# Patient Record
Sex: FEMALE | Race: WHITE | Employment: PART TIME | URBAN - METROPOLITAN AREA
[De-identification: names, ages, dates, MRNs, and addresses within clinical notes are randomized per-mention and may not be internally consistent; named-entity substitution may affect disease eponyms.]

---

## 2017-01-24 RX ORDER — LEVOTHYROXINE SODIUM 0.03 MG/1
TABLET ORAL
Qty: 30 TABLET | Refills: 5 | Status: SHIPPED | OUTPATIENT
Start: 2017-01-24 | End: 2017-04-20 | Stop reason: DRUGHIGH

## 2017-02-06 RX ORDER — HYDROXYZINE PAMOATE 25 MG/1
CAPSULE ORAL
Qty: 90 CAPSULE | Refills: 1 | Status: SHIPPED | OUTPATIENT
Start: 2017-02-06 | End: 2019-05-16

## 2017-04-18 ENCOUNTER — OFFICE VISIT (OUTPATIENT)
Dept: FAMILY MEDICINE CLINIC | Age: 37
End: 2017-04-18

## 2017-04-18 VITALS
TEMPERATURE: 99.3 F | BODY MASS INDEX: 48.54 KG/M2 | DIASTOLIC BLOOD PRESSURE: 84 MMHG | OXYGEN SATURATION: 98 % | RESPIRATION RATE: 20 BRPM | HEART RATE: 78 BPM | SYSTOLIC BLOOD PRESSURE: 122 MMHG | WEIGHT: 274 LBS

## 2017-04-18 DIAGNOSIS — N63.10 BREAST MASS, RIGHT: ICD-10-CM

## 2017-04-18 DIAGNOSIS — E03.9 ACQUIRED HYPOTHYROIDISM: ICD-10-CM

## 2017-04-18 DIAGNOSIS — R10.84 GENERALIZED ABDOMINAL PAIN: Primary | ICD-10-CM

## 2017-04-18 LAB
BILIRUBIN, POC: NORMAL
BLOOD URINE, POC: NORMAL
CLARITY, POC: NORMAL
COLOR, POC: NORMAL
GLUCOSE URINE, POC: NORMAL
KETONES, POC: NORMAL
LEUKOCYTE EST, POC: NORMAL
NITRITE, POC: NORMAL
PH, POC: 5.5
PROTEIN, POC: NORMAL
SPECIFIC GRAVITY, POC: 1.03
T4 FREE: 1.11 NG/DL (ref 0.93–1.7)
TSH REFLEX: 6.78 UIU/ML (ref 0.27–4.2)
UROBILINOGEN, POC: 3.5

## 2017-04-18 PROCEDURE — 99214 OFFICE O/P EST MOD 30 MIN: CPT | Performed by: NURSE PRACTITIONER

## 2017-04-18 PROCEDURE — 81003 URINALYSIS AUTO W/O SCOPE: CPT | Performed by: NURSE PRACTITIONER

## 2017-04-18 RX ORDER — DICYCLOMINE HYDROCHLORIDE 10 MG/1
10 CAPSULE ORAL
Qty: 120 CAPSULE | Refills: 0 | Status: SHIPPED | OUTPATIENT
Start: 2017-04-18 | End: 2017-08-16 | Stop reason: SDUPTHER

## 2017-04-18 ASSESSMENT — ENCOUNTER SYMPTOMS
HEMATOCHEZIA: 0
BELCHING: 0
DIARRHEA: 0
ABDOMINAL PAIN: 1
FLATUS: 0
CONSTIPATION: 0
NAUSEA: 0
VOMITING: 0

## 2017-04-18 ASSESSMENT — CROHNS DISEASE ACTIVITY INDEX (CDAI): CDAI SCORE: 0

## 2017-04-19 ENCOUNTER — TELEPHONE (OUTPATIENT)
Dept: FAMILY MEDICINE CLINIC | Age: 37
End: 2017-04-19

## 2017-04-19 DIAGNOSIS — N63.10 BREAST MASS, RIGHT: Primary | ICD-10-CM

## 2017-04-20 DIAGNOSIS — E03.9 PRIMARY HYPOTHYROIDISM: Primary | ICD-10-CM

## 2017-04-20 RX ORDER — LEVOTHYROXINE SODIUM 0.2 MG/1
200 TABLET ORAL DAILY
Qty: 30 TABLET | Refills: 3 | Status: SHIPPED | OUTPATIENT
Start: 2017-04-20 | End: 2017-09-03 | Stop reason: SDUPTHER

## 2017-05-01 ENCOUNTER — HOSPITAL ENCOUNTER (OUTPATIENT)
Dept: ULTRASOUND IMAGING | Age: 37
Discharge: HOME | End: 2017-05-01
Payer: COMMERCIAL

## 2017-05-01 ENCOUNTER — HOSPITAL ENCOUNTER (OUTPATIENT)
Dept: WOMENS IMAGING | Age: 37
Discharge: HOME | End: 2017-05-01
Payer: COMMERCIAL

## 2017-05-01 DIAGNOSIS — N63.10 BREAST MASS, RIGHT: ICD-10-CM

## 2017-05-01 DIAGNOSIS — N63.10 LUMP OF RIGHT BREAST: Primary | ICD-10-CM

## 2017-05-01 DIAGNOSIS — N63.10 LUMP OF RIGHT BREAST: ICD-10-CM

## 2017-05-01 PROCEDURE — 76642 ULTRASOUND BREAST LIMITED: CPT

## 2017-05-01 PROCEDURE — G0204 DX MAMMO INCL CAD BI: HCPCS

## 2017-05-03 ENCOUNTER — HOSPITAL ENCOUNTER (EMERGENCY)
Age: 37
Discharge: HOME | End: 2017-05-03
Payer: COMMERCIAL

## 2017-05-03 VITALS
TEMPERATURE: 98 F | DIASTOLIC BLOOD PRESSURE: 79 MMHG | HEART RATE: 89 BPM | RESPIRATION RATE: 16 BRPM | HEIGHT: 64 IN | SYSTOLIC BLOOD PRESSURE: 128 MMHG | OXYGEN SATURATION: 99 % | BODY MASS INDEX: 46.44 KG/M2 | WEIGHT: 272 LBS

## 2017-05-03 DIAGNOSIS — N61.0 CELLULITIS OF BREAST: Primary | ICD-10-CM

## 2017-05-03 PROCEDURE — 99282 EMERGENCY DEPT VISIT SF MDM: CPT

## 2017-05-03 RX ORDER — DOXYCYCLINE HYCLATE 100 MG
100 TABLET ORAL 2 TIMES DAILY
Qty: 20 TABLET | Refills: 0 | Status: SHIPPED | OUTPATIENT
Start: 2017-05-03 | End: 2017-05-13

## 2017-05-03 RX ORDER — HYDROCODONE BITARTRATE AND ACETAMINOPHEN 5; 325 MG/1; MG/1
1-2 TABLET ORAL EVERY 6 HOURS PRN
Qty: 14 TABLET | Refills: 0 | Status: SHIPPED | OUTPATIENT
Start: 2017-05-03 | End: 2017-05-10

## 2017-05-03 ASSESSMENT — PAIN SCALES - GENERAL: PAINLEVEL_OUTOF10: 9

## 2017-05-03 ASSESSMENT — PAIN DESCRIPTION - LOCATION: LOCATION: BREAST

## 2017-05-03 ASSESSMENT — PAIN DESCRIPTION - ORIENTATION: ORIENTATION: RIGHT

## 2017-05-05 ENCOUNTER — OFFICE VISIT (OUTPATIENT)
Dept: FAMILY MEDICINE CLINIC | Age: 37
End: 2017-05-05

## 2017-05-05 VITALS
BODY MASS INDEX: 47.03 KG/M2 | SYSTOLIC BLOOD PRESSURE: 112 MMHG | DIASTOLIC BLOOD PRESSURE: 78 MMHG | HEART RATE: 89 BPM | RESPIRATION RATE: 20 BRPM | WEIGHT: 274 LBS | TEMPERATURE: 97.8 F

## 2017-05-05 DIAGNOSIS — N63.10 BREAST MASS, RIGHT: ICD-10-CM

## 2017-05-05 DIAGNOSIS — N63.10 LUMP OF RIGHT BREAST: Primary | ICD-10-CM

## 2017-05-05 PROCEDURE — 99213 OFFICE O/P EST LOW 20 MIN: CPT | Performed by: NURSE PRACTITIONER

## 2017-05-05 RX ORDER — DOXYCYCLINE 100 MG/1
CAPSULE ORAL
COMMUNITY
Start: 2017-05-03 | End: 2019-05-16 | Stop reason: ALTCHOICE

## 2017-05-08 ENCOUNTER — OFFICE VISIT (OUTPATIENT)
Dept: SURGERY | Age: 37
End: 2017-05-08

## 2017-05-08 VITALS — TEMPERATURE: 98.3 F | WEIGHT: 273 LBS | BODY MASS INDEX: 46.61 KG/M2 | HEIGHT: 64 IN

## 2017-05-08 DIAGNOSIS — N60.81 SEBACEOUS CYST OF BREAST, RIGHT: ICD-10-CM

## 2017-05-08 DIAGNOSIS — L02.213 ABSCESS OF CHEST WALL: Primary | ICD-10-CM

## 2017-05-08 PROCEDURE — 11401 EXC TR-EXT B9+MARG 0.6-1 CM: CPT | Performed by: SURGERY

## 2017-05-08 PROCEDURE — 99201 PR OFFICE OUTPATIENT NEW 10 MINUTES: CPT | Performed by: SURGERY

## 2017-05-09 RX ORDER — LEVOTHYROXINE SODIUM 175 UG/1
TABLET ORAL
Qty: 30 TABLET | Refills: 3 | Status: SHIPPED | OUTPATIENT
Start: 2017-05-09 | End: 2019-03-15 | Stop reason: DRUGHIGH

## 2017-05-15 ENCOUNTER — OFFICE VISIT (OUTPATIENT)
Dept: SURGERY | Age: 37
End: 2017-05-15

## 2017-05-15 VITALS
HEART RATE: 84 BPM | BODY MASS INDEX: 46.78 KG/M2 | HEIGHT: 64 IN | SYSTOLIC BLOOD PRESSURE: 118 MMHG | RESPIRATION RATE: 12 BRPM | WEIGHT: 274 LBS | TEMPERATURE: 98 F | DIASTOLIC BLOOD PRESSURE: 80 MMHG

## 2017-05-15 DIAGNOSIS — Z09 SURGERY FOLLOW-UP: Primary | ICD-10-CM

## 2017-05-15 PROCEDURE — 99024 POSTOP FOLLOW-UP VISIT: CPT | Performed by: SURGERY

## 2017-08-02 DIAGNOSIS — E03.9 PRIMARY HYPOTHYROIDISM: ICD-10-CM

## 2017-08-02 LAB
T4 FREE: 1.21 NG/DL (ref 0.93–1.7)
TSH SERPL DL<=0.05 MIU/L-ACNC: 2.84 UIU/ML (ref 0.27–4.2)

## 2017-08-16 DIAGNOSIS — R10.84 GENERALIZED ABDOMINAL PAIN: ICD-10-CM

## 2017-08-16 RX ORDER — DICYCLOMINE HYDROCHLORIDE 10 MG/1
10 CAPSULE ORAL
Qty: 120 CAPSULE | Refills: 0 | Status: SHIPPED | OUTPATIENT
Start: 2017-08-16 | End: 2017-09-13 | Stop reason: SDUPTHER

## 2017-09-05 RX ORDER — LEVOTHYROXINE SODIUM 0.2 MG/1
TABLET ORAL
Qty: 30 TABLET | Refills: 5 | Status: SHIPPED | OUTPATIENT
Start: 2017-09-05 | End: 2018-03-12 | Stop reason: SDUPTHER

## 2017-09-13 DIAGNOSIS — R10.84 GENERALIZED ABDOMINAL PAIN: ICD-10-CM

## 2017-09-13 RX ORDER — DICYCLOMINE HYDROCHLORIDE 10 MG/1
CAPSULE ORAL
Qty: 120 CAPSULE | Refills: 0 | Status: SHIPPED | OUTPATIENT
Start: 2017-09-13 | End: 2019-05-16 | Stop reason: SDUPTHER

## 2018-01-04 ENCOUNTER — OFFICE VISIT (OUTPATIENT)
Dept: FAMILY MEDICINE CLINIC | Age: 38
End: 2018-01-04

## 2018-01-04 VITALS
DIASTOLIC BLOOD PRESSURE: 80 MMHG | BODY MASS INDEX: 46.78 KG/M2 | HEART RATE: 85 BPM | SYSTOLIC BLOOD PRESSURE: 112 MMHG | OXYGEN SATURATION: 98 % | RESPIRATION RATE: 16 BRPM | WEIGHT: 274 LBS | HEIGHT: 64 IN | TEMPERATURE: 98.6 F

## 2018-01-04 DIAGNOSIS — M25.551 RIGHT HIP PAIN: Primary | ICD-10-CM

## 2018-01-04 DIAGNOSIS — M70.61 GREATER TROCHANTERIC BURSITIS OF RIGHT HIP: ICD-10-CM

## 2018-01-04 PROCEDURE — 1036F TOBACCO NON-USER: CPT | Performed by: NURSE PRACTITIONER

## 2018-01-04 PROCEDURE — G8417 CALC BMI ABV UP PARAM F/U: HCPCS | Performed by: NURSE PRACTITIONER

## 2018-01-04 PROCEDURE — G8484 FLU IMMUNIZE NO ADMIN: HCPCS | Performed by: NURSE PRACTITIONER

## 2018-01-04 PROCEDURE — 99213 OFFICE O/P EST LOW 20 MIN: CPT | Performed by: NURSE PRACTITIONER

## 2018-01-04 PROCEDURE — G8427 DOCREV CUR MEDS BY ELIG CLIN: HCPCS | Performed by: NURSE PRACTITIONER

## 2018-01-04 RX ORDER — PREDNISONE 10 MG/1
TABLET ORAL
Qty: 30 TABLET | Refills: 0 | Status: SHIPPED | OUTPATIENT
Start: 2018-01-04 | End: 2018-02-14

## 2018-01-04 ASSESSMENT — PATIENT HEALTH QUESTIONNAIRE - PHQ9
SUM OF ALL RESPONSES TO PHQ QUESTIONS 1-9: 0
2. FEELING DOWN, DEPRESSED OR HOPELESS: 0
1. LITTLE INTEREST OR PLEASURE IN DOING THINGS: 0
SUM OF ALL RESPONSES TO PHQ9 QUESTIONS 1 & 2: 0

## 2018-01-04 ASSESSMENT — ENCOUNTER SYMPTOMS: BACK PAIN: 0

## 2018-01-04 NOTE — PROGRESS NOTES
discontinued medications. Return in about 2 months (around 3/4/2018) for Irena Tucker. Reviewed with the patient: current clinical status, medications, activities and diet. Side effects, adverse effects of the medication prescribed today, as well as treatment plan/ rationale and result expectations have been discussed with the patient who expresses understanding and desires to proceed. Close follow up to evaluate treatment results and for coordination of care. I have reviewed the patient's medical history in detail and updated the computerized patient record.     Nj Alcantara NP

## 2018-01-04 NOTE — PATIENT INSTRUCTIONS
knee. With your opposite hand, reach across your body, and then gently pull your knee toward your opposite shoulder. 3. Hold the stretch for 15 to 30 seconds. 4. Repeat 2 to 4 times. Double knee-to-chest    1. Lie on your back with your knees bent and your feet flat on the floor. You can put a small pillow under your head and neck if it is more comfortable. 2. Bring both knees to your chest.  3. Keep your lower back pressed to the floor. Hold for 15 to 30 seconds. 4. Relax, and lower your knees to the starting position. 5. Repeat 2 to 4 times. Follow-up care is a key part of your treatment and safety. Be sure to make and go to all appointments, and call your doctor if you are having problems. It's also a good idea to know your test results and keep a list of the medicines you take. Where can you learn more? Go to https://Pear (formerly [employer]SoundCure. org and sign in to your [employer] account. Enter X636 in the [employer] box to learn more about \"Trochanteric Bursitis: Exercises. \"     If you do not have an account, please click on the \"Sign Up Now\" link. Current as of: March 21, 2017  Content Version: 11.5  © 9161-8938 Healthwise, [employer]. Care instructions adapted under license by [facility] ([facility]). If you have questions about a medical condition or this instruction, always ask your healthcare professional. [place] [address] any warranty or liability for your use of this information. Patient Education        Learning About a Hip Bursa Injection  What is a hip bursa injection? A bursa is a small sac of fluid that cushions an area between tendons and bones. The bursa on the outer side of the hip bone is called the trochanteric (say \"RGBN-xrr-CCEC-ik\") bursa. Sometimes it can become swollen and painful. This condition is called bursitis. An injection into the bursa is a shot of medicine to reduce pain and swelling.  The medicines may include pain relievers and steroid medicines. A steroid shot can sometimes help with short-term pain relief when other treatments haven't worked. How is a hip bursa injection done? First the area over the bursa will be cleaned. Your doctor may use a tiny needle to numb the skin over the area where you will get the injection. If a tiny needle is used to numb the area, your doctor will use another needle to inject the medicine. Your doctor may use a pain reliever, a steroid, or both. You may feel some pressure or discomfort. The procedure takes 10 to 30 minutes. But the shot itself usually takes only a few minutes. Your doctor may put ice on the area before you go home. You will probably go home shortly after your shot. What can you expect after the injection? You may have numbness over your hip for a few hours. If your shot included both a pain reliever and a steroid, the pain will probably go away right away. But it might come back after a few hours. This might happen if the pain reliever wears off and the steroid has not started to work yet. The steroid medicine generally takes a few days to work. If the pain comes back, you can put ice or a cold pack on your hip for 10 to 20 minutes at a time. Put a thin cloth between the ice and your skin. Follow your doctor's instructions carefully. Avoid strenuous activities on the day you get the shot, especially those that put stress on your hip. Steroids don't always work. But when they do, the pain relief can last for several days to a few months or longer. Follow-up care is a key part of your treatment and safety. Be sure to make and go to all appointments, and call your doctor if you are having problems. It's also a good idea to know your test results and keep a list of the medicines you take. Where can you learn more? Go to https://jazmyn.CommunityForce. org and sign in to your [email or web address] account.  Enter B755 in the [employer]Money box to learn more about \"Learning About a Hip

## 2018-02-09 ENCOUNTER — TELEPHONE (OUTPATIENT)
Dept: FAMILY MEDICINE CLINIC | Age: 38
End: 2018-02-09

## 2018-02-09 NOTE — TELEPHONE ENCOUNTER
Patient was seen at [facility] 2/6/18 for back lower lumbar strain. X-ray showed degenerative disease in spine. Patient has appt next week but would like some medication for pain to get her through until then. Ibuprofen and Tylenol is not working. Pharmacy is [facility].

## 2018-02-14 ENCOUNTER — OFFICE VISIT (OUTPATIENT)
Dept: FAMILY MEDICINE CLINIC | Age: 38
End: 2018-02-14
Payer: COMMERCIAL

## 2018-02-14 VITALS
SYSTOLIC BLOOD PRESSURE: 120 MMHG | WEIGHT: 275 LBS | BODY MASS INDEX: 46.95 KG/M2 | TEMPERATURE: 98.1 F | HEIGHT: 64 IN | HEART RATE: 76 BPM | RESPIRATION RATE: 16 BRPM | DIASTOLIC BLOOD PRESSURE: 70 MMHG

## 2018-02-14 DIAGNOSIS — G89.29 CHRONIC MIDLINE LOW BACK PAIN WITHOUT SCIATICA: Primary | ICD-10-CM

## 2018-02-14 DIAGNOSIS — M54.50 CHRONIC MIDLINE LOW BACK PAIN WITHOUT SCIATICA: Primary | ICD-10-CM

## 2018-02-14 DIAGNOSIS — M47.816 SPONDYLOSIS OF LUMBAR REGION WITHOUT MYELOPATHY OR RADICULOPATHY: ICD-10-CM

## 2018-02-14 DIAGNOSIS — S39.012A LUMBOSACRAL STRAIN, INITIAL ENCOUNTER: ICD-10-CM

## 2018-02-14 PROCEDURE — G8484 FLU IMMUNIZE NO ADMIN: HCPCS | Performed by: FAMILY MEDICINE

## 2018-02-14 PROCEDURE — 99213 OFFICE O/P EST LOW 20 MIN: CPT | Performed by: FAMILY MEDICINE

## 2018-02-14 PROCEDURE — 1036F TOBACCO NON-USER: CPT | Performed by: FAMILY MEDICINE

## 2018-02-14 PROCEDURE — G8427 DOCREV CUR MEDS BY ELIG CLIN: HCPCS | Performed by: FAMILY MEDICINE

## 2018-02-14 PROCEDURE — G8417 CALC BMI ABV UP PARAM F/U: HCPCS | Performed by: FAMILY MEDICINE

## 2018-02-14 RX ORDER — CYCLOBENZAPRINE HCL 10 MG
10 TABLET ORAL NIGHTLY PRN
Qty: 30 TABLET | Refills: 1 | Status: SHIPPED | OUTPATIENT
Start: 2018-02-14 | End: 2018-02-24

## 2018-02-14 NOTE — PROGRESS NOTES
 hydrOXYzine (VISTARIL) 25 MG capsule TAKE 1 CAPSULE BY MOUTH 4 TIMES DAILY AS NEEDED FOR ANXIETY 90 capsule 1    ibuprofen (ADVIL;MOTRIN) 600 MG tablet   2    omeprazole (PRILOSEC) 20 MG capsule Take 1 capsule by mouth Daily. for stomach 60 capsule 2     No current facility-administered medications for this visit. The patient denies any history of      seizures,             heart attack or KNOWN CAD        or stroke. No chest pain, shortness of breath, paroxysmal nocturnal dyspnea. No nausea, vomiting, diarrhea, hematochezia or melena. No paresthesias or headaches. No dysuria, frequency or hematuria. Last labs  No visits with results within 3 Month(s) from this visit. Latest known visit with results is:   Orders Only on 08/02/2017   Component Date Value Ref Range Status    T4 Free 08/02/2017 1.21  0.93 - 1.70 ng/dL Final    TSH 08/02/2017 2.840  0.270 - 4.200 uIU/mL Final     Health Maintenance   Topic Date Due    HIV screen  08/06/1995    DTaP/Tdap/Td vaccine (1 - Tdap) 01/04/2019 (Originally 8/6/1999)    Flu vaccine (1) 01/04/2019 (Originally 9/1/2017)    Cervical cancer screen  04/27/2018    TSH testing  08/02/2018       No results found for this visit on 02/14/18. Objective    Vitals:    02/14/18 0902   BP: 120/70   Pulse: 76   Resp: 16   Temp: 98.1 °F (36.7 °C)   TempSrc: Oral   Weight: 275 lb (124.7 kg)   Height: 5' 4\" (1.626 m)       PHYSICAL EXAMINATION:        GENERAL:    The patient appears well nourished and well-developed,     Normal affect. Not appearing significantly anxious or depressed. No acute respiratory distress. Alert and oriented times 3. Skin:     No skin rashes. No concerning moles observed. Gait:    Normal gait. No ataxia. HEENT:  Normocephalic, atraumatic. Throat:  Pharynx is clear, no erythema/ edema or exudates   Ears:    TMs normal bilaterally.   Canals and ears normal   Eyes:  Extraocular eye motions intact and pain

## 2018-02-14 NOTE — PATIENT INSTRUCTIONS
Patient Education        Back Pain: Care Instructions  Your Care Instructions    Back pain has many possible causes. It is often related to problems with muscles and ligaments of the back. It may also be related to problems with the nerves, discs, or bones of the back. Moving, lifting, standing, sitting, or sleeping in an awkward way can strain the back. Sometimes you don't notice the injury until later. Arthritis is another common cause of back pain. Although it may hurt a lot, back pain usually improves on its own within several weeks. Most people recover in 12 weeks or less. Using good home treatment and being careful not to stress your back can help you feel better sooner. Follow-up care is a key part of your treatment and safety. Be sure to make and go to all appointments, and call your doctor if you are having problems. It's also a good idea to know your test results and keep a list of the medicines you take. How can you care for yourself at home? · Sit or lie in positions that are most comfortable and reduce your pain. Try one of these positions when you lie down:  ¨ Lie on your back with your knees bent and supported by large pillows. ¨ Lie on the floor with your legs on the seat of a sofa or chair. Yoana Sas on your side with your knees and hips bent and a pillow between your legs. ¨ Lie on your stomach if it does not make pain worse. · Do not sit up in bed, and avoid soft couches and twisted positions. Bed rest can help relieve pain at first, but it delays healing. Avoid bed rest after the first day of back pain. · Change positions every 30 minutes. If you must sit for long periods of time, take breaks from sitting. Get up and walk around, or lie in a comfortable position. · Try using a heating pad on a low or medium setting for 15 to 20 minutes every 2 or 3 hours. Try a warm shower in place of one session with the heating pad.   · You can also try an ice pack for 10 to 15 minutes every 2 to 3 school or to energize yourself in the morning. You can easily massage your feet, hands, or neck. Self-massage works best if you are in comfortable clothes and are sitting or lying in a comfortable position. Use oil or lotion to massage bare skin. · Reduce stress. Back pain can lead to a vicious [place]: Distress about the pain tenses the muscles in your back, which in turn causes more pain. Learn how to relax your mind and your muscles to lower your stress. Where can you learn more? Go to https://Fitonic AGpeciprianoeb.Bazaarvoice. org and sign in to your [employer] account. Enter N463 in the MPGomatic.com box to learn more about \"Learning About Relief for Back Pain. \"     If you do not have an account, please click on the \"Sign Up Now\" link. Current as of: March 21, 2017  Content Version: 11.5  © 8399-5646 Blink Messenger. Care instructions adapted under license by [facility] ([facility]). If you have questions about a medical condition or this instruction, always ask your healthcare professional. [place] [address] any warranty or liability for your use of this information. Patient Education        Back Pain, Emergency or Urgent Symptoms: Care Instructions  Your Care Instructions    Many people have back pain at one time or another. In most cases, pain gets better with self-care that includes over-the-counter pain medicine, ice, heat, and exercises. Unless you have symptoms of a severe injury or heart attack, you may be able to give yourself a few days before you call a doctor. But some back problems are very serious. Do not ignore symptoms that need to be checked right away. Follow-up care is a key part of your treatment and safety. Be sure to make and go to all appointments, and call your doctor if you are having problems. It's also a good idea to know your test results and keep a list of the medicines you take. How can you care for yourself at home?   · Sit or lie in positions that are most comfortable and that reduce your pain. Try one of these positions when you lie down:  ¨ Lie on your back with your knees bent and supported by large pillows. ¨ Lie on the floor with your legs on the seat of a sofa or chair. Alvarez Doles on your side with your knees and hips bent and a pillow between your legs. ¨ Lie on your stomach if it does not make pain worse. · Do not sit up in bed, and avoid soft couches and twisted positions. Bed rest can help relieve pain at first, but it delays healing. Avoid bed rest after the first day. · Change positions every 30 minutes. If you must sit for long periods of time, take breaks from sitting. Get up and walk around, or lie flat. · Try using a heating pad on a low or medium setting, for 15 to 20 minutes every 2 or 3 hours. Try a warm shower in place of one session with the heating pad. You can also buy single-use heat wraps that last up to 8 hours. You can also try ice or cold packs on your back for 10 to 20 minutes at a time, several times a day. (Put a thin cloth between the ice pack and your skin.) This reduces pain and makes it easier to be active and exercise. · Take pain medicines exactly as directed. ¨ If the doctor gave you a prescription medicine for pain, take it as prescribed. ¨ If you are not taking a prescription pain medicine, ask your doctor if you can take an over-the-counter medicine. When should you call for help? Call 911 anytime you think you may need emergency care. For example, call if:  ? · You are unable to move a leg at all. ? · You have back pain with severe belly pain. ? · You have symptoms of a heart attack. These may include:  ¨ Chest pain or pressure, or a strange feeling in the chest.  ¨ Sweating. ¨ Shortness of breath. ¨ Nausea or vomiting. ¨ Pain, pressure, or a strange feeling in the back, neck, jaw, or upper belly or in one or both shoulders or arms. ¨ Lightheadedness or sudden weakness.   ¨ A fast or irregular the ball forward. You will bear most of your weight on your arms. 5. If your back starts to ache, you've gone too far. Pull back a bit. 6. Roll back to the start position. 7. Repeat 8 to 12 times. Walk-out plank on ball    1. Kneel over the ball. Place your hands on the floor in front of you. 2. Walk your hands forward until your legs are straight on the ball. This is the plank position. 3. When in plank position, hold your body straight and tighten your belly and buttocks muscles. Keep your chin slightly tucked. 4. Roll as far forward as you can without losing your balance or letting your hips drop. You may stop with the ball under your thighs, or even under your knees or shins. 5. Hold a few seconds, then walk your hands back and return to the start position. 6. Repeat 8 to 12 times. Push-up with thighs on ball    1. Kneel over the ball. Place your hands on the floor in front of you. 2. Walk your hands forward until your legs are straight on the ball. This is the plank position. 3. When in plank position, hold your body straight and tighten your belly and buttocks muscles. Keep your chin slightly tucked. 4. Roll as far forward as you can without losing your balance or letting your hips drop. You may stop with the ball under your thighs, or even under your knees or shins. 5. Bend your elbows. Slowly lower your body toward the ground as far as you can without losing your balance. 6. If your wrists hurt, try moving your hands a little farther apart so they're not right under your shoulders. 7. Slowly straighten your arms. 8. Do 8 to 12 of these push-ups. Wall squat with ball    1. Stand facing away from a wall. Place your feet about shoulder-width apart. 2. Place the ball between your middle back and the wall. Move your feet out in front of you so they are about a foot in front of your hips. 3. Keep your arms at your sides, or put your hands on your hips.   4. Slowly squat down as if you are going

## 2018-02-28 ENCOUNTER — HOSPITAL ENCOUNTER (OUTPATIENT)
Dept: PHYSICAL THERAPY | Age: 38
Setting detail: THERAPIES SERIES
Discharge: HOME | End: 2018-02-28
Payer: COMMERCIAL

## 2018-02-28 PROCEDURE — 97110 THERAPEUTIC EXERCISES: CPT

## 2018-02-28 PROCEDURE — G0283 ELEC STIM OTHER THAN WOUND: HCPCS

## 2018-02-28 PROCEDURE — 97162 PT EVAL MOD COMPLEX 30 MIN: CPT

## 2018-02-28 ASSESSMENT — PAIN DESCRIPTION - ORIENTATION: ORIENTATION: RIGHT;LEFT;LOWER

## 2018-02-28 ASSESSMENT — PAIN DESCRIPTION - LOCATION: LOCATION: BACK

## 2018-02-28 ASSESSMENT — PAIN DESCRIPTION - DESCRIPTORS: DESCRIPTORS: BURNING

## 2018-02-28 ASSESSMENT — PAIN SCALES - GENERAL: PAINLEVEL_OUTOF10: 6

## 2018-02-28 ASSESSMENT — PAIN DESCRIPTION - PAIN TYPE: TYPE: CHRONIC PAIN

## 2018-02-28 NOTE — PROGRESS NOTES
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     Date: 2018  Treatment Diagnosis: Impaired Strength; Impaired ROM;  LBP; Joint hypomobility       Progress Report Period from:  2018  to 2018    Total # of Visits to Date: 1              OBJECTIVE:   Short Term Goals -      Goals Current/Discharge status  Met   Short term goal 1: I w/ HEP  Initiated HEP this date [] yes  [] no     Long Term Goals - Time Frame for Long term goals : 10 visits  Goals Current/ Discharge status Met   Long term goal 1: Dec Pain to </= 4/10 at worst   Pain Location: Back    Pain Level: 6 (weekly range: 4/10 to 8/10)    Pain Descriptors: Burning       [] yes  [] no   Long term goal 2: Inc strength B HS and hip SLR, Abd, Ext, IR, ER >/= 4+/5 to dec tune w/ transfers Strength RLE  Comment: HIP: IR 4/5;  ER 4/5;  SLR 4/5;  Abd 4+/5; Ext 4/5;  HS 4-/5; Quad and DF5/5  Strength LLE  Comment: HIP:  IR 4-/5;  ER 4/5;  SLR 4+/5;   Abd 4/5;  Ext 4/5;  HS 4-/5;  Quad and DF 5/5           [] yes  [] no   Long term goal 3: Pt to demo Lx AROM w/ </= 3/10 pain  to improve alan of work duties PROM RLE (degrees)  RLE General PROM: hip IR and ER WFL     PROM LLE (degrees)  LLE General PROM: hip IR and ER [facility]              Spine  Lumbar: flex WFL-WNL ERP; Ext > 50% limited , PDM, inc at end range;  B SB WNL, PDM        [] yes  [] no   Long term goal 4: Pt to demo lev 2 TA march w/ neutral LB to improve bed mobility Strength Other  Other: unable to maintain nuetral LB w/ Leve 1 TA march [] yes  [] no   Long term goal 5: Dec TONI to
d/t bursitis; Pt demos painful Lx AROM w/ limited Ext, dec strength B LEs and core effecting pt's function w/ transfers, and with  bending, lifting, carrying and turning needed for job duties. Prognosis: Good           History: high - chronicity, BMI, depression  Exam: high- ROM, strength LE, strength core, TONI 17/50   Clinical Presentation: mod- evolving        Plan  Frequency/Duration:  Plan  Times per week: 2  Plan weeks: 5  Current Treatment Recommendations: Strengthening, ROM, Neuromuscular Re-education, Manual Therapy - Soft Tissue Mobilization, Manual Therapy - Joint Manipulation, Pain Management, Modalities, Home Exercise Program  Plan Comment: skilled PT         Patient Education  New Education Provided: HEP    POST-PAIN     Pain Rating (0-10 pain scale):   5/10 ( 7/10 pre IFC and HP)  Location and pain description same as pre-treatment unless indicated. Action: [] NA  [] Call Physician  [x] Perform HEP  [] Meds as prescribed    Evaluation and patient rights have been reviewed and patient agrees with plan of care. Yes  [x]  No  []   Explain:       Reina Fall Risk Assessment  Risk Factor Scale  Score   History of Falls [] Yes  [] No 25  0 0   Secondary Diagnosis [] Yes  [] No 15  0 0   Ambulatory Aid [] Furniture  [] Crutches/cane/walker  [] None/bedrest/wheelchair/nurse 30  15  0 0   IV/Heparin Lock [] Yes  [] No 20  0 0   Gait/Transferring [] Impaired  [] Weak  [] Normal/bedrest/immobile 20  10  0 0   Mental Status [] Forgets limitations  [] Oriented to own ability 15  0 0      Total:0     Based on the Assessment score: check the appropriate box.   [x]  No intervention needed   Low =   Score of 0-24  []  Use standard prevention interventions Moderate =  Score of 24-44   [] Discuss fall prevention strategies   [] Indicate moderate falls risk on eval  []  Use high risk prevention interventions High = Score of 45 and higher   [] Discuss fall prevention strategies   [] Provide supervision during

## 2018-03-02 ENCOUNTER — HOSPITAL ENCOUNTER (OUTPATIENT)
Dept: PHYSICAL THERAPY | Age: 38
Setting detail: THERAPIES SERIES
Discharge: HOME | End: 2018-03-02
Payer: COMMERCIAL

## 2018-03-02 NOTE — PROGRESS NOTES
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    Visit Information:  PT Visit Information  Onset Date: 18  [facility]  Total # of Visits Approved: 30  Total # of Visits to Date: 1  No Show: 0  Canceled Appointment: 1  Progress Note Counter: 1/10 (CX 3/2/18)     For today's appointment patient:  [x]  Cancelled  []  Rescheduled appointment  []  No-show   []  Called pt to remind of next appointment     Reason given by patient:  []  Patient ill  []  Conflicting appointment  []  No transportation    [x]  Conflict with work  []  No reason given  []  Other:        Signature: Electronically signed by Ana Paula Moore PTA on 3/2/18 at 7:23 AM

## 2018-03-07 ENCOUNTER — HOSPITAL ENCOUNTER (OUTPATIENT)
Dept: PHYSICAL THERAPY | Age: 38
Setting detail: THERAPIES SERIES
Discharge: HOME | End: 2018-03-07
Payer: COMMERCIAL

## 2018-03-07 NOTE — PROGRESS NOTES
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    Visit Information:  PT Visit Information  Onset Date: 18  [facility]  Total # of Visits Approved: 30  Total # of Visits to Date: 1  No Show: 0  Canceled Appointment: 2  Progress Note Counter: 1/10 (CX 3/7/18)     For today's appointment patient:  [x]  Cancelled  []  Rescheduled appointment  []  No-show   []  Called pt to remind of next appointment     Reason given by patient:  []  Patient ill  []  Conflicting appointment  []  No transportation    []  Conflict with work  []  No reason given  [x]  Other:  Pt having  issues. Cancelling all scheduled appts at this time. States will call back to reschedule next week.      Comments:       Signature: Electronically signed by Lucien Hines PTA on 3/7/18 at 10:35 AM

## 2018-03-12 RX ORDER — LEVOTHYROXINE SODIUM 0.2 MG/1
TABLET ORAL
Qty: 30 TABLET | Refills: 5 | Status: SHIPPED | OUTPATIENT
Start: 2018-03-12 | End: 2018-09-17 | Stop reason: SDUPTHER

## 2018-05-01 ENCOUNTER — CLINICAL DOCUMENTATION (OUTPATIENT)
Dept: PHYSICAL THERAPY | Age: 38
End: 2018-05-01

## 2018-06-22 ENCOUNTER — TELEPHONE (OUTPATIENT)
Dept: FAMILY MEDICINE CLINIC | Age: 38
End: 2018-06-22

## 2018-08-27 ENCOUNTER — OFFICE VISIT (OUTPATIENT)
Dept: FAMILY MEDICINE CLINIC | Age: 38
End: 2018-08-27
Payer: COMMERCIAL

## 2018-08-27 VITALS
WEIGHT: 268 LBS | SYSTOLIC BLOOD PRESSURE: 130 MMHG | HEIGHT: 63 IN | DIASTOLIC BLOOD PRESSURE: 74 MMHG | RESPIRATION RATE: 17 BRPM | BODY MASS INDEX: 47.48 KG/M2 | TEMPERATURE: 98 F | HEART RATE: 77 BPM

## 2018-08-27 DIAGNOSIS — Z01.419 PAP TEST, AS PART OF ROUTINE GYNECOLOGICAL EXAMINATION: ICD-10-CM

## 2018-08-27 DIAGNOSIS — E03.9 ACQUIRED HYPOTHYROIDISM: ICD-10-CM

## 2018-08-27 DIAGNOSIS — Z20.2 EXPOSURE TO GENITAL HERPES: ICD-10-CM

## 2018-08-27 DIAGNOSIS — Z20.2 STD EXPOSURE: ICD-10-CM

## 2018-08-27 DIAGNOSIS — Z01.419 PAP TEST, AS PART OF ROUTINE GYNECOLOGICAL EXAMINATION: Primary | ICD-10-CM

## 2018-08-27 LAB
T4 FREE: 0.86 NG/DL (ref 0.93–1.7)
TSH SERPL DL<=0.05 MIU/L-ACNC: 38.54 UIU/ML (ref 0.27–4.2)

## 2018-08-27 PROCEDURE — 99395 PREV VISIT EST AGE 18-39: CPT | Performed by: NURSE PRACTITIONER

## 2018-08-30 LAB
GENITAL CULTURE, ROUTINE: NORMAL
HERPES TYPE 1/2 IGM COMBINED: 1 IV
HERPES TYPE I/II IGG COMBINED: >22.4 IV
HSV 1 GLYCOPROTEIN G AB IGG: 41.6 IV
HSV 2 GLYCOPROTEIN G AB IGG: 3.11 IV

## 2018-09-05 LAB
C TRACH DNA GENITAL QL NAA+PROBE: NEGATIVE
N. GONORRHOEAE DNA: NEGATIVE

## 2018-09-06 ENCOUNTER — TELEPHONE (OUTPATIENT)
Dept: FAMILY MEDICINE CLINIC | Age: 38
End: 2018-09-06

## 2018-09-06 NOTE — TELEPHONE ENCOUNTER
Patient is still waiting to hear from you as to what she need to do about her thyroid and HSV results. Please review message sent back to you.

## 2018-09-12 DIAGNOSIS — E03.9 PRIMARY HYPOTHYROIDISM: Primary | ICD-10-CM

## 2018-09-12 RX ORDER — LIOTHYRONINE SODIUM 5 UG/1
5 TABLET ORAL DAILY
Qty: 30 TABLET | Refills: 3 | Status: SHIPPED | OUTPATIENT
Start: 2018-09-12 | End: 2019-01-30 | Stop reason: SDUPTHER

## 2018-09-12 RX ORDER — LEVOTHYROXINE SODIUM 0.03 MG/1
25 TABLET ORAL DAILY
Qty: 30 TABLET | Refills: 3 | Status: SHIPPED | OUTPATIENT
Start: 2018-09-12 | End: 2019-01-12 | Stop reason: SDUPTHER

## 2018-09-17 RX ORDER — LEVOTHYROXINE SODIUM 0.2 MG/1
TABLET ORAL
Qty: 30 TABLET | Refills: 5 | Status: SHIPPED | OUTPATIENT
Start: 2018-09-17 | End: 2019-04-02 | Stop reason: SDUPTHER

## 2018-12-21 ENCOUNTER — TELEPHONE (OUTPATIENT)
Dept: FAMILY MEDICINE CLINIC | Age: 38
End: 2018-12-21

## 2018-12-21 DIAGNOSIS — E03.9 PRIMARY HYPOTHYROIDISM: Primary | ICD-10-CM

## 2018-12-28 DIAGNOSIS — E03.9 PRIMARY HYPOTHYROIDISM: ICD-10-CM

## 2018-12-28 LAB
T4 FREE: 1.08 NG/DL (ref 0.93–1.7)
TSH SERPL DL<=0.05 MIU/L-ACNC: 1.28 UIU/ML (ref 0.27–4.2)

## 2019-01-12 DIAGNOSIS — E03.9 PRIMARY HYPOTHYROIDISM: ICD-10-CM

## 2019-01-14 RX ORDER — LEVOTHYROXINE SODIUM 0.03 MG/1
TABLET ORAL
Qty: 30 TABLET | Refills: 5 | Status: SHIPPED | OUTPATIENT
Start: 2019-01-14 | End: 2019-05-09 | Stop reason: ALTCHOICE

## 2019-01-30 DIAGNOSIS — E03.9 PRIMARY HYPOTHYROIDISM: ICD-10-CM

## 2019-01-30 RX ORDER — LIOTHYRONINE SODIUM 5 UG/1
TABLET ORAL
Qty: 30 TABLET | Refills: 3 | Status: SHIPPED | OUTPATIENT
Start: 2019-01-30 | End: 2019-05-09 | Stop reason: ALTCHOICE

## 2019-03-13 ENCOUNTER — HOSPITAL ENCOUNTER (EMERGENCY)
Age: 39
Discharge: HOME | End: 2019-03-14
Payer: COMMERCIAL

## 2019-03-13 DIAGNOSIS — R79.89 ELEVATED TSH: ICD-10-CM

## 2019-03-13 DIAGNOSIS — R00.2 PALPITATIONS: Primary | ICD-10-CM

## 2019-03-13 LAB
ALBUMIN SERPL-MCNC: 4.5 G/DL (ref 3.5–4.6)
ALP BLD-CCNC: 62 U/L (ref 40–130)
ALT SERPL-CCNC: 14 U/L (ref 0–33)
ANION GAP SERPL CALCULATED.3IONS-SCNC: 10 MEQ/L (ref 9–15)
AST SERPL-CCNC: 13 U/L (ref 0–35)
BASOPHILS ABSOLUTE: 0 K/UL (ref 0–0.2)
BASOPHILS RELATIVE PERCENT: 0.3 %
BILIRUB SERPL-MCNC: 0.6 MG/DL (ref 0.2–0.7)
BUN BLDV-MCNC: 13 MG/DL (ref 6–20)
CALCIUM SERPL-MCNC: 9.1 MG/DL (ref 8.5–9.9)
CHLORIDE BLD-SCNC: 106 MEQ/L (ref 95–107)
CO2: 25 MEQ/L (ref 20–31)
CREAT SERPL-MCNC: 0.6 MG/DL (ref 0.5–0.9)
EKG ATRIAL RATE: 68 BPM
EKG P AXIS: 36 DEGREES
EKG P-R INTERVAL: 154 MS
EKG Q-T INTERVAL: 418 MS
EKG QRS DURATION: 92 MS
EKG QTC CALCULATION (BAZETT): 444 MS
EKG R AXIS: 22 DEGREES
EKG T AXIS: 22 DEGREES
EKG VENTRICULAR RATE: 68 BPM
EOSINOPHILS ABSOLUTE: 0 K/UL (ref 0–0.7)
EOSINOPHILS RELATIVE PERCENT: 0.4 %
GFR AFRICAN AMERICAN: >60
GFR NON-AFRICAN AMERICAN: >60
GLOBULIN: 2.6 G/DL (ref 2.3–3.5)
GLUCOSE BLD-MCNC: 87 MG/DL (ref 70–99)
HCG QUALITATIVE: NEGATIVE
HCT VFR BLD CALC: 36.5 % (ref 37–47)
HEMOGLOBIN: 12.4 G/DL (ref 12–16)
LYMPHOCYTES ABSOLUTE: 2.3 K/UL (ref 1–4.8)
LYMPHOCYTES RELATIVE PERCENT: 34.3 %
MAGNESIUM: 2.1 MG/DL (ref 1.7–2.4)
MCH RBC QN AUTO: 33.1 PG (ref 27–31.3)
MCHC RBC AUTO-ENTMCNC: 34.1 % (ref 33–37)
MCV RBC AUTO: 97.2 FL (ref 82–100)
MONOCYTES ABSOLUTE: 0.6 K/UL (ref 0.2–0.8)
MONOCYTES RELATIVE PERCENT: 9 %
NEUTROPHILS ABSOLUTE: 3.7 K/UL (ref 1.4–6.5)
NEUTROPHILS RELATIVE PERCENT: 56 %
PDW BLD-RTO: 13.2 % (ref 11.5–14.5)
PLATELET # BLD: 147 K/UL (ref 130–400)
POTASSIUM SERPL-SCNC: 3.6 MEQ/L (ref 3.4–4.9)
RBC # BLD: 3.76 M/UL (ref 4.2–5.4)
SODIUM BLD-SCNC: 141 MEQ/L (ref 135–144)
TOTAL PROTEIN: 7.1 G/DL (ref 6.3–8)
TROPONIN: <0.01 NG/ML (ref 0–0.01)
TSH SERPL DL<=0.05 MIU/L-ACNC: 5.45 UIU/ML (ref 0.44–3.86)
WBC # BLD: 6.6 K/UL (ref 4.8–10.8)

## 2019-03-13 PROCEDURE — 36415 COLL VENOUS BLD VENIPUNCTURE: CPT

## 2019-03-13 PROCEDURE — 2580000003 HC RX 258: Performed by: PERSONAL EMERGENCY RESPONSE ATTENDANT

## 2019-03-13 PROCEDURE — 83735 ASSAY OF MAGNESIUM: CPT

## 2019-03-13 PROCEDURE — 80053 COMPREHEN METABOLIC PANEL: CPT

## 2019-03-13 PROCEDURE — 84484 ASSAY OF TROPONIN QUANT: CPT

## 2019-03-13 PROCEDURE — 84703 CHORIONIC GONADOTROPIN ASSAY: CPT

## 2019-03-13 PROCEDURE — 93005 ELECTROCARDIOGRAM TRACING: CPT

## 2019-03-13 PROCEDURE — 84443 ASSAY THYROID STIM HORMONE: CPT

## 2019-03-13 PROCEDURE — 85025 COMPLETE CBC W/AUTO DIFF WBC: CPT

## 2019-03-13 PROCEDURE — 99285 EMERGENCY DEPT VISIT HI MDM: CPT

## 2019-03-13 RX ORDER — 0.9 % SODIUM CHLORIDE 0.9 %
1000 INTRAVENOUS SOLUTION INTRAVENOUS ONCE
Status: COMPLETED | OUTPATIENT
Start: 2019-03-13 | End: 2019-03-14

## 2019-03-13 RX ADMIN — SODIUM CHLORIDE 1000 ML: 9 INJECTION, SOLUTION INTRAVENOUS at 22:36

## 2019-03-13 ASSESSMENT — ENCOUNTER SYMPTOMS
ABDOMINAL PAIN: 0
SHORTNESS OF BREATH: 0
SORE THROAT: 0
NAUSEA: 0
COLOR CHANGE: 0
COUGH: 0
BLOOD IN STOOL: 0
VOMITING: 0
RHINORRHEA: 0
DIARRHEA: 0

## 2019-03-14 ENCOUNTER — APPOINTMENT (OUTPATIENT)
Dept: GENERAL RADIOLOGY | Age: 39
End: 2019-03-14
Payer: COMMERCIAL

## 2019-03-14 VITALS
DIASTOLIC BLOOD PRESSURE: 71 MMHG | RESPIRATION RATE: 18 BRPM | HEART RATE: 71 BPM | SYSTOLIC BLOOD PRESSURE: 103 MMHG | BODY MASS INDEX: 38.09 KG/M2 | TEMPERATURE: 98.1 F | HEIGHT: 63 IN | WEIGHT: 215 LBS | OXYGEN SATURATION: 98 %

## 2019-03-14 LAB
T3 FREE: 2.3 PG/ML (ref 2–4.4)
T4 FREE: 1.23 NG/DL (ref 0.84–1.68)

## 2019-03-14 PROCEDURE — 93010 ELECTROCARDIOGRAM REPORT: CPT | Performed by: INTERNAL MEDICINE

## 2019-03-14 PROCEDURE — 84439 ASSAY OF FREE THYROXINE: CPT

## 2019-03-14 PROCEDURE — 36415 COLL VENOUS BLD VENIPUNCTURE: CPT

## 2019-03-14 PROCEDURE — 84481 FREE ASSAY (FT-3): CPT

## 2019-03-14 PROCEDURE — 71045 X-RAY EXAM CHEST 1 VIEW: CPT

## 2019-03-15 ENCOUNTER — OFFICE VISIT (OUTPATIENT)
Dept: FAMILY MEDICINE CLINIC | Age: 39
End: 2019-03-15
Payer: COMMERCIAL

## 2019-03-15 VITALS
OXYGEN SATURATION: 99 % | HEIGHT: 63 IN | WEIGHT: 215 LBS | TEMPERATURE: 97.2 F | DIASTOLIC BLOOD PRESSURE: 58 MMHG | RESPIRATION RATE: 12 BRPM | SYSTOLIC BLOOD PRESSURE: 96 MMHG | HEART RATE: 64 BPM | BODY MASS INDEX: 38.09 KG/M2

## 2019-03-15 DIAGNOSIS — E66.09 CLASS 2 OBESITY DUE TO EXCESS CALORIES WITHOUT SERIOUS COMORBIDITY WITH BODY MASS INDEX (BMI) OF 38.0 TO 38.9 IN ADULT: ICD-10-CM

## 2019-03-15 DIAGNOSIS — R00.2 PALPITATIONS: Primary | ICD-10-CM

## 2019-03-15 DIAGNOSIS — E03.8 SUBCLINICAL HYPOTHYROIDISM: ICD-10-CM

## 2019-03-15 PROCEDURE — 99214 OFFICE O/P EST MOD 30 MIN: CPT | Performed by: INTERNAL MEDICINE

## 2019-03-15 PROCEDURE — 93000 ELECTROCARDIOGRAM COMPLETE: CPT | Performed by: INTERNAL MEDICINE

## 2019-03-15 PROCEDURE — G8417 CALC BMI ABV UP PARAM F/U: HCPCS | Performed by: INTERNAL MEDICINE

## 2019-03-15 PROCEDURE — G8427 DOCREV CUR MEDS BY ELIG CLIN: HCPCS | Performed by: INTERNAL MEDICINE

## 2019-03-15 PROCEDURE — 1036F TOBACCO NON-USER: CPT | Performed by: INTERNAL MEDICINE

## 2019-03-15 PROCEDURE — G8484 FLU IMMUNIZE NO ADMIN: HCPCS | Performed by: INTERNAL MEDICINE

## 2019-03-15 ASSESSMENT — PATIENT HEALTH QUESTIONNAIRE - PHQ9
2. FEELING DOWN, DEPRESSED OR HOPELESS: 1
1. LITTLE INTEREST OR PLEASURE IN DOING THINGS: 0
SUM OF ALL RESPONSES TO PHQ9 QUESTIONS 1 & 2: 1
SUM OF ALL RESPONSES TO PHQ QUESTIONS 1-9: 1
SUM OF ALL RESPONSES TO PHQ QUESTIONS 1-9: 1

## 2019-03-15 ASSESSMENT — ENCOUNTER SYMPTOMS
BACK PAIN: 0
ABDOMINAL PAIN: 0
SHORTNESS OF BREATH: 0
EYE PAIN: 0

## 2019-04-02 RX ORDER — LEVOTHYROXINE SODIUM 0.2 MG/1
TABLET ORAL
Qty: 30 TABLET | Refills: 0 | Status: SHIPPED | OUTPATIENT
Start: 2019-04-02 | End: 2019-05-09 | Stop reason: ALTCHOICE

## 2019-05-07 ENCOUNTER — TELEPHONE (OUTPATIENT)
Dept: FAMILY MEDICINE CLINIC | Age: 39
End: 2019-05-07

## 2019-05-08 DIAGNOSIS — E03.8 SUBCLINICAL HYPOTHYROIDISM: ICD-10-CM

## 2019-05-08 LAB
T4 FREE: 2.09 NG/DL (ref 0.84–1.68)
TSH REFLEX: 0.04 UIU/ML (ref 0.44–3.86)

## 2019-05-09 RX ORDER — LEVOTHYROXINE SODIUM 0.15 MG/1
150 TABLET ORAL DAILY
Qty: 30 TABLET | Refills: 1 | Status: SHIPPED | OUTPATIENT
Start: 2019-05-09 | End: 2019-07-09 | Stop reason: SDUPTHER

## 2019-05-16 ENCOUNTER — OFFICE VISIT (OUTPATIENT)
Dept: FAMILY MEDICINE CLINIC | Age: 39
End: 2019-05-16
Payer: COMMERCIAL

## 2019-05-16 VITALS
TEMPERATURE: 98.1 F | DIASTOLIC BLOOD PRESSURE: 60 MMHG | WEIGHT: 205 LBS | BODY MASS INDEX: 36.31 KG/M2 | OXYGEN SATURATION: 99 % | HEART RATE: 74 BPM | SYSTOLIC BLOOD PRESSURE: 104 MMHG

## 2019-05-16 DIAGNOSIS — E03.9 HYPOTHYROIDISM, UNSPECIFIED TYPE: ICD-10-CM

## 2019-05-16 DIAGNOSIS — F41.9 ANXIETY: ICD-10-CM

## 2019-05-16 DIAGNOSIS — R00.2 PALPITATIONS: ICD-10-CM

## 2019-05-16 DIAGNOSIS — R10.9 ABDOMINAL CRAMPS: Primary | ICD-10-CM

## 2019-05-16 DIAGNOSIS — K58.9 IRRITABLE BOWEL SYNDROME WITHOUT DIARRHEA: ICD-10-CM

## 2019-05-16 PROCEDURE — G8427 DOCREV CUR MEDS BY ELIG CLIN: HCPCS | Performed by: INTERNAL MEDICINE

## 2019-05-16 PROCEDURE — 99214 OFFICE O/P EST MOD 30 MIN: CPT | Performed by: INTERNAL MEDICINE

## 2019-05-16 PROCEDURE — 1036F TOBACCO NON-USER: CPT | Performed by: INTERNAL MEDICINE

## 2019-05-16 PROCEDURE — G8417 CALC BMI ABV UP PARAM F/U: HCPCS | Performed by: INTERNAL MEDICINE

## 2019-05-16 RX ORDER — PANTOPRAZOLE SODIUM 40 MG/1
TABLET, DELAYED RELEASE ORAL
Refills: 2 | COMMUNITY
Start: 2019-04-22 | End: 2020-02-17

## 2019-05-16 RX ORDER — BUSPIRONE HYDROCHLORIDE 7.5 MG/1
7.5 TABLET ORAL 3 TIMES DAILY
Qty: 90 TABLET | Refills: 0 | Status: SHIPPED | OUTPATIENT
Start: 2019-05-16 | End: 2019-06-17 | Stop reason: SDUPTHER

## 2019-05-16 RX ORDER — DICYCLOMINE HYDROCHLORIDE 10 MG/1
CAPSULE ORAL
Qty: 120 CAPSULE | Refills: 0 | Status: SHIPPED | OUTPATIENT
Start: 2019-05-16 | End: 2019-11-07

## 2019-05-16 ASSESSMENT — ENCOUNTER SYMPTOMS
SHORTNESS OF BREATH: 0
BACK PAIN: 0
ABDOMINAL PAIN: 0
EYE PAIN: 0

## 2019-05-16 NOTE — PROGRESS NOTES
Subjective:      Patient ID: Nancie Mancuso is a 45 y.o. female who presents today with:  Chief Complaint   Patient presents with    Palpitations    Anxiety    Abdominal Cramping       HPI   Palpitations-Chronic, started back in 2019, in 2019 saw me. I directed her to her cardiologist whom ordered a 30 day monitor and she mentions she completed a 19 day course. Dr. Antonia Gunn stopped it at that point. She hasn't followed up with him since then. She is no longer using monitor. No passing out. Mentions she still has anxiety. Anxiety is intermittent. Sometimes has palpitations without anxiety. Generalized abdominal cramps Marilin Sinning gets them everyone once a in a while\" and bentyl helps it. Never had dark or bright stools. No focal pain in one area. In past she was diagnosed with IBS. Bentyl and completely relieves her symptoms. Past Medical History:   Diagnosis Date    Depression     Generalized anxiety disorder     Hiatal hernia 13    Hypothyroidism     Obesity     Tx'd with Adipex     Past Surgical History:   Procedure Laterality Date    BREAST SURGERY      Reduction     SECTION      CHOLECYSTECTOMY      ENDOMETRIAL ABLATION      TUBAL LIGATION      UPPER GASTROINTESTINAL ENDOSCOPY  2013    Dr Valencia Saeed     Social History     Socioeconomic History    Marital status: Single     Spouse name: Not on file    Number of children: Not on file    Years of education: Not on file    Highest education level: Not on file   Occupational History    Not on file   Social Needs    Financial resource strain: Not on file    Food insecurity:     Worry: Not on file     Inability: Not on file    Transportation needs:     Medical: Not on file     Non-medical: Not on file   Tobacco Use    Smoking status: Former Smoker     Types: Cigarettes    Smokeless tobacco: Never Used   Substance and Sexual Activity    Alcohol use:  Yes     Alcohol/week: 0.0 oz     Comment: Rare use    Drug use: No    Sexual activity: Yes     Partners: Male   Lifestyle    Physical activity:     Days per week: Not on file     Minutes per session: Not on file    Stress: Not on file   Relationships    Social connections:     Talks on phone: Not on file     Gets together: Not on file     Attends Yarsani service: Not on file     Active member of club or organization: Not on file     Attends meetings of clubs or organizations: Not on file     Relationship status: Not on file    Intimate partner violence:     Fear of current or ex partner: Not on file     Emotionally abused: Not on file     Physically abused: Not on file     Forced sexual activity: Not on file   Other Topics Concern    Not on file   Social History Narrative    Not on file     No Known Allergies  Current Outpatient Medications on File Prior to Visit   Medication Sig Dispense Refill    pantoprazole (PROTONIX) 40 MG tablet TAKE 1 TABLET BY MOUTH EVERY DAY  2    levothyroxine (SYNTHROID) 150 MCG tablet Take 1 tablet by mouth daily 30 tablet 1     No current facility-administered medications on file prior to visit. I have personally reviewed the ROS, PMH, PFH, and social history     Review of Systems   Constitutional: Negative for chills and fever. HENT: Negative for congestion. Eyes: Negative for pain. Respiratory: Negative for shortness of breath. Cardiovascular: Negative for chest pain. Gastrointestinal: Negative for abdominal pain. +abdominal cramps    Genitourinary: Negative for hematuria. Musculoskeletal: Negative for back pain. Allergic/Immunologic: Negative for immunocompromised state. Neurological: Negative for headaches. Psychiatric/Behavioral: Negative for hallucinations.        Objective:   /60 (Site: Left Upper Arm, Position: Sitting, Cuff Size: Large Adult)   Pulse 74   Temp 98.1 °F (36.7 °C) (Oral)   Wt 205 lb (93 kg)   SpO2 99%   BMI 36.31 kg/m²     Physical Exam   Constitutional: She is oriented to person, place, and time. She appears well-developed and well-nourished. HENT:   Head: Normocephalic. Eyes: Pupils are equal, round, and reactive to light. Neck: No tracheal deviation present. Cardiovascular: Normal rate, regular rhythm and normal heart sounds. Exam reveals no gallop and no friction rub. No murmur heard. Pulmonary/Chest: No respiratory distress. Abdominal: Soft. Bowel sounds are normal. She exhibits no distension. There is no tenderness. There is no rebound and no guarding. No pain over mcburney's point   Negative smalls's sign    Musculoskeletal: She exhibits no edema. Neurological: She is oriented to person, place, and time. Skin: Skin is warm and dry. Assessment:       Diagnosis Orders   1. Abdominal cramps     2. Irritable bowel syndrome without diarrhea     3. Palpitations  TSH with Reflex   4. Anxiety  busPIRone (BUSPAR) 7.5 MG tablet    Albaro Mendosa, PhD, Diabetes Counseling, [place]   5. Hypothyroidism, unspecified type  TSH with Reflex         Plan:   Due for repeat TSH in 6 weeks. Her cramps are predictably improved with bentyl  Chronic problem for 2 years  Never gotten worse  No FH of colon cancer  Follow up with Dr. Asia Galo for 19 day/30 monitor, if new or worsening symptoms go to ER. Doesn't need referral.   Continue synthroid at 150 mcg, repeat in Early Juy   Continue bentyl and to call asap if any new or worsening symptoms  Understands that's it's likely IBS, but would need GI if new or worsening symptoms  Patient was offered pregnancy test, she declined, she understands risks of birth defects. BUSPIRONE AND referall to psych  No si/hi   If you get abdominal pain, new or worsening symptoms go to ER. Follow up in 6 weeks. Feels fine in office, drink fluids, if get symptoms follow up or ER need work up.    Orders Placed This Encounter   Procedures    TSH with Reflex     Standing Status:   Future     Standing Expiration Date:   5/16/2020   Vijaya Bernstein, PhD, [facility] Counsel[facility], [place]     Referral Priority:   Routine     Referral Type:   Eval and Treat     Referral Reason:   Specialty Services Required     Referred to Provider:   Srikanth Hurst PSYD     Requested Specialty:   Psychology     Number of Visits Requested:   1     Orders Placed This Encounter   Medications    dicyclomine (BENTYL) 10 MG capsule     Sig: TAKE 1 CAPSULE BY MOUTH 4 TIMES DAILY BEFORE MEALS AND NIGHTLY     Dispense:  120 capsule     Refill:  0    busPIRone (BUSPAR) 7.5 MG tablet     Sig: Take 1 tablet by mouth 3 times daily     Dispense:  90 tablet     Refill:  0       Return in about 6 weeks (around 6/27/2019) for worsening symptoms, call ASAP for appointment, regularly scheduled appointment with PCP. Rohan Ann MD    If anything should change or worsen call ASAP, don't wait for next scheduled appointment.

## 2019-05-21 ENCOUNTER — INITIAL CONSULT (OUTPATIENT)
Dept: BEHAVIORAL/MENTAL HEALTH CLINIC | Age: 39
End: 2019-05-21
Payer: COMMERCIAL

## 2019-05-21 DIAGNOSIS — F41.0 GENERALIZED ANXIETY DISORDER WITH PANIC ATTACKS: Primary | ICD-10-CM

## 2019-05-21 DIAGNOSIS — F41.1 GENERALIZED ANXIETY DISORDER WITH PANIC ATTACKS: Primary | ICD-10-CM

## 2019-05-21 PROCEDURE — 90791 PSYCH DIAGNOSTIC EVALUATION: CPT | Performed by: PSYCHOLOGIST

## 2019-05-21 ASSESSMENT — PATIENT HEALTH QUESTIONNAIRE - PHQ9
10. IF YOU CHECKED OFF ANY PROBLEMS, HOW DIFFICULT HAVE THESE PROBLEMS MADE IT FOR YOU TO DO YOUR WORK, TAKE CARE OF THINGS AT HOME, OR GET ALONG WITH OTHER PEOPLE: 1
4. FEELING TIRED OR HAVING LITTLE ENERGY: 3
6. FEELING BAD ABOUT YOURSELF - OR THAT YOU ARE A FAILURE OR HAVE LET YOURSELF OR YOUR FAMILY DOWN: 0
2. FEELING DOWN, DEPRESSED OR HOPELESS: 1
SUM OF ALL RESPONSES TO PHQ QUESTIONS 1-9: 6
9. THOUGHTS THAT YOU WOULD BE BETTER OFF DEAD, OR OF HURTING YOURSELF: 0
3. TROUBLE FALLING OR STAYING ASLEEP: 2
7. TROUBLE CONCENTRATING ON THINGS, SUCH AS READING THE NEWSPAPER OR WATCHING TELEVISION: 0
1. LITTLE INTEREST OR PLEASURE IN DOING THINGS: 0
8. MOVING OR SPEAKING SO SLOWLY THAT OTHER PEOPLE COULD HAVE NOTICED. OR THE OPPOSITE, BEING SO FIGETY OR RESTLESS THAT YOU HAVE BEEN MOVING AROUND A LOT MORE THAN USUAL: 0
5. POOR APPETITE OR OVEREATING: 0
SUM OF ALL RESPONSES TO PHQ QUESTIONS 1-9: 6
SUM OF ALL RESPONSES TO PHQ9 QUESTIONS 1 & 2: 1

## 2019-05-21 NOTE — PROGRESS NOTES
reports that she loves her job. Pt reports that she was previously prescribed Vistaril, but it was not helpful. She is currently prescribed Buspar and has found this to be helpful in decreasing her palpitations. Pt reports that she had gastric sleeve surgery in November 2018. She states that she has been exercising about every other day. Pt has no prior history of counseling or psychiatric hospitalization. Pt denies SI and HI. History:          Diagnosis Date    Depression     Generalized anxiety disorder     Hiatal hernia 11/18/13    Hypothyroidism     Obesity     Tx'd with Adipex           Medications:   Current Outpatient Medications   Medication Sig Dispense Refill    pantoprazole (PROTONIX) 40 MG tablet TAKE 1 TABLET BY MOUTH EVERY DAY  2    dicyclomine (BENTYL) 10 MG capsule TAKE 1 CAPSULE BY MOUTH 4 TIMES DAILY BEFORE MEALS AND NIGHTLY 120 capsule 0    busPIRone (BUSPAR) 7.5 MG tablet Take 1 tablet by mouth 3 times daily 90 tablet 0    levothyroxine (SYNTHROID) 150 MCG tablet Take 1 tablet by mouth daily 30 tablet 1     No current facility-administered medications for this visit. Social History:   Social History     Socioeconomic History    Marital status: Single     Spouse name: Not on file    Number of children: Not on file    Years of education: Not on file    Highest education level: Not on file   Occupational History    Not on file   Social Needs    Financial resource strain: Not on file    Food insecurity:     Worry: Not on file     Inability: Not on file    Transportation needs:     Medical: Not on file     Non-medical: Not on file   Tobacco Use    Smoking status: Former Smoker     Types: Cigarettes    Smokeless tobacco: Never Used   Substance and Sexual Activity    Alcohol use:  Yes     Alcohol/week: 0.0 oz     Comment: Rare use    Drug use: No    Sexual activity: Yes     Partners: Male   Lifestyle    Physical activity:     Days per week: Not on file Minutes per session: Not on file    Stress: Not on file   Relationships    Social connections:     Talks on phone: Not on file     Gets together: Not on file     Attends Zoroastrianism service: Not on file     Active member of club or organization: Not on file     Attends meetings of clubs or organizations: Not on file     Relationship status: Not on file    Intimate partner violence:     Fear of current or ex partner: Not on file     Emotionally abused: Not on file     Physically abused: Not on file     Forced sexual activity: Not on file   Other Topics Concern    Not on file   Social History Narrative    Not on file         Family History:   Family History   Problem Relation Age of Onset    High Blood Pressure Mother     Cancer Father         Lung cancer    Mental Illness Brother     Heart Disease Brother     Asthma Brother        TOBACCO:   reports that she has quit smoking. Her smoking use included cigarettes. She has never used smokeless tobacco.  ETOH:   reports that she drinks alcohol.        O:  MSE:    Appearance    alert, cooperative   Personal Hygiene : appropriately dressed, appropriately groomed and healthy looking  Appetite normal  Sleep disturbance Yes, including: non-restful sleep  Fatigue Yes  Loss of pleasure No  Impulsive behavior Yes  Speech    spontaneous, normal rate and normal volume  Mood   anxious   Affect    anxiety  Thought Content    intact  Thought Process    linear, goal directed and coherent  Associations    logical connections  Insight    fair  Judgment    fair  Orientation    oriented to person, place, time, and general circumstances  Memory    recent and remote memory intact  Attention/Concentration    impaired  Morbid ideation No  Suicide Assessment    no suicidal ideation        A:  Symptoms of depression include: occasional depressed mood    Symptoms of brendon include: none    Symptoms of generalized anxiety include: excessive worry, difficulty controlling worry, restlessness, fatigue, difficulty concentrating, irritability and sleep disturbance    Symptoms of panic include: palpitations        PHQ Scores 5/21/2019 3/15/2019 1/4/2018   PHQ2 Score 1 1 0   PHQ9 Score 6 1 0     Interpretation of Total Score Depression Severity: 1-4 = Minimal depression, 5-9 = Mild depression, 10-14 = Moderate depression, 15-19 = Moderately severe depression, 20-27 = Severe depression    Administered the PHQ9 which indicates a self report of mild symptom distress. Is given a diagnosis of generalized anxiety disorder with panic attacks due to report of excessive worry that is difficult to manage along with fatigue, restlessness, irritability and panic attacks characterized by palpitations and sweating. Pt would benefit from [facility] services to increase coping skills to provide symptom management/control/relief. Diagnosis:    Generalized anxiety disorder        Plan:  Pt interventions:  Provided handout on  stress, Established rapport, Conducted functional assessment, Supportive techniques, Emphasized self-care as important for managing overall health, Explained relaxed breathing technique in detail and practiced this with pt in visit and Provided pt list of websites and several smartphone dominique resources for further practicing guided meditations and breathing exercises        Pt Behavioral Change Plan:  1. Dedicate 5 minutes 3x/day to practice the deep breathing    2. Review the list of apps and give some a try! ([id number], CBTi  and progressive muscle relaxation for sleep, etc.)    3. Read the below information about stress management    4. Return in about 2-3 weeks      Please note this report has been partially produced using speech recognition software and may cause contain errors related to that system including grammar, punctuation and spelling as well as words and phrases that may seem inappropriate.  If there are questions or concerns please feel free to contact me to

## 2019-05-21 NOTE — Clinical Note
Reports Buspar working well but does notice palpitations resume close to when her next dose is due.   Will see her again in 2 weeks

## 2019-05-21 NOTE — PATIENT INSTRUCTIONS
1. Dedicate 5 minutes 3x/day to practice the deep breathing    2. Review the list of apps and give some a try! ([id number], CBTi  and progressive muscle relaxation for sleep, etc.)    3. Read the below information about stress management    4. Return in about 2-3 weeks    \"The entire autonomic nervous system (and through it, our internal organs and glands) is largely driven by our breathing patterns. By changing our breathing we can influence millions of biochemical reactions in our body, producing more relaxing substances such as endorphins and fewer anxiety-producing ones like adrenaline and higher blood acidity. Mindfulness of the breath is so effective that it is common to all meditative and prayer traditions. \" Anxiety Fear & Breathing - Breathing. com    \"When overcoming high levels of anxiety, it is important to learn the techniques of correct breathing. Many people who live with high levels of anxiety are known to breathe through their chest. Shallow breathing through the chest means you are disrupting the balance of oxygen and carbon dioxide necessary to be in a relaxed state. This type of breathing will perpetuate the symptoms of anxiety. \" [employer]. com      Diaphragmatic Breathing             _____________________________________________________________________________  1. Sit in a comfortable position    2. Place one hand on your stomach and the other on your chest    3. Try to breathe so that only your stomach rises and falls    As you inhale, concentrate on your chest remaining relatively still while your stomach rises. It may be helpful for you to imagine that your pants are too big and you need to push your stomach out to hold them up. When exhaling, allow your stomach to fall in and the air to fully escape. Inhale slowly. You may choose to hold the air in for about a second. Exhale slowly.   Dont push the air out, but just let the natural pressure of your body slowly move it out.    It is normal for this healthy method of breathing to feel a little awkward at first.  With practice, it will feel more natural.    4. Get your mind on your side    One other important factor in getting relaxed is your mind. Your mind and body are connected. The mind influences the body and the body influences the mind. What you do with your mind when you are trying to relax is very important. The key is to avoid thinking about stressful things. You can think about      Neutral things (e.g., counting, saying a word like calm or relax)   Pleasant things (e.g., imagining a pleasant place)    5. It is recommended that you practice 2 times per day, 10 minutes each time. ----------------------------------------------------------------------------------------------------------------------  ----------------------------------------------------------------------------------------------------------------------  ----------------------------------------------------------------------------------------------------------------------  ----------------------------------------------------------------------------------------------------------------------      CONTROLLED or MEASURED BREATHING EXERCISE: (YOU MAY WANT TO DOWNLOAD THE FREE WILLARD \"VIRTUAL HOPE BOX\" TO PRACTICE THIS EXERCISE)    You can sit or stand, but be sure to soften up a little before you begin. Make sure your hands are relaxed, and your knees are soft. Drop your shoulders and let your jaw relax. Now breath in slowly through your nose and count to three, keep your shoulders down and allow your stomach to expand as you breathe in. Hold the breath for a moment. Now release your breath slowly and smoothly as you count to six. Repeat for a couple of minutes        It can be very helpful to use tools like relaxed breathing, muscle relaxation, and guided imagery/visualization to cope with stress, pain, anxiety and depression.  I recommend to all my patients that they try different techniques to find the ones that work best for them. Below are 2 websites that have several breathing, relaxation, and visualization exercises that you can listen to and download for free.    NetworkAffair.tn. html  · Deep Breathing & Guided Relaxation Exercises (3)  · Guided Imagery/Visualization Exercises (5)  · Mindfulness & Meditation Exercises (3)  · Progressive Muscle Relaxation   · Soothing Instrumental Music (11)    [email or web address]  · Diaphragmatic Breathing   · Deep Breathing I   · Deep Breathing II   · Progressive Muscle Relaxation   · Guided Imagery: The FluxDrive   · Guided Imagery: The [facility]   · Relaxing Phrases   · Just This Breath   · Increasing Awareness   · Sending Thoughts Away on Clouds  · Sending Thoughts Away on Leaves  · Sorting Into Boxes     -----------------------------------------------------  Below are several apps that you can download to your smartphone to help with relaxation and mood coping. Guhtood5Axuro  Platform: Baby World Language  Cost: Free  Your breathing has a profound effect on your body. Errol Lei know this fact to be true if youve ever taken deep breaths to calm yourself down when you were upset. That exercise can often make you feel more centered, and its proof that breathing is powerful. The Mkwylzk5Avaep dominique uses guided breathing exercises to help reduce symptoms of an anxiety attack. If an attack is coming or the symptoms are unbearable, slip away into a quiet room, open your dominique, and let the worry and stress slip away with each breath. Universal Breathing - Pranayama Free  Platform: Baby World Language  Cost: Free  Focused breathing exercises can help you regain composure during an anxiety attack. They can also help you prevent an anxiety attack before one starts. Pranayama breathing techniques are common in yoga and have powerful benefits.  If youre a beginner, you can benefit from the dominiques guided breathing instruction. Ofe Ramirez learn how to breathe deeply, hold, and then release with better control. If it works for you, you can purchase the full course which gives you access to the entire program.    Breathing Zone   Platform: The New Dailyhone & Android  Cost: $3.99   Breathing Zone uses a clinically proven therapeutic breathing exercise that decreases your heart rate, and with daily use can help manage high blood pressure.    ----------------------------------------------  Self-Help for Anxiety Management (PAO)  Platform: iPhone & Android  Cost: Free  The Self-Help for Anxiety Management (PAO) dominique from the Virtual [employer] Center can help you regain control of your anxiety and emotions. Tell the dominique how youre feeling, how anxious you are, or how worried you are. Then let the dominiques self-help features walk you through some calming or relaxation practices. If you want, you can connect with a social network of other [facility] users. Dont worry, the network isnt connected to larger networks like Twitter or [employer]. Stop Panic & Anxiety Help  Platform: Android  Cost: Free  If panic and anxiety attacks have a  on your life, this dominique might help you let them go. The Stop Panic & Anxiety Help Android dominique uses emotion and relaxation training audio tracks to help you fight your fears and find a state of calm. When youve overcome the attack, use the Why Not Give Back journal to record what caused the attack and how you were able to get through it. Then use this journal to learn from your experiences and prepare for the future. I Can Be Fearless by Human Progress  Platform: Firetide  Cost: Free  When you were younger, your parents might have told you that you could do anything you put your mind to. This dominique might not help you be an astronaut or a world famous actress, but it can help you break through your anxiety, fears, and worries to a place of calm and confidence.  Open your Apple device and select what you want to be right now -- calm, motivated, and confident are among the options -- then let the audio hypnosis guide you through a session. Relax Melodies  Platform: iPhone and Android  Cost: Free  Anxiety can disrupt healthy sleep patterns in more than one way. First, people who dont get enough sleep tend to feel more anxious. Then, people who are more anxious have a difficult time sleeping. Creating a calming environment may help you fall asleep and stay asleep. Relax to one of this dominiques 50 sounds. Need the music to stop once youre asleep? Set a timer, and it will stop playing. Set an alarm when you need to be awake. Then, enjoy the benefits of a good nights sleep, free from anxiety. Relaxing Sounds of Nature - Lite  Platform: iPhone  Cost: Free  You can find rest and relaxation without having to travel. The dominique comes with 35 nature tracks, which include soothing classics like crickets chirping, breaking waves, and a serene lake. You can download more free tracks to MyFab, and customize a favorite combination that helps you reduce your anxiety in a peaceful setting. Allow the sounds of nature to sweep you away from your worries in the comfort of your living room, office, or bedroom. Relax & Rest Guided Meditations  Platform: iPhone and Android  Cost: $0.99  While group meetings and discussions are always an option, some people find relaxation more easily on their own. This dominique lets you relax in the space of your own home or office with three guided meditations. Breath Awareness Guided Meditation (5 minutes), Deep Rested Guided Meditation (13 minutes), and Whole Body Guided Relaxation (24 minutes) are designed specifically to help you relax and sink into a peaceful meditation moment.     Virtual Hope Box  Platform: iPhone and Android  Cost: Free  The [facility] Box (VHB) is a smartphone application that contains simple tools to help with coping, relaxation, distraction, and positive thinking via personalized supportive audio, video, pictures, games, mindfulness exercises, positive messages and activity planning, inspirational quotes, coping statements, and other tools. PTSD : Self-Management of Posttraumatic Stress  Platform: iPhone and Android  Cost: Free  This dominique can help you learn about and manage symptoms that often occur after trauma. Provides information and coping skills for common kinds of posttraumatic stress symptoms and problems, including systematic relaxation and self-help techniques. CBT-i : Cognitive Behavioral Therapy for Insomnia  Platform: iPhone  Cost: Free  Identify sleep patterns with a sleep diary and assessment, tools to create new sleep habits, quiet your mind and prevent insomnia in the future. You can set reminders and learn about healthy sleep habits. Mindfulness   Platform: iPhone  Cost: Free  Mindfulness practice to decrease stress, manage emotional discomfort, depression, physical pain, and other problems. It offers exercises, information, and a tracking log to that you can optimize practice.

## 2019-06-17 DIAGNOSIS — F41.9 ANXIETY: ICD-10-CM

## 2019-06-17 RX ORDER — BUSPIRONE HYDROCHLORIDE 7.5 MG/1
7.5 TABLET ORAL 3 TIMES DAILY
Qty: 90 TABLET | Refills: 0 | Status: SHIPPED | OUTPATIENT
Start: 2019-06-17 | End: 2019-07-16 | Stop reason: SDUPTHER

## 2019-06-17 NOTE — TELEPHONE ENCOUNTER
requesting medication refill.      Rx requested:  Requested Prescriptions     Pending Prescriptions Disp Refills    busPIRone (BUSPAR) 7.5 MG tablet 90 tablet 0     Sig: Take 1 tablet by mouth 3 times daily       Last Office Visit:   5/16/2019        Next Visit Date:  Future Appointments   Date Time Provider Sangeeta Agarwal   6/25/2019  4:45 PM Alaina Lee  [place]

## 2019-09-20 RX ORDER — LEVOTHYROXINE SODIUM 0.15 MG/1
TABLET ORAL
Qty: 10 TABLET | Refills: 0 | Status: SHIPPED | OUTPATIENT
Start: 2019-09-20 | End: 2019-09-30 | Stop reason: DRUGHIGH

## 2019-09-23 DIAGNOSIS — E03.9 HYPOTHYROIDISM, UNSPECIFIED TYPE: ICD-10-CM

## 2019-09-23 DIAGNOSIS — R00.2 PALPITATIONS: ICD-10-CM

## 2019-09-23 LAB
T4 FREE: 0.8 NG/DL (ref 0.84–1.68)
TSH REFLEX: 48.89 UIU/ML (ref 0.44–3.86)

## 2019-09-30 ENCOUNTER — OFFICE VISIT (OUTPATIENT)
Dept: FAMILY MEDICINE CLINIC | Age: 39
End: 2019-09-30
Payer: COMMERCIAL

## 2019-09-30 VITALS
HEIGHT: 63 IN | SYSTOLIC BLOOD PRESSURE: 104 MMHG | DIASTOLIC BLOOD PRESSURE: 66 MMHG | HEART RATE: 70 BPM | WEIGHT: 197 LBS | OXYGEN SATURATION: 98 % | BODY MASS INDEX: 34.91 KG/M2 | TEMPERATURE: 98.1 F | RESPIRATION RATE: 15 BRPM

## 2019-09-30 DIAGNOSIS — R32 URINARY INCONTINENCE, UNSPECIFIED TYPE: ICD-10-CM

## 2019-09-30 DIAGNOSIS — E66.09 CLASS 1 OBESITY DUE TO EXCESS CALORIES WITHOUT SERIOUS COMORBIDITY WITH BODY MASS INDEX (BMI) OF 34.0 TO 34.9 IN ADULT: ICD-10-CM

## 2019-09-30 DIAGNOSIS — E03.9 HYPOTHYROIDISM, UNSPECIFIED TYPE: Primary | ICD-10-CM

## 2019-09-30 PROCEDURE — G8427 DOCREV CUR MEDS BY ELIG CLIN: HCPCS | Performed by: INTERNAL MEDICINE

## 2019-09-30 PROCEDURE — 1036F TOBACCO NON-USER: CPT | Performed by: INTERNAL MEDICINE

## 2019-09-30 PROCEDURE — G8417 CALC BMI ABV UP PARAM F/U: HCPCS | Performed by: INTERNAL MEDICINE

## 2019-09-30 PROCEDURE — 99214 OFFICE O/P EST MOD 30 MIN: CPT | Performed by: INTERNAL MEDICINE

## 2019-09-30 RX ORDER — OXYBUTYNIN CHLORIDE 10 MG/1
10 TABLET, EXTENDED RELEASE ORAL DAILY
COMMUNITY
End: 2020-09-17 | Stop reason: CLARIF

## 2019-09-30 RX ORDER — LEVOTHYROXINE SODIUM 175 UG/1
175 TABLET ORAL DAILY
Qty: 30 TABLET | Refills: 1 | Status: SHIPPED | OUTPATIENT
Start: 2019-09-30 | End: 2019-09-30 | Stop reason: DRUGHIGH

## 2019-09-30 RX ORDER — LEVOTHYROXINE SODIUM 0.2 MG/1
200 TABLET ORAL DAILY
Qty: 30 TABLET | Refills: 1 | Status: SHIPPED | OUTPATIENT
Start: 2019-09-30 | End: 2019-10-29 | Stop reason: SDUPTHER

## 2019-09-30 RX ORDER — MELATONIN 10 MG
CAPSULE ORAL
Qty: 30 CAPSULE | Refills: 2 | Status: SHIPPED | OUTPATIENT
Start: 2019-09-30 | End: 2019-12-18

## 2019-09-30 ASSESSMENT — ENCOUNTER SYMPTOMS
BACK PAIN: 0
SHORTNESS OF BREATH: 0
ABDOMINAL PAIN: 0
EYE PAIN: 0

## 2019-09-30 NOTE — PROGRESS NOTES
Subjective:      Patient ID: Ana Paula Alas is a 44 y.o. female who presents today with:  Chief Complaint   Patient presents with    Follow-up    Discuss Labs    Hypothyroidism    Obesity    Other     Urinary incontinence        HPI    Hypothyroidism-Chronic, improved with synthroid 150 micrograms once daily. Compliant. Denies being cold. Urinary incontinence-Chronic issue, on ditropan xl 10 mg once daily. It helps some. Obesity-Chronic, BMI of 35   Not exercising regularly. Past Medical History:   Diagnosis Date    Depression     Generalized anxiety disorder     Hiatal hernia 13    Hypothyroidism     Obesity     Tx'd with Adipex     Past Surgical History:   Procedure Laterality Date    BREAST SURGERY  2007    Reduction     SECTION      CHOLECYSTECTOMY  1996    ENDOMETRIAL ABLATION      TUBAL LIGATION      UPPER GASTROINTESTINAL ENDOSCOPY  2013    Dr Windy Landon     Social History     Socioeconomic History    Marital status: Single     Spouse name: Not on file    Number of children: Not on file    Years of education: Not on file    Highest education level: Not on file   Occupational History    Not on file   Social Needs    Financial resource strain: Not on file    Food insecurity:     Worry: Not on file     Inability: Not on file    Transportation needs:     Medical: Not on file     Non-medical: Not on file   Tobacco Use    Smoking status: Former Smoker     Types: Cigarettes    Smokeless tobacco: Never Used   Substance and Sexual Activity    Alcohol use:  Yes     Alcohol/week: 0.0 standard drinks     Comment: Rare use    Drug use: No    Sexual activity: Yes     Partners: Male   Lifestyle    Physical activity:     Days per week: Not on file     Minutes per session: Not on file    Stress: Not on file   Relationships    Social connections:     Talks on phone: Not on file     Gets together: Not on file     Attends [employer] orthodox service: Not on file Expiration Date:   9/29/2020    Hemoglobin A1C     Standing Status:   Future     Standing Expiration Date:   9/29/2020    Lipid Panel     Standing Status:   Future     Standing Expiration Date:   9/29/2020     Order Specific Question:   Is Patient Fasting?/# of Hours     Answer:   10    Vitamin D 25 Hydroxy     Standing Status:   Future     Standing Expiration Date:   9/29/2020     Orders Placed This Encounter   Medications    DISCONTD: levothyroxine (SYNTHROID) 175 MCG tablet     Sig: Take 1 tablet by mouth daily     Dispense:  30 tablet     Refill:  1    levothyroxine (SYNTHROID) 200 MCG tablet     Sig: Take 1 tablet by mouth daily     Dispense:  30 tablet     Refill:  1       Return for regularly scheduled appointment with PCP, worsening symptoms, call ASAP for appointment. Hernan Roberts MD    If anything should change or worsen call ASAP, don't wait for next scheduled appointment.

## 2019-10-18 RX ORDER — LEVOTHYROXINE SODIUM 0.15 MG/1
TABLET ORAL
Qty: 30 TABLET | Refills: 0 | OUTPATIENT
Start: 2019-10-18

## 2019-10-24 ENCOUNTER — OFFICE VISIT (OUTPATIENT)
Dept: FAMILY MEDICINE CLINIC | Age: 39
End: 2019-10-24
Payer: COMMERCIAL

## 2019-10-24 VITALS
SYSTOLIC BLOOD PRESSURE: 120 MMHG | HEART RATE: 71 BPM | OXYGEN SATURATION: 99 % | HEIGHT: 63 IN | TEMPERATURE: 98 F | RESPIRATION RATE: 16 BRPM | WEIGHT: 193 LBS | BODY MASS INDEX: 34.2 KG/M2 | DIASTOLIC BLOOD PRESSURE: 80 MMHG

## 2019-10-24 DIAGNOSIS — R10.9 ABDOMINAL CRAMPING: ICD-10-CM

## 2019-10-24 DIAGNOSIS — M25.512 LEFT SUBSCAPULAR PAIN: ICD-10-CM

## 2019-10-24 DIAGNOSIS — F41.9 ANXIETY: Primary | ICD-10-CM

## 2019-10-24 PROCEDURE — 99213 OFFICE O/P EST LOW 20 MIN: CPT | Performed by: INTERNAL MEDICINE

## 2019-10-24 PROCEDURE — G8417 CALC BMI ABV UP PARAM F/U: HCPCS | Performed by: INTERNAL MEDICINE

## 2019-10-24 PROCEDURE — G8484 FLU IMMUNIZE NO ADMIN: HCPCS | Performed by: INTERNAL MEDICINE

## 2019-10-24 PROCEDURE — G8427 DOCREV CUR MEDS BY ELIG CLIN: HCPCS | Performed by: INTERNAL MEDICINE

## 2019-10-24 PROCEDURE — 1036F TOBACCO NON-USER: CPT | Performed by: INTERNAL MEDICINE

## 2019-10-24 RX ORDER — BUSPIRONE HYDROCHLORIDE 10 MG/1
10 TABLET ORAL 3 TIMES DAILY
Qty: 42 TABLET | Refills: 0 | Status: SHIPPED | OUTPATIENT
Start: 2019-10-24 | End: 2019-11-07 | Stop reason: SDUPTHER

## 2019-10-24 RX ORDER — NITROFURANTOIN MACROCRYSTALS 50 MG/1
CAPSULE ORAL
Status: ON HOLD | COMMUNITY
Start: 2019-10-22 | End: 2019-12-03

## 2019-10-24 RX ORDER — DICYCLOMINE HCL 20 MG
20 TABLET ORAL 3 TIMES DAILY PRN
Qty: 120 TABLET | Refills: 3 | Status: SHIPPED | OUTPATIENT
Start: 2019-10-24 | End: 2020-01-06 | Stop reason: ALTCHOICE

## 2019-10-24 ASSESSMENT — ENCOUNTER SYMPTOMS
NAUSEA: 0
SHORTNESS OF BREATH: 0
RHINORRHEA: 0
COLOR CHANGE: 0
WHEEZING: 0
VOMITING: 0
PHOTOPHOBIA: 0
SORE THROAT: 0
CONSTIPATION: 0
VOICE CHANGE: 0
DIARRHEA: 0
ABDOMINAL DISTENTION: 0
COUGH: 0
BLOOD IN STOOL: 0
BACK PAIN: 0
TROUBLE SWALLOWING: 0
EYE DISCHARGE: 0
EYE PAIN: 0
EYE REDNESS: 0
CHEST TIGHTNESS: 0
FACIAL SWELLING: 0
ABDOMINAL PAIN: 0
APNEA: 0
EYE ITCHING: 0
SINUS PRESSURE: 0
RECTAL PAIN: 0
SINUS PAIN: 0

## 2019-10-29 RX ORDER — LEVOTHYROXINE SODIUM 0.2 MG/1
200 TABLET ORAL DAILY
Qty: 30 TABLET | Refills: 1 | Status: SHIPPED | OUTPATIENT
Start: 2019-10-29 | End: 2020-01-03 | Stop reason: DRUGHIGH

## 2019-11-07 ENCOUNTER — OFFICE VISIT (OUTPATIENT)
Dept: FAMILY MEDICINE CLINIC | Age: 39
End: 2019-11-07
Payer: COMMERCIAL

## 2019-11-07 VITALS
SYSTOLIC BLOOD PRESSURE: 108 MMHG | TEMPERATURE: 96.7 F | BODY MASS INDEX: 34.47 KG/M2 | DIASTOLIC BLOOD PRESSURE: 60 MMHG | WEIGHT: 194.6 LBS | OXYGEN SATURATION: 99 % | HEART RATE: 64 BPM

## 2019-11-07 DIAGNOSIS — Z23 NEED FOR INFLUENZA VACCINATION: ICD-10-CM

## 2019-11-07 DIAGNOSIS — F41.9 ANXIETY: Primary | ICD-10-CM

## 2019-11-07 DIAGNOSIS — R14.0 ABDOMINAL BLOATING: ICD-10-CM

## 2019-11-07 PROCEDURE — 1036F TOBACCO NON-USER: CPT | Performed by: INTERNAL MEDICINE

## 2019-11-07 PROCEDURE — G8427 DOCREV CUR MEDS BY ELIG CLIN: HCPCS | Performed by: INTERNAL MEDICINE

## 2019-11-07 PROCEDURE — G8482 FLU IMMUNIZE ORDER/ADMIN: HCPCS | Performed by: INTERNAL MEDICINE

## 2019-11-07 PROCEDURE — 90688 IIV4 VACCINE SPLT 0.5 ML IM: CPT | Performed by: INTERNAL MEDICINE

## 2019-11-07 PROCEDURE — G8417 CALC BMI ABV UP PARAM F/U: HCPCS | Performed by: INTERNAL MEDICINE

## 2019-11-07 PROCEDURE — 90471 IMMUNIZATION ADMIN: CPT | Performed by: INTERNAL MEDICINE

## 2019-11-07 PROCEDURE — 99213 OFFICE O/P EST LOW 20 MIN: CPT | Performed by: INTERNAL MEDICINE

## 2019-11-07 RX ORDER — AMOXICILLIN AND CLAVULANATE POTASSIUM 875; 125 MG/1; MG/1
1 TABLET, FILM COATED ORAL 2 TIMES DAILY
Qty: 14 TABLET | Refills: 0 | Status: SHIPPED | OUTPATIENT
Start: 2019-11-07 | End: 2019-11-14

## 2019-11-07 RX ORDER — BUSPIRONE HYDROCHLORIDE 10 MG/1
10 TABLET ORAL 3 TIMES DAILY
Qty: 42 TABLET | Refills: 0 | Status: SHIPPED | OUTPATIENT
Start: 2019-11-07 | End: 2019-11-21 | Stop reason: SDUPTHER

## 2019-11-07 ASSESSMENT — ENCOUNTER SYMPTOMS
PHOTOPHOBIA: 0
EYE PAIN: 0
COUGH: 0
ABDOMINAL DISTENTION: 0
BACK PAIN: 0
SORE THROAT: 0
EYE REDNESS: 0
CHEST TIGHTNESS: 0
BLOOD IN STOOL: 0
NAUSEA: 0
SHORTNESS OF BREATH: 0
FACIAL SWELLING: 0
RHINORRHEA: 0
CONSTIPATION: 0
EYE ITCHING: 0
APNEA: 0
RECTAL PAIN: 0
TROUBLE SWALLOWING: 0
VOICE CHANGE: 0
COLOR CHANGE: 0
WHEEZING: 0
ABDOMINAL PAIN: 0
EYE DISCHARGE: 0
SINUS PAIN: 0
SINUS PRESSURE: 0
DIARRHEA: 0
VOMITING: 0

## 2019-11-21 ENCOUNTER — OFFICE VISIT (OUTPATIENT)
Dept: FAMILY MEDICINE CLINIC | Age: 39
End: 2019-11-21
Payer: COMMERCIAL

## 2019-11-21 VITALS
DIASTOLIC BLOOD PRESSURE: 60 MMHG | WEIGHT: 194 LBS | BODY MASS INDEX: 34.37 KG/M2 | HEART RATE: 72 BPM | SYSTOLIC BLOOD PRESSURE: 100 MMHG | OXYGEN SATURATION: 99 % | TEMPERATURE: 97.3 F

## 2019-11-21 DIAGNOSIS — B37.31 YEAST INFECTION OF THE VAGINA: ICD-10-CM

## 2019-11-21 DIAGNOSIS — F41.9 ANXIETY: ICD-10-CM

## 2019-11-21 DIAGNOSIS — N63.0 BREAST LUMP: Primary | ICD-10-CM

## 2019-11-21 DIAGNOSIS — Z23 NEED FOR TETANUS BOOSTER: ICD-10-CM

## 2019-11-21 PROCEDURE — G8427 DOCREV CUR MEDS BY ELIG CLIN: HCPCS | Performed by: INTERNAL MEDICINE

## 2019-11-21 PROCEDURE — 99214 OFFICE O/P EST MOD 30 MIN: CPT | Performed by: INTERNAL MEDICINE

## 2019-11-21 PROCEDURE — G8417 CALC BMI ABV UP PARAM F/U: HCPCS | Performed by: INTERNAL MEDICINE

## 2019-11-21 PROCEDURE — G8482 FLU IMMUNIZE ORDER/ADMIN: HCPCS | Performed by: INTERNAL MEDICINE

## 2019-11-21 PROCEDURE — 90715 TDAP VACCINE 7 YRS/> IM: CPT | Performed by: INTERNAL MEDICINE

## 2019-11-21 PROCEDURE — 90471 IMMUNIZATION ADMIN: CPT | Performed by: INTERNAL MEDICINE

## 2019-11-21 PROCEDURE — 1036F TOBACCO NON-USER: CPT | Performed by: INTERNAL MEDICINE

## 2019-11-21 RX ORDER — FLUCONAZOLE 150 MG/1
TABLET ORAL
Qty: 2 TABLET | Refills: 0 | Status: SHIPPED | OUTPATIENT
Start: 2019-11-21 | End: 2020-04-08 | Stop reason: CLARIF

## 2019-11-21 RX ORDER — BUSPIRONE HYDROCHLORIDE 10 MG/1
10 TABLET ORAL 3 TIMES DAILY
Qty: 90 TABLET | Refills: 0 | Status: SHIPPED | OUTPATIENT
Start: 2019-11-21 | End: 2020-01-07

## 2019-11-21 ASSESSMENT — ENCOUNTER SYMPTOMS
EYE REDNESS: 0
BACK PAIN: 0
DIARRHEA: 0
CONSTIPATION: 0
CHEST TIGHTNESS: 0
PHOTOPHOBIA: 0
TROUBLE SWALLOWING: 0
COLOR CHANGE: 0
NAUSEA: 0
EYE DISCHARGE: 0
SINUS PRESSURE: 0
VOICE CHANGE: 0
RECTAL PAIN: 0
APNEA: 0
EYE ITCHING: 0
COUGH: 0
WHEEZING: 0
VOMITING: 0
RHINORRHEA: 0
SORE THROAT: 0
SINUS PAIN: 0
ABDOMINAL PAIN: 0
BLOOD IN STOOL: 0
FACIAL SWELLING: 0
SHORTNESS OF BREATH: 0
EYE PAIN: 0
ABDOMINAL DISTENTION: 0

## 2019-12-03 ENCOUNTER — HOSPITAL ENCOUNTER (INPATIENT)
Age: 39
LOS: 1 days | Discharge: HOME | DRG: 661 | End: 2019-12-04
Attending: INTERNAL MEDICINE | Admitting: INTERNAL MEDICINE
Payer: COMMERCIAL

## 2019-12-03 DIAGNOSIS — D69.6 SEVERE THROMBOCYTOPENIA (HCC): Primary | ICD-10-CM

## 2019-12-03 LAB
ABO/RH: NORMAL
ALBUMIN SERPL-MCNC: 4.3 G/DL (ref 3.5–4.6)
ALP BLD-CCNC: 61 U/L (ref 40–130)
ALT SERPL-CCNC: 9 U/L (ref 0–33)
ANION GAP SERPL CALCULATED.3IONS-SCNC: 11 MEQ/L (ref 9–15)
ANION GAP SERPL CALCULATED.3IONS-SCNC: 9 MEQ/L (ref 9–15)
ANISOCYTOSIS: 0
ANTIBODY SCREEN: NORMAL
APTT: 27.3 SEC (ref 24.4–36.8)
AST SERPL-CCNC: 10 U/L (ref 0–35)
ATYPICAL LYMPHOCYTE RELATIVE PERCENT: 3 %
ATYPICAL LYMPHOCYTE RELATIVE PERCENT: 5 %
BASOPHILS ABSOLUTE: 0 K/UL (ref 0–0.2)
BASOPHILS ABSOLUTE: 0.1 K/UL (ref 0–0.2)
BASOPHILS RELATIVE PERCENT: 0.4 %
BASOPHILS RELATIVE PERCENT: 2 %
BILIRUB SERPL-MCNC: 0.6 MG/DL (ref 0.2–0.7)
BLOOD BANK DISPENSE STATUS: NORMAL
BLOOD BANK DISPENSE STATUS: NORMAL
BLOOD BANK PRODUCT CODE: NORMAL
BLOOD BANK PRODUCT CODE: NORMAL
BPU ID: NORMAL
BPU ID: NORMAL
BUN BLDV-MCNC: 13 MG/DL (ref 6–20)
BUN BLDV-MCNC: 13 MG/DL (ref 6–20)
CALCIUM SERPL-MCNC: 9.5 MG/DL (ref 8.5–9.9)
CALCIUM SERPL-MCNC: 9.5 MG/DL (ref 8.5–9.9)
CHLORIDE BLD-SCNC: 104 MEQ/L (ref 95–107)
CHLORIDE BLD-SCNC: 105 MEQ/L (ref 95–107)
CO2: 26 MEQ/L (ref 20–31)
CO2: 26 MEQ/L (ref 20–31)
CREAT SERPL-MCNC: 0.55 MG/DL (ref 0.5–0.9)
CREAT SERPL-MCNC: 0.66 MG/DL (ref 0.5–0.9)
DESCRIPTION BLOOD BANK: NORMAL
DESCRIPTION BLOOD BANK: NORMAL
EOSINOPHILS ABSOLUTE: 0 K/UL (ref 0–0.7)
EOSINOPHILS ABSOLUTE: 0 K/UL (ref 0–0.7)
EOSINOPHILS RELATIVE PERCENT: 0.1 %
EOSINOPHILS RELATIVE PERCENT: 0.9 %
FOLATE: 11.1 NG/ML (ref 7.3–26.1)
GFR AFRICAN AMERICAN: >60
GFR AFRICAN AMERICAN: >60
GFR NON-AFRICAN AMERICAN: >60
GFR NON-AFRICAN AMERICAN: >60
GLOBULIN: 3 G/DL (ref 2.3–3.5)
GLUCOSE BLD-MCNC: 82 MG/DL (ref 70–99)
GLUCOSE BLD-MCNC: 88 MG/DL (ref 70–99)
HAV IGM SER IA-ACNC: NORMAL
HCT VFR BLD CALC: 34.4 % (ref 37–47)
HCT VFR BLD CALC: 37.8 % (ref 37–47)
HCT VFR BLD CALC: 37.9 % (ref 37–47)
HEMOGLOBIN: 11.7 G/DL (ref 12–16)
HEMOGLOBIN: 12.7 G/DL (ref 12–16)
HEMOGLOBIN: 13 G/DL (ref 12–16)
HEPATITIS B CORE IGM ANTIBODY: NORMAL
HEPATITIS B SURFACE ANTIGEN INTERPRETATION: NORMAL
HEPATITIS C ANTIBODY INTERPRETATION: NORMAL
HEPATITIS INTERPRETATION:: NORMAL
HYPOCHROMIA: 0
INR BLD: 1
LYMPHOCYTES ABSOLUTE: 0.5 K/UL (ref 1–4.8)
LYMPHOCYTES ABSOLUTE: 3.9 K/UL (ref 1–4.8)
LYMPHOCYTES RELATIVE PERCENT: 11 %
LYMPHOCYTES RELATIVE PERCENT: 52 %
MACROCYTES: 0
MAGNESIUM: 2 MG/DL (ref 1.7–2.4)
MCH RBC QN AUTO: 33.3 PG (ref 27–31.3)
MCH RBC QN AUTO: 33.3 PG (ref 27–31.3)
MCH RBC QN AUTO: 33.6 PG (ref 27–31.3)
MCHC RBC AUTO-ENTMCNC: 33.6 % (ref 33–37)
MCHC RBC AUTO-ENTMCNC: 34 % (ref 33–37)
MCHC RBC AUTO-ENTMCNC: 34.2 % (ref 33–37)
MCV RBC AUTO: 97.9 FL (ref 82–100)
MCV RBC AUTO: 98.4 FL (ref 82–100)
MCV RBC AUTO: 99.2 FL (ref 82–100)
MICROCYTES: 0
MONOCYTES ABSOLUTE: 0 K/UL (ref 0.2–0.8)
MONOCYTES ABSOLUTE: 0.4 K/UL (ref 0.2–0.8)
MONOCYTES RELATIVE PERCENT: 0.9 %
MONOCYTES RELATIVE PERCENT: 5.7 %
NEUTROPHILS ABSOLUTE: 2.4 K/UL (ref 1.4–6.5)
NEUTROPHILS ABSOLUTE: 3.2 K/UL (ref 1.4–6.5)
NEUTROPHILS RELATIVE PERCENT: 36 %
NEUTROPHILS RELATIVE PERCENT: 85 %
PDW BLD-RTO: 11.5 % (ref 11.5–14.5)
PDW BLD-RTO: 11.6 % (ref 11.5–14.5)
PDW BLD-RTO: 11.8 % (ref 11.5–14.5)
PLATELET # BLD: 14 K/UL (ref 130–400)
PLATELET # BLD: 27 K/UL (ref 130–400)
PLATELET # BLD: 5 K/UL (ref 130–400)
PLATELET SLIDE REVIEW: ABNORMAL
POIKILOCYTES: 0
POLYCHROMASIA: 0
POTASSIUM REFLEX MAGNESIUM: 3.5 MEQ/L (ref 3.4–4.9)
POTASSIUM SERPL-SCNC: 3.8 MEQ/L (ref 3.4–4.9)
PROTHROMBIN TIME: 14.1 SEC (ref 12.3–14.9)
RBC # BLD: 3.51 M/UL (ref 4.2–5.4)
RBC # BLD: 3.81 M/UL (ref 4.2–5.4)
RBC # BLD: 3.85 M/UL (ref 4.2–5.4)
RBC # BLD: NORMAL 10*6/UL
SMUDGE CELLS: 1.9
SODIUM BLD-SCNC: 140 MEQ/L (ref 135–144)
SODIUM BLD-SCNC: 141 MEQ/L (ref 135–144)
T4 FREE: 1.77 NG/DL (ref 0.84–1.68)
T4 FREE: 1.8 NG/DL (ref 0.84–1.68)
TOTAL PROTEIN: 7.3 G/DL (ref 6.3–8)
TOXIC GRANULATION: ABNORMAL
TSH SERPL DL<=0.05 MIU/L-ACNC: 0.19 UIU/ML (ref 0.44–3.86)
VACUOLATED NEUTROPHILS: PRESENT
VACUOLATED NEUTROPHILS: PRESENT
VITAMIN B-12: 386 PG/ML (ref 232–1245)
WBC # BLD: 3.8 K/UL (ref 4.8–10.8)
WBC # BLD: 6.8 K/UL (ref 4.8–10.8)
WBC # BLD: 8.4 K/UL (ref 4.8–10.8)

## 2019-12-03 PROCEDURE — 82607 VITAMIN B-12: CPT

## 2019-12-03 PROCEDURE — 85610 PROTHROMBIN TIME: CPT

## 2019-12-03 PROCEDURE — 86361 T CELL ABSOLUTE COUNT: CPT

## 2019-12-03 PROCEDURE — 87389 HIV-1 AG W/HIV-1&-2 AB AG IA: CPT

## 2019-12-03 PROCEDURE — 84443 ASSAY THYROID STIM HORMONE: CPT

## 2019-12-03 PROCEDURE — 86901 BLOOD TYPING SEROLOGIC RH(D): CPT

## 2019-12-03 PROCEDURE — 80074 ACUTE HEPATITIS PANEL: CPT

## 2019-12-03 PROCEDURE — 83735 ASSAY OF MAGNESIUM: CPT

## 2019-12-03 PROCEDURE — 6360000002 HC RX W HCPCS: Performed by: INTERNAL MEDICINE

## 2019-12-03 PROCEDURE — 36415 COLL VENOUS BLD VENIPUNCTURE: CPT

## 2019-12-03 PROCEDURE — 84439 ASSAY OF FREE THYROXINE: CPT

## 2019-12-03 PROCEDURE — 85027 COMPLETE CBC AUTOMATED: CPT

## 2019-12-03 PROCEDURE — 86850 RBC ANTIBODY SCREEN: CPT

## 2019-12-03 PROCEDURE — 85730 THROMBOPLASTIN TIME PARTIAL: CPT

## 2019-12-03 PROCEDURE — 82746 ASSAY OF FOLIC ACID SERUM: CPT

## 2019-12-03 PROCEDURE — P9035 PLATELET PHERES LEUKOREDUCED: HCPCS

## 2019-12-03 PROCEDURE — 80053 COMPREHEN METABOLIC PANEL: CPT

## 2019-12-03 PROCEDURE — 36430 TRANSFUSION BLD/BLD COMPNT: CPT

## 2019-12-03 PROCEDURE — 2580000003 HC RX 258: Performed by: HOSPITALIST

## 2019-12-03 PROCEDURE — 86900 BLOOD TYPING SEROLOGIC ABO: CPT

## 2019-12-03 PROCEDURE — 85025 COMPLETE CBC W/AUTO DIFF WBC: CPT

## 2019-12-03 PROCEDURE — 6370000000 HC RX 637 (ALT 250 FOR IP): Performed by: HOSPITALIST

## 2019-12-03 PROCEDURE — 1210000000 HC MED SURG R&B

## 2019-12-03 RX ORDER — 0.9 % SODIUM CHLORIDE 0.9 %
500 INTRAVENOUS SOLUTION INTRAVENOUS ONCE
Status: COMPLETED | OUTPATIENT
Start: 2019-12-03 | End: 2019-12-04

## 2019-12-03 RX ORDER — SODIUM CHLORIDE 0.9 % (FLUSH) 0.9 %
10 SYRINGE (ML) INJECTION EVERY 12 HOURS SCHEDULED
Status: DISCONTINUED | OUTPATIENT
Start: 2019-12-03 | End: 2019-12-04 | Stop reason: HOSPADM

## 2019-12-03 RX ORDER — UREA 10 %
10 LOTION (ML) TOPICAL NIGHTLY
Status: DISCONTINUED | OUTPATIENT
Start: 2019-12-03 | End: 2019-12-04 | Stop reason: HOSPADM

## 2019-12-03 RX ORDER — FAMOTIDINE 20 MG/1
20 TABLET, FILM COATED ORAL 2 TIMES DAILY
Status: DISCONTINUED | OUTPATIENT
Start: 2019-12-03 | End: 2019-12-04 | Stop reason: HOSPADM

## 2019-12-03 RX ORDER — BUSPIRONE HYDROCHLORIDE 10 MG/1
10 TABLET ORAL 3 TIMES DAILY
Status: DISCONTINUED | OUTPATIENT
Start: 2019-12-03 | End: 2019-12-04 | Stop reason: HOSPADM

## 2019-12-03 RX ORDER — OMEPRAZOLE 10 MG/1
10 CAPSULE, DELAYED RELEASE ORAL DAILY
Status: ON HOLD | COMMUNITY
End: 2019-12-03

## 2019-12-03 RX ORDER — DICYCLOMINE HYDROCHLORIDE 10 MG/1
20 CAPSULE ORAL 3 TIMES DAILY PRN
Status: DISCONTINUED | OUTPATIENT
Start: 2019-12-03 | End: 2019-12-04 | Stop reason: HOSPADM

## 2019-12-03 RX ORDER — OXYBUTYNIN CHLORIDE 5 MG/1
10 TABLET, EXTENDED RELEASE ORAL DAILY
Status: DISCONTINUED | OUTPATIENT
Start: 2019-12-03 | End: 2019-12-04 | Stop reason: HOSPADM

## 2019-12-03 RX ORDER — 0.9 % SODIUM CHLORIDE 0.9 %
250 INTRAVENOUS SOLUTION INTRAVENOUS ONCE
Status: COMPLETED | OUTPATIENT
Start: 2019-12-03 | End: 2019-12-03

## 2019-12-03 RX ORDER — SODIUM CHLORIDE 0.9 % (FLUSH) 0.9 %
10 SYRINGE (ML) INJECTION PRN
Status: DISCONTINUED | OUTPATIENT
Start: 2019-12-03 | End: 2019-12-04 | Stop reason: HOSPADM

## 2019-12-03 RX ORDER — ACETAMINOPHEN 325 MG/1
650 TABLET ORAL EVERY 4 HOURS PRN
Status: DISCONTINUED | OUTPATIENT
Start: 2019-12-03 | End: 2019-12-04 | Stop reason: HOSPADM

## 2019-12-03 RX ORDER — ONDANSETRON 2 MG/ML
4 INJECTION INTRAMUSCULAR; INTRAVENOUS EVERY 6 HOURS PRN
Status: DISCONTINUED | OUTPATIENT
Start: 2019-12-03 | End: 2019-12-04 | Stop reason: HOSPADM

## 2019-12-03 RX ORDER — DEXAMETHASONE 4 MG/1
40 TABLET ORAL DAILY
Status: DISCONTINUED | OUTPATIENT
Start: 2019-12-03 | End: 2019-12-04 | Stop reason: HOSPADM

## 2019-12-03 RX ORDER — LEVOTHYROXINE SODIUM 175 UG/1
175 TABLET ORAL DAILY
Status: DISCONTINUED | OUTPATIENT
Start: 2019-12-03 | End: 2019-12-04 | Stop reason: HOSPADM

## 2019-12-03 RX ADMIN — SODIUM CHLORIDE 500 ML: 9 INJECTION, SOLUTION INTRAVENOUS at 23:57

## 2019-12-03 RX ADMIN — DEXAMETHASONE 40 MG: 4 TABLET ORAL at 12:19

## 2019-12-03 RX ADMIN — BUSPIRONE HYDROCHLORIDE 10 MG: 10 TABLET ORAL at 08:56

## 2019-12-03 RX ADMIN — BUSPIRONE HYDROCHLORIDE 10 MG: 10 TABLET ORAL at 14:05

## 2019-12-03 RX ADMIN — FAMOTIDINE 20 MG: 20 TABLET, FILM COATED ORAL at 20:50

## 2019-12-03 RX ADMIN — IMMUNE GLOBULIN INTRAVENOUS (HUMAN) 30 G: 5 INJECTION, SOLUTION INTRAVENOUS at 12:16

## 2019-12-03 RX ADMIN — Medication 10 ML: at 20:51

## 2019-12-03 RX ADMIN — OXYBUTYNIN CHLORIDE 10 MG: 5 TABLET, EXTENDED RELEASE ORAL at 08:56

## 2019-12-03 RX ADMIN — LEVOTHYROXINE SODIUM 175 MCG: 175 TABLET ORAL at 06:21

## 2019-12-03 RX ADMIN — Medication 10 MG: at 21:28

## 2019-12-03 RX ADMIN — SODIUM CHLORIDE 250 ML: 9 INJECTION, SOLUTION INTRAVENOUS at 03:38

## 2019-12-03 RX ADMIN — FAMOTIDINE 20 MG: 20 TABLET, FILM COATED ORAL at 08:56

## 2019-12-03 RX ADMIN — Medication 10 ML: at 08:57

## 2019-12-03 RX ADMIN — BUSPIRONE HYDROCHLORIDE 10 MG: 10 TABLET ORAL at 20:50

## 2019-12-03 RX ADMIN — FAMOTIDINE 20 MG: 20 TABLET, FILM COATED ORAL at 03:38

## 2019-12-03 RX ADMIN — ACETAMINOPHEN 650 MG: 325 TABLET ORAL at 17:36

## 2019-12-03 ASSESSMENT — PAIN SCALES - GENERAL
PAINLEVEL_OUTOF10: 0
PAINLEVEL_OUTOF10: 4

## 2019-12-04 VITALS
SYSTOLIC BLOOD PRESSURE: 109 MMHG | OXYGEN SATURATION: 98 % | TEMPERATURE: 97.9 F | BODY MASS INDEX: 34.2 KG/M2 | RESPIRATION RATE: 18 BRPM | DIASTOLIC BLOOD PRESSURE: 51 MMHG | HEIGHT: 63 IN | HEART RATE: 59 BPM | WEIGHT: 193 LBS

## 2019-12-04 LAB
ABSOLUTE CD 4 HELPER: 2189 CELLS/UL (ref 430–1800)
ANION GAP SERPL CALCULATED.3IONS-SCNC: 12 MEQ/L (ref 9–15)
BUN BLDV-MCNC: 14 MG/DL (ref 6–20)
CALCIUM SERPL-MCNC: 9.8 MG/DL (ref 8.5–9.9)
CD4 % HELPER T CELL: 61 % (ref 32–64)
CHLORIDE BLD-SCNC: 106 MEQ/L (ref 95–107)
CO2: 23 MEQ/L (ref 20–31)
CREAT SERPL-MCNC: 0.53 MG/DL (ref 0.5–0.9)
GFR AFRICAN AMERICAN: >60
GFR NON-AFRICAN AMERICAN: >60
GLUCOSE BLD-MCNC: 130 MG/DL (ref 70–99)
HCT VFR BLD CALC: 39.2 % (ref 37–47)
HEMOGLOBIN: 13.4 G/DL (ref 12–16)
HIV 1,2 COMBO ANTIGEN/ANTIBODY: NEGATIVE
LYMPHOCYTE SUBSET PANEL 2 INFO: ABNORMAL
MCH RBC QN AUTO: 33.7 PG (ref 27–31.3)
MCHC RBC AUTO-ENTMCNC: 34.3 % (ref 33–37)
MCV RBC AUTO: 98.4 FL (ref 82–100)
PDW BLD-RTO: 11.5 % (ref 11.5–14.5)
PLATELET # BLD: 16 K/UL (ref 130–400)
POTASSIUM REFLEX MAGNESIUM: 4.5 MEQ/L (ref 3.4–4.9)
RBC # BLD: 3.98 M/UL (ref 4.2–5.4)
SODIUM BLD-SCNC: 141 MEQ/L (ref 135–144)
WBC # BLD: 6.1 K/UL (ref 4.8–10.8)

## 2019-12-04 PROCEDURE — 85027 COMPLETE CBC AUTOMATED: CPT

## 2019-12-04 PROCEDURE — 6370000000 HC RX 637 (ALT 250 FOR IP): Performed by: HOSPITALIST

## 2019-12-04 PROCEDURE — 36415 COLL VENOUS BLD VENIPUNCTURE: CPT

## 2019-12-04 PROCEDURE — 2580000003 HC RX 258: Performed by: HOSPITALIST

## 2019-12-04 PROCEDURE — 6360000002 HC RX W HCPCS: Performed by: INTERNAL MEDICINE

## 2019-12-04 PROCEDURE — 80048 BASIC METABOLIC PNL TOTAL CA: CPT

## 2019-12-04 RX ORDER — DEXAMETHASONE 4 MG/1
40 TABLET ORAL DAILY
Qty: 20 TABLET | Refills: 0 | Status: SHIPPED | OUTPATIENT
Start: 2019-12-05 | End: 2019-12-07

## 2019-12-04 RX ADMIN — FAMOTIDINE 20 MG: 20 TABLET, FILM COATED ORAL at 09:09

## 2019-12-04 RX ADMIN — ACETAMINOPHEN 650 MG: 325 TABLET ORAL at 09:14

## 2019-12-04 RX ADMIN — Medication 10 ML: at 09:10

## 2019-12-04 RX ADMIN — OXYBUTYNIN CHLORIDE 10 MG: 5 TABLET, EXTENDED RELEASE ORAL at 09:09

## 2019-12-04 RX ADMIN — LEVOTHYROXINE SODIUM 175 MCG: 175 TABLET ORAL at 05:56

## 2019-12-04 RX ADMIN — DEXAMETHASONE 40 MG: 4 TABLET ORAL at 10:02

## 2019-12-04 RX ADMIN — BUSPIRONE HYDROCHLORIDE 10 MG: 10 TABLET ORAL at 09:09

## 2019-12-04 ASSESSMENT — PAIN SCALES - GENERAL
PAINLEVEL_OUTOF10: 0
PAINLEVEL_OUTOF10: 3

## 2019-12-12 ENCOUNTER — OFFICE VISIT (OUTPATIENT)
Dept: GASTROENTEROLOGY | Age: 39
End: 2019-12-12
Payer: COMMERCIAL

## 2019-12-12 VITALS
OXYGEN SATURATION: 99 % | HEART RATE: 71 BPM | WEIGHT: 195 LBS | TEMPERATURE: 98.1 F | BODY MASS INDEX: 34.55 KG/M2 | HEIGHT: 63 IN

## 2019-12-12 DIAGNOSIS — R10.9 ABDOMINAL CRAMPS: ICD-10-CM

## 2019-12-12 DIAGNOSIS — K21.9 GASTROESOPHAGEAL REFLUX DISEASE WITHOUT ESOPHAGITIS: ICD-10-CM

## 2019-12-12 DIAGNOSIS — K58.2 IRRITABLE BOWEL SYNDROME WITH BOTH CONSTIPATION AND DIARRHEA: ICD-10-CM

## 2019-12-12 DIAGNOSIS — R14.0 ABDOMINAL BLOATING: Primary | ICD-10-CM

## 2019-12-12 DIAGNOSIS — Z90.3 HISTORY OF SLEEVE GASTRECTOMY: ICD-10-CM

## 2019-12-12 PROCEDURE — 1111F DSCHRG MED/CURRENT MED MERGE: CPT | Performed by: INTERNAL MEDICINE

## 2019-12-12 PROCEDURE — 1036F TOBACCO NON-USER: CPT | Performed by: INTERNAL MEDICINE

## 2019-12-12 PROCEDURE — G8427 DOCREV CUR MEDS BY ELIG CLIN: HCPCS | Performed by: INTERNAL MEDICINE

## 2019-12-12 PROCEDURE — 99203 OFFICE O/P NEW LOW 30 MIN: CPT | Performed by: INTERNAL MEDICINE

## 2019-12-12 PROCEDURE — G8417 CALC BMI ABV UP PARAM F/U: HCPCS | Performed by: INTERNAL MEDICINE

## 2019-12-12 PROCEDURE — G8482 FLU IMMUNIZE ORDER/ADMIN: HCPCS | Performed by: INTERNAL MEDICINE

## 2019-12-12 RX ORDER — DICYCLOMINE HYDROCHLORIDE 10 MG/1
10 CAPSULE ORAL
Qty: 120 CAPSULE | Refills: 3 | Status: SHIPPED | OUTPATIENT
Start: 2019-12-12 | End: 2020-02-17

## 2019-12-18 RX ORDER — BIOTIN 10000 MCG
TABLET,CHEWABLE ORAL
Qty: 30 CAPSULE | Refills: 2 | Status: SHIPPED | OUTPATIENT
Start: 2019-12-18 | End: 2020-03-18 | Stop reason: CLARIF

## 2019-12-19 RX ORDER — TRAZODONE HYDROCHLORIDE 50 MG/1
50 TABLET ORAL NIGHTLY
Qty: 30 TABLET | Refills: 1 | Status: SHIPPED | OUTPATIENT
Start: 2019-12-19 | End: 2020-02-19 | Stop reason: SDUPTHER

## 2019-12-23 ENCOUNTER — OFFICE VISIT (OUTPATIENT)
Dept: FAMILY MEDICINE CLINIC | Age: 39
End: 2019-12-23
Payer: COMMERCIAL

## 2019-12-23 VITALS
DIASTOLIC BLOOD PRESSURE: 88 MMHG | HEIGHT: 63 IN | RESPIRATION RATE: 15 BRPM | TEMPERATURE: 97.7 F | WEIGHT: 199.2 LBS | HEART RATE: 85 BPM | SYSTOLIC BLOOD PRESSURE: 130 MMHG | BODY MASS INDEX: 35.3 KG/M2 | OXYGEN SATURATION: 99 %

## 2019-12-23 DIAGNOSIS — J01.90 ACUTE BACTERIAL SINUSITIS: Primary | ICD-10-CM

## 2019-12-23 DIAGNOSIS — R05.9 COUGH: ICD-10-CM

## 2019-12-23 DIAGNOSIS — B96.89 ACUTE BACTERIAL SINUSITIS: Primary | ICD-10-CM

## 2019-12-23 PROCEDURE — 99213 OFFICE O/P EST LOW 20 MIN: CPT | Performed by: NURSE PRACTITIONER

## 2019-12-23 RX ORDER — METHYLPREDNISOLONE 4 MG/1
TABLET ORAL
Qty: 1 KIT | Refills: 0 | Status: SHIPPED | OUTPATIENT
Start: 2019-12-23 | End: 2019-12-29

## 2019-12-23 RX ORDER — AMOXICILLIN AND CLAVULANATE POTASSIUM 875; 125 MG/1; MG/1
1 TABLET, FILM COATED ORAL 2 TIMES DAILY
Qty: 20 TABLET | Refills: 0 | Status: SHIPPED | OUTPATIENT
Start: 2019-12-23 | End: 2020-01-02

## 2019-12-23 RX ORDER — BENZONATATE 100 MG/1
200 CAPSULE ORAL 3 TIMES DAILY PRN
Qty: 20 CAPSULE | Refills: 0 | Status: SHIPPED | OUTPATIENT
Start: 2019-12-23 | End: 2019-12-28

## 2019-12-30 ASSESSMENT — ENCOUNTER SYMPTOMS
COLOR CHANGE: 0
COUGH: 1
WHEEZING: 0
EYE DISCHARGE: 0
SORE THROAT: 1
TROUBLE SWALLOWING: 0
SINUS PAIN: 1
RHINORRHEA: 1
SINUS PRESSURE: 1
SHORTNESS OF BREATH: 0
EYE PAIN: 0

## 2020-01-03 DIAGNOSIS — R32 URINARY INCONTINENCE, UNSPECIFIED TYPE: ICD-10-CM

## 2020-01-03 DIAGNOSIS — E03.9 HYPOTHYROIDISM, UNSPECIFIED TYPE: ICD-10-CM

## 2020-01-03 DIAGNOSIS — E66.09 CLASS 1 OBESITY DUE TO EXCESS CALORIES WITHOUT SERIOUS COMORBIDITY WITH BODY MASS INDEX (BMI) OF 34.0 TO 34.9 IN ADULT: ICD-10-CM

## 2020-01-03 LAB
ALBUMIN SERPL-MCNC: 4.2 G/DL (ref 3.5–4.6)
ALP BLD-CCNC: 61 U/L (ref 40–130)
ALT SERPL-CCNC: 13 U/L (ref 0–33)
ANION GAP SERPL CALCULATED.3IONS-SCNC: 17 MEQ/L (ref 9–15)
AST SERPL-CCNC: 15 U/L (ref 0–35)
BASOPHILS ABSOLUTE: 0.1 K/UL (ref 0–0.2)
BASOPHILS RELATIVE PERCENT: 1 %
BILIRUB SERPL-MCNC: 0.6 MG/DL (ref 0.2–0.7)
BUN BLDV-MCNC: 14 MG/DL (ref 6–20)
CALCIUM SERPL-MCNC: 9.6 MG/DL (ref 8.5–9.9)
CHLORIDE BLD-SCNC: 99 MEQ/L (ref 95–107)
CHOLESTEROL, TOTAL: 176 MG/DL (ref 0–199)
CO2: 25 MEQ/L (ref 20–31)
CREAT SERPL-MCNC: 0.65 MG/DL (ref 0.5–0.9)
EOSINOPHILS ABSOLUTE: 0 K/UL (ref 0–0.7)
EOSINOPHILS RELATIVE PERCENT: 0.7 %
GFR AFRICAN AMERICAN: >60
GFR NON-AFRICAN AMERICAN: >60
GLOBULIN: 3.2 G/DL (ref 2.3–3.5)
GLUCOSE BLD-MCNC: 73 MG/DL (ref 70–99)
HBA1C MFR BLD: 4.3 % (ref 4.8–5.9)
HCT VFR BLD CALC: 41.5 % (ref 37–47)
HDLC SERPL-MCNC: 50 MG/DL (ref 40–59)
HEMOGLOBIN: 14.2 G/DL (ref 12–16)
LDL CHOLESTEROL CALCULATED: 95 MG/DL (ref 0–129)
LYMPHOCYTES ABSOLUTE: 2.5 K/UL (ref 1–4.8)
LYMPHOCYTES RELATIVE PERCENT: 46.8 %
MCH RBC QN AUTO: 33.4 PG (ref 27–31.3)
MCHC RBC AUTO-ENTMCNC: 34.2 % (ref 33–37)
MCV RBC AUTO: 97.5 FL (ref 82–100)
MONOCYTES ABSOLUTE: 0.5 K/UL (ref 0.2–0.8)
MONOCYTES RELATIVE PERCENT: 8.7 %
NEUTROPHILS ABSOLUTE: 2.3 K/UL (ref 1.4–6.5)
NEUTROPHILS RELATIVE PERCENT: 42.8 %
PDW BLD-RTO: 12 % (ref 11.5–14.5)
PLATELET # BLD: 116 K/UL (ref 130–400)
POTASSIUM SERPL-SCNC: 3.9 MEQ/L (ref 3.4–4.9)
RBC # BLD: 4.25 M/UL (ref 4.2–5.4)
SODIUM BLD-SCNC: 141 MEQ/L (ref 135–144)
T4 FREE: 1.65 NG/DL (ref 0.84–1.68)
TOTAL PROTEIN: 7.4 G/DL (ref 6.3–8)
TRIGL SERPL-MCNC: 153 MG/DL (ref 0–150)
TSH REFLEX: 0.09 UIU/ML (ref 0.44–3.86)
VITAMIN D 25-HYDROXY: 26 NG/ML (ref 30–100)
WBC # BLD: 5.4 K/UL (ref 4.8–10.8)

## 2020-01-03 RX ORDER — LEVOTHYROXINE SODIUM 0.15 MG/1
150 TABLET ORAL DAILY
Qty: 30 TABLET | Refills: 1 | Status: SHIPPED | OUTPATIENT
Start: 2020-01-03 | End: 2020-03-18 | Stop reason: CLARIF

## 2020-01-06 ENCOUNTER — OFFICE VISIT (OUTPATIENT)
Dept: FAMILY MEDICINE CLINIC | Age: 40
End: 2020-01-06
Payer: COMMERCIAL

## 2020-01-06 VITALS
TEMPERATURE: 98.2 F | WEIGHT: 202 LBS | DIASTOLIC BLOOD PRESSURE: 66 MMHG | HEIGHT: 63 IN | SYSTOLIC BLOOD PRESSURE: 108 MMHG | BODY MASS INDEX: 35.79 KG/M2 | RESPIRATION RATE: 15 BRPM | HEART RATE: 78 BPM | OXYGEN SATURATION: 98 %

## 2020-01-06 PROCEDURE — G8417 CALC BMI ABV UP PARAM F/U: HCPCS | Performed by: INTERNAL MEDICINE

## 2020-01-06 PROCEDURE — 1036F TOBACCO NON-USER: CPT | Performed by: INTERNAL MEDICINE

## 2020-01-06 PROCEDURE — 99214 OFFICE O/P EST MOD 30 MIN: CPT | Performed by: INTERNAL MEDICINE

## 2020-01-06 PROCEDURE — G8482 FLU IMMUNIZE ORDER/ADMIN: HCPCS | Performed by: INTERNAL MEDICINE

## 2020-01-06 PROCEDURE — G8427 DOCREV CUR MEDS BY ELIG CLIN: HCPCS | Performed by: INTERNAL MEDICINE

## 2020-01-06 RX ORDER — MULTIVIT WITH MINERALS/LUTEIN
500 TABLET ORAL DAILY
COMMUNITY

## 2020-01-06 RX ORDER — ERGOCALCIFEROL 1.25 MG/1
50000 CAPSULE ORAL WEEKLY
Qty: 8 CAPSULE | Refills: 0 | Status: SHIPPED | OUTPATIENT
Start: 2020-01-06 | End: 2020-06-30 | Stop reason: CLARIF

## 2020-01-06 ASSESSMENT — PATIENT HEALTH QUESTIONNAIRE - PHQ9
SUM OF ALL RESPONSES TO PHQ QUESTIONS 1-9: 0
2. FEELING DOWN, DEPRESSED OR HOPELESS: 0
SUM OF ALL RESPONSES TO PHQ9 QUESTIONS 1 & 2: 0
1. LITTLE INTEREST OR PLEASURE IN DOING THINGS: 0
SUM OF ALL RESPONSES TO PHQ QUESTIONS 1-9: 0

## 2020-01-06 ASSESSMENT — ENCOUNTER SYMPTOMS
SHORTNESS OF BREATH: 0
BACK PAIN: 0
ABDOMINAL PAIN: 0
EYE PAIN: 0

## 2020-01-06 NOTE — PROGRESS NOTES
Subjective:      Patient ID: Autumn Quezada is a 44 y.o. female who presents today with:  Chief Complaint   Patient presents with    3 Month Follow-Up    Discuss Labs       HPI     Thrombocytopenia-Acute, ITP, Status post IVIG and decadron. Most recent platelets 469G. No longer with petechia. Hypothyroidism-Chronic, improved with synthroid 150 micrograms once daily. Compliant. Denies being cold. Insomnia-Chronic, improved with trazodone, better than it was. She is able to fall alseep easier. No side effects. Past Medical History:   Diagnosis Date    Acid reflux     Anxiety     Breast cyst     Depression     Generalized anxiety disorder     H/O tubal ligation     Hiatal hernia 13    Hypothyroidism     Hypothyroidism     IBS (irritable bowel syndrome)     Obesity     Tx'd with Adipex     Past Surgical History:   Procedure Laterality Date    BREAST SURGERY      Reduction     SECTION      CHOLECYSTECTOMY  1996    ENDOMETRIAL ABLATION      TUBAL LIGATION      UPPER GASTROINTESTINAL ENDOSCOPY  2013    Dr Stefany Watson     Social History     Socioeconomic History    Marital status: Single     Spouse name: Not on file    Number of children: Not on file    Years of education: Not on file    Highest education level: Not on file   Occupational History    Not on file   Social Needs    Financial resource strain: Not on file    Food insecurity:     Worry: Not on file     Inability: Not on file    Transportation needs:     Medical: Not on file     Non-medical: Not on file   Tobacco Use    Smoking status: Former Smoker     Types: Cigarettes    Smokeless tobacco: Never Used   Substance and Sexual Activity    Alcohol use:  Yes     Alcohol/week: 0.0 standard drinks     Comment: Rare use    Drug use: No    Sexual activity: Yes     Partners: Male   Lifestyle    Physical activity:     Days per week: Not on file     Minutes per session: Not on file    Stress: Not

## 2020-01-07 ENCOUNTER — TELEPHONE (OUTPATIENT)
Dept: FAMILY MEDICINE CLINIC | Age: 40
End: 2020-01-07

## 2020-01-07 RX ORDER — BUSPIRONE HYDROCHLORIDE 10 MG/1
TABLET ORAL
Qty: 90 TABLET | Refills: 0 | Status: SHIPPED | OUTPATIENT
Start: 2020-01-07 | End: 2020-02-03

## 2020-01-07 NOTE — PATIENT INSTRUCTIONS
dose, do NOT take a double dose of medicine. Take your usual dose the next day. · Tell your doctor about all prescription, herbal, or over-the-counter products you take. · Take care of yourself. Eat a healthy diet, get enough sleep, and get regular exercise. When should you call for help? Call 911 anytime you think you may need emergency care. For example, call if:    · You passed out (lost consciousness).     · You have severe trouble breathing.     · You have a very slow heartbeat (less than 60 beats a minute).     · You have a low body temperature (95°F or below).    Call your doctor now or seek immediate medical care if:    · You feel tired, sluggish, or weak.     · You have trouble remembering things or concentrating.     · You do not begin to feel better 2 weeks after starting your medicine.    Watch closely for changes in your health, and be sure to contact your doctor if you have any problems. Where can you learn more? Go to https://hovelstay.[employer]. org and sign in to your [employer] account. Enter V941 in the Mutualink box to learn more about \"Hypothyroidism: Care Instructions. \"     If you do not have an account, please click on the \"Sign Up Now\" link. Current as of: November 6, 2018  Content Version: 12.1  © 0577-8147 Healthwise, Incorporated. Care instructions adapted under license by [facility] ([facility]). If you have questions about a medical condition or this instruction, always ask your healthcare professional. [place] [address] any warranty or liability for your use of this information. Patient Education        Insomnia: Care Instructions  Your Care Instructions    Insomnia is the inability to sleep well. It is a common problem for most people at some time. Insomnia may make it hard for you to get to sleep, stay asleep, or sleep as long as you need to. This can make you tired and grouchy during the day.  It can also make you forgetful, less effective at your house quiet and calm about an hour before bedtime. Turn down the lights, turn off the TV, log off the computer, and turn down the volume on music. This can help you relax after a busy day. When should you call for help? Watch closely for changes in your health, and be sure to contact your doctor if:    · Your efforts to improve your sleep do not work.     · Your insomnia gets worse.     · You have been feeling down, depressed, or hopeless or have lost interest in things that you usually enjoy. Where can you learn more? Go to https://[employer] People[employer] Company[employer]. org and sign in to your [employer] account. Enter P513 in the [employer] box to learn more about \"Insomnia: Care Instructions. \"     If you do not have an account, please click on the \"Sign Up Now\" link. Current as of: June 28, 2018  Content Version: 12.1  © 8129-4773 Healthwise, Incorporated. Care instructions adapted under license by [facility] ([facility]). If you have questions about a medical condition or this instruction, always ask your healthcare professional. [address] any warranty or liability for your use of this information.

## 2020-01-07 NOTE — TELEPHONE ENCOUNTER
Pharmacy requesting medication refill.  Please approve or deny this request.    Rx requested:  Requested Prescriptions     Pending Prescriptions Disp Refills    busPIRone (BUSPAR) 10 MG tablet [Pharmacy Med Name: BUSPIRONE HCL 10 MG TABLET] 90 tablet 0     Sig: TAKE 1 TABLET BY MOUTH THREE TIMES A DAY         Last Office Visit:   11/21/2019      Next Visit Date:  Future Appointments   Date Time Provider Sangeeta Agarwal   2/17/2020  4:00 PM Efe Barker

## 2020-01-07 NOTE — TELEPHONE ENCOUNTER
Pt called office stating she was told to call after seeing her oncologist today. She states her oncologist told her it is ok to continue taking Trazodone. Pt states she has no other questions at this time.

## 2020-01-09 ENCOUNTER — HOSPITAL ENCOUNTER (OUTPATIENT)
Dept: ULTRASOUND IMAGING | Age: 40
Discharge: HOME | End: 2020-01-11
Payer: COMMERCIAL

## 2020-01-09 PROCEDURE — 76536 US EXAM OF HEAD AND NECK: CPT

## 2020-01-17 ENCOUNTER — PATIENT MESSAGE (OUTPATIENT)
Dept: FAMILY MEDICINE CLINIC | Age: 40
End: 2020-01-17

## 2020-01-19 ENCOUNTER — TELEPHONE (OUTPATIENT)
Dept: FAMILY MEDICINE CLINIC | Age: 40
End: 2020-01-19

## 2020-01-19 NOTE — TELEPHONE ENCOUNTER
Patient will be seeing you for atrophic thyroid gland and hypothyroidism, can you opine as to why her a1c is low?

## 2020-01-20 NOTE — TELEPHONE ENCOUNTER
From: Lupe Alfaro  To: Marjorie Garcia MD  Sent: 1/17/2020 2:23 PM EST  Subject: Non-Urgent Medical Question    Hi Dr Deborah Lopez had sent me to go get an ultrasound due to a lump on right breast but since its a lump they want a mammogram done as well. If you can, can you send in the request for it so I can make my appt? I haven't had it done yet even though its been awhile due to me dealing with ITP.   Thank you  Siva Camarillo

## 2020-01-30 ENCOUNTER — OFFICE VISIT (OUTPATIENT)
Dept: ENDOCRINOLOGY | Age: 40
End: 2020-01-30
Payer: COMMERCIAL

## 2020-01-30 VITALS
DIASTOLIC BLOOD PRESSURE: 69 MMHG | OXYGEN SATURATION: 98 % | HEART RATE: 82 BPM | HEIGHT: 63 IN | RESPIRATION RATE: 18 BRPM | SYSTOLIC BLOOD PRESSURE: 103 MMHG | BODY MASS INDEX: 36.32 KG/M2 | WEIGHT: 205 LBS

## 2020-01-30 PROCEDURE — G8427 DOCREV CUR MEDS BY ELIG CLIN: HCPCS | Performed by: INTERNAL MEDICINE

## 2020-01-30 PROCEDURE — 1036F TOBACCO NON-USER: CPT | Performed by: INTERNAL MEDICINE

## 2020-01-30 PROCEDURE — 99203 OFFICE O/P NEW LOW 30 MIN: CPT | Performed by: INTERNAL MEDICINE

## 2020-01-30 PROCEDURE — G8482 FLU IMMUNIZE ORDER/ADMIN: HCPCS | Performed by: INTERNAL MEDICINE

## 2020-01-30 PROCEDURE — G8417 CALC BMI ABV UP PARAM F/U: HCPCS | Performed by: INTERNAL MEDICINE

## 2020-01-30 RX ORDER — LEVOTHYROXINE SODIUM 175 UG/1
175 TABLET ORAL DAILY
Qty: 30 TABLET | Refills: 3 | Status: SHIPPED | OUTPATIENT
Start: 2020-01-30 | End: 2020-04-30 | Stop reason: SDUPTHER

## 2020-01-31 ENCOUNTER — HOSPITAL ENCOUNTER (OUTPATIENT)
Dept: WOMENS IMAGING | Age: 40
Discharge: HOME | End: 2020-02-02
Payer: COMMERCIAL

## 2020-01-31 ENCOUNTER — TELEPHONE (OUTPATIENT)
Dept: FAMILY MEDICINE CLINIC | Age: 40
End: 2020-01-31

## 2020-01-31 PROCEDURE — G0279 TOMOSYNTHESIS, MAMMO: HCPCS

## 2020-02-05 NOTE — PROGRESS NOTES
Subjective:      Patient ID: Eulogio Hendrix is a 44 y.o. female. Pt referred for hypothyroidism tsh was suppressed and thyroid u/s showing atropic gland    Other   This is a new (hypothyroidism ) problem. The current episode started more than 1 year ago. The problem has been waxing and waning. Associated symptoms include fatigue. Pertinent negatives include no neck pain. Nothing aggravates the symptoms. Treatments tried: synthyroid  The treatment provided moderate relief. Results for Domitila Ybarra (MRN [id number]) as of 2/5/2020 07:52   Ref. Range 3/13/2019 22:00 5/8/2019 13:22 9/23/2019 13:37 12/3/2019 01:38 1/3/2020 08:01   TSH Latest Ref Range: 0.440 - 3.860 uIU/mL 5.450 (H) 0.040 (L) 48.890 (H) 0.189 (L) 0.087 (L)     Results for Domitila Ybarra (MRN [id number]) as of 2/5/2020 07:52   Ref. Range 5/8/2019 13:22 9/23/2019 13:37 12/3/2019 05:57 12/3/2019 20:09 1/3/2020 08:01   T4 Free Latest Ref Range: 0.84 - 1.68 ng/dL 2.09 (H) 0.80 (L) 1.77 (H) 1.80 (H) 1.65       EXAMINATION: US HEAD NECK SOFT TISSUE THYROID       CLINICAL HISTORY: 79-year-old with hypothyroidism       COMPARISONS: None available.       FINDINGS: Thyroid ultrasonography was performed. The gland is diffusely atrophic. Right lobe measures about 1.9 x 0.5 x 0.5 cm and left lobe measures about 0.9 x 0.5 x 0.4 cm. Isthmus measures 1 mm in thickness. Echotexture of the thyroid gland is    minimally coarsened. No defined thyroid cysts, nodules or calcifications.       There are a few small nonspecific lymph nodes in the neck. The largest is on the left measuring 1.7 x 0.5 cm.           Impression   ATROPHIC THYROID GLAND. Results for Domitila bYarra (MRN [id number]) as of 2/5/2020 07:52   Ref.  Range 1/3/2020 08:01   Sodium Latest Ref Range: 135 - 144 mEq/L 141   Potassium Latest Ref Range: 3.4 - 4.9 mEq/L 3.9   Chloride Latest Ref Range: 95 - 107 mEq/L 99   CO2 Latest Ref Range: 20 - 31 mEq/L 25   BUN Latest Ref Range: 6 - 20 mg/dL 14   Creatinine Latest Ref Range: 0.50 - 0.90 mg/dL 0.65   Anion Gap Latest Ref Range: 9 - 15 mEq/L 17 (H)   GFR Non- Latest Ref Range: >60  >60.0   GFR African American Latest Ref Range: >60  >60.0   Glucose Latest Ref Range: 70 - 99 mg/dL 73   Calcium Latest Ref Range: 8.5 - 9.9 mg/dL 9.6   Total Protein Latest Ref Range: 6.3 - 8.0 g/dL 7.4   Cholesterol, Total Latest Ref Range: 0 - 199 mg/dL 176   HDL Cholesterol Latest Ref Range: 40 - 59 mg/dL 50   LDL Calculated Latest Ref Range: 0 - 129 mg/dL 95   Triglycerides Latest Ref Range: 0 - 150 mg/dL 153 (H)   Albumin Latest Ref Range: 3.5 - 4.6 g/dL 4.2   Globulin Latest Ref Range: 2.3 - 3.5 g/dL 3.2   Alk Phos Latest Ref Range: 40 - 130 U/L 61   ALT Latest Ref Range: 0 - 33 U/L 13   AST Latest Ref Range: 0 - 35 U/L 15   Bilirubin Latest Ref Range: 0.2 - 0.7 mg/dL 0.6   Hemoglobin A1C Latest Ref Range: 4.8 - 5.9 % 4.3 (L)   TSH Latest Ref Range: 0.440 - 3.860 uIU/mL 0.087 (L)   T4 Free Latest Ref Range: 0.84 - 1.68 ng/dL 1.65     Patient Active Problem List   Diagnosis    Primary hypothyroidism    GERD (gastroesophageal reflux disease)    Generalized anxiety disorder    Obesity    Bone disease    Vitamin D deficiency    Severe thrombocytopenia (HCC)     Social History     Socioeconomic History    Marital status: Single     Spouse name: Not on file    Number of children: Not on file    Years of education: Not on file    Highest education level: Not on file   Occupational History    Not on file   Social Needs    Financial resource strain: Not on file    Food insecurity:     Worry: Not on file     Inability: Not on file    Transportation needs:     Medical: Not on file     Non-medical: Not on file   Tobacco Use    Smoking status: Former Smoker     Types: Cigarettes    Smokeless tobacco: Never Used   Substance and Sexual Activity    Alcohol use:  Yes     Alcohol/week: 0.0 standard drinks     Comment: Rare use    Drug use: No   

## 2020-02-10 ENCOUNTER — OFFICE VISIT (OUTPATIENT)
Dept: SURGERY | Age: 40
End: 2020-02-10
Payer: COMMERCIAL

## 2020-02-10 VITALS
BODY MASS INDEX: 36.5 KG/M2 | DIASTOLIC BLOOD PRESSURE: 78 MMHG | HEIGHT: 63 IN | WEIGHT: 206 LBS | SYSTOLIC BLOOD PRESSURE: 118 MMHG | TEMPERATURE: 97.8 F

## 2020-02-10 PROBLEM — N63.10 BREAST MASS, RIGHT: Status: ACTIVE | Noted: 2020-02-10

## 2020-02-10 PROCEDURE — 99202 OFFICE O/P NEW SF 15 MIN: CPT | Performed by: SURGERY

## 2020-02-10 PROCEDURE — 1036F TOBACCO NON-USER: CPT | Performed by: SURGERY

## 2020-02-10 PROCEDURE — G8417 CALC BMI ABV UP PARAM F/U: HCPCS | Performed by: SURGERY

## 2020-02-10 PROCEDURE — G8427 DOCREV CUR MEDS BY ELIG CLIN: HCPCS | Performed by: SURGERY

## 2020-02-10 PROCEDURE — G8482 FLU IMMUNIZE ORDER/ADMIN: HCPCS | Performed by: SURGERY

## 2020-02-10 ASSESSMENT — ENCOUNTER SYMPTOMS
COLOR CHANGE: 0
NAUSEA: 0
BLOOD IN STOOL: 0
ABDOMINAL PAIN: 1
CHEST TIGHTNESS: 0
ABDOMINAL DISTENTION: 0
DIARRHEA: 1
SHORTNESS OF BREATH: 0
RECTAL PAIN: 0
ALLERGIC/IMMUNOLOGIC NEGATIVE: 1
RHINORRHEA: 0

## 2020-02-11 NOTE — PROGRESS NOTES
Subjective:      Patient ID: Adam Landon is a 44 y.o. female. HPI   Adam Landon is being seen at the request of Dr Nicanor Cooley for evaluation of a right breast mass. The patient found the mass on routine exam.   It is in lower inner quadrant. It does not hurt. It does vary with time. Size 2 cms. It has been there for 3  month(s). Patient denies a lump on the contralateral  left side. Mammogram dated January,2020 on the right side is BIRAD 1. It shows no mass. Mammogram on the left is BIRAD 1. There is not a discharge. There are not are skin changes. There is  a prior history of breast surgery(incision and drainage of several abscesses in this region). She does not have a personal history of breast cancer. She does not have a family history of breast cancer. G 3 , P 3  premenopausal  She is not currently taking OCPs   She is not taking HRT  She  has not had a hysterectomy. She does have her ovaries. Review of Systems   Constitutional: Negative for activity change, appetite change and unexpected weight change. HENT: Negative for congestion, nosebleeds, rhinorrhea and sneezing. Eyes: Negative for visual disturbance. Respiratory: Negative for chest tightness and shortness of breath. Cardiovascular: Negative for chest pain and leg swelling. Gastrointestinal: Positive for abdominal pain and diarrhea. Negative for abdominal distention, blood in stool, nausea and rectal pain. Endocrine: Negative. Genitourinary: Negative for difficulty urinating. Musculoskeletal: Negative. Skin: Negative for color change. Allergic/Immunologic: Negative. Neurological: Negative for seizures, light-headedness, numbness and headaches. Hematological: Does not bruise/bleed easily. Psychiatric/Behavioral: Negative for sleep disturbance. Anxiety       Objective:   Physical Exam  Constitutional:       General: She is not in acute distress. Appearance: Normal appearance.    HENT: Mouth/Throat:      Mouth: Mucous membranes are moist.      Pharynx: Oropharynx is clear. Eyes:      Pupils: Pupils are equal, round, and reactive to light. Neck:      Comments: Neck is supple with out masses, no thyromegaly, trachea midline  Chest:      Breasts:         Right: Skin change (1 cm skin incision in the lower medial right breast at the site of where a mass was. ) present. No swelling, bleeding, inverted nipple, mass, nipple discharge or tenderness. Left: Normal. No swelling, bleeding, inverted nipple, mass, nipple discharge, skin change or tenderness. Musculoskeletal:      Comments: Normal gait   Skin:     Findings: No bruising, lesion or rash. Neurological:      Mental Status: She is alert and oriented to person, place, and time.    Psychiatric:         Mood and Affect: Mood normal.         Judgment: Judgment normal.       /78   Temp 97.8 °F (36.6 °C) (Temporal)   Ht 5' 3\" (1.6 m)   Wt 206 lb (93.4 kg)   LMP 01/14/2020 (Approximate)   BMI 36.49 kg/m²   Assessment:      Right breast mass resolved   probable cyst or small abscess that resolved  No evidence of malignancy        Plan:      Repeat mammogram in 1 year and follow-up visit with anamika Martin MD

## 2020-02-17 ENCOUNTER — OFFICE VISIT (OUTPATIENT)
Dept: GASTROENTEROLOGY | Age: 40
End: 2020-02-17
Payer: COMMERCIAL

## 2020-02-17 VITALS
HEIGHT: 63 IN | HEART RATE: 77 BPM | OXYGEN SATURATION: 98 % | DIASTOLIC BLOOD PRESSURE: 74 MMHG | WEIGHT: 207 LBS | SYSTOLIC BLOOD PRESSURE: 110 MMHG | BODY MASS INDEX: 36.68 KG/M2

## 2020-02-17 PROCEDURE — G8482 FLU IMMUNIZE ORDER/ADMIN: HCPCS | Performed by: NURSE PRACTITIONER

## 2020-02-17 PROCEDURE — G8427 DOCREV CUR MEDS BY ELIG CLIN: HCPCS | Performed by: NURSE PRACTITIONER

## 2020-02-17 PROCEDURE — 99213 OFFICE O/P EST LOW 20 MIN: CPT | Performed by: NURSE PRACTITIONER

## 2020-02-17 PROCEDURE — 1036F TOBACCO NON-USER: CPT | Performed by: NURSE PRACTITIONER

## 2020-02-17 PROCEDURE — G8417 CALC BMI ABV UP PARAM F/U: HCPCS | Performed by: NURSE PRACTITIONER

## 2020-02-17 RX ORDER — DICYCLOMINE HCL 20 MG
20 TABLET ORAL EVERY 6 HOURS
COMMUNITY
End: 2020-02-17

## 2020-02-17 RX ORDER — PANTOPRAZOLE SODIUM 40 MG/1
40 TABLET, DELAYED RELEASE ORAL
Qty: 90 TABLET | Refills: 2 | Status: SHIPPED | OUTPATIENT
Start: 2020-02-17 | End: 2020-11-23

## 2020-02-17 RX ORDER — HYOSCYAMINE SULFATE 0.12 MG/1
125 TABLET SUBLINGUAL EVERY 4 HOURS PRN
Qty: 120 EACH | Refills: 3 | Status: SHIPPED | OUTPATIENT
Start: 2020-02-17 | End: 2020-09-16

## 2020-02-17 NOTE — PROGRESS NOTES
Gastroenterology Clinic Follow up Visit    Ambrose Matta  [id number]  Chief Complaint   Patient presents with    Follow-up    Abdominal Pain     HPI: Last seen in GI clinic on 12/12/19 with IBS, GERD. Interval change: Patient presents to the clinic with complaints of lower abdominal cramping, intermittently. Patient reports she has been taking Bentyl 20 mg four times daily with some response. Currently she has been having daily soft formed BMs. When she becomes constipated she takes MiraLAX with good response. No past colonoscopy. No family history of colon cancer. No melena or hematochezia. GERD symptoms well managed with daily PPI. No dysphagia, weight loss, or loss of appetite. No CP, SOB, or palpitations. HPI and A/P at last visits summarized below:  44 y.o. female with clinical history consistent with irritable bowel syndrome with alternating diarrhea and constipation. Gastroesophageal reflux moderately well controlled with Protonix secondary to gastric sleeve surgery. Review of Systems   All other systems reviewed and are negative. Past medical history, past surgical history, medication list, social and familyhistory reviewed    Blood pressure 110/74, pulse 77, height 5' 3\" (1.6 m), weight 207 lb (93.9 kg), last menstrual period 01/14/2020, SpO2 98 %, not currently breastfeeding. Physical Exam  Vitals signs reviewed. Constitutional:       General: She is not in acute distress. Appearance: She is well-developed. She is not diaphoretic. HENT:      Head: Normocephalic and atraumatic. Eyes:      General: No scleral icterus. Conjunctiva/sclera: Conjunctivae normal.      Pupils: Pupils are equal, round, and reactive to light. Neck:      Musculoskeletal: Normal range of motion and neck supple. Cardiovascular:      Rate and Rhythm: Normal rate and regular rhythm. Heart sounds: Normal heart sounds. No murmur.    Pulmonary:      Effort: Pulmonary effort is normal. No respiratory distress. Breath sounds: Normal breath sounds. No wheezing or rales. Chest:      Chest wall: No tenderness. Abdominal:      General: Bowel sounds are normal. There is no distension. Palpations: Abdomen is soft. There is no mass. Tenderness: There is no abdominal tenderness. There is no guarding or rebound. Comments: Central obesity    Musculoskeletal: Normal range of motion. Lymphadenopathy:      Cervical: No cervical adenopathy. Skin:     General: Skin is warm and dry. Coloration: Skin is not pale. Findings: No erythema or rash. Neurological:      Mental Status: She is alert and oriented to person, place, and time. Psychiatric:         Behavior: Behavior normal.         Thought Content: Thought content normal.         Judgment: Judgment normal.       Laboratory, Pathology, Radiology reviewed in detail with relevantimportant investigations summarized below:    No results for input(s): WBC, HGB, HCT, MCV, PLT in the last 720 hours. Lab Results   Component Value Date    ALT 13 01/03/2020    AST 15 01/03/2020    ALKPHOS 61 01/03/2020    BILITOT 0.6 01/03/2020     Endoscopic investigations: EGD at [place], upper endoscopy with Dr. Moni Condon in 2013, no previous colonoscopy       Assessment and Plan:  Jimi Patel 44 y.o. female with clinical history suggestive of IBS with alternating diarrhea and constipation. Ongoing complaints of lower abdominal cramping, intermittently. Will discontinue Bentyl. Will add Levsin SL every four hours as needed for abdominal pain. Patient to continue bowel regimen as needed. GERD: Antireflux lifestyle encouraged, examples provided. Daily PPI 15-30 minutes before breakfast.     Return in about 3 months (around 5/17/2020).     SOLIS Pollard - [facility]    Please note this report has been partially produced using speech recognitionsoftware  and may cause contain errors related to that system including grammar, punctuation and spelling as well as words andphrases that may seem inappropriate. If there are questions or concerns please feel free to contact me to clarify.

## 2020-02-19 ENCOUNTER — PATIENT MESSAGE (OUTPATIENT)
Dept: FAMILY MEDICINE CLINIC | Age: 40
End: 2020-02-19

## 2020-02-19 RX ORDER — TRAZODONE HYDROCHLORIDE 50 MG/1
50 TABLET ORAL NIGHTLY
Qty: 30 TABLET | Refills: 1 | Status: SHIPPED | OUTPATIENT
Start: 2020-02-19 | End: 2020-03-31 | Stop reason: DRUGHIGH

## 2020-02-19 NOTE — TELEPHONE ENCOUNTER
From: Tracy Jarvis  To: Peggy Pelletier MD  Sent: 2/19/2020 12:35 PM EST  Subject: Prescription Question    I will be needing a refill for the trazodone but I switched pharmacy to [facility] on [address] in [place]

## 2020-03-18 ENCOUNTER — OFFICE VISIT (OUTPATIENT)
Dept: FAMILY MEDICINE CLINIC | Age: 40
End: 2020-03-18
Payer: COMMERCIAL

## 2020-03-18 VITALS
WEIGHT: 207.4 LBS | RESPIRATION RATE: 16 BRPM | BODY MASS INDEX: 35.41 KG/M2 | SYSTOLIC BLOOD PRESSURE: 107 MMHG | OXYGEN SATURATION: 98 % | HEIGHT: 64 IN | HEART RATE: 70 BPM | DIASTOLIC BLOOD PRESSURE: 63 MMHG | TEMPERATURE: 98.2 F

## 2020-03-18 DIAGNOSIS — J02.9 ACUTE VIRAL PHARYNGITIS: ICD-10-CM

## 2020-03-18 PROCEDURE — 1036F TOBACCO NON-USER: CPT | Performed by: INTERNAL MEDICINE

## 2020-03-18 PROCEDURE — G8482 FLU IMMUNIZE ORDER/ADMIN: HCPCS | Performed by: INTERNAL MEDICINE

## 2020-03-18 PROCEDURE — G8417 CALC BMI ABV UP PARAM F/U: HCPCS | Performed by: INTERNAL MEDICINE

## 2020-03-18 PROCEDURE — G8427 DOCREV CUR MEDS BY ELIG CLIN: HCPCS | Performed by: INTERNAL MEDICINE

## 2020-03-18 PROCEDURE — 99214 OFFICE O/P EST MOD 30 MIN: CPT | Performed by: INTERNAL MEDICINE

## 2020-03-18 RX ORDER — FLUTICASONE PROPIONATE 50 MCG
1 SPRAY, SUSPENSION (ML) NASAL DAILY
Qty: 1 BOTTLE | Refills: 0 | Status: SHIPPED | OUTPATIENT
Start: 2020-03-18

## 2020-03-18 RX ORDER — FEXOFENADINE HCL 180 MG/1
180 TABLET ORAL DAILY
Qty: 30 TABLET | Refills: 0 | Status: SHIPPED | OUTPATIENT
Start: 2020-03-18 | End: 2020-04-17

## 2020-03-18 ASSESSMENT — ENCOUNTER SYMPTOMS
SORE THROAT: 1
BACK PAIN: 0
SHORTNESS OF BREATH: 0
EYE PAIN: 0
ABDOMINAL PAIN: 0

## 2020-03-18 NOTE — PROGRESS NOTES
Subjective:      Patient ID: Medardo Montoya is a 44 y.o. female who presents today with:  Chief Complaint   Patient presents with    Otalgia     pain in her ears was [facility] and was given an antibitoic but it has not helped has had pain x1 week    Pharyngitis     throat is also scratchy        HPI     Insomnia-Chronic, better with trazodone, no Se. No SI/HI. Hypothyroidism-Hypothyroidism-Chronic, improved with synthroid 175 micrograms once daily. Compliant. Denies being cold. ITP-Per patient platelet count up to 169k. Following up with heme. Eat pain and \"scratchy throat\" went to [facility] given amoxicillin. (One week ago today). After one week of therapy she would normally feel better, still with ear pain and \"scrathy throat\". No voice change, no drooling. Symptoms fluctuate.      Past Medical History:   Diagnosis Date    Acid reflux     Anxiety     Breast cyst     Depression     Generalized anxiety disorder     H/O tubal ligation     Hiatal hernia 13    Hypothyroidism     Hypothyroidism     IBS (irritable bowel syndrome)     Obesity     Tx'd with Adipex     Past Surgical History:   Procedure Laterality Date    BREAST SURGERY      Reduction     SECTION  2004    CHOLECYSTECTOMY  1996    ENDOMETRIAL ABLATION      TUBAL LIGATION      UPPER GASTROINTESTINAL ENDOSCOPY  2013    Dr Galo Bai     Social History     Socioeconomic History    Marital status: Single     Spouse name: Not on file    Number of children: Not on file    Years of education: Not on file    Highest education level: Not on file   Occupational History    Not on file   Social Needs    Financial resource strain: Not on file    Food insecurity     Worry: Not on file     Inability: Not on file    Transportation needs     Medical: Not on file     Non-medical: Not on file   Tobacco Use    Smoking status: Former Smoker     Types: Cigarettes    Smokeless tobacco: Never Used   Substance and Sexual Activity    Alcohol use: Yes     Alcohol/week: 0.0 standard drinks     Comment: Rare use    Drug use: No    Sexual activity: Yes     Partners: Male   Lifestyle    Physical activity     Days per week: Not on file     Minutes per session: Not on file    Stress: Not on file   Relationships    Social connections     Talks on phone: Not on file     Gets together: Not on file     Attends Mandaen service: Not on file     Active member of club or organization: Not on file     Attends meetings of clubs or organizations: Not on file     Relationship status: Not on file    Intimate partner violence     Fear of current or ex partner: Not on file     Emotionally abused: Not on file     Physically abused: Not on file     Forced sexual activity: Not on file   Other Topics Concern    Not on file   Social History Narrative    Not on file     No Known Allergies  Current Outpatient Medications on File Prior to Visit   Medication Sig Dispense Refill    traZODone (DESYREL) 50 MG tablet Take 1 tablet by mouth nightly 30 tablet 1    Hyoscyamine Sulfate SL (LEVSIN/SL) 0.125 MG SUBL Place 125 mcg under the tongue every 4 hours as needed (abdominal pain) 120 each 3    pantoprazole (PROTONIX) 40 MG tablet Take 1 tablet by mouth every morning (before breakfast) 90 tablet 2    busPIRone (BUSPAR) 10 MG tablet TAKE 1 TABLET BY MOUTH THREE TIMES A DAY 90 tablet 1    levothyroxine (SYNTHROID) 175 MCG tablet Take 1 tablet by mouth Daily 30 tablet 3    Ascorbic Acid (VITAMIN C) 250 MG tablet Take 250 mg by mouth daily      Probiotic Product (PROBIOTIC DAILY PO) Take by mouth      fluconazole (DIFLUCAN) 150 MG tablet Take 1 tab po once. Repeat in 72 hours .  2 tablet 0    oxybutynin (DITROPAN-XL) 10 MG extended release tablet Take 10 mg by mouth daily      vitamin D (ERGOCALCIFEROL) 1.25 MG (37158 UT) CAPS capsule Take 1 capsule by mouth once a week 8 capsule 0     No current facility-administered medications on file prior to visit. I have personally reviewed the ROS, PMH, PFH, and social history     Review of Systems   Constitutional: Negative for chills and fever. HENT: Positive for ear pain and sore throat. Negative for congestion. Eyes: Negative for pain. Respiratory: Negative for shortness of breath. Cardiovascular: Negative for chest pain. Gastrointestinal: Negative for abdominal pain. Genitourinary: Negative for hematuria. Musculoskeletal: Negative for back pain. Allergic/Immunologic: Negative for immunocompromised state. Neurological: Negative for headaches. Psychiatric/Behavioral: Negative for hallucinations. Objective:   /63 (Site: Left Upper Arm, Position: Sitting, Cuff Size: Large Adult)   Pulse 70   Temp 98.2 °F (36.8 °C) (Oral)   Resp 16   Ht 5' 4\" (1.626 m)   Wt 207 lb 6.4 oz (94.1 kg)   SpO2 98%   BMI 35.60 kg/m²     Physical Exam  Constitutional:       Appearance: She is well-developed. HENT:      Head: Normocephalic. Comments: Uvula is midline and mucous membranes are normal. Posterior oropharynx without erythema. No oropharyngeal exudate, posterior oropharyngeal edema or tonsillar abscesses. Floor of mouth soft   Central cloudy area of TM     Right Ear: Tympanic membrane, ear canal and external ear normal. There is no impacted cerumen. Left Ear: Tympanic membrane, ear canal and external ear normal. There is no impacted cerumen. Eyes:      Pupils: Pupils are equal, round, and reactive to light. Neck:      Trachea: No tracheal deviation. Cardiovascular:      Rate and Rhythm: Normal rate and regular rhythm. Heart sounds: Normal heart sounds. No murmur. No friction rub. No gallop. Pulmonary:      Effort: No respiratory distress. Abdominal:      General: Bowel sounds are normal. There is no distension. Palpations: Abdomen is soft. Tenderness: There is no rebound. Skin:     General: Skin is warm and dry.

## 2020-03-20 LAB — THROAT CULTURE: NORMAL

## 2020-03-23 RX ORDER — BUSPIRONE HYDROCHLORIDE 10 MG/1
10 TABLET ORAL 3 TIMES DAILY PRN
Qty: 90 TABLET | Refills: 5 | Status: SHIPPED | OUTPATIENT
Start: 2020-03-23 | End: 2020-11-01 | Stop reason: SDUPTHER

## 2020-03-31 RX ORDER — TRAZODONE HYDROCHLORIDE 50 MG/1
TABLET ORAL
Qty: 45 TABLET | Refills: 0 | Status: SHIPPED | OUTPATIENT
Start: 2020-03-31 | End: 2020-04-21 | Stop reason: DRUGHIGH

## 2020-04-08 ENCOUNTER — VIRTUAL VISIT (OUTPATIENT)
Dept: FAMILY MEDICINE CLINIC | Age: 40
End: 2020-04-08
Payer: COMMERCIAL

## 2020-04-08 PROCEDURE — G8427 DOCREV CUR MEDS BY ELIG CLIN: HCPCS | Performed by: INTERNAL MEDICINE

## 2020-04-08 PROCEDURE — 99213 OFFICE O/P EST LOW 20 MIN: CPT | Performed by: INTERNAL MEDICINE

## 2020-04-08 RX ORDER — DOXYCYCLINE HYCLATE 100 MG
100 TABLET ORAL 2 TIMES DAILY
Qty: 14 TABLET | Refills: 0 | Status: SHIPPED | OUTPATIENT
Start: 2020-04-08 | End: 2020-04-15

## 2020-04-08 RX ORDER — GUAIFENESIN 600 MG/1
600 TABLET, EXTENDED RELEASE ORAL 2 TIMES DAILY
Qty: 30 TABLET | Refills: 0 | Status: SHIPPED | OUTPATIENT
Start: 2020-04-08 | End: 2020-04-23

## 2020-04-08 ASSESSMENT — ENCOUNTER SYMPTOMS
BACK PAIN: 0
ABDOMINAL PAIN: 0
SHORTNESS OF BREATH: 0
EYE PAIN: 0

## 2020-04-08 NOTE — PROGRESS NOTES
Talks on phone: Not on file     Gets together: Not on file     Attends Pentecostal service: Not on file     Active member of club or organization: Not on file     Attends meetings of clubs or organizations: Not on file     Relationship status: Not on file    Intimate partner violence     Fear of current or ex partner: Not on file     Emotionally abused: Not on file     Physically abused: Not on file     Forced sexual activity: Not on file   Other Topics Concern    Not on file   Social History Narrative    Not on file     No Known Allergies  Current Outpatient Medications on File Prior to Visit   Medication Sig Dispense Refill    traZODone (DESYREL) 50 MG tablet Take 1.5 tablets po qhs 45 tablet 0    busPIRone (BUSPAR) 10 MG tablet Take 1 tablet by mouth 3 times daily as needed (anxiety) 90 tablet 5    fexofenadine (ALLEGRA) 180 MG tablet Take 1 tablet by mouth daily 30 tablet 0    fluticasone (FLONASE) 50 MCG/ACT nasal spray 1 spray by Nasal route daily 1 Bottle 0    Hyoscyamine Sulfate SL (LEVSIN/SL) 0.125 MG SUBL Place 125 mcg under the tongue every 4 hours as needed (abdominal pain) 120 each 3    pantoprazole (PROTONIX) 40 MG tablet Take 1 tablet by mouth every morning (before breakfast) 90 tablet 2    levothyroxine (SYNTHROID) 175 MCG tablet Take 1 tablet by mouth Daily 30 tablet 3    Ascorbic Acid (VITAMIN C) 250 MG tablet Take 250 mg by mouth daily      Probiotic Product (PROBIOTIC DAILY PO) Take by mouth      oxybutynin (DITROPAN-XL) 10 MG extended release tablet Take 10 mg by mouth daily      vitamin D (ERGOCALCIFEROL) 1.25 MG (21945 UT) CAPS capsule Take 1 capsule by mouth once a week 8 capsule 0     No current facility-administered medications on file prior to visit. I have personally reviewed the ROS, PMH, PFH, and social history     Review of Systems   Constitutional: Negative for chills and fever. HENT: Negative for congestion. Eyes: Negative for pain.    Respiratory: Negative

## 2020-04-08 NOTE — PATIENT INSTRUCTIONS
hot, wet towel or a warm gel pack on your face 3 or 4 times a day for 5 to 10 minutes each time. · Try a decongestant nasal spray like oxymetazoline (Afrin). Do not use it for more than 3 days in a row. Using it for more than 3 days can make your congestion worse. When should you call for help? Call your doctor now or seek immediate medical care if:    · You have new or worse swelling or redness in your face or around your eyes.     · You have a new or higher fever.    Watch closely for changes in your health, and be sure to contact your doctor if:    · You have new or worse facial pain.     · The mucus from your nose becomes thicker (like pus) or has new blood in it.     · You are not getting better as expected. Where can you learn more? Go to [email or web address] and sign in to your COM DEV account. Enter Y374 in the Veratect box to learn more about \"Sinusitis: Care Instructions. \"     If you do not have an account, please click on the \"Sign Up Now\" link. Current as of: July 28, 2019Content Version: 12.4  © 6154-3874 [employer]wise, Incorporated. Care instructions adapted under license by [facility] ([facility]). If you have questions about a medical condition or this instruction, always ask your healthcare professional. [address] any warranty or liability for your use of this information.

## 2020-04-21 RX ORDER — TRAZODONE HYDROCHLORIDE 100 MG/1
100 TABLET ORAL NIGHTLY PRN
Qty: 30 TABLET | Refills: 1 | Status: SHIPPED | OUTPATIENT
Start: 2020-04-21 | End: 2020-07-19

## 2020-04-24 DIAGNOSIS — E03.9 HYPOTHYROIDISM, UNSPECIFIED TYPE: ICD-10-CM

## 2020-04-24 LAB
T4 FREE: 1.61 NG/DL (ref 0.84–1.68)
TSH SERPL DL<=0.05 MIU/L-ACNC: 0.04 UIU/ML (ref 0.44–3.86)

## 2020-04-27 LAB — THYROID PEROXIDASE (TPO) ABS: <10 IU/ML (ref 0–35)

## 2020-04-30 ENCOUNTER — VIRTUAL VISIT (OUTPATIENT)
Dept: ENDOCRINOLOGY | Age: 40
End: 2020-04-30
Payer: COMMERCIAL

## 2020-04-30 PROCEDURE — G8428 CUR MEDS NOT DOCUMENT: HCPCS | Performed by: INTERNAL MEDICINE

## 2020-04-30 PROCEDURE — 99213 OFFICE O/P EST LOW 20 MIN: CPT | Performed by: INTERNAL MEDICINE

## 2020-04-30 RX ORDER — LEVOTHYROXINE SODIUM 0.15 MG/1
150 TABLET ORAL DAILY
Qty: 30 TABLET | Refills: 3 | Status: SHIPPED | OUTPATIENT
Start: 2020-04-30 | End: 2020-07-01 | Stop reason: SDUPTHER

## 2020-04-30 NOTE — PROGRESS NOTES
fluticasone (FLONASE) 50 MCG/ACT nasal spray 1 spray by Nasal route daily  Jeralene Najjar, MD   Hyoscyamine Sulfate SL (LEVSIN/SL) 0.125 MG SUBL Place 125 mcg under the tongue every 4 hours as needed (abdominal pain)  SOLIS Campos CNP   pantoprazole (PROTONIX) 40 MG tablet Take 1 tablet by mouth every morning (before breakfast)  SOLIS Cruz CNP   levothyroxine (SYNTHROID) 175 MCG tablet Take 1 tablet by mouth Daily  Alan Neville MD   Ascorbic Acid (VITAMIN C) 250 MG tablet Take 250 mg by mouth daily  Historical Provider, MD   Probiotic Product (PROBIOTIC DAILY PO) Take by mouth  Historical Provider, MD   vitamin D (ERGOCALCIFEROL) 1.25 MG (44988 UT) CAPS capsule Take 1 capsule by mouth once a week  Jeralene Najjar, MD   oxybutynin (DITROPAN-XL) 10 MG extended release tablet Take 10 mg by mouth daily  Historical Provider, MD       Social History     Tobacco Use    Smoking status: Former Smoker     Types: Cigarettes    Smokeless tobacco: Never Used   Substance Use Topics    Alcohol use: Yes     Alcohol/week: 0.0 standard drinks     Comment: Rare use    Drug use:  No            PHYSICAL EXAMINATION:  [ INSTRUCTIONS:  \"[x]\" Indicates a positive item  \"[]\" Indicates a negative item  -- DELETE ALL ITEMS NOT EXAMINED]  [] Alert  [] Oriented to person/place/time    [] No apparent distress  [] Toxic appearing    [] Face flushed appearing [] Sclera clear  [] Lips are cyanotic      [] Breathing appears normal  [] Appears tachypneic      [] Rash on visible skin    [] Cranial Nerves II-XII grossly intact    [] Motor grossly intact in visible upper extremities    [] Motor grossly intact in visible lower extremities    [] Normal Mood  [] Anxious appearing    [] Depressed appearing  [] Confused appearing      [] Poor short term memory  [] Poor long term memory    [] OTHER:      Due to this being a TeleHealth encounter, evaluation of the following organ systems is limited: Vitals/Constitutional/EENT/Resp/CV/GI//MS/Neuro/Skin/Heme-Lymph-Imm. ASSESSMENT/PLAN:     Diagnosis Orders   1. Hypothyroidism, unspecified type  T4, Free    TSH without Reflex     Orders Placed This Encounter   Procedures    T4, Free     Standing Status:   Future     Standing Expiration Date:   4/30/2021    TSH without Reflex     Standing Status:   Future     Standing Expiration Date:   4/30/2021     Lower Synthroid  To 150 mcg daily repeat labs in 2 months time    Time spent with patient was 15 minutes        An  electronic signature was used to authenticate this note. --Karen Hernandez MD on 4/30/2020 at 1:28 PM        Pursuant to the emergency declaration under the [facility], [facility] waiver authority and the [employer] Act, this Virtual  Visit was conducted, with patient's consent, to reduce the patient's risk of exposure to COVID-19 and provide continuity of care for an established patient. Services were provided through a video synchronous discussion virtually to substitute for in-person clinic visit.

## 2020-05-11 ENCOUNTER — VIRTUAL VISIT (OUTPATIENT)
Dept: GASTROENTEROLOGY | Age: 40
End: 2020-05-11
Payer: COMMERCIAL

## 2020-05-11 PROCEDURE — 99213 OFFICE O/P EST LOW 20 MIN: CPT | Performed by: NURSE PRACTITIONER

## 2020-06-26 DIAGNOSIS — E03.9 HYPOTHYROIDISM, UNSPECIFIED TYPE: ICD-10-CM

## 2020-06-26 LAB
T4 FREE: 1.23 NG/DL (ref 0.84–1.68)
TSH SERPL DL<=0.05 MIU/L-ACNC: 2.57 UIU/ML (ref 0.44–3.86)

## 2020-06-30 ENCOUNTER — TELEMEDICINE (OUTPATIENT)
Dept: FAMILY MEDICINE CLINIC | Age: 40
End: 2020-06-30
Payer: COMMERCIAL

## 2020-06-30 PROCEDURE — 99213 OFFICE O/P EST LOW 20 MIN: CPT | Performed by: INTERNAL MEDICINE

## 2020-06-30 PROCEDURE — G8427 DOCREV CUR MEDS BY ELIG CLIN: HCPCS | Performed by: INTERNAL MEDICINE

## 2020-06-30 RX ORDER — AMOXICILLIN AND CLAVULANATE POTASSIUM 875; 125 MG/1; MG/1
1 TABLET, FILM COATED ORAL 2 TIMES DAILY
Qty: 20 TABLET | Refills: 0 | Status: SHIPPED | OUTPATIENT
Start: 2020-06-30 | End: 2020-07-10

## 2020-06-30 RX ORDER — FEXOFENADINE HCL 180 MG/1
180 TABLET ORAL DAILY
Qty: 30 TABLET | Refills: 0 | Status: SHIPPED | OUTPATIENT
Start: 2020-06-30 | End: 2020-07-30

## 2020-06-30 RX ORDER — FLUCONAZOLE 150 MG/1
150 TABLET ORAL ONCE
Qty: 2 TABLET | Refills: 0 | Status: SHIPPED | OUTPATIENT
Start: 2020-06-30 | End: 2020-06-30

## 2020-06-30 RX ORDER — GREEN TEA/HOODIA GORDONII 315-12.5MG
1 CAPSULE ORAL DAILY
Qty: 30 TABLET | Refills: 0 | Status: SHIPPED | OUTPATIENT
Start: 2020-06-30 | End: 2020-07-30

## 2020-06-30 ASSESSMENT — ENCOUNTER SYMPTOMS
EYE PAIN: 0
ABDOMINAL PAIN: 0
SINUS PRESSURE: 1
SHORTNESS OF BREATH: 0
BACK PAIN: 0
SINUS PAIN: 1

## 2020-06-30 NOTE — PATIENT INSTRUCTIONS

## 2020-06-30 NOTE — PROGRESS NOTES
Subjective:      Patient ID: Will García is a 44 y.o. female who presents today with:  No chief complaint on file. HPI    One month ago of mild headaches and sinus discomfort. Mucinex and flonase not helping. She is taking loratadine with little improvement. Today it was actually sore even though she wasn't outside. Having pressure around eyes. deneis fever, vision loss/changes, shortness of breath, severe headaches, etc.   Past Medical History:   Diagnosis Date    Acid reflux     Anxiety     Breast cyst     Depression     Generalized anxiety disorder     H/O tubal ligation     Hiatal hernia 13    Hypothyroidism     Hypothyroidism     IBS (irritable bowel syndrome)     Obesity     Tx'd with Adipex     Past Surgical History:   Procedure Laterality Date    BREAST SURGERY  2007    Reduction     SECTION      CHOLECYSTECTOMY  1996    ENDOMETRIAL ABLATION      TUBAL LIGATION      UPPER GASTROINTESTINAL ENDOSCOPY  2013    Dr Brittney Roberson     Social History     Socioeconomic History    Marital status: Single     Spouse name: Not on file    Number of children: Not on file    Years of education: Not on file    Highest education level: Not on file   Occupational History    Not on file   Social Needs    Financial resource strain: Not on file    Food insecurity     Worry: Not on file     Inability: Not on file    Transportation needs     Medical: Not on file     Non-medical: Not on file   Tobacco Use    Smoking status: Former Smoker     Types: Cigarettes    Smokeless tobacco: Never Used   Substance and Sexual Activity    Alcohol use:  Yes     Alcohol/week: 0.0 standard drinks     Comment: Rare use    Drug use: No    Sexual activity: Yes     Partners: Male   Lifestyle    Physical activity     Days per week: Not on file     Minutes per session: Not on file    Stress: Not on file   Relationships    Social connections     Talks on phone: Not on file     Gets

## 2020-07-01 ENCOUNTER — VIRTUAL VISIT (OUTPATIENT)
Dept: ENDOCRINOLOGY | Age: 40
End: 2020-07-01
Payer: COMMERCIAL

## 2020-07-01 PROCEDURE — G8428 CUR MEDS NOT DOCUMENT: HCPCS | Performed by: INTERNAL MEDICINE

## 2020-07-01 PROCEDURE — 99213 OFFICE O/P EST LOW 20 MIN: CPT | Performed by: INTERNAL MEDICINE

## 2020-07-01 RX ORDER — LEVOTHYROXINE SODIUM 0.15 MG/1
150 TABLET ORAL DAILY
Qty: 30 TABLET | Refills: 3 | Status: SHIPPED | OUTPATIENT
Start: 2020-07-01 | End: 2020-10-01 | Stop reason: SDUPTHER

## 2020-07-01 NOTE — PROGRESS NOTES
2020    TELEHEALTH EVALUATION -- Audio/Visual (During [facility] public health emergency)    Due to COVID 19 outbreak, patient's office visit was converted to a virtual visit. Patient was contacted and agreed to proceed with a virtual visit via [email or web address] me  The risks and benefits of converting to a virtual visit were discussed in light of the current infectious disease epidemic. Patient also understood that insurance coverage and co-pays are up to their individual insurance plans. HPI: Patient made aware this is a video visit billable visit agreed to proceed patient was at home I was at my office    [place] Radhaterry (:  1980) has requested an audio/video evaluation for the following concern(s):    Hypothyroidism on replacement with Synthroid last time dosages reduced to 150 mcg daily thyroid function test have improved complains of occasional palpitations due to anxiety requesting refills last free T4 1.23 TSH was 2.570    Patient Active Problem List   Diagnosis    Primary hypothyroidism    GERD (gastroesophageal reflux disease)    Generalized anxiety disorder    Obesity    Bone disease    Vitamin D deficiency    Severe thrombocytopenia ([address]    Breast mass, right     No Known Allergies        Results for Jason Cobos (MRN [id number]) as of 2020 13:27   Ref. Range 2020 10:17 2020 09:50   TSH Latest Ref Range: 0.440 - 3.860 uIU/mL 0.036 (L) 2.570   THYROID PEROXIDASE (TPO) ABS Latest Ref Range: 0.0 - 35.0 IU/mL <10.0    T4 Free Latest Ref Range: 0.84 - 1.68 ng/dL 1.61 1.23       Review of Systems    Prior to Visit Medications    Medication Sig Taking?  Authorizing Provider   fexofenadine (ALLEGRA) 180 MG tablet Take 1 tablet by mouth daily  Lissy Pearson MD   amoxicillin-clavulanate (AUGMENTIN) 875-125 MG per tablet Take 1 tablet by mouth 2 times daily for 10 days  Lissy Pearson MD   Probiotic Acidophilus [facility]) TABS Take 1 tablet by mouth daily  Lissy Pearson MD famotidine (PEPCID) 20 MG tablet Take 1 tablet by mouth 2 times daily as needed (GERD)  SOLIS Cordero CNP   levothyroxine (SYNTHROID) 150 MCG tablet Take 1 tablet by mouth Daily  Alan Neville MD   traZODone (DESYREL) 100 MG tablet Take 1 tablet by mouth nightly as needed for Sleep  Alfredo Aly MD   busPIRone (BUSPAR) 10 MG tablet Take 1 tablet by mouth 3 times daily as needed (anxiety)  Alfredo Aly MD   fluticasone (FLONASE) 50 MCG/ACT nasal spray 1 spray by Nasal route daily  Alfredo Aly MD   Hyoscyamine Sulfate SL (LEVSIN/SL) 0.125 MG SUBL Place 125 mcg under the tongue every 4 hours as needed (abdominal pain)  SOLIS Kasper CNP   pantoprazole (PROTONIX) 40 MG tablet Take 1 tablet by mouth every morning (before breakfast)  SOLIS Cordero CNP   Ascorbic Acid (VITAMIN C) 250 MG tablet Take 250 mg by mouth daily  Historical Provider, MD   oxybutynin (DITROPAN-XL) 10 MG extended release tablet Take 10 mg by mouth daily  Historical Provider, MD       Social History     Tobacco Use    Smoking status: Former Smoker     Types: Cigarettes    Smokeless tobacco: Never Used   Substance Use Topics    Alcohol use: Yes     Alcohol/week: 0.0 standard drinks     Comment: Rare use    Drug use:  No            PHYSICAL EXAMINATION:  [ INSTRUCTIONS:  \"[x]\" Indicates a positive item  \"[]\" Indicates a negative item  -- DELETE ALL ITEMS NOT EXAMINED]  [] Alert  [] Oriented to person/place/time    [] No apparent distress  [] Toxic appearing    [] Face flushed appearing [] Sclera clear  [] Lips are cyanotic      [] Breathing appears normal  [] Appears tachypneic      [] Rash on visible skin    [] Cranial Nerves II-XII grossly intact    [] Motor grossly intact in visible upper extremities    [] Motor grossly intact in visible lower extremities    [] Normal Mood  [] Anxious appearing    [] Depressed appearing  [] Confused appearing      [] Poor short term memory  [] Poor long term memory    [] OTHER:      Due to this being a TeleHealth encounter, evaluation of the following organ systems is limited: Vitals/Constitutional/EENT/Resp/CV/GI//MS/Neuro/Skin/Heme-Lymph-Imm. ASSESSMENT/PLAN:     Diagnosis Orders   1. Hypothyroidism, unspecified type  T4, Free    TSH without Reflex     Orders Placed This Encounter   Procedures    T4, Free     Standing Status:   Future     Standing Expiration Date:   7/1/2021    TSH without Reflex     Standing Status:   Future     Standing Expiration Date:   7/1/2021     Orders Placed This Encounter   Medications    levothyroxine (SYNTHROID) 150 MCG tablet     Sig: Take 1 tablet by mouth Daily     Dispense:  30 tablet     Refill:  3     Continue Synthroid 150 mcg daily patient to have repeat labs in 3 to 6 months time    Total time spent with patient was 17 minutes    An  electronic signature was used to authenticate this note. --Dora Dawson MD on 7/1/2020 at 1:26 PM        Pursuant to the emergency declaration under the [facility], [facility] waiver authority and the [employer] and [employer] Act, this Virtual  Visit was conducted, with patient's consent, to reduce the patient's risk of exposure to COVID-19 and provide continuity of care for an established patient. Services were provided through a video synchronous discussion virtually to substitute for in-person clinic visit.

## 2020-07-10 RX ORDER — DOXYCYCLINE HYCLATE 100 MG
100 TABLET ORAL 2 TIMES DAILY
Qty: 20 TABLET | Refills: 0 | Status: SHIPPED | OUTPATIENT
Start: 2020-07-10 | End: 2020-07-20

## 2020-07-19 RX ORDER — TRAZODONE HYDROCHLORIDE 100 MG/1
TABLET ORAL
Qty: 90 TABLET | Refills: 1 | Status: SHIPPED | OUTPATIENT
Start: 2020-07-19 | End: 2020-10-13 | Stop reason: CLARIF

## 2020-07-19 NOTE — TELEPHONE ENCOUNTER
Pharmacy is requesting medication refill. Please approve or deny this request.    Rx requested:  Requested Prescriptions     Pending Prescriptions Disp Refills    traZODone (DESYREL) 100 MG tablet [Pharmacy Med Name: traZODone HCl Oral Tablet 100 MG] 30 tablet 0     Sig: TAKE ONE TABLET BY MOUTH NIGHTLY AS NEEDED FOR SLEEP.          Last Office Visit:   3/18/2020      Next Visit Date:  Future Appointments   Date Time Provider Sangeeta Oleai   9/17/2020  4:30 PM Jennetta Bamberger,  [place] [address]   10/1/2020  2:30 PM Ramos Ruiz MD [facility]

## 2020-07-28 ENCOUNTER — VIRTUAL VISIT (OUTPATIENT)
Dept: GASTROENTEROLOGY | Age: 40
End: 2020-07-28
Payer: COMMERCIAL

## 2020-07-28 PROCEDURE — 99213 OFFICE O/P EST LOW 20 MIN: CPT | Performed by: NURSE PRACTITIONER

## 2020-07-28 RX ORDER — POLYETHYLENE GLYCOL 3350, SODIUM CHLORIDE, SODIUM BICARBONATE, POTASSIUM CHLORIDE 420; 11.2; 5.72; 1.48 G/4L; G/4L; G/4L; G/4L
4000 POWDER, FOR SOLUTION ORAL ONCE
Qty: 1 BOTTLE | Refills: 0 | Status: SHIPPED | OUTPATIENT
Start: 2020-07-28 | End: 2020-07-28

## 2020-07-28 NOTE — PROGRESS NOTES
APRN - CNP   traZODone (DESYREL) 100 MG tablet TAKE ONE TABLET BY MOUTH NIGHTLY AS NEEDED FOR SLEEP. Yes Minda Myers MD   levothyroxine (SYNTHROID) 150 MCG tablet Take 1 tablet by mouth Daily Yes Jenny Quiroz MD   Probiotic Acidophilus (FLORANEX) TABS Take 1 tablet by mouth daily Yes Minda Myers MD   famotidine (PEPCID) 20 MG tablet Take 1 tablet by mouth 2 times daily as needed (GERD) Yes Sherril Osler [place], APRN - CNP   busPIRone (BUSPAR) 10 MG tablet Take 1 tablet by mouth 3 times daily as needed (anxiety) Yes Minda Myers MD   Hyoscyamine Sulfate SL (LEVSIN/SL) 0.125 MG SUBL Place 125 mcg under the tongue every 4 hours as needed (abdominal pain) Yes Sherril Osler [place], APRN - CNP   pantoprazole (PROTONIX) 40 MG tablet Take 1 tablet by mouth every morning (before breakfast) Yes SOLIS Marcelo CNP   Ascorbic Acid (VITAMIN C) 250 MG tablet Take 250 mg by mouth daily Yes Historical Provider, MD   oxybutynin (DITROPAN-XL) 10 MG extended release tablet Take 10 mg by mouth daily Yes Historical Provider, MD   fexofenadine (ALLEGRA) 180 MG tablet Take 1 tablet by mouth daily  Patient not taking: Reported on 7/28/2020  Minda Myers MD   fluticasone (FLONASE) 50 MCG/ACT nasal spray 1 spray by Nasal route daily  Patient not taking: Reported on 7/28/2020  Minda Myers MD       Social History     Tobacco Use    Smoking status: Former Smoker     Types: Cigarettes    Smokeless tobacco: Never Used   Substance Use Topics    Alcohol use:  Yes     Alcohol/week: 0.0 standard drinks     Comment: Rare use    Drug use: No      No Known Allergies,   Past Medical History:   Diagnosis Date    Acid reflux     Anxiety     Breast cyst     Depression     Generalized anxiety disorder     H/O tubal ligation     Hiatal hernia 11/18/13    Hypothyroidism     Hypothyroidism     IBS (irritable bowel syndrome)     Obesity     Tx'd with Adipex   ,   Past Surgical History:   Procedure Laterality Date    BREAST SURGERY  2007    Reduction     SECTION      CHOLECYSTECTOMY      ENDOMETRIAL ABLATION      TUBAL LIGATION      UPPER GASTROINTESTINAL ENDOSCOPY  2013    Dr Des Sifuentes       PHYSICAL EXAMINATION:  [x] Alert  [x] Oriented to person/place/time    [x] No apparent distress  [] Toxic appearing    [] Face flushed appearing [] Sclera clear  [] Lips are cyanotic      [x] Breathing appears normal  [] Appears tachypneic      [] Rash on visible skin    [] Cranial Nerves II-XII grossly intact    [] Motor grossly intact in visible upper extremities    [] Motor grossly intact in visible lower extremities    [x] Normal Mood  [] Anxious appearing    [] Depressed appearing  [] Confused appearing      [] Poor short term memory  [] Poor long term memory    [] OTHER:      Due to this being a TeleHealth encounter, evaluation of the following organ systems is limited: Vitals/Constitutional/EENT/Resp/CV/GI//MS/Neuro/Skin/Heme-Lymph-Imm. ASSESSMENT/PLAN:  60-year-old female patient with clinical history suggestive of IBS with both diarrhea and constipation. However, recently patient experienced severe abdominal cramping with diarrhea. She has lost response to Levsin. She has never undergone a colonoscopy in the past.  Recommend proceeding with colonoscopy with random colon biopsies to rule out organic cause of her symptoms. The procedure was discussed at length, including the risks and benefits. Split prep was prescribed and discussed. Importance of the prep to ensure a good exam was emphasized. The patient was counseled at length about the risks of love Covid-19 during their perioperative period and any recovery window from their procedure. The patient was made aware that love Covid-19  may worsen their prognosis for recovering from their procedure  and lend to a higher morbidity and/or mortality risk.   All material risks, benefits, and reasonable

## 2020-08-25 ENCOUNTER — NURSE ONLY (OUTPATIENT)
Dept: PRIMARY CARE CLINIC | Age: 40
End: 2020-08-25

## 2020-08-27 LAB
SARS-COV-2: NOT DETECTED
SOURCE: NORMAL

## 2020-08-31 ENCOUNTER — ANESTHESIA EVENT (OUTPATIENT)
Dept: ENDOSCOPY | Age: 40
End: 2020-08-31
Payer: COMMERCIAL

## 2020-08-31 ENCOUNTER — ANCILLARY PROCEDURE (OUTPATIENT)
Dept: ENDOSCOPY | Age: 40
End: 2020-08-31
Attending: INTERNAL MEDICINE
Payer: COMMERCIAL

## 2020-08-31 ENCOUNTER — ANESTHESIA (OUTPATIENT)
Dept: ENDOSCOPY | Age: 40
End: 2020-08-31
Payer: COMMERCIAL

## 2020-08-31 ENCOUNTER — HOSPITAL ENCOUNTER (OUTPATIENT)
Age: 40
Setting detail: OUTPATIENT SURGERY
Discharge: HOME | End: 2020-08-31
Attending: INTERNAL MEDICINE | Admitting: INTERNAL MEDICINE
Payer: COMMERCIAL

## 2020-08-31 VITALS
OXYGEN SATURATION: 100 % | WEIGHT: 206 LBS | DIASTOLIC BLOOD PRESSURE: 55 MMHG | RESPIRATION RATE: 18 BRPM | TEMPERATURE: 97.5 F | HEIGHT: 63 IN | HEART RATE: 64 BPM | SYSTOLIC BLOOD PRESSURE: 115 MMHG | BODY MASS INDEX: 36.5 KG/M2

## 2020-08-31 VITALS — DIASTOLIC BLOOD PRESSURE: 50 MMHG | OXYGEN SATURATION: 92 % | SYSTOLIC BLOOD PRESSURE: 100 MMHG

## 2020-08-31 LAB
HCG, URINE, POC: NEGATIVE
Lab: NORMAL
NEGATIVE QC PASS/FAIL: NORMAL
POSITIVE QC PASS/FAIL: NORMAL

## 2020-08-31 PROCEDURE — 6360000002 HC RX W HCPCS: Performed by: NURSE ANESTHETIST, CERTIFIED REGISTERED

## 2020-08-31 PROCEDURE — 2500000003 HC RX 250 WO HCPCS: Performed by: NURSE ANESTHETIST, CERTIFIED REGISTERED

## 2020-08-31 PROCEDURE — 45380 COLONOSCOPY AND BIOPSY: CPT | Performed by: INTERNAL MEDICINE

## 2020-08-31 PROCEDURE — 6370000000 HC RX 637 (ALT 250 FOR IP): Performed by: INTERNAL MEDICINE

## 2020-08-31 PROCEDURE — 2709999900 HC NON-CHARGEABLE SUPPLY: Performed by: INTERNAL MEDICINE

## 2020-08-31 PROCEDURE — 3609027000 HC COLONOSCOPY: Performed by: INTERNAL MEDICINE

## 2020-08-31 PROCEDURE — 2580000003 HC RX 258: Performed by: INTERNAL MEDICINE

## 2020-08-31 PROCEDURE — 3700000001 HC ADD 15 MINUTES (ANESTHESIA): Performed by: INTERNAL MEDICINE

## 2020-08-31 PROCEDURE — 2580000003 HC RX 258: Performed by: NURSE ANESTHETIST, CERTIFIED REGISTERED

## 2020-08-31 PROCEDURE — 88305 TISSUE EXAM BY PATHOLOGIST: CPT

## 2020-08-31 PROCEDURE — 7100000010 HC PHASE II RECOVERY - FIRST 15 MIN: Performed by: INTERNAL MEDICINE

## 2020-08-31 PROCEDURE — 3700000000 HC ANESTHESIA ATTENDED CARE: Performed by: INTERNAL MEDICINE

## 2020-08-31 RX ORDER — LIDOCAINE HYDROCHLORIDE 20 MG/ML
INJECTION, SOLUTION INFILTRATION; PERINEURAL PRN
Status: DISCONTINUED | OUTPATIENT
Start: 2020-08-31 | End: 2020-08-31 | Stop reason: SDUPTHER

## 2020-08-31 RX ORDER — PROPOFOL 10 MG/ML
INJECTION, EMULSION INTRAVENOUS PRN
Status: DISCONTINUED | OUTPATIENT
Start: 2020-08-31 | End: 2020-08-31 | Stop reason: SDUPTHER

## 2020-08-31 RX ORDER — 0.9 % SODIUM CHLORIDE 0.9 %
500 INTRAVENOUS SOLUTION INTRAVENOUS ONCE
Status: DISCONTINUED | OUTPATIENT
Start: 2020-08-31 | End: 2020-08-31 | Stop reason: HOSPADM

## 2020-08-31 RX ORDER — MAGNESIUM HYDROXIDE 1200 MG/15ML
LIQUID ORAL PRN
Status: DISCONTINUED | OUTPATIENT
Start: 2020-08-31 | End: 2020-08-31 | Stop reason: ALTCHOICE

## 2020-08-31 RX ORDER — SIMETHICONE 20 MG/.3ML
EMULSION ORAL PRN
Status: DISCONTINUED | OUTPATIENT
Start: 2020-08-31 | End: 2020-08-31 | Stop reason: ALTCHOICE

## 2020-08-31 RX ORDER — SODIUM CHLORIDE 9 MG/ML
INJECTION, SOLUTION INTRAVENOUS CONTINUOUS PRN
Status: DISCONTINUED | OUTPATIENT
Start: 2020-08-31 | End: 2020-08-31 | Stop reason: SDUPTHER

## 2020-08-31 RX ORDER — SODIUM CHLORIDE 9 MG/ML
INJECTION, SOLUTION INTRAVENOUS
Status: COMPLETED
Start: 2020-08-31 | End: 2020-08-31

## 2020-08-31 RX ADMIN — LIDOCAINE HYDROCHLORIDE 30 MG: 20 INJECTION, SOLUTION INFILTRATION; PERINEURAL at 08:44

## 2020-08-31 RX ADMIN — PROPOFOL 280 MG: 10 INJECTION, EMULSION INTRAVENOUS at 08:44

## 2020-08-31 RX ADMIN — SODIUM CHLORIDE: 9 INJECTION, SOLUTION INTRAVENOUS at 08:37

## 2020-08-31 ASSESSMENT — PULMONARY FUNCTION TESTS
PIF_VALUE: 0
PIF_VALUE: 1
PIF_VALUE: 0
PIF_VALUE: 0

## 2020-08-31 ASSESSMENT — PAIN - FUNCTIONAL ASSESSMENT: PAIN_FUNCTIONAL_ASSESSMENT: 0-10

## 2020-08-31 NOTE — ANESTHESIA POSTPROCEDURE EVALUATION
Department of Anesthesiology  Postprocedure Note    Patient: Michael Adams  MRN: [id number]  YOB: 1980  Date of evaluation: 8/31/2020  Time:  9:06 AM     Procedure Summary     Date:  08/31/20 Room / Location:  [address] [place] [address] Nick Cuenca    Anesthesia Start:  0876 Anesthesia Stop:      Procedure:  COLONOSCOPY DIAGNOSTIC (N/A ) Diagnosis:  (lower abdominal pain, diarrhea; R10.30, R19.7)    Surgeon:  Kranthi Mccloud MD Responsible Provider:  SOLIS Xie CRNA    Anesthesia Type:  MAC ASA Status:  2          Anesthesia Type: MAC    Marquita Phase I: Marquita Score: 10    Marquita Phase II:      Last vitals: Reviewed and per EMR flowsheets.        Anesthesia Post Evaluation    Patient location during evaluation: PACU  Patient participation: complete - patient participated  Level of consciousness: awake and alert  Pain score: 1  Airway patency: patent  Nausea & Vomiting: no nausea and no vomiting  Complications: no  Cardiovascular status: hemodynamically stable  Respiratory status: acceptable and nasal cannula  Hydration status: euvolemic  Comments: Report to RN, normal sinus rhythm

## 2020-08-31 NOTE — ANESTHESIA PRE PROCEDURE
Department of Anesthesiology  Preprocedure Note       Name:  Milagros Meier   Age:  36 y.o.  :  1980                                          MRN:  [id number]         Date:  2020      Surgeon: Bianca Watkins):  Ramon Cadena MD    Procedure: Procedure(s):  COLONOSCOPY DIAGNOSTIC    Medications prior to admission:   Prior to Admission medications    Medication Sig Start Date End Date Taking?  Authorizing Provider   traZODone (DESYREL) 100 MG tablet TAKE ONE TABLET BY MOUTH NIGHTLY AS NEEDED FOR SLEEP.  20   Marilee Cochran MD   levothyroxine (SYNTHROID) 150 MCG tablet Take 1 tablet by mouth Daily 20   Torey Griggs MD   famotidine (PEPCID) 20 MG tablet Take 1 tablet by mouth 2 times daily as needed (GERD) 20   SOLIS Tariq CNP   busPIRone (BUSPAR) 10 MG tablet Take 1 tablet by mouth 3 times daily as needed (anxiety) 3/23/20   Marilee Cochran MD   fluticasone (FLONASE) 50 MCG/ACT nasal spray 1 spray by Nasal route daily  Patient not taking: Reported on 2020 3/18/20   Marilee Cochran MD   Hyoscyamine Sulfate SL (LEVSIN/SL) 0.125 MG SUBL Place 125 mcg under the tongue every 4 hours as needed (abdominal pain) 20   SOLIS Tariq CNP   pantoprazole (PROTONIX) 40 MG tablet Take 1 tablet by mouth every morning (before breakfast) 20   SOLIS Tariq CNP   Ascorbic Acid (VITAMIN C) 250 MG tablet Take 250 mg by mouth daily    Historical Provider, MD   oxybutynin (DITROPAN-XL) 10 MG extended release tablet Take 10 mg by mouth daily    Historical Provider, MD       Current medications:    Current Facility-Administered Medications   Medication Dose Route Frequency Provider Last Rate Last Dose    sodium chloride 0.9 % infusion             0.9 % sodium chloride bolus  500 mL Intravenous Once Ramon Cadena MD           Allergies:  No Known Allergies    Problem List:    Patient Active Problem List   Diagnosis Code    Primary hypothyroidism E03.9  GERD (gastroesophageal reflux disease) K21.9    Generalized anxiety disorder F41.1    Obesity E66.9    Bone disease M89.9    Vitamin D deficiency E55.9    Severe thrombocytopenia (HCC) D69.6    Breast mass, right N63.10       Past Medical History:        Diagnosis Date    Acid reflux     Anxiety     Breast cyst     Depression     Generalized anxiety disorder     H/O tubal ligation     Hiatal hernia 13    Hypothyroidism     Hypothyroidism     IBS (irritable bowel syndrome)     Obesity     Tx'd with Adipex       Past Surgical History:        Procedure Laterality Date    BREAST SURGERY  2007    Reduction     SECTION      CHOLECYSTECTOMY      ENDOMETRIAL ABLATION      STOMACH SURGERY  2018    Gastric Sleeve    TUBAL LIGATION  2005    UPPER GASTROINTESTINAL ENDOSCOPY  2013    Dr Sara Choi       Social History:    Social History     Tobacco Use    Smoking status: Former Smoker     Types: Cigarettes     Last attempt to quit: 2018     Years since quittin.0    Smokeless tobacco: Never Used   Substance Use Topics    Alcohol use:  Yes     Alcohol/week: 0.0 standard drinks     Comment: Rare use                                Counseling given: Not Answered      Vital Signs (Current):   Vitals:    20 0800   BP: (!) 121/58   Pulse: 73   Resp: 18   Temp: 36.4 °C (97.5 °F)   TempSrc: Temporal   SpO2: 100%   Weight: 206 lb (93.4 kg)   Height: 5' 3\" (1.6 m)                                              BP Readings from Last 3 Encounters:   20 (!) 121/58   20 107/63   20 110/74       NPO Status: Time of last liquid consumption: 0130                        Time of last solid consumption: 1630                        Date of last liquid consumption: 20                        Date of last solid food consumption: 20    BMI:   Wt Readings from Last 3 Encounters:   20 206 lb (93.4 kg)   20 207 lb 6.4 oz (94.1 kg)   20 207 bowel prep, morbid obesity          Endo/Other:    (+) hypothyroidism::., .                 Abdominal:   (+) obese,         Vascular: negative vascular ROS. Anesthesia Plan      MAC     ASA 2             Anesthetic plan and risks discussed with patient. Plan discussed with attending.                   SOLIS Bar - CRNA   8/31/2020

## 2020-08-31 NOTE — H&P
Patient Name: Rosalia Crawford  : 1980  MRN: [id number]  DATE: 20      ENDOSCOPY  History and Physical    Procedure:    [x] Diagnostic Colonoscopy       [] Screening Colonoscopy  [] EGD      [] ERCP      [] EUS       [] Other    [x] Previous office notes/History and Physical reviewed from the patients chart. Please see EMR for further details of HPI. I have examined the patient's status immediately prior to the procedure and:      Indications/HPI:    [x]Abdominal Pain   []Cancer- GI/Lung  []Fhx of colon CA  []History of Polyps   []Howes   []Melena  []Abnormal Imaging   []Dysphagia    []Persistent Pneumonia  []Anemia   []Food Impaction  []History of Polyps  []GI Bleed   []Pulmonary nodule/Mass  []Change in bowel habits  []Heartburn/Reflux  []Rectal Bleed (BRBPR)  []Chest Pain - Non Cardiac  []Heme (+) Stool  []Ulcers  []Constipation   []Hemoptysis   []Varices  [x]Diarrhea   []Hypoxemia  []Nausea/Vomiting   []Screening   []Crohns/Colitis  []Other:    Anesthesia:   [x] MAC [] Moderate Sedation   [] General   [] None     ROS: 12 pt Review of Symptoms was negative unless mentioned above    Medications:   Prior to Admission medications    Medication Sig Start Date End Date Taking?  Authorizing Provider   traZODone (DESYREL) 100 MG tablet TAKE ONE TABLET BY MOUTH NIGHTLY AS NEEDED FOR SLEEP.  20   Miladys Jane MD   levothyroxine (SYNTHROID) 150 MCG tablet Take 1 tablet by mouth Daily 20   Duane Vazquez MD   famotidine (PEPCID) 20 MG tablet Take 1 tablet by mouth 2 times daily as needed (GERD) 20   SOLIS Ramirez CNP   busPIRone (BUSPAR) 10 MG tablet Take 1 tablet by mouth 3 times daily as needed (anxiety) 3/23/20   Miladys Jane MD   fluticasone (FLONASE) 50 MCG/ACT nasal spray 1 spray by Nasal route daily  Patient not taking: Reported on 2020 3/18/20   Miladys Jane MD   Hyoscyamine Sulfate SL (LEVSIN/SL) 0.125 MG SUBL Place 125 mcg under the tongue every 4 hours as needed (abdominal pain) 20   SOLIS Marcelo CNP   pantoprazole (PROTONIX) 40 MG tablet Take 1 tablet by mouth every morning (before breakfast) 20   SOLIS Marcelo CNP   Ascorbic Acid (VITAMIN C) 250 MG tablet Take 250 mg by mouth daily    Historical Provider, MD   oxybutynin (DITROPAN-XL) 10 MG extended release tablet Take 10 mg by mouth daily    Historical Provider, MD       Allergies: No Known Allergies     History of allergic reaction to anesthesia:  No    Past Medical History:  Past Medical History:   Diagnosis Date    Acid reflux     Anxiety     Breast cyst     Depression     Generalized anxiety disorder     H/O tubal ligation     Hiatal hernia 13    Hypothyroidism     Hypothyroidism     IBS (irritable bowel syndrome)     Obesity     Tx'd with Adipex       Past Surgical History:  Past Surgical History:   Procedure Laterality Date    BREAST SURGERY  2007    Reduction     SECTION      CHOLECYSTECTOMY      ENDOMETRIAL ABLATION      STOMACH SURGERY  2018    Gastric Sleeve    TUBAL LIGATION      UPPER GASTROINTESTINAL ENDOSCOPY  2013    Dr Uma Menon       Social History:  Social History     Tobacco Use    Smoking status: Former Smoker     Types: Cigarettes     Last attempt to quit: 2018     Years since quittin.0    Smokeless tobacco: Never Used   Substance Use Topics    Alcohol use:  Yes     Alcohol/week: 0.0 standard drinks     Comment: Rare use    Drug use: No       Vital Signs:   Vitals:    20 0800   BP: (!) 121/58   Pulse: 73   Resp: 18   Temp: 97.5 °F (36.4 °C)   SpO2: 100%        Physical Exam:  Cardiac:  [x]WNL []Comments:  Pulmonary:  [x]WNL   []Comments:   Neuro/Mental Status:  [x]WNL  []Comments:  Abdominal:  [x]WNL    []Comments:  Other:   []WNL  []Comments:    Informed Consent:  The risks and benefits of the procedure have been discussed with either the patient or if they cannot consent, their representative. Assessment:  Patient examined and appropriate for planned sedation and procedure. Plan:  Proceed with planned sedation and procedure as above. The patient was counseled at length about risks of love COVID-19 in the perioperative and any recovery window from the procedure. The patient was made aware that love COVID-19 may worsen their prognosis for recovery from their procedure and lend to a higher morbidity and-all mortality risk. The patient was given the option of postponing the procedure all material risks, benefits, and alternatives were discussed. The patient does wish to proceed with the procedure at this time.     Maurice Puente MD  8:19 AM

## 2020-09-16 ENCOUNTER — VIRTUAL VISIT (OUTPATIENT)
Dept: GASTROENTEROLOGY | Age: 40
End: 2020-09-16
Payer: COMMERCIAL

## 2020-09-16 PROCEDURE — 99213 OFFICE O/P EST LOW 20 MIN: CPT | Performed by: NURSE PRACTITIONER

## 2020-09-16 RX ORDER — HYOSCYAMINE SULFATE EXTENDED-RELEASE 0.38 MG/1
375 TABLET ORAL EVERY 12 HOURS PRN
Qty: 60 TABLET | Refills: 3 | Status: SHIPPED | OUTPATIENT
Start: 2020-09-16 | End: 2020-09-17 | Stop reason: CLARIF

## 2020-09-16 NOTE — PROGRESS NOTES
2020    TELEHEALTH EVALUATION -- Audio/Visual (During EMJWV-04 public health emergency)    Due to Kat 19 outbreak, patient's office visit was converted to a virtual visit. Patient was contacted and agreed to proceed with a virtual visit via Telephone Visit 15 minutes   The risks and benefits of converting to a virtual visit were discussed in light of the current infectious disease epidemic. Patient also understood that insurance coverage and co-pays are up to their individual insurance plans. HPI:  Nils Haro (:  1980) has requested an audio/video evaluation for the following concern(s):  Patient without any GI complaints at this time. She reports Levsin helps with abdominal pain, but has to take it every 4 hours. Reports her bowel movements have improved with dietary fiber. No melena or hematochezia. No weight loss or loss of appetite. GERD symptoms well managed with daily PPI. No dysphagia. No nausea or vomiting. Patient denies chest pain, shortness of breath, or palpitations. She reports she has a follow-up with GYN this month. Colonoscopy 20- normal colonic mucosa throughout. Pathology -negative. Review of Systems   All other systems reviewed and are negative. Prior to Visit Medications    Medication Sig Taking? Authorizing Provider   hyoscyamine (LEVBID) 375 MCG extended release tablet Take 1 tablet by mouth every 12 hours as needed for Cramping Yes SOLIS Lee CNP   traZODone (DESYREL) 100 MG tablet TAKE ONE TABLET BY MOUTH NIGHTLY AS NEEDED FOR SLEEP.   Yes Kenny Cyr MD   levothyroxine (SYNTHROID) 150 MCG tablet Take 1 tablet by mouth Daily Yes Kaelyn Ramey MD   famotidine (PEPCID) 20 MG tablet Take 1 tablet by mouth 2 times daily as needed (GERD) Yes SOLIS Arroyo CNP   busPIRone (BUSPAR) 10 MG tablet Take 1 tablet by mouth 3 times daily as needed (anxiety) Yes Kenny Cyr MD   pantoprazole (PROTONIX) 40 MG

## 2020-09-17 ENCOUNTER — VIRTUAL VISIT (OUTPATIENT)
Dept: FAMILY MEDICINE CLINIC | Age: 40
End: 2020-09-17
Payer: COMMERCIAL

## 2020-09-17 PROBLEM — D69.6 SEVERE THROMBOCYTOPENIA (HCC): Status: RESOLVED | Noted: 2019-12-03 | Resolved: 2020-09-17

## 2020-09-17 PROCEDURE — G8417 CALC BMI ABV UP PARAM F/U: HCPCS | Performed by: INTERNAL MEDICINE

## 2020-09-17 PROCEDURE — G8427 DOCREV CUR MEDS BY ELIG CLIN: HCPCS | Performed by: INTERNAL MEDICINE

## 2020-09-17 PROCEDURE — 1036F TOBACCO NON-USER: CPT | Performed by: INTERNAL MEDICINE

## 2020-09-17 PROCEDURE — 99214 OFFICE O/P EST MOD 30 MIN: CPT | Performed by: INTERNAL MEDICINE

## 2020-09-17 ASSESSMENT — ENCOUNTER SYMPTOMS
SHORTNESS OF BREATH: 0
EYE PAIN: 0
ABDOMINAL PAIN: 0
BACK PAIN: 0

## 2020-09-17 NOTE — PATIENT INSTRUCTIONS
Patient Education        melatonin  Pronunciation:  rocael mark TOE jose ramon  Brand:  Advanced Sleep Melatonin, Dual Spectrum Melatonin, Melatonin Time Release, Nature's Bounty Dual Spectrum Melatonin  What is the most important information I should know about melatonin? Follow all directions on the product label and package. Tell each of your healthcare providers about all your medical conditions, allergies, and all medicines you use. What is melatonin? Melatonin is a manmade form of a hormone produced in the brain that helps regulate your sleep and wake cycle. Melatonin has been used in alternative medicine as a likely effective aid in treating insomnia (trouble falling asleep or staying asleep). Melatonin is also likely effective in treating sleep disorders in people who are blind. Melatonin is also possibly effective in treating jet lag, high blood pressure, tumors, low blood platelets (blood cells that help your blood to clot), insomnia caused by withdrawal from drug addiction, or anxiety caused by surgery. A topical form of melatonin applied to the skin is possibly effective in preventing sunburn. Melatonin has also been used to treat infertility, to improve sleep problems caused by shift work, or to enhance athletic performance. However, research has shown that melatonin may not be effective in treating these conditions. Other uses not proven with research have included treating depression, bipolar disorder, dementia, macular degeneration, attention deficit hyperactivity disorder, enlarged prostate, chronic fatigue syndrome, fibromyalgia, restless leg syndrome, stomach ulcers, irritable bowel syndrome, nicotine withdrawal, and many other conditions. It is not certain whether melatonin is effective in treating any medical condition. Medicinal use of this product has not been approved by the FDA. Melatonin should not be used in place of medication prescribed for you by your doctor.   Melatonin is often sold as an herbal supplement. There are no regulated manufacturing standards in place for many herbal compounds and some marketed supplements have been found to be contaminated with toxic metals or other drugs. Herbal/health supplements should be purchased from a reliable source to minimize the risk of contamination. Melatonin may also be used for purposes not listed in this product guide. What should I discuss with my health care provider before taking melatonin? You should not use melatonin if you are allergic to it. Before using melatonin, talk to your healthcare provider. You may not be able to use melatonin if you have certain medical conditions, especially:  · diabetes;  · depression;  · a bleeding or blood clotting disorder such as hemophilia;  · high or low blood pressure;  · epilepsy or other seizure disorder; or  · if you are using any medicine to prevent organ transplant rejection. Ask a doctor before using this product if you are pregnant or breastfeeding. High doses of melatonin may affect ovulation, making it difficult for you to get pregnant. Do not give any herbal/health supplement to a child without medical advice. How should I take melatonin? When considering the use of herbal supplements, seek the advice of your doctor. You may also consider consulting a practitioner who is trained in the use of herbal/health supplements. If you choose to use melatonin, use it as directed on the package or as directed by your doctor, pharmacist, or other healthcare provider. Do not use more of this product than is recommended on the label. Use the lowest dose of melatonin when you first start taking this product. Take melatonin at bedtime, or when you are getting ready for sleep. If you use this product to treat jet lag, take the melatonin at bedtime on the day you arrive at your destination and keep using this product for 2 to 5 days.   If you take this product to treat other conditions not related to sleep, follow your healthcare provider's instructions about when and how to take melatonin. Do not crush, chew, or break an extended-release tablet. Swallow it whole. Do not swallow the orally disintegrating tablet whole. Allow it to dissolve in your mouth without chewing. If desired, you may drink liquid to help swallow the dissolved tablet. Measure liquid medicine carefully. Use the dosing syringe provided, or use a medicine dose-measuring device (not a kitchen spoon). Call your doctor if the condition you are treating with melatonin does not improve, or if it gets worse while using this product. Store at room temperature away from moisture and heat. What happens if I miss a dose? Since melatonin is used when needed, you may not be on a dosing schedule. Skip any missed dose if it's almost time for your next dose. Do not use two doses at one time. What happens if I overdose? Seek emergency medical attention or call the Poison Help line at 1-674.927.5053. What should I avoid while taking melatonin? Melatonin may impair your thinking or reactions. Avoid driving or operating machinery for a least 4 hours after taking melatonin. This product may also affect your sleep-wake cycle for several days if you are traveling through many different time zones. Avoid using melatonin with other herbal/health supplements. Melatonin and many other herbal products can increase your risk of bleeding, seizures, or low blood pressure. Using certain products together can increase these risks. Avoid coffee, tea, cola, energy drinks, or other products that contain caffeine. What are the possible side effects of melatonin? Get emergency medical help if you have signs of an allergic reaction: hives; difficult breathing; swelling of your face, lips, tongue, or throat. Although not all side effects are known, melatonin is thought to be possibly safe when taken for a short period of time (up to 2 years in some people).   Common side effects may include:  · daytime drowsiness;  · depressed mood, feeling irritable;  · stomach pain;  · headache; or  · dizziness. This is not a complete list of side effects and others may occur. Call your doctor for medical advice about side effects. You may report side effects to FDA at 9-095-FDA-9304. What other drugs will affect melatonin? Using melatonin with other drugs that make you drowsy can worsen this effect. Ask your doctor before using opioid medication, a sleeping pill, a muscle relaxer, medicine for anxiety or seizures, or herbal/health supplements may also cause drowsiness (tryptophan, [place] poppy, chamomile, gotu antonio, kava, [place]'s wort, skullcap, valerian, and others). Do not take melatonin without medical advice if you are using any of the following medications:  · an antibiotic;  · aspirin or acetaminophen (Tylenol);  · birth control pills;  · insulin or oral diabetes medicine;  · narcotic pain medicine;  · stomach medicine --lansoprazole (Prevacid), omeprazole (Prilosec), ondansetron (Zofran);  · ADHD medication- -methylphenidate, Adderall, Ritalin, and others;  · heart or blood pressure medicine --mexiletine, propranolol, verapamil;  · medicine to treat or prevent blood clots --clopidogrel (Plavix), warfarin (Coumadin, Jantoven);  · NSAIDs (nonsteroidal anti-inflammatory drugs) --ibuprofen (Advil, Motrin), naproxen (Aleve), celecoxib, diclofenac, indomethacin, meloxicam, and others; or  · steroid medicine --prednisone, and others. This list is not complete. Other drugs may affect melatonin, including prescription and over-the-counter medicines, vitamins, and herbal products. Not all possible drug interactions are listed here. Where can I get more information? Your doctor, pharmacist, or health care provider may have more information about melatonin.   Remember, keep this and all other medicines out of the reach of children, never share your medicines with others, and use this medication only for the indication prescribed. Every effort has been made to ensure that the information provided by [address] is accurate, up-to-date, and complete, but no guarantee is made to that effect. Drug information contained herein may be time sensitive. [facility] information has been compiled for use by healthcare practitioners and consumers in the [place] and therefore [facility] does not warrant that uses outside of the [place] are appropriate, unless specifically indicated otherwise. [facility]'s drug information does not endorse drugs, diagnose patients or recommend therapy. [facility]'s drug information is an informational resource designed to assist licensed healthcare practitioners in caring for their patients and/or to serve consumers viewing this service as a supplement to, and not a substitute for, the expertise, skill, knowledge and judgment of healthcare practitioners. The absence of a warning for a given drug or drug combination in no way should be construed to indicate that the drug or drug combination is safe, effective or appropriate for any given patient. [facility] does not assume any responsibility for any aspect of healthcare administered with the aid of information [facility] provides. The information contained herein is not intended to cover all possible uses, directions, precautions, warnings, drug interactions, allergic reactions, or adverse effects. If you have questions about the drugs you are taking, check with your doctor, nurse or pharmacist.  Copyright 0262-3279 [address] [place] [address] Dr LOPEZ. Version: 3.06. Revision date: 4/17/2019. Care instructions adapted under license by [facility] ([facility]). If you have questions about a medical condition or this instruction, always ask your healthcare professional. [place] [address] any warranty or liability for your use of this information.

## 2020-09-17 NOTE — PROGRESS NOTES
standard drinks     Comment: Rare use    Drug use: No    Sexual activity: Yes     Partners: Male   Lifestyle    Physical activity     Days per week: Not on file     Minutes per session: Not on file    Stress: Not on file   Relationships    Social connections     Talks on phone: Not on file     Gets together: Not on file     Attends Congregational service: Not on file     Active member of club or organization: Not on file     Attends meetings of clubs or organizations: Not on file     Relationship status: Not on file    Intimate partner violence     Fear of current or ex partner: Not on file     Emotionally abused: Not on file     Physically abused: Not on file     Forced sexual activity: Not on file   Other Topics Concern    Not on file   Social History Narrative    Not on file     No Known Allergies  Current Outpatient Medications on File Prior to Visit   Medication Sig Dispense Refill    traZODone (DESYREL) 100 MG tablet TAKE ONE TABLET BY MOUTH NIGHTLY AS NEEDED FOR SLEEP. (Patient taking differently: Take 1/2 tablet po once daily) 90 tablet 1    levothyroxine (SYNTHROID) 150 MCG tablet Take 1 tablet by mouth Daily 30 tablet 3    famotidine (PEPCID) 20 MG tablet Take 1 tablet by mouth 2 times daily as needed (GERD) 120 tablet 3    busPIRone (BUSPAR) 10 MG tablet Take 1 tablet by mouth 3 times daily as needed (anxiety) 90 tablet 5    fluticasone (FLONASE) 50 MCG/ACT nasal spray 1 spray by Nasal route daily 1 Bottle 0    pantoprazole (PROTONIX) 40 MG tablet Take 1 tablet by mouth every morning (before breakfast) 90 tablet 2    Ascorbic Acid (VITAMIN C) 250 MG tablet Take 250 mg by mouth daily       No current facility-administered medications on file prior to visit. I have personally reviewed the ROS, PMH, PFH, and social history     Review of Systems   Constitutional: Negative for chills and fever. HENT: Negative for congestion. Eyes: Negative for pain.    Respiratory: Negative for shortness of breath. Cardiovascular: Negative for chest pain. Gastrointestinal: Negative for abdominal pain. Genitourinary: Negative for hematuria. Musculoskeletal: Negative for back pain. Allergic/Immunologic: Negative for immunocompromised state. Neurological: Negative for headaches. Psychiatric/Behavioral: Negative for hallucinations. Objective: There were no vitals taken for this visit. Physical Exam  Constitutional:       General: She is not in acute distress. Appearance: She is obese. She is not ill-appearing, toxic-appearing or diaphoretic. Pulmonary:      Effort: Pulmonary effort is normal. No respiratory distress. Assessment:       Diagnosis Orders   1. Insomnia, unspecified type  Vitamin D 25 Hydroxy    CBC Auto Differential    Comprehensive Metabolic Panel    Hemoglobin A1C    Lipid Panel    Magnesium   2. Anxiety  Vitamin D 25 Hydroxy    CBC Auto Differential    Comprehensive Metabolic Panel    Hemoglobin A1C    Lipid Panel    Magnesium   3. Hypothyroidism, unspecified type  Vitamin D 25 Hydroxy    CBC Auto Differential    Comprehensive Metabolic Panel    Hemoglobin A1C    Lipid Panel    Magnesium   4. Gastroesophageal reflux disease without esophagitis  Vitamin D 25 Hydroxy    CBC Auto Differential    Comprehensive Metabolic Panel    Hemoglobin A1C    Lipid Panel    Magnesium   5. Screening for breast cancer  Vitamin D 25 Hydroxy    CBC Auto Differential    Comprehensive Metabolic Panel    Hemoglobin A1C    Lipid Panel    Magnesium    KAMARI DIGITAL SCREEN W OR WO CAD BILATERAL         Plan:    Video visit done because of coronavirus pandemic. Visit done via doxy. me   explained billeable visit, patient understands and agrees to continue  I was at home and patient was at home during this visit. has not seen OB/GYN yet (HAS APPT 09/28/2020)  Needs labs  Getting flu shot wed at her work. Buspirone well controlled, no SI/HI.    Let your cardiologist about palpitations (already had 19 day monitor)  Next step adjust anxiety meds  Risk of stroke explained  Update me in a week or two. On ppi through GI. No se from trazodone  Thyroid through endo  gerd through GI. Following up with heme  rem fresh, break trazodone in half. (during this trial)        Orders Placed This Encounter   Procedures    KAMARI DIGITAL SCREEN W OR WO CAD BILATERAL     Standing Status:   Future     Standing Expiration Date:   11/17/2021     Order Specific Question:   Reason for exam:     Answer:   screening    Vitamin D 25 Hydroxy     Standing Status:   Future     Standing Expiration Date:   9/17/2021    CBC Auto Differential     Standing Status:   Future     Standing Expiration Date:   9/17/2021    Comprehensive Metabolic Panel     Standing Status:   Future     Standing Expiration Date:   9/17/2021    Hemoglobin A1C     Standing Status:   Future     Standing Expiration Date:   9/17/2021    Lipid Panel     Standing Status:   Future     Standing Expiration Date:   9/17/2021     Order Specific Question:   Is Patient Fasting?/# of Hours     Answer:   10    Magnesium     Standing Status:   Future     Standing Expiration Date:   9/17/2021     No orders of the defined types were placed in this encounter. If anything should change or worsen call ASAP, don't wait for next scheduled appointment. Return in about 4 weeks (around 10/15/2020) for worsening symptoms, call ASAP for appointment, Chronic condition management/appointment.       Rocio Thomas MD

## 2020-09-22 DIAGNOSIS — K21.9 GASTROESOPHAGEAL REFLUX DISEASE WITHOUT ESOPHAGITIS: ICD-10-CM

## 2020-09-22 DIAGNOSIS — G47.00 INSOMNIA, UNSPECIFIED TYPE: ICD-10-CM

## 2020-09-22 DIAGNOSIS — Z12.39 SCREENING FOR BREAST CANCER: ICD-10-CM

## 2020-09-22 DIAGNOSIS — E03.9 HYPOTHYROIDISM, UNSPECIFIED TYPE: ICD-10-CM

## 2020-09-22 DIAGNOSIS — F41.9 ANXIETY: ICD-10-CM

## 2020-09-22 DIAGNOSIS — N63.10 BREAST MASS, RIGHT: ICD-10-CM

## 2020-09-22 LAB
ALBUMIN SERPL-MCNC: 4.4 G/DL (ref 3.5–4.6)
ALP BLD-CCNC: 67 U/L (ref 40–130)
ALT SERPL-CCNC: 12 U/L (ref 0–33)
ANION GAP SERPL CALCULATED.3IONS-SCNC: 12 MEQ/L (ref 9–15)
AST SERPL-CCNC: 13 U/L (ref 0–35)
BASOPHILS ABSOLUTE: 0 K/UL (ref 0–0.2)
BASOPHILS RELATIVE PERCENT: 0.5 %
BILIRUB SERPL-MCNC: 0.7 MG/DL (ref 0.2–0.7)
BUN BLDV-MCNC: 13 MG/DL (ref 6–20)
CALCIUM SERPL-MCNC: 9.6 MG/DL (ref 8.5–9.9)
CHLORIDE BLD-SCNC: 104 MEQ/L (ref 95–107)
CHOLESTEROL, TOTAL: 158 MG/DL (ref 0–199)
CO2: 26 MEQ/L (ref 20–31)
CREAT SERPL-MCNC: 0.69 MG/DL (ref 0.5–0.9)
EOSINOPHILS ABSOLUTE: 0 K/UL (ref 0–0.7)
EOSINOPHILS RELATIVE PERCENT: 0.6 %
GFR AFRICAN AMERICAN: >60
GFR NON-AFRICAN AMERICAN: >60
GLOBULIN: 3 G/DL (ref 2.3–3.5)
GLUCOSE BLD-MCNC: 82 MG/DL (ref 70–99)
HBA1C MFR BLD: 4.4 % (ref 4.8–5.9)
HCT VFR BLD CALC: 41 % (ref 37–47)
HDLC SERPL-MCNC: 51 MG/DL (ref 40–59)
HEMOGLOBIN: 14.2 G/DL (ref 12–16)
LDL CHOLESTEROL CALCULATED: 86 MG/DL (ref 0–129)
LYMPHOCYTES ABSOLUTE: 1.8 K/UL (ref 1–4.8)
LYMPHOCYTES RELATIVE PERCENT: 32.1 %
MAGNESIUM: 2.2 MG/DL (ref 1.7–2.4)
MCH RBC QN AUTO: 33.5 PG (ref 27–31.3)
MCHC RBC AUTO-ENTMCNC: 34.6 % (ref 33–37)
MCV RBC AUTO: 96.8 FL (ref 82–100)
MONOCYTES ABSOLUTE: 0.4 K/UL (ref 0.2–0.8)
MONOCYTES RELATIVE PERCENT: 7.8 %
NEUTROPHILS ABSOLUTE: 3.4 K/UL (ref 1.4–6.5)
NEUTROPHILS RELATIVE PERCENT: 59 %
PDW BLD-RTO: 12.1 % (ref 11.5–14.5)
PLATELET # BLD: 193 K/UL (ref 130–400)
POTASSIUM SERPL-SCNC: 3.6 MEQ/L (ref 3.4–4.9)
RBC # BLD: 4.23 M/UL (ref 4.2–5.4)
SODIUM BLD-SCNC: 142 MEQ/L (ref 135–144)
T4 FREE: 1.2 NG/DL (ref 0.84–1.68)
TOTAL PROTEIN: 7.4 G/DL (ref 6.3–8)
TRIGL SERPL-MCNC: 107 MG/DL (ref 0–150)
TSH REFLEX: 4.27 UIU/ML (ref 0.44–3.86)
VITAMIN D 25-HYDROXY: 36.1 NG/ML (ref 30–100)
WBC # BLD: 5.7 K/UL (ref 4.8–10.8)

## 2020-09-28 ENCOUNTER — OFFICE VISIT (OUTPATIENT)
Dept: OBGYN CLINIC | Age: 40
End: 2020-09-28
Payer: COMMERCIAL

## 2020-09-28 VITALS
BODY MASS INDEX: 37.03 KG/M2 | SYSTOLIC BLOOD PRESSURE: 102 MMHG | HEIGHT: 63 IN | DIASTOLIC BLOOD PRESSURE: 78 MMHG | WEIGHT: 209 LBS

## 2020-09-28 DIAGNOSIS — Z01.419 ENCOUNTER FOR WELL WOMAN EXAM WITH ROUTINE GYNECOLOGICAL EXAM: ICD-10-CM

## 2020-09-28 DIAGNOSIS — Z11.51 SCREENING FOR HPV (HUMAN PAPILLOMAVIRUS): ICD-10-CM

## 2020-09-28 PROCEDURE — 99396 PREV VISIT EST AGE 40-64: CPT | Performed by: OBSTETRICS & GYNECOLOGY

## 2020-09-28 NOTE — PROGRESS NOTES
SUBJECTIVE:   36 y.o.  female here for annual exam. Pt with oligomenorrhea with h/o uterine ablation. PT with scant VB but worsening dysmenorrhea. PT may be interested in definitive surgical mgmt. Review of Systems:  General ROS: negative  Psychological ROS: negative  ENT ROS: negative  Endocrine ROS: negative  Respiratory ROS: no cough, shortness of breath, or wheezing  Cardiovascular ROS: no chest pain or dyspnea on exertion  Gastrointestinal ROS: no abdominal pain, change in bowel habits, or black or bloody stools  Genito-Urinary ROS: no dysuria, trouble voiding, or hematuria  Musculoskeletal ROS: negative  Neurological ROS: no TIA or stroke symptoms  Dermatological ROS: negative    OBJECTIVE:   /78   Ht 5' 3\" (1.6 m)   Wt 209 lb (94.8 kg)   LMP 2020   BMI 37.02 kg/m²     Physical Exam:  CONSTITUTIONAL: She appears well nourished and developed   NEUROLOGIC: Alert and oriented to time, place and person  NECK: no thyroidmegaly  BREASTS: Bilateral breasts without masses, lesions or nipple discharge  LUNGS: Clear to ascultation bilaterally  CVS: regular rate and rhythm  LYMPHATIC: No palpable lymph nodes  ABDOMEN: benign, soft, nontender, no masses. No liver or splenic organomegaly. No evidence of abdominal or inguinal hernia. No indication for occult blood testing  SKIN: normal texture and tone, no lesions  NEURO: normal tone, no hyperreflexia, 1+DTRs throughout    Pelvic Exam:   EFG: normal external genitalia  URETHRAL MEATUS: normal size, no diverticula   URETHRA: normal appearing without diverticula or lesions  BLADDER:  No masses or tenderness  VAGINA: normal rugae, bloody discharge   CERVIX: parous, no lesions  UTERUS: uterus is normal size, shape, consistency and nontender   ADNEXA: normal adnexa in size, nontender and no masses. PERINEUM: normal appearing without lesions or masses  RECTUM: no hemorrhoids or rectal masses.      ASSESSMENT:   Routine gynecologic exam    PLAN:   Pap with hpv pending  US pending  Referral to Dr. Abraham Javed for surgical consult  MMG pending per PCP  Past medical, social and family history reviewed and updated in pt's chart. Routine health screenings and precautions discussed.

## 2020-10-01 ENCOUNTER — VIRTUAL VISIT (OUTPATIENT)
Dept: ENDOCRINOLOGY | Age: 40
End: 2020-10-01
Payer: COMMERCIAL

## 2020-10-01 PROCEDURE — 99213 OFFICE O/P EST LOW 20 MIN: CPT | Performed by: INTERNAL MEDICINE

## 2020-10-01 PROCEDURE — G8428 CUR MEDS NOT DOCUMENT: HCPCS | Performed by: INTERNAL MEDICINE

## 2020-10-01 RX ORDER — LEVOTHYROXINE SODIUM 0.15 MG/1
150 TABLET ORAL DAILY
Qty: 30 TABLET | Refills: 3 | Status: SHIPPED | OUTPATIENT
Start: 2020-10-01 | End: 2020-12-08 | Stop reason: SDUPTHER

## 2020-10-01 NOTE — PROGRESS NOTES
10/1/2020    TELEHEALTH EVALUATION -- Audio/Visual (During FROJB-11 public health emergency)    Due to COVID 19 outbreak, patient's office visit was converted to a virtual visit. Patient was contacted and agreed to proceed with a virtual visit via Doxy. me  The risks and benefits of converting to a virtual visit were discussed in light of the current infectious disease epidemic. Patient also understood that insurance coverage and co-pays are up to their individual insurance plans. HPI: Patient made aware of a video visit patient was at home I was at my office    Rishi Blanton (:  1980) has requested an audio/video evaluation for the following concern(s):    Follow-up on hypothyroidism had labs done recently which was reviewed chemistries were normal A1c was low thyroid function test slightly elevated TSH free T4 was normal currently on levothyroxine 150 mcg daily    Complains of lack of energy  Results for Munir Brown (MRN [id number]) as of 10/1/2020 14:47   Ref.  Range 2020 08:15 2020 08:19   Sodium Latest Ref Range: 135 - 144 mEq/L  142   Potassium Latest Ref Range: 3.4 - 4.9 mEq/L  3.6   Chloride Latest Ref Range: 95 - 107 mEq/L  104   CO2 Latest Ref Range: 20 - 31 mEq/L  26   BUN Latest Ref Range: 6 - 20 mg/dL  13   Creatinine Latest Ref Range: 0.50 - 0.90 mg/dL  0.69   Anion Gap Latest Ref Range: 9 - 15 mEq/L  12   GFR Non- Latest Ref Range: >60   >60.0   GFR African American Latest Ref Range: >60   >60.0   Magnesium Latest Ref Range: 1.7 - 2.4 mg/dL  2.2   Glucose Latest Ref Range: 70 - 99 mg/dL  82   Calcium Latest Ref Range: 8.5 - 9.9 mg/dL  9.6   Total Protein Latest Ref Range: 6.3 - 8.0 g/dL  7.4   Cholesterol, Total Latest Ref Range: 0 - 199 mg/dL  158   HDL Cholesterol Latest Ref Range: 40 - 59 mg/dL  51   LDL Calculated Latest Ref Range: 0 - 129 mg/dL  86   Triglycerides Latest Ref Range: 0 - 150 mg/dL  107   Albumin Latest Ref Range: 3.5 - 4.6 g/dL  4.4 Globulin Latest Ref Range: 2.3 - 3.5 g/dL  3.0   Alk Phos Latest Ref Range: 40 - 130 U/L  67   ALT Latest Ref Range: 0 - 33 U/L  12   AST Latest Ref Range: 0 - 35 U/L  13   Bilirubin Latest Ref Range: 0.2 - 0.7 mg/dL  0.7   Hemoglobin A1C Latest Ref Range: 4.8 - 5.9 %  4.4 (L)   TSH Latest Ref Range: 0.440 - 3.860 uIU/mL 4.270 (H)    T4 Free Latest Ref Range: 0.84 - 1.68 ng/dL 1.20    Vit D, 25-Hydroxy Latest Ref Range: 30.0 - 100.0 ng/mL  36.1     Patient Active Problem List   Diagnosis    Primary hypothyroidism    GERD (gastroesophageal reflux disease)    Generalized anxiety disorder    Obesity    Bone disease    Vitamin D deficiency    Breast mass, right     No Known Allergies      Review of Systems   Constitutional: Positive for fatigue. Psychiatric/Behavioral: Positive for sleep disturbance. All other systems reviewed and are negative. Prior to Visit Medications    Medication Sig Taking? Authorizing Provider   hyoscyamine (LEVBID) 375 MCG extended release tablet Take 1 tablet by mouth every 12 hours as needed for Cramping  SOLIS Davis CNP   traZODone (DESYREL) 100 MG tablet TAKE ONE TABLET BY MOUTH NIGHTLY AS NEEDED FOR SLEEP.    Patient taking differently: Take 1/2 tablet po once daily  Josie Patel MD   levothyroxine (SYNTHROID) 150 MCG tablet Take 1 tablet by mouth Daily  Nikita Henderson MD   famotidine (PEPCID) 20 MG tablet Take 1 tablet by mouth 2 times daily as needed (GERD)  SOLIS Davis CNP   busPIRone (BUSPAR) 10 MG tablet Take 1 tablet by mouth 3 times daily as needed (anxiety)  Josie Patel MD   fluticasone (FLONASE) 50 MCG/ACT nasal spray 1 spray by Nasal route daily  Josie Patel MD   pantoprazole (PROTONIX) 40 MG tablet Take 1 tablet by mouth every morning (before breakfast)  SOLIS Davis CNP   Ascorbic Acid (VITAMIN C) 250 MG tablet Take 250 mg by mouth daily  Historical Provider, MD       Social History     Tobacco Use    Smoking status: Former Smoker     Types: Cigarettes     Last attempt to quit: 2018     Years since quittin.1    Smokeless tobacco: Never Used   Substance Use Topics    Alcohol use: Yes     Alcohol/week: 0.0 standard drinks     Comment: Rare use    Drug use: No            PHYSICAL EXAMINATION:  [ INSTRUCTIONS:  \"[x]\" Indicates a positive item  \"[]\" Indicates a negative item  -- DELETE ALL ITEMS NOT EXAMINED]  [] Alert  [] Oriented to person/place/time    [] No apparent distress  [] Toxic appearing    [] Face flushed appearing [] Sclera clear  [] Lips are cyanotic      [] Breathing appears normal  [] Appears tachypneic      [] Rash on visible skin    [] Cranial Nerves II-XII grossly intact    [] Motor grossly intact in visible upper extremities    [] Motor grossly intact in visible lower extremities    [] Normal Mood  [] Anxious appearing    [] Depressed appearing  [] Confused appearing      [] Poor short term memory  [] Poor long term memory    [] OTHER:      Due to this being a TeleHealth encounter, evaluation of the following organ systems is limited: Vitals/Constitutional/EENT/Resp/CV/GI//MS/Neuro/Skin/Heme-Lymph-Imm. ASSESSMENT/PLAN:     Diagnosis Orders   1. Hypothyroidism, unspecified type  Cortisol Total    T4, Free    TSH without Reflex   2.  Fatigue, unspecified type           Orders Placed This Encounter   Procedures    Cortisol Total     Standing Status:   Future     Standing Expiration Date:   10/1/2021     Order Specific Question:   8AM or 4PM?     Answer:   random    T4, Free     Standing Status:   Future     Standing Expiration Date:   10/1/2021    TSH without Reflex     Standing Status:   Future     Standing Expiration Date:   10/1/2021     Orders Placed This Encounter   Medications    levothyroxine (SYNTHROID) 150 MCG tablet     Sig: Take 1 tablet by mouth Daily     Dispense:  30 tablet     Refill:  3     Continue levothyroxine 150 mcg daily to repeat thyroid function test and get a cortisol level hold off any increase in the dose of levothyroxine    Total time spent with patient was 15 minutes  An  electronic signature was used to authenticate this note. --Abdirahman Goldstein MD on 10/1/2020 at 2:47 PM        Pursuant to the emergency declaration under the [facility], [facility] waiver authority and the [employer] Act, this Virtual  Visit was conducted, with patient's consent, to reduce the patient's risk of exposure to COVID-19 and provide continuity of care for an established patient. Services were provided through a video synchronous discussion virtually to substitute for in-person clinic visit.

## 2020-10-03 LAB
HPV COMMENT: NORMAL
HPV TYPE 16: NOT DETECTED
HPV TYPE 18: NOT DETECTED
HPVOH (OTHER TYPES): NOT DETECTED

## 2020-10-06 ENCOUNTER — OFFICE VISIT (OUTPATIENT)
Dept: OBGYN CLINIC | Age: 40
End: 2020-10-06
Payer: COMMERCIAL

## 2020-10-06 VITALS
SYSTOLIC BLOOD PRESSURE: 102 MMHG | HEIGHT: 63 IN | HEART RATE: 68 BPM | BODY MASS INDEX: 38.45 KG/M2 | WEIGHT: 217 LBS | DIASTOLIC BLOOD PRESSURE: 66 MMHG

## 2020-10-06 PROCEDURE — 1036F TOBACCO NON-USER: CPT | Performed by: OBSTETRICS & GYNECOLOGY

## 2020-10-06 PROCEDURE — G8484 FLU IMMUNIZE NO ADMIN: HCPCS | Performed by: OBSTETRICS & GYNECOLOGY

## 2020-10-06 PROCEDURE — G8417 CALC BMI ABV UP PARAM F/U: HCPCS | Performed by: OBSTETRICS & GYNECOLOGY

## 2020-10-06 PROCEDURE — G8427 DOCREV CUR MEDS BY ELIG CLIN: HCPCS | Performed by: OBSTETRICS & GYNECOLOGY

## 2020-10-06 PROCEDURE — 99213 OFFICE O/P EST LOW 20 MIN: CPT | Performed by: OBSTETRICS & GYNECOLOGY

## 2020-10-06 RX ORDER — CHOLECALCIFEROL (VITAMIN D3) 125 MCG
5 CAPSULE ORAL NIGHTLY PRN
Status: ON HOLD | COMMUNITY
End: 2020-11-19

## 2020-10-06 RX ORDER — POLYETHYLENE GLYCOL 3350 17 G/17G
POWDER, FOR SOLUTION ORAL
Qty: 1020 G | Refills: 0 | Status: ON HOLD | OUTPATIENT
Start: 2020-10-06 | End: 2020-11-19 | Stop reason: HOSPADM

## 2020-10-06 RX ORDER — CLINDAMYCIN PHOSPHATE 20 MG/G
CREAM VAGINAL
Qty: 1 TUBE | Refills: 0 | Status: SHIPPED | OUTPATIENT
Start: 2020-10-06 | End: 2021-01-18

## 2020-10-06 NOTE — PROGRESS NOTES
Jenny Cuenca is a 36 y.o. female who presents here today for complaints of referred by Dr. Devika Rainey, patient presenting with pelvic pain vaginal spotting status post ablation years ago. With the current bleeding episodes patient did not have endometrial biopsy due to the previous ablation . Patient is complaining of irregular on and off bleeding over the past months worsening, patient here seeking definitive treatment after failed ablation. Vitals:  /66 (Site: Right Upper Arm, Position: Sitting, Cuff Size: Large Adult)   Pulse 68   Ht 5' 3\" (1.6 m)   Wt 217 lb (98.4 kg)   LMP 2020   BMI 38.44 kg/m²   Allergies:  Patient has no known allergies.   Past Medical History:   Diagnosis Date    Acid reflux     Anxiety     Breast cyst     Depression     Generalized anxiety disorder     H/O tubal ligation     Hiatal hernia 13    Hypothyroidism     Hypothyroidism     IBS (irritable bowel syndrome)     Obesity     Tx'd with Adipex     Past Surgical History:   Procedure Laterality Date    BREAST SURGERY      Reduction     SECTION      CHOLECYSTECTOMY      COLONOSCOPY N/A 2020    COLONOSCOPY DIAGNOSTIC performed by Jose A Mays [facility] at [facility]  2018    Gastric Sleeve    TUBAL LIGATION      UPPER GASTROINTESTINAL ENDOSCOPY  2013    Dr Megan Aldrich     OB History        3    Para   3    Term                AB        Living   3       SAB        TAB        Ectopic        Molar        Multiple   1    Live Births              Obstetric Comments   7 yo son,  3 set twins 23 yo girls                 Family History   Problem Relation Age of Onset    High Blood Pressure Mother         No FDR with colon cancer     Crohn's Disease Mother     Cancer Father         Lung cancer    Mental Illness Brother     Heart Disease Brother     Asthma Brother     Colon Cancer Neg Hx Social History     Socioeconomic History    Marital status: Single     Spouse name: Not on file    Number of children: Not on file    Years of education: Not on file    Highest education level: Not on file   Occupational History    Not on file   Social Needs    Financial resource strain: Not on file    Food insecurity     Worry: Not on file     Inability: Not on file    Transportation needs     Medical: Not on file     Non-medical: Not on file   Tobacco Use    Smoking status: Former Smoker     Types: Cigarettes     Last attempt to quit: 2018     Years since quittin.1    Smokeless tobacco: Never Used   Substance and Sexual Activity    Alcohol use: Yes     Alcohol/week: 0.0 standard drinks     Comment: Rare use    Drug use: No    Sexual activity: Yes     Partners: Male   Lifestyle    Physical activity     Days per week: Not on file     Minutes per session: Not on file    Stress: Not on file   Relationships    Social connections     Talks on phone: Not on file     Gets together: Not on file     Attends Church service: Not on file     Active member of club or organization: Not on file     Attends meetings of clubs or organizations: Not on file     Relationship status: Not on file    Intimate partner violence     Fear of current or ex partner: Not on file     Emotionally abused: Not on file     Physically abused: Not on file     Forced sexual activity: Not on file   Other Topics Concern    Not on file   Social History Narrative    Not on file       Contraceptive method:  none    Patient's medications, allergies, past medical, surgical, social and family histories were reviewed and updated as appropriate. Review of Systems  As per chief complaint   All other systems reviewed and are negative.     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 Diagnoses of Menometrorrhagia and Pelvic pain in female were also pertinent to this visit. and Surgical Consult (Referred by Dr. Abdirahman Goldstein)   as well as  counseling on preventative health maintenance follow-up. Follow Up:  Return for scheduled for surgery.         Anai Luo MD

## 2020-10-11 ENCOUNTER — HOSPITAL ENCOUNTER (OUTPATIENT)
Dept: ULTRASOUND IMAGING | Age: 40
Discharge: HOME | End: 2020-10-13
Payer: COMMERCIAL

## 2020-10-11 PROCEDURE — 76830 TRANSVAGINAL US NON-OB: CPT

## 2020-10-11 PROCEDURE — 76856 US EXAM PELVIC COMPLETE: CPT

## 2020-10-13 ENCOUNTER — VIRTUAL VISIT (OUTPATIENT)
Dept: FAMILY MEDICINE CLINIC | Age: 40
End: 2020-10-13
Payer: COMMERCIAL

## 2020-10-13 PROCEDURE — G8427 DOCREV CUR MEDS BY ELIG CLIN: HCPCS | Performed by: INTERNAL MEDICINE

## 2020-10-13 PROCEDURE — 99214 OFFICE O/P EST MOD 30 MIN: CPT | Performed by: INTERNAL MEDICINE

## 2020-10-13 RX ORDER — TRAZODONE HYDROCHLORIDE 50 MG/1
50 TABLET ORAL NIGHTLY
COMMUNITY
End: 2021-04-12 | Stop reason: DRUGHIGH

## 2020-10-13 RX ORDER — ROPINIROLE 0.5 MG/1
0.5 TABLET, FILM COATED ORAL NIGHTLY PRN
Qty: 30 TABLET | Refills: 1 | Status: SHIPPED | OUTPATIENT
Start: 2020-10-13 | End: 2020-12-06 | Stop reason: SDUPTHER

## 2020-10-13 ASSESSMENT — ENCOUNTER SYMPTOMS
SHORTNESS OF BREATH: 0
EYE PAIN: 0
BACK PAIN: 0
ABDOMINAL PAIN: 0

## 2020-10-13 NOTE — PROGRESS NOTES
Subjective:      Patient ID: Dania Solorzano is a 36 y.o. female who presents today with:  No chief complaint on file. HPI      insomnia  Better on remfresh  But still around 3 am waking up for the rest of the night  Some component of restless legs  No swelling or redness. No SI/HI. No se from trazodone  Anxiety chronic   Stressed more  Uterine fibroid  Going for partial hysterectomy. Past Medical History:   Diagnosis Date    Acid reflux     Anxiety     Breast cyst     Depression     Generalized anxiety disorder     H/O tubal ligation     Hiatal hernia 13    Hypothyroidism     Hypothyroidism     IBS (irritable bowel syndrome)     Obesity     Tx'd with Adipex     Past Surgical History:   Procedure Laterality Date    BREAST SURGERY  2007    Reduction     SECTION      CHOLECYSTECTOMY      COLONOSCOPY N/A 2020    COLONOSCOPY DIAGNOSTIC performed by Ovidio Loza [facility]  2018    Gastric Sleeve    TUBAL LIGATION      UPPER GASTROINTESTINAL ENDOSCOPY  2013    Dr Tom Tolbert     Social History     Socioeconomic History    Marital status: Single     Spouse name: Not on file    Number of children: Not on file    Years of education: Not on file    Highest education level: Not on file   Occupational History    Not on file   Social Needs    Financial resource strain: Not on file    Food insecurity     Worry: Not on file     Inability: Not on file    Transportation needs     Medical: Not on file     Non-medical: Not on file   Tobacco Use    Smoking status: Former Smoker     Types: Cigarettes     Last attempt to quit: 2018     Years since quittin.1    Smokeless tobacco: Never Used   Substance and Sexual Activity    Alcohol use:  Yes     Alcohol/week: 0.0 standard drinks     Comment: Rare use    Drug use: No    Sexual activity: Yes     Partners: Male   Lifestyle    Physical activity     Days per week: Not on file     Minutes per session: Not on file    Stress: Not on file   Relationships    Social connections     Talks on phone: Not on file     Gets together: Not on file     Attends Taoist service: Not on file     Active member of club or organization: Not on file     Attends meetings of clubs or organizations: Not on file     Relationship status: Not on file    Intimate partner violence     Fear of current or ex partner: Not on file     Emotionally abused: Not on file     Physically abused: Not on file     Forced sexual activity: Not on file   Other Topics Concern    Not on file   Social History Narrative    Not on file     No Known Allergies  Current Outpatient Medications on File Prior to Visit   Medication Sig Dispense Refill    traZODone (DESYREL) 50 MG tablet Take 50 mg by mouth nightly      melatonin 5 MG TABS tablet Take 5 mg by mouth nightly as needed      polyethylene glycol (MIRALAX) 17 GM/SCOOP powder Use ONE CAPEFUL at 10 am and then 2 pm on day prior to procedure 1020 g 0    clindamycin (CLEOCIN) 2 % vaginal cream USE one applicatorful vaginally nightly daily for 1 week prior to procedure 1 Tube 0    levothyroxine (SYNTHROID) 150 MCG tablet Take 1 tablet by mouth Daily 30 tablet 3    hyoscyamine (LEVBID) 375 MCG extended release tablet Take 1 tablet by mouth every 12 hours as needed for Cramping 60 tablet 3    famotidine (PEPCID) 20 MG tablet Take 1 tablet by mouth 2 times daily as needed (GERD) 120 tablet 3    busPIRone (BUSPAR) 10 MG tablet Take 1 tablet by mouth 3 times daily as needed (anxiety) 90 tablet 5    fluticasone (FLONASE) 50 MCG/ACT nasal spray 1 spray by Nasal route daily 1 Bottle 0    pantoprazole (PROTONIX) 40 MG tablet Take 1 tablet by mouth every morning (before breakfast) 90 tablet 2    Ascorbic Acid (VITAMIN C) 250 MG tablet Take 250 mg by mouth daily       No current facility-administered medications on file prior to visit.         I have personally reviewed the ROS, PMH, PFH, and social history     Review of Systems   Constitutional: Negative for chills and fever. HENT: Negative for congestion. Eyes: Negative for pain. Respiratory: Negative for shortness of breath. Cardiovascular: Negative for chest pain. Gastrointestinal: Negative for abdominal pain. Genitourinary: Negative for hematuria. Musculoskeletal: Negative for back pain. Sensation of having to move her legs    Allergic/Immunologic: Negative for immunocompromised state. Neurological: Negative for headaches. Psychiatric/Behavioral: Positive for sleep disturbance. Negative for hallucinations. Objective:   LMP 09/28/2020     Physical Exam  Constitutional:       General: She is not in acute distress. Appearance: She is not ill-appearing, toxic-appearing or diaphoretic. Pulmonary:      Effort: Pulmonary effort is normal. No respiratory distress. Assessment:       Diagnosis Orders   1. Uterine cramping     2. Submucous leiomyoma of uterus     3. Primary insomnia     4. Anxiety     5. Restless legs syndrome (RLS)  rOPINIRole (REQUIP) 0.5 MG tablet    Iron And Tibc    Ferritin         Plan:    Video visit done because of coronavirus pandemic. Visit done via doxy. me   explained billeable visit, patient understands and agrees to continue  I was at home and patient was at home during this visit.       partial hysterectomy scheduled for November. Let your cardiologist about palpitations (already had 19 day monitor)  Check iron studies  Add requip. Following up with heme (APPT NEXT week)   Thyroid through endo  rem fresh, break trazodone in half.   gerd through GI. Ascvd under 1 percent. Orders Placed This Encounter   Procedures    Iron And Tibc     Standing Status:   Future     Standing Expiration Date:   10/13/2021     Order Specific Question:   Is Patient Fasting? Answer:   YES     Order Specific Question:   No of Hours? Answer:   8    Ferritin     Standing Status:   Future     Standing Expiration Date:   10/13/2021     Orders Placed This Encounter   Medications    rOPINIRole (REQUIP) 0.5 MG tablet     Sig: Take 1 tablet by mouth nightly as needed (restless legs)     Dispense:  30 tablet     Refill:  1     If anything should change or worsen call ASAP, don't wait for next scheduled appointment. Return for Chronic condition management/appointment, worsening symptoms, call ASAP for appointment.       Eduin Mendosa MD

## 2020-10-13 NOTE — PATIENT INSTRUCTIONS
Patient Education        Restless Legs Syndrome: Care Instructions  Your Care Instructions  Restless legs syndrome is a common nervous system problem. People with this syndrome feel a creeping, achy, or unpleasant feeling in the legs and an overpowering urge to move them. It often occurs in the evening and at night and can lead to sleep problems and tiredness. Your doctor may suggest doing a study of your sleep patterns to figure out what is happening when you try to sleep. Many people get relief from symptoms when they get regular exercise, eat well, and avoid caffeine, alcohol, and tobacco.  Follow-up care is a key part of your treatment and safety. Be sure to make and go to all appointments, and call your doctor if you are having problems. It's also a good idea to know your test results and keep a list of the medicines you take. How can you care for yourself at home? · Take your medicines exactly as prescribed. Call your doctor if you think you are having a problem with your medicine. · Try bathing in hot or cold water. Applying a heating pad or ice bag to your legs may also help symptoms. · Stretch and massage your legs before bed or when discomfort begins. · Get some exercise for at least 30 minutes a day on most days of the week. Stop exercising at least 3 hours before bedtime. · Try to plan for situations where you will need to remain seated for long stretches. For example, if you are traveling by car, plan some stops so you can get out and walk around. · Tell your doctor about any medicines you are taking. This includes all over-the-counter, prescription, and herbal medicines. Some medicines, such as antidepressants, antihistamines, and cold and sinus medicines, can make your symptoms worse. · Avoid caffeine products, such as coffee, tea, cola, and chocolate. Caffeine can interrupt your sleep and stimulate you. · Do not smoke. Nicotine can make restless legs worse.  If you need help quitting, talk to your doctor about stop-smoking programs and medicines. These can increase your chances of quitting for good. · Do not drink alcohol late in the evening. Take steps to help you sleep better  · Get plenty of sunlight in the outdoors, particularly later in the afternoon. · Use the evening hours for settling down. Avoid activities that challenge you in the hours before bedtime. · Eat meals at regular times, and do not snack before bedtime. · Keep your bedroom quiet, dark, and cool. Try using a sleep mask and earplugs to help you sleep. · Limit how much you drink at night to reduce your need to get up to urinate. But do not go to bed thirsty. · Run a fan or other steady \"white noise\" during the night if noises wake you up. · [place] the bed for sleeping and sex. Do your reading or TV watching in another room. · Once you are in bed, relax from head to toe, and guide your mind to pleasant thoughts. · Do not stay in bed longer than 8 hours, and try to avoid naps. When should you call for help? Watch closely for changes in your health, and be sure to contact your doctor if:    · You are still not getting enough sleep.     · Your symptoms become more severe or happen more often. Where can you learn more? Go to https://TMAT.[employer]. org and sign in to your [employer] account. Enter Y014 in the [facility] box to learn more about \"Restless Legs Syndrome: Care Instructions. \"     If you do not have an account, please click on the \"Sign Up Now\" link. Current as of: November 20, 2019               Content Version: 12.6  © 7955-2295 [employer]. Care instructions adapted under license by [facility] ([facility]). If you have questions about a medical condition or this instruction, always ask your healthcare professional. [address] any warranty or liability for your use of this information.

## 2020-11-03 RX ORDER — BUSPIRONE HYDROCHLORIDE 10 MG/1
TABLET ORAL
Qty: 90 TABLET | Refills: 0 | OUTPATIENT
Start: 2020-11-03

## 2020-11-06 ENCOUNTER — HOSPITAL ENCOUNTER (EMERGENCY)
Age: 40
Discharge: HOME | End: 2020-11-06
Payer: COMMERCIAL

## 2020-11-06 ENCOUNTER — APPOINTMENT (OUTPATIENT)
Dept: CT IMAGING | Age: 40
End: 2020-11-06
Payer: COMMERCIAL

## 2020-11-06 VITALS
RESPIRATION RATE: 20 BRPM | HEART RATE: 62 BPM | HEIGHT: 63 IN | BODY MASS INDEX: 37.74 KG/M2 | DIASTOLIC BLOOD PRESSURE: 68 MMHG | SYSTOLIC BLOOD PRESSURE: 124 MMHG | TEMPERATURE: 98 F | WEIGHT: 213 LBS | OXYGEN SATURATION: 100 %

## 2020-11-06 LAB
HCG, URINE, POC: NEGATIVE
Lab: NORMAL
NEGATIVE QC PASS/FAIL: NORMAL
POSITIVE QC PASS/FAIL: NORMAL

## 2020-11-06 PROCEDURE — 99284 EMERGENCY DEPT VISIT MOD MDM: CPT

## 2020-11-06 PROCEDURE — 70450 CT HEAD/BRAIN W/O DYE: CPT

## 2020-11-06 RX ORDER — HYDROCODONE BITARTRATE AND ACETAMINOPHEN 5; 325 MG/1; MG/1
1 TABLET ORAL EVERY 6 HOURS PRN
Qty: 10 TABLET | Refills: 0 | Status: SHIPPED | OUTPATIENT
Start: 2020-11-06 | End: 2020-11-09

## 2020-11-06 RX ORDER — HYDROCODONE BITARTRATE AND ACETAMINOPHEN 5; 325 MG/1; MG/1
1 TABLET ORAL EVERY 6 HOURS PRN
Qty: 10 TABLET | Refills: 0 | Status: SHIPPED | OUTPATIENT
Start: 2020-11-06 | End: 2020-11-06 | Stop reason: SDUPTHER

## 2020-11-06 RX ORDER — IBUPROFEN 800 MG/1
800 TABLET ORAL EVERY 8 HOURS PRN
Qty: 20 TABLET | Refills: 0 | Status: SHIPPED | OUTPATIENT
Start: 2020-11-06 | End: 2020-11-06 | Stop reason: CLARIF

## 2020-11-06 RX ORDER — AMOXICILLIN 875 MG/1
875 TABLET, COATED ORAL 2 TIMES DAILY
Qty: 20 TABLET | Refills: 0 | Status: SHIPPED | OUTPATIENT
Start: 2020-11-06 | End: 2020-11-16

## 2020-11-06 ASSESSMENT — ENCOUNTER SYMPTOMS
TROUBLE SWALLOWING: 0
SHORTNESS OF BREATH: 0
SINUS PRESSURE: 1
ABDOMINAL PAIN: 0
BACK PAIN: 0
SORE THROAT: 0
NAUSEA: 0
DIARRHEA: 0
VOMITING: 0
COUGH: 0

## 2020-11-06 ASSESSMENT — PAIN DESCRIPTION - ORIENTATION: ORIENTATION: RIGHT

## 2020-11-06 ASSESSMENT — PAIN DESCRIPTION - PAIN TYPE: TYPE: ACUTE PAIN

## 2020-11-06 ASSESSMENT — PAIN DESCRIPTION - LOCATION: LOCATION: EAR

## 2020-11-06 NOTE — ED PROVIDER NOTES
[address] [facility] ED  eMERGENCY dEPARTMENT eNCOUnter      Pt Name: Manuel Tran  MRN: [id number]  [place] 1980  Date of evaluation: 2020  Provider: SOLIS Ramirez CNP      HISTORY OF PRESENT ILLNESS    Manuel Tran is a 36 y.o. female who presents to the Emergency Department with R ear pain and pressure to the back of the head x 1 month. Patient states she has bad allergies and has been taking allergy medicine without relief. She states her cough is d/t the post nasal drip. The cough is not productive. She denies fever, SOB, nausea, headache or dizziness. Pain is mild. REVIEW OF SYSTEMS       Review of Systems   Constitutional: Negative for activity change, appetite change and fever. HENT: Positive for ear pain, postnasal drip and sinus pressure. Negative for congestion, dental problem, sore throat and trouble swallowing. Respiratory: Negative for cough and shortness of breath. Cardiovascular: Negative for chest pain. Gastrointestinal: Negative for abdominal pain, diarrhea, nausea and vomiting. Genitourinary: Negative for dysuria. Musculoskeletal: Negative for arthralgias and back pain. Skin: Negative for rash. Neurological: Negative for dizziness, syncope, light-headedness and headaches. Head pressure. All other systems reviewed and are negative.         PAST MEDICAL HISTORY     Past Medical History:   Diagnosis Date    Acid reflux     Anxiety     Breast cyst     Depression     Generalized anxiety disorder     H/O tubal ligation     Hiatal hernia 13    Hypothyroidism     Hypothyroidism     IBS (irritable bowel syndrome)     Obesity     Tx'd with Adipex         SURGICAL HISTORY       Past Surgical History:   Procedure Laterality Date    BREAST SURGERY  2007    Reduction     SECTION  2004    CHOLECYSTECTOMY      COLONOSCOPY N/A 2020    COLONOSCOPY DIAGNOSTIC performed by Rosa Rosales MD at [facility]  ENDOMETRIAL ABLATION      STOMACH SURGERY  11/2018    Gastric Sleeve    TUBAL LIGATION  2005    UPPER GASTROINTESTINAL ENDOSCOPY  11/18/2013    Dr Mary Patterson       Previous Medications    ASCORBIC ACID (VITAMIN C) 250 MG TABLET    Take 250 mg by mouth daily    BUSPIRONE (BUSPAR) 10 MG TABLET    Take 1 tablet by mouth 3 times daily as needed (anxiety)    CLINDAMYCIN (CLEOCIN) 2 % VAGINAL CREAM    USE one applicatorful vaginally nightly daily for 1 week prior to procedure    FAMOTIDINE (PEPCID) 20 MG TABLET    Take 1 tablet by mouth 2 times daily as needed (GERD)    FLUTICASONE (FLONASE) 50 MCG/ACT NASAL SPRAY    1 spray by Nasal route daily    HYOSCYAMINE (LEVBID) 375 MCG EXTENDED RELEASE TABLET    Take 1 tablet by mouth every 12 hours as needed for Cramping    LEVOTHYROXINE (SYNTHROID) 150 MCG TABLET    Take 1 tablet by mouth Daily    MELATONIN 5 MG TABS TABLET    Take 5 mg by mouth nightly as needed    PANTOPRAZOLE (PROTONIX) 40 MG TABLET    Take 1 tablet by mouth every morning (before breakfast)    POLYETHYLENE GLYCOL (MIRALAX) 17 GM/SCOOP POWDER    Use ONE CAPEFUL at 10 am and then 2 pm on day prior to procedure    ROPINIROLE (REQUIP) 0.5 MG TABLET    Take 1 tablet by mouth nightly as needed (restless legs)    TRAZODONE (DESYREL) 50 MG TABLET    Take 50 mg by mouth nightly       ALLERGIES     Patient has no known allergies.     FAMILY HISTORY       Family History   Problem Relation Age of Onset    High Blood Pressure Mother         No FDR with colon cancer     Crohn's Disease Mother     Cancer Father         Lung cancer    Mental Illness Brother     Heart Disease Brother     Asthma Brother     Colon Cancer Neg Hx           SOCIAL HISTORY       Social History     Socioeconomic History    Marital status: Single     Spouse name: None    Number of children: None    Years of education: None    Highest education level: None   Occupational History    None   Social Needs    Financial resource strain: None    Food insecurity     Worry: None     Inability: None    Transportation needs     Medical: None     Non-medical: None   Tobacco Use    Smoking status: Former Smoker     Types: Cigarettes     Last attempt to quit: 2018     Years since quittin.2    Smokeless tobacco: Never Used   Substance and Sexual Activity    Alcohol use: Yes     Alcohol/week: 0.0 standard drinks     Comment: Rare use    Drug use: No    Sexual activity: Yes     Partners: Male   Lifestyle    Physical activity     Days per week: None     Minutes per session: None    Stress: None   Relationships    Social connections     Talks on phone: None     Gets together: None     Attends Zoroastrian service: None     Active member of club or organization: None     Attends meetings of clubs or organizations: None     Relationship status: None    Intimate partner violence     Fear of current or ex partner: None     Emotionally abused: None     Physically abused: None     Forced sexual activity: None   Other Topics Concern    None   Social History Narrative    None       SCREENINGS    [place] Coma Scale  Eye Opening: Spontaneous  Best Verbal Response: Oriented  Best Motor Response: Obeys commands  [place] Coma Scale Score: 15 @FLOW([id number])@      PHYSICAL EXAM    (up to 7 for level 4, 8 or more for level 5)     ED Triage Vitals [20 1406]   BP Temp Temp Source Pulse Resp SpO2 Height Weight   119/73 98 °F (36.7 °C) Oral 78 18 95 % 5' 3\" (1.6 m) 213 lb (96.6 kg)       Physical Exam  Vitals signs and nursing note reviewed. Constitutional:       Appearance: She is well-developed. HENT:      Head: Normocephalic and atraumatic. Right Ear: Hearing, ear canal and external ear normal. Tympanic membrane is erythematous (Dull TM) and retracted. Left Ear: Hearing, tympanic membrane, ear canal and external ear normal.      Nose: Congestion present. Right Sinus: Frontal sinus tenderness present. Left Sinus: Frontal sinus tenderness present. Mouth/Throat:      Lips: Pink. Mouth: Mucous membranes are moist.      Pharynx: Oropharynx is clear. Uvula midline. Eyes:      Conjunctiva/sclera: Conjunctivae normal.      Pupils: Pupils are equal, round, and reactive to light. Neck:      Musculoskeletal: Normal range of motion and neck supple. Cardiovascular:      Rate and Rhythm: Normal rate and regular rhythm. Heart sounds: Normal heart sounds. Pulmonary:      Effort: Pulmonary effort is normal. No accessory muscle usage or respiratory distress. Breath sounds: Normal breath sounds. No decreased air movement. No decreased breath sounds or wheezing. Abdominal:      General: Bowel sounds are normal. There is no distension. Palpations: Abdomen is soft. Tenderness: There is no abdominal tenderness. Musculoskeletal: Normal range of motion. Skin:     General: Skin is warm and dry. Neurological:      General: No focal deficit present. Mental Status: She is alert and oriented to person, place, and time. GCS: GCS eye subscore is 4. GCS verbal subscore is 5. GCS motor subscore is 6. Cranial Nerves: Cranial nerves are intact. Sensory: Sensation is intact. Motor: Motor function is intact. Coordination: Coordination is intact. Gait: Gait is intact. Deep Tendon Reflexes: Reflexes are normal and symmetric. Psychiatric:         Judgment: Judgment normal.           All other labs were within normal range or not returned as of this dictation. EMERGENCY DEPARTMENT COURSE and DIFFERENTIALDIAGNOSIS/MDM:   Vitals:    Vitals:    11/06/20 1406 11/06/20 1532   BP: 119/73 124/68   Pulse: 78 62   Resp: 18 20   Temp: 98 °F (36.7 °C)    TempSrc: Oral    SpO2: 95% 100%   Weight: 213 lb (96.6 kg)    Height: 5' 3\" (1.6 m)             36 yr old female with R acute otitis media. Prescriptions for Amoxicillin and Motrin were given to the patient.   F/U With PCP in 2 days. Patient verbalizes understanding,. PROCEDURES:  Unless otherwise noted below, none     Procedures      FINAL IMPRESSION      1.  Right otitis media, unspecified otitis media type          DISPOSITION/PLAN   DISPOSITION Decision To Discharge 11/06/2020 03:52:28 PM          SOLIS Moss CNP (electronically signed)  Attending Emergency Physician     SOLIS Moss CNP  11/06/20 1042

## 2020-11-12 ENCOUNTER — HOSPITAL ENCOUNTER (OUTPATIENT)
Dept: PREADMISSION TESTING | Age: 40
Discharge: HOME | End: 2020-11-16
Payer: COMMERCIAL

## 2020-11-12 ENCOUNTER — NURSE ONLY (OUTPATIENT)
Dept: PRIMARY CARE CLINIC | Age: 40
End: 2020-11-12

## 2020-11-12 VITALS
OXYGEN SATURATION: 99 % | SYSTOLIC BLOOD PRESSURE: 107 MMHG | TEMPERATURE: 98 F | HEART RATE: 72 BPM | DIASTOLIC BLOOD PRESSURE: 62 MMHG | HEIGHT: 64 IN | BODY MASS INDEX: 37.05 KG/M2 | WEIGHT: 217 LBS | RESPIRATION RATE: 16 BRPM

## 2020-11-12 LAB
ABO/RH: NORMAL
ANTIBODY SCREEN: NORMAL
HCT VFR BLD CALC: 38.7 % (ref 37–47)
HEMOGLOBIN: 13.4 G/DL (ref 12–16)
MCH RBC QN AUTO: 33.3 PG (ref 27–31.3)
MCHC RBC AUTO-ENTMCNC: 34.7 % (ref 33–37)
MCV RBC AUTO: 96 FL (ref 82–100)
PDW BLD-RTO: 12.2 % (ref 11.5–14.5)
PLATELET # BLD: 179 K/UL (ref 130–400)
RBC # BLD: 4.03 M/UL (ref 4.2–5.4)
WBC # BLD: 5.7 K/UL (ref 4.8–10.8)

## 2020-11-12 PROCEDURE — 86850 RBC ANTIBODY SCREEN: CPT

## 2020-11-12 PROCEDURE — 86901 BLOOD TYPING SEROLOGIC RH(D): CPT

## 2020-11-12 PROCEDURE — 86900 BLOOD TYPING SEROLOGIC ABO: CPT

## 2020-11-12 PROCEDURE — 85027 COMPLETE CBC AUTOMATED: CPT

## 2020-11-12 PROCEDURE — U0003 INFECTIOUS AGENT DETECTION BY NUCLEIC ACID (DNA OR RNA); SEVERE ACUTE RESPIRATORY SYNDROME CORONAVIRUS 2 (SARS-COV-2) (CORONAVIRUS DISEASE [COVID-19]), AMPLIFIED PROBE TECHNIQUE, MAKING USE OF HIGH THROUGHPUT TECHNOLOGIES AS DESCRIBED BY CMS-2020-01-R: HCPCS

## 2020-11-12 NOTE — PROGRESS NOTES
Yellow PAT instruction sheet reviewed with patient, who verbalized understanding. Sent for COVID test and will self Quarantine until [employer].

## 2020-11-13 ENCOUNTER — ANESTHESIA EVENT (OUTPATIENT)
Dept: OPERATING ROOM | Age: 40
End: 2020-11-13
Payer: COMMERCIAL

## 2020-11-13 LAB
SARS-COV-2: NOT DETECTED
SOURCE: NORMAL

## 2020-11-17 RX ORDER — LIDOCAINE HYDROCHLORIDE 10 MG/ML
1 INJECTION, SOLUTION EPIDURAL; INFILTRATION; INTRACAUDAL; PERINEURAL
Status: CANCELLED | OUTPATIENT
Start: 2020-11-19 | End: 2020-11-19

## 2020-11-17 RX ORDER — SODIUM CHLORIDE 0.9 % (FLUSH) 0.9 %
10 SYRINGE (ML) INJECTION EVERY 12 HOURS SCHEDULED
Status: CANCELLED | OUTPATIENT
Start: 2020-11-19

## 2020-11-17 RX ORDER — SODIUM CHLORIDE 0.9 % (FLUSH) 0.9 %
10 SYRINGE (ML) INJECTION PRN
Status: CANCELLED | OUTPATIENT
Start: 2020-11-19

## 2020-11-17 RX ORDER — SODIUM CHLORIDE, SODIUM LACTATE, POTASSIUM CHLORIDE, CALCIUM CHLORIDE 600; 310; 30; 20 MG/100ML; MG/100ML; MG/100ML; MG/100ML
INJECTION, SOLUTION INTRAVENOUS CONTINUOUS
Status: CANCELLED | OUTPATIENT
Start: 2020-11-19

## 2020-11-19 ENCOUNTER — HOSPITAL ENCOUNTER (OUTPATIENT)
Age: 40
Setting detail: OUTPATIENT SURGERY
Discharge: HOME | End: 2020-11-19
Attending: OBSTETRICS & GYNECOLOGY | Admitting: OBSTETRICS & GYNECOLOGY
Payer: COMMERCIAL

## 2020-11-19 ENCOUNTER — ANESTHESIA (OUTPATIENT)
Dept: OPERATING ROOM | Age: 40
End: 2020-11-19
Payer: COMMERCIAL

## 2020-11-19 ENCOUNTER — TELEPHONE (OUTPATIENT)
Dept: OBGYN CLINIC | Age: 40
End: 2020-11-19

## 2020-11-19 VITALS — DIASTOLIC BLOOD PRESSURE: 94 MMHG | OXYGEN SATURATION: 100 % | TEMPERATURE: 97.9 F | SYSTOLIC BLOOD PRESSURE: 141 MMHG

## 2020-11-19 VITALS
HEART RATE: 59 BPM | RESPIRATION RATE: 12 BRPM | WEIGHT: 217 LBS | TEMPERATURE: 97.6 F | HEIGHT: 64 IN | OXYGEN SATURATION: 99 % | BODY MASS INDEX: 37.05 KG/M2 | DIASTOLIC BLOOD PRESSURE: 69 MMHG | SYSTOLIC BLOOD PRESSURE: 113 MMHG

## 2020-11-19 LAB
HCG, URINE, POC: NEGATIVE
Lab: NORMAL
NEGATIVE QC PASS/FAIL: NORMAL
POSITIVE QC PASS/FAIL: NORMAL

## 2020-11-19 PROCEDURE — 6360000002 HC RX W HCPCS: Performed by: ANESTHESIOLOGY

## 2020-11-19 PROCEDURE — 58571 TLH W/T/O 250 G OR LESS: CPT | Performed by: OBSTETRICS & GYNECOLOGY

## 2020-11-19 PROCEDURE — 6370000000 HC RX 637 (ALT 250 FOR IP): Performed by: OBSTETRICS & GYNECOLOGY

## 2020-11-19 PROCEDURE — 6360000002 HC RX W HCPCS: Performed by: NURSE ANESTHETIST, CERTIFIED REGISTERED

## 2020-11-19 PROCEDURE — 6360000002 HC RX W HCPCS: Performed by: OBSTETRICS & GYNECOLOGY

## 2020-11-19 PROCEDURE — 3600000004 HC SURGERY LEVEL 4 BASE: Performed by: OBSTETRICS & GYNECOLOGY

## 2020-11-19 PROCEDURE — 6370000000 HC RX 637 (ALT 250 FOR IP)

## 2020-11-19 PROCEDURE — 7100000000 HC PACU RECOVERY - FIRST 15 MIN: Performed by: OBSTETRICS & GYNECOLOGY

## 2020-11-19 PROCEDURE — 7100000010 HC PHASE II RECOVERY - FIRST 15 MIN: Performed by: OBSTETRICS & GYNECOLOGY

## 2020-11-19 PROCEDURE — 76942 ECHO GUIDE FOR BIOPSY: CPT | Performed by: NURSE ANESTHETIST, CERTIFIED REGISTERED

## 2020-11-19 PROCEDURE — 2720000010 HC SURG SUPPLY STERILE: Performed by: OBSTETRICS & GYNECOLOGY

## 2020-11-19 PROCEDURE — 3600000014 HC SURGERY LEVEL 4 ADDTL 15MIN: Performed by: OBSTETRICS & GYNECOLOGY

## 2020-11-19 PROCEDURE — 2580000003 HC RX 258: Performed by: NURSE PRACTITIONER

## 2020-11-19 PROCEDURE — 2580000003 HC RX 258: Performed by: OBSTETRICS & GYNECOLOGY

## 2020-11-19 PROCEDURE — 2709999900 HC NON-CHARGEABLE SUPPLY: Performed by: OBSTETRICS & GYNECOLOGY

## 2020-11-19 PROCEDURE — 7100000011 HC PHASE II RECOVERY - ADDTL 15 MIN: Performed by: OBSTETRICS & GYNECOLOGY

## 2020-11-19 PROCEDURE — 88307 TISSUE EXAM BY PATHOLOGIST: CPT

## 2020-11-19 PROCEDURE — C1760 CLOSURE DEV, VASC: HCPCS | Performed by: OBSTETRICS & GYNECOLOGY

## 2020-11-19 PROCEDURE — 3700000001 HC ADD 15 MINUTES (ANESTHESIA): Performed by: OBSTETRICS & GYNECOLOGY

## 2020-11-19 PROCEDURE — 2500000003 HC RX 250 WO HCPCS: Performed by: NURSE ANESTHETIST, CERTIFIED REGISTERED

## 2020-11-19 PROCEDURE — 7100000001 HC PACU RECOVERY - ADDTL 15 MIN: Performed by: OBSTETRICS & GYNECOLOGY

## 2020-11-19 PROCEDURE — 3700000000 HC ANESTHESIA ATTENDED CARE: Performed by: OBSTETRICS & GYNECOLOGY

## 2020-11-19 RX ORDER — SODIUM CHLORIDE 0.9 % (FLUSH) 0.9 %
10 SYRINGE (ML) INJECTION EVERY 12 HOURS SCHEDULED
Status: DISCONTINUED | OUTPATIENT
Start: 2020-11-19 | End: 2020-11-19 | Stop reason: HOSPADM

## 2020-11-19 RX ORDER — OXYCODONE HYDROCHLORIDE AND ACETAMINOPHEN 5; 325 MG/1; MG/1
2 TABLET ORAL EVERY 4 HOURS PRN
Status: DISCONTINUED | OUTPATIENT
Start: 2020-11-19 | End: 2020-11-19 | Stop reason: HOSPADM

## 2020-11-19 RX ORDER — IBUPROFEN 800 MG/1
800 TABLET ORAL EVERY 6 HOURS PRN
Qty: 60 TABLET | Refills: 0 | Status: SHIPPED | OUTPATIENT
Start: 2020-11-19 | End: 2021-01-18

## 2020-11-19 RX ORDER — MAGNESIUM HYDROXIDE 1200 MG/15ML
LIQUID ORAL CONTINUOUS PRN
Status: COMPLETED | OUTPATIENT
Start: 2020-11-19 | End: 2020-11-19

## 2020-11-19 RX ORDER — ONDANSETRON 2 MG/ML
4 INJECTION INTRAMUSCULAR; INTRAVENOUS
Status: DISCONTINUED | OUTPATIENT
Start: 2020-11-19 | End: 2020-11-19 | Stop reason: HOSPADM

## 2020-11-19 RX ORDER — SIMETHICONE 80 MG
80 TABLET,CHEWABLE ORAL 4 TIMES DAILY PRN
Qty: 180 TABLET | Refills: 1 | Status: SHIPPED | OUTPATIENT
Start: 2020-11-19 | End: 2021-01-18

## 2020-11-19 RX ORDER — ONDANSETRON 2 MG/ML
4 INJECTION INTRAMUSCULAR; INTRAVENOUS EVERY 6 HOURS PRN
Status: DISCONTINUED | OUTPATIENT
Start: 2020-11-19 | End: 2020-11-19 | Stop reason: HOSPADM

## 2020-11-19 RX ORDER — DOCUSATE SODIUM 100 MG/1
100 CAPSULE, LIQUID FILLED ORAL 2 TIMES DAILY PRN
Qty: 60 CAPSULE | Refills: 2 | Status: SHIPPED | OUTPATIENT
Start: 2020-11-19 | End: 2021-04-06 | Stop reason: CLARIF

## 2020-11-19 RX ORDER — OXYCODONE HYDROCHLORIDE AND ACETAMINOPHEN 5; 325 MG/1; MG/1
2 TABLET ORAL NIGHTLY PRN
Qty: 10 TABLET | Refills: 0 | Status: SHIPPED | OUTPATIENT
Start: 2020-11-19 | End: 2020-11-24

## 2020-11-19 RX ORDER — KETOROLAC TROMETHAMINE 30 MG/ML
30 INJECTION, SOLUTION INTRAMUSCULAR; INTRAVENOUS EVERY 6 HOURS
Status: DISCONTINUED | OUTPATIENT
Start: 2020-11-19 | End: 2020-11-19 | Stop reason: HOSPADM

## 2020-11-19 RX ORDER — OXYCODONE HYDROCHLORIDE AND ACETAMINOPHEN 5; 325 MG/1; MG/1
1 TABLET ORAL EVERY 4 HOURS PRN
Status: DISCONTINUED | OUTPATIENT
Start: 2020-11-19 | End: 2020-11-19 | Stop reason: HOSPADM

## 2020-11-19 RX ORDER — ONDANSETRON 4 MG/1
4 TABLET, FILM COATED ORAL EVERY 8 HOURS PRN
Qty: 30 TABLET | Refills: 1 | Status: SHIPPED | OUTPATIENT
Start: 2020-11-19 | End: 2021-01-18

## 2020-11-19 RX ORDER — METOCLOPRAMIDE HYDROCHLORIDE 5 MG/ML
10 INJECTION INTRAMUSCULAR; INTRAVENOUS
Status: DISCONTINUED | OUTPATIENT
Start: 2020-11-19 | End: 2020-11-19 | Stop reason: HOSPADM

## 2020-11-19 RX ORDER — ACETAMINOPHEN 325 MG/1
650 TABLET ORAL EVERY 4 HOURS PRN
Status: DISCONTINUED | OUTPATIENT
Start: 2020-11-19 | End: 2020-11-19 | Stop reason: HOSPADM

## 2020-11-19 RX ORDER — LIDOCAINE HYDROCHLORIDE 20 MG/ML
INJECTION, SOLUTION INTRAVENOUS PRN
Status: DISCONTINUED | OUTPATIENT
Start: 2020-11-19 | End: 2020-11-19 | Stop reason: SDUPTHER

## 2020-11-19 RX ORDER — PROPOFOL 10 MG/ML
INJECTION, EMULSION INTRAVENOUS PRN
Status: DISCONTINUED | OUTPATIENT
Start: 2020-11-19 | End: 2020-11-19 | Stop reason: SDUPTHER

## 2020-11-19 RX ORDER — HYDROCODONE BITARTRATE AND ACETAMINOPHEN 5; 325 MG/1; MG/1
1 TABLET ORAL PRN
Status: DISCONTINUED | OUTPATIENT
Start: 2020-11-19 | End: 2020-11-19 | Stop reason: HOSPADM

## 2020-11-19 RX ORDER — FENTANYL CITRATE 50 UG/ML
INJECTION, SOLUTION INTRAMUSCULAR; INTRAVENOUS PRN
Status: DISCONTINUED | OUTPATIENT
Start: 2020-11-19 | End: 2020-11-19 | Stop reason: SDUPTHER

## 2020-11-19 RX ORDER — MEPERIDINE HYDROCHLORIDE 25 MG/ML
12.5 INJECTION INTRAMUSCULAR; INTRAVENOUS; SUBCUTANEOUS EVERY 5 MIN PRN
Status: DISCONTINUED | OUTPATIENT
Start: 2020-11-19 | End: 2020-11-19 | Stop reason: HOSPADM

## 2020-11-19 RX ORDER — SODIUM CHLORIDE 0.9 % (FLUSH) 0.9 %
10 SYRINGE (ML) INJECTION PRN
Status: DISCONTINUED | OUTPATIENT
Start: 2020-11-19 | End: 2020-11-19 | Stop reason: HOSPADM

## 2020-11-19 RX ORDER — ROCURONIUM BROMIDE 10 MG/ML
INJECTION, SOLUTION INTRAVENOUS PRN
Status: DISCONTINUED | OUTPATIENT
Start: 2020-11-19 | End: 2020-11-19 | Stop reason: SDUPTHER

## 2020-11-19 RX ORDER — MIDAZOLAM HYDROCHLORIDE 1 MG/ML
INJECTION INTRAMUSCULAR; INTRAVENOUS PRN
Status: DISCONTINUED | OUTPATIENT
Start: 2020-11-19 | End: 2020-11-19 | Stop reason: SDUPTHER

## 2020-11-19 RX ORDER — HYDROCODONE BITARTRATE AND ACETAMINOPHEN 5; 325 MG/1; MG/1
2 TABLET ORAL PRN
Status: DISCONTINUED | OUTPATIENT
Start: 2020-11-19 | End: 2020-11-19 | Stop reason: HOSPADM

## 2020-11-19 RX ORDER — ROPIVACAINE HYDROCHLORIDE 5 MG/ML
INJECTION, SOLUTION EPIDURAL; INFILTRATION; PERINEURAL PRN
Status: DISCONTINUED | OUTPATIENT
Start: 2020-11-19 | End: 2020-11-19 | Stop reason: SDUPTHER

## 2020-11-19 RX ORDER — ACETAMINOPHEN 500 MG
1000 TABLET ORAL EVERY 6 HOURS PRN
Qty: 60 TABLET | Refills: 1 | Status: SHIPPED | OUTPATIENT
Start: 2020-11-19

## 2020-11-19 RX ORDER — DIPHENHYDRAMINE HYDROCHLORIDE 50 MG/ML
12.5 INJECTION INTRAMUSCULAR; INTRAVENOUS
Status: DISCONTINUED | OUTPATIENT
Start: 2020-11-19 | End: 2020-11-19 | Stop reason: HOSPADM

## 2020-11-19 RX ORDER — DEXAMETHASONE SODIUM PHOSPHATE 4 MG/ML
INJECTION, SOLUTION INTRA-ARTICULAR; INTRALESIONAL; INTRAMUSCULAR; INTRAVENOUS; SOFT TISSUE PRN
Status: DISCONTINUED | OUTPATIENT
Start: 2020-11-19 | End: 2020-11-19 | Stop reason: SDUPTHER

## 2020-11-19 RX ORDER — POLYETHYLENE GLYCOL 3350 17 G/17G
17 POWDER, FOR SOLUTION ORAL 2 TIMES DAILY PRN
Qty: 1020 G | Refills: 1 | Status: SHIPPED | OUTPATIENT
Start: 2020-11-19 | End: 2020-12-19

## 2020-11-19 RX ORDER — LIDOCAINE HYDROCHLORIDE 10 MG/ML
1 INJECTION, SOLUTION EPIDURAL; INFILTRATION; INTRACAUDAL; PERINEURAL
Status: DISCONTINUED | OUTPATIENT
Start: 2020-11-19 | End: 2020-11-19 | Stop reason: HOSPADM

## 2020-11-19 RX ORDER — FENTANYL CITRATE 50 UG/ML
50 INJECTION, SOLUTION INTRAMUSCULAR; INTRAVENOUS EVERY 10 MIN PRN
Status: DISCONTINUED | OUTPATIENT
Start: 2020-11-19 | End: 2020-11-19 | Stop reason: HOSPADM

## 2020-11-19 RX ORDER — SODIUM CHLORIDE, SODIUM LACTATE, POTASSIUM CHLORIDE, CALCIUM CHLORIDE 600; 310; 30; 20 MG/100ML; MG/100ML; MG/100ML; MG/100ML
INJECTION, SOLUTION INTRAVENOUS CONTINUOUS
Status: DISCONTINUED | OUTPATIENT
Start: 2020-11-19 | End: 2020-11-19 | Stop reason: HOSPADM

## 2020-11-19 RX ORDER — PROMETHAZINE HYDROCHLORIDE 12.5 MG/1
12.5 TABLET ORAL EVERY 6 HOURS PRN
Status: DISCONTINUED | OUTPATIENT
Start: 2020-11-19 | End: 2020-11-19 | Stop reason: HOSPADM

## 2020-11-19 RX ORDER — ONDANSETRON 2 MG/ML
INJECTION INTRAMUSCULAR; INTRAVENOUS PRN
Status: DISCONTINUED | OUTPATIENT
Start: 2020-11-19 | End: 2020-11-19 | Stop reason: SDUPTHER

## 2020-11-19 RX ADMIN — SODIUM CHLORIDE, POTASSIUM CHLORIDE, SODIUM LACTATE AND CALCIUM CHLORIDE: 600; 310; 30; 20 INJECTION, SOLUTION INTRAVENOUS at 13:55

## 2020-11-19 RX ADMIN — DEXAMETHASONE SODIUM PHOSPHATE 4 MG: 4 INJECTION INTRA-ARTICULAR; INTRALESIONAL; INTRAMUSCULAR; INTRAVENOUS; SOFT TISSUE at 13:45

## 2020-11-19 RX ADMIN — LIDOCAINE HYDROCHLORIDE 50 MG: 20 INJECTION, SOLUTION INTRAVENOUS at 13:36

## 2020-11-19 RX ADMIN — ROPIVACAINE HYDROCHLORIDE 20 ML: 5 INJECTION, SOLUTION EPIDURAL; INFILTRATION; PERINEURAL at 13:06

## 2020-11-19 RX ADMIN — SUGAMMADEX 200 MG: 100 INJECTION, SOLUTION INTRAVENOUS at 14:50

## 2020-11-19 RX ADMIN — ROCURONIUM BROMIDE 50 MG: 10 INJECTION INTRAVENOUS at 13:36

## 2020-11-19 RX ADMIN — ONDANSETRON 4 MG: 2 INJECTION INTRAMUSCULAR; INTRAVENOUS at 16:08

## 2020-11-19 RX ADMIN — MIDAZOLAM HYDROCHLORIDE 2 MG: 2 INJECTION, SOLUTION INTRAMUSCULAR; INTRAVENOUS at 13:00

## 2020-11-19 RX ADMIN — CEFOXITIN SODIUM 2 G: 2 POWDER, FOR SOLUTION INTRAVENOUS at 13:21

## 2020-11-19 RX ADMIN — PROPOFOL 200 MG: 10 INJECTION, EMULSION INTRAVENOUS at 13:36

## 2020-11-19 RX ADMIN — FENTANYL CITRATE 100 MCG: 50 INJECTION, SOLUTION INTRAMUSCULAR; INTRAVENOUS at 13:36

## 2020-11-19 RX ADMIN — FENTANYL CITRATE 50 MCG: 0.05 INJECTION, SOLUTION INTRAMUSCULAR; INTRAVENOUS at 15:17

## 2020-11-19 RX ADMIN — SODIUM CHLORIDE, POTASSIUM CHLORIDE, SODIUM LACTATE AND CALCIUM CHLORIDE: 600; 310; 30; 20 INJECTION, SOLUTION INTRAVENOUS at 12:45

## 2020-11-19 RX ADMIN — FENTANYL CITRATE 50 MCG: 0.05 INJECTION, SOLUTION INTRAMUSCULAR; INTRAVENOUS at 15:29

## 2020-11-19 RX ADMIN — ROPIVACAINE HYDROCHLORIDE 20 ML: 5 INJECTION, SOLUTION EPIDURAL; INFILTRATION; PERINEURAL at 13:03

## 2020-11-19 RX ADMIN — ONDANSETRON 4 MG: 2 INJECTION INTRAMUSCULAR; INTRAVENOUS at 14:34

## 2020-11-19 RX ADMIN — OXYCODONE HYDROCHLORIDE AND ACETAMINOPHEN 2 TABLET: 5; 325 TABLET ORAL at 16:09

## 2020-11-19 ASSESSMENT — PULMONARY FUNCTION TESTS
PIF_VALUE: 24
PIF_VALUE: 14
PIF_VALUE: 13
PIF_VALUE: 26
PIF_VALUE: 13
PIF_VALUE: 26
PIF_VALUE: 21
PIF_VALUE: 25
PIF_VALUE: 13
PIF_VALUE: 25
PIF_VALUE: 17
PIF_VALUE: 15
PIF_VALUE: 24
PIF_VALUE: 25
PIF_VALUE: 24
PIF_VALUE: 16
PIF_VALUE: 0
PIF_VALUE: 16
PIF_VALUE: 13
PIF_VALUE: 13
PIF_VALUE: 1
PIF_VALUE: 14
PIF_VALUE: 18
PIF_VALUE: 13
PIF_VALUE: 24
PIF_VALUE: 0
PIF_VALUE: 24
PIF_VALUE: 16
PIF_VALUE: 13
PIF_VALUE: 13
PIF_VALUE: 14
PIF_VALUE: 25
PIF_VALUE: 24
PIF_VALUE: 24
PIF_VALUE: 25
PIF_VALUE: 25
PIF_VALUE: 23
PIF_VALUE: 13
PIF_VALUE: 24
PIF_VALUE: 16
PIF_VALUE: 13
PIF_VALUE: 23
PIF_VALUE: 25
PIF_VALUE: 24
PIF_VALUE: 25
PIF_VALUE: 16
PIF_VALUE: 25
PIF_VALUE: 13
PIF_VALUE: 0
PIF_VALUE: 24
PIF_VALUE: 15
PIF_VALUE: 23
PIF_VALUE: 13
PIF_VALUE: 24
PIF_VALUE: 13
PIF_VALUE: 24
PIF_VALUE: 0
PIF_VALUE: 24
PIF_VALUE: 16
PIF_VALUE: 1
PIF_VALUE: 15
PIF_VALUE: 14
PIF_VALUE: 25
PIF_VALUE: 13
PIF_VALUE: 19
PIF_VALUE: 22
PIF_VALUE: 2
PIF_VALUE: 13
PIF_VALUE: 25
PIF_VALUE: 0
PIF_VALUE: 25
PIF_VALUE: 13
PIF_VALUE: 13
PIF_VALUE: 2
PIF_VALUE: 24
PIF_VALUE: 26
PIF_VALUE: 5
PIF_VALUE: 24
PIF_VALUE: 13
PIF_VALUE: 13
PIF_VALUE: 24
PIF_VALUE: 4
PIF_VALUE: 26
PIF_VALUE: 14
PIF_VALUE: 0
PIF_VALUE: 24
PIF_VALUE: 13
PIF_VALUE: 24
PIF_VALUE: 25
PIF_VALUE: 18
PIF_VALUE: 24
PIF_VALUE: 25
PIF_VALUE: 17
PIF_VALUE: 14
PIF_VALUE: 24
PIF_VALUE: 13
PIF_VALUE: 14
PIF_VALUE: 14

## 2020-11-19 ASSESSMENT — PAIN - FUNCTIONAL ASSESSMENT: PAIN_FUNCTIONAL_ASSESSMENT: 0-10

## 2020-11-19 ASSESSMENT — PAIN SCALES - GENERAL
PAINLEVEL_OUTOF10: 9
PAINLEVEL_OUTOF10: 8
PAINLEVEL_OUTOF10: 6
PAINLEVEL_OUTOF10: 6

## 2020-11-19 NOTE — OP NOTE
Operative Report      Pre-operative Diagnosis: post ablation syndrome , severe pelvic pain     Post-operative Diagnosis: same , left ovarian cyst     Operation: TLH with bilateral salpingectomies    Surgeon: Dimple Lock M.D     Anesthesia: GETA    Findings: endometriosis    Estimated Blood Loss:  less than 50 ml           Specimens: Uterus . Bilateral tubes. Complications:  None; patient tolerated the procedure well. Disposition: PACU - hemodynamically stable. Procedure Details      The patient was seen in the Holding Room. The risks, benefits, complications, treatment options, and expected outcomes were discussed with the patient. The patient concurred with the proposed plan, giving informed consent. The patient was taken to Operating Room,identified as name,  the procedure verified as TLH with bilateral salpingectomy . A Time Out was held and the above information confirmed. After induction of anesthesia, the patient was draped and prepped in the usual sterile manner. A Shin catheter was inserted into her bladder. Two retractors were placed into the vagina. A single tooth tenaculum was used to grasp the anterior lip of the cervix. The Blink (air taxi)-Care uterine manipulator  was placed and the retractors were removed. Attention was then turned to the abdomen. . A 5 mm skin incision was made. A Veress needle was   then inserted. The initial pressure was 5mmhg . Pneumoperitoneum was created till 15mmhg of CO2 gas pressure was recorded on insuflator. The laparoscope was then inserted in the 5 mm trocar, and safe entry was confirmed under direct visualizaion through the trocar. Three other trocars were then inserted under direct visualization.  After injecting the local anaesthetic mix mentioned above at each port entry site,  On the left side slightly below the level of the umbilicus and 8 cm lateral to the midline, a 12mm incision was made through which a 12 mm trocar was inserted, then on the same side approximately 15 cm lateral to the midline and at the level of the anterior superior iliac spine, a 5 mm incision was made through which a 5 mm trocar was inserted under direct visualization as well. On the right side, 12 cm lateral to the midline and 5 cm below the level of the umbilicus another 5 mm incision was made through which a 5 mm trocar was inserted. Patient was then placed in T-schwarz and a survey of the abdomen and pelvis was performed which revealed enlarged ovary with a left ovarian cyst .Ureters were identified bilaterally. The left utero-ovarian ligament was then grasped , coagulated and cut using the Harmonic ace device from [employer]. The bipolar cautery was also used on the field for any non hemostatic areas or bleeding pedicles and was used sequentially with the harmonic ace device. After the left utero-ovarian ligament , then the left round ligament was coagulated and cut, followed by the anterior and posterior leaf of the broad ligament which was coagulated and cut multiple times all the way down to the uterine vessels on the left side. The anterior leaf of the broad ligament was dissected all the way anteriorly over the lower uterine ligament on the left side, when the bladder flap was also dissected from that side as well. The posterior leaf was also dissected posteriorly arround the edge of the V-care cup. By completing the dissection of the broad ligament, the uterine vessels were skeletonized, i used the bipolar coagulator to grasp the vessels and were coagulated multiple times, then the harmonic ace was used and the vessels were transected completely, hemostasis was intact. Same steps were carried out on the right side all the way down to the uterine vessels which were coagulated and cut in same fashion. The bladder flap dissection was then completed from the right side, and the blasser was pushed down in the pelvis completely freeing the anterior lower uterine segment. The v-care cup edge was then seen clearly arround the vaginal cuff. Uterus was noted to have blanched at that time. I then used the monopolar spatula to cut through the vaginal cuff surrounding the cervix and uterus, arround the edge of the v-care cup used as guidance , pushing up on the v-care cup during the procedure to prevent any ureteral injury. After the uterus and cervix were completely freed from surrounding vaginal tissue, the uterus was exteriorized vaginally. Attention was then towards the right and left fallopian tubes were then removed using the harmonic ace device cutting through mesosalpinx. The right and left ovary were preserved. The left ovarian cyst was drained. The vaginal cuff was then closed using 0-Vicryl sutures , in an interrupted fashion which were tied utilizing extracorporeal knot tying techniques. 6 Vicryl sutures were used to closed cuff completely. Care was taken to incorporate distal portion of uterosacral ligaments with the cuff closure to prevent future prolapse. Irrigation was then carried out and pedicles rechecked and found to be hemostatic. The 12mm facial incision was approximated using the endo loop closure device with 0-vicryl suture utilized. At that point gas was emptied , All  the instruments were removed from the abdomen. The counts were correct . The skin incisions were closed with a subcuticular fashion. She was sent to the recovery room in good condition. Instrument, sponge, and needle counts were correct prior to  closure and at the conclusion of the case. Angie Loaiza M.D., F.A.C.O. G

## 2020-11-19 NOTE — H&P
Cynthia Stoddard is a 36 y.o. female who presents here today for complaints of referred by Dr. Carlos Fisher, patient presenting with pelvic pain vaginal spotting status post ablation years ago. With the current bleeding episodes patient did not have endometrial biopsy due to the previous ablation . Patient is complaining of irregular on and off bleeding over the past months worsening, patient here seeking definitive treatment after failed ablation.        Vitals:  /66 (Site: Right Upper Arm, Position: Sitting, Cuff Size: Large Adult)   Pulse 68   Ht 5' 3\" (1.6 m)   Wt 217 lb (98.4 kg)   LMP 2020   BMI 38.44 kg/m²   Allergies:  Patient has no known allergies.   Past Medical History        Past Medical History:   Diagnosis Date    Acid reflux      Anxiety      Breast cyst      Depression      Generalized anxiety disorder      H/O tubal ligation      Hiatal hernia 13    Hypothyroidism      Hypothyroidism      IBS (irritable bowel syndrome)      Obesity       Tx'd with Adipex        Past Surgical History         Past Surgical History:   Procedure Laterality Date    BREAST SURGERY   2007     Reduction     SECTION       CHOLECYSTECTOMY       COLONOSCOPY N/A 2020     COLONOSCOPY DIAGNOSTIC performed by Cyn Conroy MD at [address] [place] [address]   2018     Gastric Sleeve    TUBAL LIGATION       UPPER GASTROINTESTINAL ENDOSCOPY   2013     Dr Bisi Alvarez                 OB History         3    Para   3    Term                AB        Living   3        SAB        TAB        Ectopic        Molar        Multiple   1    Live Births               Obstetric Comments   5 yo son,  1 set twins 25 yo girls                      Family History         Family History   Problem Relation Age of Onset    High Blood Pressure Mother           No FDR with colon cancer     Crohn's Disease Mother      Cancer Father           Lung cancer    Mental Illness Brother      Heart Disease Brother      Asthma Brother      Colon Cancer Neg Hx          Social History               Socioeconomic History    Marital status: Single       Spouse name: Not on file    Number of children: Not on file    Years of education: Not on file    Highest education level: Not on file   Occupational History    Not on file   Social Needs    Financial resource strain: Not on file    Food insecurity       Worry: Not on file       Inability: Not on file    Transportation needs       Medical: Not on file       Non-medical: Not on file   Tobacco Use    Smoking status: Former Smoker       Types: Cigarettes       Last attempt to quit: 2018       Years since quittin.1    Smokeless tobacco: Never Used   Substance and Sexual Activity    Alcohol use:  Yes       Alcohol/week: 0.0 standard drinks       Comment: Rare use    Drug use: No    Sexual activity: Yes       Partners: Male   Lifestyle    Physical activity       Days per week: Not on file       Minutes per session: Not on file    Stress: Not on file   Relationships    Social connections       Talks on phone: Not on file       Gets together: Not on file       Attends [employer] service: Not on file       Active member of club or organization: Not on file       Attends meetings of clubs or organizations: Not on file       Relationship status: Not on file    Intimate partner violence       Fear of current or ex partner: Not on file       Emotionally abused: Not on file       Physically abused: Not on file       Forced sexual activity: Not on file   Other Topics Concern    Not on file   Social History Narrative    Not on file           Contraceptive method:  none     Patient's medications, allergies, past medical, surgical, social and family histories were reviewed and updated as appropriate.     Review of Systems  As per chief complaint   All other systems reviewed and are negative.     Physical Exam:  Vitals:  /66 (Site: Right Upper Arm, Position: Sitting, Cuff Size: Large Adult)   Pulse 68   Ht 5' 3\" (1.6 m)   Wt 217 lb (98.4 kg)   LMP 09/28/2020   BMI 38.44 kg/m²   Lungs: CTAB   Heart : Regular S1/S2, no M/R/G  Abdomen: Soft , NT, ND , + BS   Pelvic exam : deferred     Assessment:        Diagnosis Orders   1. Post endometrial ablation syndrome      2. Menometrorrhagia      3. Pelvic pain in female            Plan:      DISCUSSED DIFFERENT TREATMENTS , opted for TLH with preservation of ovaries. Counseling: The patient was counseled on all options both medical and surgical, conservative as well as definitive. She has elected to proceed with the procedure as stated above.     The patient was counseled on the procedure. Risks and complications were reviewed in detail. The patients orders, labs, consents have been completed. The history and physical as well as all supporting surgical documentation will be forwarded to the pre-operative holding area.     The patient is aware that this procedure may not alleviate her symptoms. That there may be a necessity for a second surgery and that there may be an incomplete removal of abnormal tissue.     Discussed all risks and benefits of procedure in detail including risks of anesthesia, blood loss, need for transfusion, infection;  also potential for complication, injury, need for repair and/or removal of uterus, tubes, ovaries, bowel, bladder, ureters, blood vessels and nerves. Also discussed pre-operative and post-operative expectations.   Patient verbalizes understanding.              Nery Patel MD

## 2020-11-19 NOTE — PROGRESS NOTES
DC instructions given to pt with voicing of understanding. No nausea. Up ambulating with steady slow gait.

## 2020-11-19 NOTE — ANESTHESIA PROCEDURE NOTES
Peripheral Block    Patient location during procedure: pre-op  Staffing  Anesthesiologist: Devan Gallagher MD  Performed: anesthesiologist   Preanesthetic Checklist  Completed: patient identified, site marked, surgical consent, pre-op evaluation, timeout performed, IV checked, risks and benefits discussed, monitors and equipment checked, anesthesia consent given, oxygen available and patient being monitored  Peripheral Block  Patient position: sitting  Prep: ChloraPrep  Patient monitoring: continuous pulse ox, frequent blood pressure checks and IV access  Block type: Erector spinae  Laterality: bilateral  Injection technique: single-shot  Procedures: ultrasound guided  Local infiltration: ropivacaine  Infiltration strength: 0.5 %  Dose: 20 mL  Provider prep: mask and sterile gloves  Local infiltration: ropivacaine  Needle  Needle type: combined needle/nerve stimulator   Needle gauge: 21 G  Needle length: 10 cm  Needle localization: ultrasound guidance  Test dose: negative  Assessment  Injection assessment: negative aspiration for heme, no paresthesia on injection and local visualized surrounding nerve on ultrasound  Paresthesia pain: none  Slow fractionated injection: yes  Hemodynamics: stable  Additional Notes  t10  Reason for block: post-op pain management

## 2020-11-19 NOTE — PROGRESS NOTES
Void without diff. Pt alan well. Taking po without diff and gait steady.   Mother called and on her way

## 2020-11-19 NOTE — TELEPHONE ENCOUNTER
Per Sharyle Gunning, they received the zofran prescription with two sets of directions and requesting clarification on which one - 1 tablet every 8hrs as needed, or 1 tablet every 6hrs prn. Please advise.

## 2020-11-19 NOTE — ANESTHESIA PRE PROCEDURE
Department of Anesthesiology  Preprocedure Note       Name:  Marianela Dougherty   Age:  36 y.o.  :  1980                                          MRN:  [id number]         Date:  2020      Surgeon: Pete Merlos):  Arden Calhoun MD    Procedure: Procedure(s):  [facility]    Medications prior to admission:   Prior to Admission medications    Medication Sig Start Date End Date Taking?  Authorizing Provider   busPIRone (BUSPAR) 10 MG tablet Take 1 tablet by mouth 3 times daily as needed (anxiety) 11/3/20  Yes Norah Snow MD   rOPINIRole (REQUIP) 0.5 MG tablet Take 1 tablet by mouth nightly as needed (restless legs) 10/13/20  Yes Norah Snow MD   polyethylene glycol (MIRALAX) 17 GM/SCOOP powder Use ONE CAPEFUL at 10 am and then 2 pm on day prior to procedure 10/6/20  Yes Arden Calhoun MD   clindamycin (CLEOCIN) 2 % vaginal cream USE one applicatorful vaginally nightly daily for 1 week prior to procedure 10/6/20  Yes Arden Calhoun MD   levothyroxine (SYNTHROID) 150 MCG tablet Take 1 tablet by mouth Daily 10/1/20  Yes Samanta Welch MD   hyoscyamine (LEVBID) 375 MCG extended release tablet Take 1 tablet by mouth every 12 hours as needed for Cramping 20  Yes SOLIS Clifton CNP   famotidine (PEPCID) 20 MG tablet Take 1 tablet by mouth 2 times daily as needed (GERD) 20  Yes SOLIS Clifton CNP   fluticasone (FLONASE) 50 MCG/ACT nasal spray 1 spray by Nasal route daily 3/18/20  Yes Norah Snow MD   pantoprazole (PROTONIX) 40 MG tablet Take 1 tablet by mouth every morning (before breakfast) 20  Yes SOLIS Clifton CNP   Multiple Vitamin (MULTI VITAMIN DAILY PO) Take 1 tablet by mouth daily    Historical Provider, MD   Probiotic Product (PROBIOTIC DAILY PO) Take 1 tablet by mouth daily    Historical Provider, MD   traZODone (DESYREL) 50 MG tablet Take 50 mg by mouth nightly    Historical Provider, MD   Ascorbic Acid (VITAMIN C) 250 MG tablet Take 250 mg by mouth daily    Historical Provider, MD       Current medications:    Current Facility-Administered Medications   Medication Dose Route Frequency Provider Last Rate Last Dose    lactated ringers infusion   Intravenous Continuous Katherene Tucker, APRN -  mL/hr at 11/19/20 1245      lidocaine PF 1 % injection 1 mL  1 mL Intradermal Once PRN Katherene Tucker, APRN - CNP        sodium chloride flush 0.9 % injection 10 mL  10 mL Intravenous 2 times per day Katherene Tucker, APRN - CNP        sodium chloride flush 0.9 % injection 10 mL  10 mL Intravenous PRN Katherene Tucker, APRN - CNP        cefOXitin (MEFOXIN) 2 g in dextrose 5% 50 mL (mini-bag)  2 g Intravenous On Call to 3424 Mira Bowie MD        fentaNYL (SUBLIMAZE) injection 50 mcg  50 mcg Intravenous Q10 Min PRN Helena Correa MD        HYDROmorphone (DILAUDID) injection 0.5 mg  0.5 mg Intravenous Q10 Min PRN Helena Correa MD        HYDROcodone-acetaminophen [facility]) 5-325 MG per tablet 1 tablet  1 tablet Oral PRN Helena Correa MD        Or    HYDROcodone-acetaminophen [facility]) 5-325 MG per tablet 2 tablet  2 tablet Oral PRN Helena Correa MD        diphenhydrAMINE (BENADRYL) injection 12.5 mg  12.5 mg Intravenous Once PRN Helena Correa MD        ondansetron [facility]) injection 4 mg  4 mg Intravenous Once PRN Helena Correa MD        metoclopramide [facility]) injection 10 mg  10 mg Intravenous Once PRN Helena Correa MD        meperidine (DEMEROL) injection 12.5 mg  12.5 mg Intravenous Q5 Min PRN Helena Correa MD           Allergies:     Allergies   Allergen Reactions    Ibuprofen Other (See Comments)     Can't have with gastric sleeve    Morphine Nausea And Vomiting       Problem List:    Patient Active Problem List   Diagnosis Code    Primary hypothyroidism E03.9    GERD (gastroesophageal reflux disease) K21.9    Generalized anxiety disorder F41.1    Obesity E66.9    Bone disease M89.9    Vitamin D deficiency E55.9    Breast mass, right N63.10       Past Medical History:        Diagnosis Date    Acid reflux     Anxiety     Breast cyst     Depression     Generalized anxiety disorder     H/O tubal ligation     Hiatal hernia 13    Hypothyroidism     Hypothyroidism     IBS (irritable bowel syndrome)     Obesity     Tx'd with Adipex       Past Surgical History:        Procedure Laterality Date    BREAST SURGERY  2007    Reduction     SECTION  2004    CHOLECYSTECTOMY      COLONOSCOPY N/A 2020    COLONOSCOPY [facility]AG[facility]STIC [facility] Bk Fall [facility]  2018    Gastric Sleeve    TUBAL LIGATION  2005    UPPER GASTROINTESTINAL ENDOSCOPY  2013    Dr [facility]       Social History:    Social History     Tobacco Use    Smoking status: Former Smoker     Types: Cigarettes     Last attempt to quit: 2018     Years since quittin.2    Smokeless tobacco: Never Used   Substance Use Topics    Alcohol use:  Yes     Alcohol/week: 0.0 standard drinks     Comment: Rare use                                Counseling given: Not Answered      Vital Signs (Current):   Vitals:    20 1226   BP: (!) 114/56   Pulse: 70   Resp: 16   Temp: 97.3 °F (36.3 °C)   TempSrc: Temporal   SpO2: 99%   Weight: 217 lb (98.4 kg)   Height: 5' 4\" (1.626 m)                                              BP Readings from Last 3 Encounters:   20 (!) 114/56   20 107/62   20 124/68       NPO Status: Time of last liquid consumption:                         Time of last solid consumption:                         Date of last liquid consumption: 20                        Date of last solid food consumption: 20    BMI:   Wt Readings from Last 3 Encounters:   20 217 lb (98.4 kg)   20 217 lb (98.4 kg)   20 213 lb (96.6 kg)     Body mass index is 37.25 kg/m². CBC:   Lab Results   Component Value Date    WBC 5.7 11/12/2020    RBC 4.03 11/12/2020    RBC 4.13 10/01/2018    HGB 13.4 11/12/2020    HCT 38.7 11/12/2020    MCV 96.0 11/12/2020    RDW 12.2 11/12/2020     11/12/2020       CMP:   Lab Results   Component Value Date     09/22/2020    K 3.6 09/22/2020    K 4.5 12/04/2019     09/22/2020    CO2 26 09/22/2020    BUN 13 09/22/2020    CREATININE 0.69 09/22/2020    GFRAA >60.0 09/22/2020    LABGLOM >60.0 09/22/2020    GLUCOSE 82 09/22/2020    GLUCOSE 97 10/01/2018    PROT 7.4 09/22/2020    CALCIUM 9.6 09/22/2020    BILITOT 0.7 09/22/2020    ALKPHOS 67 09/22/2020    AST 13 09/22/2020    ALT 12 09/22/2020       POC Tests: No results for input(s): POCGLU, POCNA, POCK, POCCL, POCBUN, POCHEMO, POCHCT in the last 72 hours. Coags:   Lab Results   Component Value Date    PROTIME 14.1 12/03/2019    INR 1.0 12/03/2019    APTT 27.3 12/03/2019       HCG (If Applicable):   Lab Results   Component Value Date    PREGTESTUR Negative 10/01/2018        ABGs: No results found for: PHART, PO2ART, WHH6LTP, WGD3OHM, BEART, A2QYSUBQ     Type & Screen (If Applicable):  No results found for: LABABO, LABRH    Drug/Infectious Status (If Applicable):  No results found for: HIV, HEPCAB    COVID-19 Screening (If Applicable):   Lab Results   Component Value Date    COVID19 Not Detected 11/12/2020         Anesthesia Evaluation  Patient summary reviewed and Nursing notes reviewed no history of anesthetic complications:   Airway: Mallampati: II  TM distance: >3 FB   Neck ROM: full  Mouth opening: > = 3 FB Dental: normal exam         Pulmonary:normal exam                               Cardiovascular:                      Neuro/Psych:   Negative Neuro/Psych ROS              GI/Hepatic/Renal:   (+) hiatal hernia, GERD:,           Endo/Other:    (+) hypothyroidism::., .                 Abdominal:           Vascular: negative vascular ROS. Anesthesia Plan      general     ASA 2       Induction: intravenous. MIPS: Prophylactic antiemetics administered. Anesthetic plan and risks discussed with patient. Plan discussed with CRNA.     Attending anesthesiologist reviewed and agrees with Pre Eval content              Judah Heard MD   11/19/2020

## 2020-11-23 RX ORDER — PANTOPRAZOLE SODIUM 40 MG/1
TABLET, DELAYED RELEASE ORAL
Qty: 90 TABLET | Refills: 0 | Status: SHIPPED | OUTPATIENT
Start: 2020-11-23 | End: 2021-01-22 | Stop reason: SDUPTHER

## 2020-11-25 DIAGNOSIS — E03.9 HYPOTHYROIDISM, UNSPECIFIED TYPE: ICD-10-CM

## 2020-11-25 DIAGNOSIS — D69.6 SEVERE THROMBOCYTOPENIA (HCC): ICD-10-CM

## 2020-11-25 DIAGNOSIS — G25.81 RESTLESS LEGS SYNDROME (RLS): ICD-10-CM

## 2020-11-25 LAB
BASOPHILS ABSOLUTE: 0 K/UL (ref 0–0.2)
BASOPHILS RELATIVE PERCENT: 0.5 %
CORTISOL TOTAL: 14.3 UG/DL
EOSINOPHILS ABSOLUTE: 0.1 K/UL (ref 0–0.7)
EOSINOPHILS RELATIVE PERCENT: 1.4 %
FERRITIN: 119.2 NG/ML (ref 13–150)
HCT VFR BLD CALC: 41.3 % (ref 37–47)
HEMOGLOBIN: 14.2 G/DL (ref 12–16)
IRON SATURATION: 35 % (ref 11–46)
IRON: 93 UG/DL (ref 37–145)
LYMPHOCYTES ABSOLUTE: 1.9 K/UL (ref 1–4.8)
LYMPHOCYTES RELATIVE PERCENT: 31.2 %
MCH RBC QN AUTO: 33.7 PG (ref 27–31.3)
MCHC RBC AUTO-ENTMCNC: 34.3 % (ref 33–37)
MCV RBC AUTO: 98.2 FL (ref 82–100)
MONOCYTES ABSOLUTE: 0.4 K/UL (ref 0.2–0.8)
MONOCYTES RELATIVE PERCENT: 6 %
NEUTROPHILS ABSOLUTE: 3.7 K/UL (ref 1.4–6.5)
NEUTROPHILS RELATIVE PERCENT: 60.9 %
PDW BLD-RTO: 12.1 % (ref 11.5–14.5)
PLATELET # BLD: 193 K/UL (ref 130–400)
RBC # BLD: 4.21 M/UL (ref 4.2–5.4)
T4 FREE: 1.46 NG/DL (ref 0.84–1.68)
TOTAL IRON BINDING CAPACITY: 269 UG/DL (ref 178–450)
TSH SERPL DL<=0.05 MIU/L-ACNC: 0.32 UIU/ML (ref 0.44–3.86)
WBC # BLD: 6.1 K/UL (ref 4.8–10.8)

## 2020-12-07 ENCOUNTER — OFFICE VISIT (OUTPATIENT)
Dept: OBGYN CLINIC | Age: 40
End: 2020-12-07

## 2020-12-07 VITALS
HEIGHT: 64 IN | WEIGHT: 223 LBS | BODY MASS INDEX: 38.07 KG/M2 | SYSTOLIC BLOOD PRESSURE: 100 MMHG | HEART RATE: 76 BPM | DIASTOLIC BLOOD PRESSURE: 62 MMHG

## 2020-12-07 PROCEDURE — 1036F TOBACCO NON-USER: CPT | Performed by: OBSTETRICS & GYNECOLOGY

## 2020-12-07 PROCEDURE — G8417 CALC BMI ABV UP PARAM F/U: HCPCS | Performed by: OBSTETRICS & GYNECOLOGY

## 2020-12-07 PROCEDURE — G8484 FLU IMMUNIZE NO ADMIN: HCPCS | Performed by: OBSTETRICS & GYNECOLOGY

## 2020-12-07 PROCEDURE — G8427 DOCREV CUR MEDS BY ELIG CLIN: HCPCS | Performed by: OBSTETRICS & GYNECOLOGY

## 2020-12-07 PROCEDURE — 99024 POSTOP FOLLOW-UP VISIT: CPT | Performed by: OBSTETRICS & GYNECOLOGY

## 2020-12-07 RX ORDER — ROPINIROLE 0.5 MG/1
0.5 TABLET, FILM COATED ORAL NIGHTLY PRN
Qty: 30 TABLET | Refills: 1 | Status: SHIPPED | OUTPATIENT
Start: 2020-12-07 | End: 2021-02-02

## 2020-12-07 NOTE — PROGRESS NOTES
Op Exam     Manuel Tran is a 36y.o. year old female who presents to the office  2 weeks status post TLH with bilateral salpingectomies for postablation syndrome. Bowel movements are Normal.  The patient is not having any pain. .  . The patient states she is satisfied with post-procedure results.      Vitals:  /62 (Site: Left Upper Arm, Position: Sitting, Cuff Size: Large Adult)   Pulse 76   Ht 5' 4\" (1.626 m)   Wt 223 lb (101.2 kg)   LMP 10/18/2020   BMI 38.28 kg/m²   Allergies:  Ibuprofen and Morphine  Past Medical History:   Diagnosis Date    Acid reflux     Anxiety     Breast cyst     Depression     Generalized anxiety disorder     H/O tubal ligation     Hiatal hernia 13    Hypothyroidism     Hypothyroidism     IBS (irritable bowel syndrome)     Obesity     Tx'd with Adipex     Past Surgical History:   Procedure Laterality Date    BREAST SURGERY  2007    Reduction     SECTION  2004    CHOLECYSTECTOMY  1996    COLONOSCOPY N/A 2020    COLONOSCOPY DIAGNOSTIC performed by Rosa Rosales MD at [address] [place] [address], [facility] N/A 2020    TLH performed by Ashley Buenrostro MD at [place] [address]  2018    Gastric Sleeve    TUBAL LIGATION  2005    UPPER GASTROINTESTINAL ENDOSCOPY  2013    Dr Rogers Cea     OB History        3    Para   3    Term                AB        Living   3       SAB        TAB        Ectopic        Molar        Multiple   1    Live Births              Obstetric Comments   7 yo son,  3 set twins 25 yo girls                 Family History   Problem Relation Age of Onset    High Blood Pressure Mother         No FDR with colon cancer     Crohn's Disease Mother     Cancer Father         Lung cancer    Mental Illness Brother     Heart Disease Brother     Asthma Brother     Colon Cancer Neg Hx      Social History     Socioeconomic History    Marital status: Single Spouse name: Not on file    Number of children: Not on file    Years of education: Not on file    Highest education level: Not on file   Occupational History    Not on file   Social Needs    Financial resource strain: Not on file    Food insecurity     Worry: Not on file     Inability: Not on file    Transportation needs     Medical: Not on file     Non-medical: Not on file   Tobacco Use    Smoking status: Former Smoker     Types: Cigarettes     Last attempt to quit: 2018     Years since quittin.3    Smokeless tobacco: Never Used   Substance and Sexual Activity    Alcohol use: Yes     Alcohol/week: 0.0 standard drinks     Comment: Rare use    Drug use: No    Sexual activity: Yes     Partners: Male   Lifestyle    Physical activity     Days per week: Not on file     Minutes per session: Not on file    Stress: Not on file   Relationships    Social connections     Talks on phone: Not on file     Gets together: Not on file     Attends Che[employer] service: Not on file     Active member of club or organization: Not on file     Attends meetings of clubs or organizations: Not on file     Relationship status: Not on file    Intimate partner violence     Fear of current or ex partner: Not on file     Emotionally abused: Not on file     Physically abused: Not on file     Forced sexual activity: Not on file   Other Topics Concern    Not on file   Social History Narrative    Not on file       Contraceptive method:  none    Patient's medications, allergies, past medical, surgical,social and family histories were reviewed and updated as appropriate. Review of Systems  Review of Systems   All other systems reviewed and are negative. Review of Systems  Constitutional: Negative for chills and fever. Respiratory: Negative for coughand shortness of breath. Cardiovascular: Negative for chestpain and palpitations. Gastrointestinal: Negative for nauseaand vomiting.    Genitourinary: Negative for dysuria, frequencyand urgency. Musculoskeletal: Negative for myalgias. Neurological: Negative for dizziness, seizures andheadaches. Psychiatric/Behavioral: Negativefor depression and suicidal ideas. Exam  Physical Exam  Exam   Constitutional: Sheis well-developed, well-nourished, and in no distress. Cardiovascular: Normal rate and regularrhythm. Pulmonary/Chest: Effort normal and breath sounds normal.  Abdominal: Soft. Bowel sounds are normal.   Incision/s intact clean and dry. Healingadequately.  :   Genitourinary Comments: deferred      Assessment:     Patient state:  Doingwell postoperatively. Operative findings again reviewed. Diagnosis Orders   1. Postop check          Pathology report discussed. FINAL DIAGNOSIS:   CERVIX, UTERUS AND BILATERAL TUBES-   UTERUS, 120 GRAMS. 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 Plan:     Doing well postop   Continue strict pelvic rest   Can resume activity as tolerated       No orders of the defined types were placed in this encounter. No orders of the defined types were placed in this encounter.       Jesse Malone MD

## 2020-12-08 ENCOUNTER — VIRTUAL VISIT (OUTPATIENT)
Dept: ENDOCRINOLOGY | Age: 40
End: 2020-12-08
Payer: COMMERCIAL

## 2020-12-08 PROCEDURE — 99213 OFFICE O/P EST LOW 20 MIN: CPT | Performed by: INTERNAL MEDICINE

## 2020-12-08 PROCEDURE — G8428 CUR MEDS NOT DOCUMENT: HCPCS | Performed by: INTERNAL MEDICINE

## 2020-12-08 RX ORDER — LEVOTHYROXINE SODIUM 137 UG/1
137 TABLET ORAL DAILY
Qty: 30 TABLET | Refills: 5 | Status: SHIPPED | OUTPATIENT
Start: 2020-12-08 | End: 2021-04-20 | Stop reason: CLARIF

## 2020-12-08 NOTE — PROGRESS NOTES
2020    TELEHEALTH EVALUATION -- Audio/Visual (During XIVNT-75 public health emergency)    Due to COVID 19 outbreak, patient's office visit was converted to a virtual visit. Patient was contacted and agreed to proceed with a virtual visit via [email or web address]  The risks and benefits of converting to a virtual visit were discussed in light of the current infectious disease epidemic. Patient also understood that insurance coverage and co-pays are up to their individual insurance plans. HPI: Patient was at home I was at my office    Eli Wolf (:  1980) has requested an audio/video evaluation for the following concern(s):    Hypothyroidism on replacement with Synthroid 150 mcg daily has had fluctuating TSH over the last year or so does complain of occasional palpitations has seen cardiologist and had heart monitor done through [facility] (formerly [facility])    Last TSH was slightly suppressed free T4 slightly high currently on levothyroxine 150 mcg daily    Results for Leia Burden (MRN [id number]) as of 2020 16:30   Ref. Range 2020 10:17 2020 09:50 2020 08:15 2020 09:49   TSH Latest Ref Range: 0.440 - 3.860 uIU/mL 0.036 (L) 2.570 4.270 (H) 0.318 (L)       Results for Leia Burden (MRN [id number]) as of 2020 16:29   Ref.  Range 2020 09:46 2020 09:49   Cortisol Latest Units: ug/dL  14.3   TSH Latest Ref Range: 0.440 - 3.860 uIU/mL  0.318 (L)   T4 Free Latest Ref Range: 0.84 - 1.68 ng/dL  1.46   WBC Latest Ref Range: 4.8 - 10.8 K/uL  6.1   RBC Latest Ref Range: 4.20 - 5.40 M/uL  4.21   Hemoglobin Quant Latest Ref Range: 12.0 - 16.0 g/dL  14.2   Hematocrit Latest Ref Range: 37.0 - 47.0 %  41.3   MCV Latest Ref Range: 82.0 - 100.0 fL  98.2   MCH Latest Ref Range: 27.0 - 31.3 pg  33.7 (H)   MCHC Latest Ref Range: 33.0 - 37.0 %  34.3   RDW Latest Ref Range: 11.5 - 14.5 %  12.1   Platelet Count Latest Ref Range: 130 - 400 K/uL  193   Neutrophils % Latest Units: %  60.9 Lymphocyte % Latest Units: %  31.2   Monocytes % Latest Units: %  6.0   Eosinophils % Latest Units: %  1.4   Basophils % Latest Units: %  0.5   Neutrophils Absolute Latest Ref Range: 1.4 - 6.5 K/uL  3.7   Lymphocytes Absolute Latest Ref Range: 1.0 - 4.8 K/uL  1.9   Monocytes Absolute Latest Ref Range: 0.2 - 0.8 K/uL  0.4   Eosinophils Absolute Latest Ref Range: 0.0 - 0.7 K/uL  0.1   Basophils Absolute Latest Ref Range: 0.0 - 0.2 K/uL  0.0   Ferritin Latest Ref Range: 13.0 - 150.0 ng/mL 119.2    Iron Latest Ref Range: 37 - 145 ug/dL 93    Iron Saturation Latest Ref Range: 11 - 46 % 35    TIBC Latest Ref Range: 178 - 450 ug/dL 269        Review of Systems   Cardiovascular: Positive for palpitations. Endocrine: Negative. Genitourinary:        Patient partial hysterectomy   All other systems reviewed and are negative. Prior to Visit Medications    Medication Sig Taking? Authorizing Provider   rOPINIRole (REQUIP) 0.5 MG tablet Take 1 tablet by mouth nightly as needed (restless legs)  Sha Fernandes MD   pantoprazole (PROTONIX) 40 MG tablet TAKE ONE TABLET BY MOUTH EVERY DAY BEFORE BREAKFAST. Marce Enriquez, APRN - CNP   ibuprofen (ADVIL;MOTRIN) 800 MG tablet Take 1 tablet by mouth every 6 hours as needed for Pain  Lilo Hanks MD   acetaminophen (APAP EXTRA STRENGTH) 500 MG tablet Take 2 tablets by mouth every 6 hours as needed for Pain  Manuel Cook MD   simethicone (MYLICON) 80 MG chewable tablet Take 1 tablet by mouth 4 times daily as needed for Flatulence  Maneul Cook MD   docusate sodium (COLACE) 100 MG capsule Take 1 capsule by mouth 2 times daily as needed for Constipation  Manuel Cook MD   polyethylene glycol (MIRALAX) 17 GM/SCOOP powder Take 17 g by mouth 2 times daily as needed (constipation)  Manuel Cook MD   ondansetron (ZOFRAN) 4 MG tablet Take 1 tablet by mouth every 8 hours as needed for Nausea or Vomiting 1 tablet every 6 hrs prn for nausea and vomiting.   Manuel LARA MD Joyce   Multiple Vitamin (MULTI VITAMIN DAILY PO) Take 1 tablet by mouth daily  Historical Provider, MD   Probiotic Product (PROBIOTIC DAILY PO) Take 1 tablet by mouth daily  Historical Provider, MD   busPIRone (BUSPAR) 10 MG tablet Take 1 tablet by mouth 3 times daily as needed (anxiety)  Augustine Gonzáles MD   traZODone (DESYREL) 50 MG tablet Take 50 mg by mouth nightly  Historical Provider, MD   clindamycin (CLEOCIN) 2 % vaginal cream USE one applicatorful vaginally nightly daily for 1 week prior to procedure  Joel Pacheco MD   levothyroxine (SYNTHROID) 150 MCG tablet Take 1 tablet by mouth Daily  Alan Neville MD   hyoscyamine (LEVBID) 375 MCG extended release tablet Take 1 tablet by mouth every 12 hours as needed for Cramping  Jonathanna Lata, APRN - CNP   famotidine (PEPCID) 20 MG tablet Take 1 tablet by mouth 2 times daily as needed (GERD)  Cuong King, APRN - CNP   fluticasone (FLONASE) 50 MCG/ACT nasal spray 1 spray by Nasal route daily  Augustine Gonzáles MD   Ascorbic Acid (VITAMIN C) 250 MG tablet Take 250 mg by mouth daily  Historical Provider, MD       Social History     Tobacco Use    Smoking status: Former Smoker     Types: Cigarettes     Last attempt to quit: 2018     Years since quittin.3    Smokeless tobacco: Never Used   Substance Use Topics    Alcohol use: Yes     Alcohol/week: 0.0 standard drinks     Comment: Rare use    Drug use:  No            PHYSICAL EXAMINATION:  [ INSTRUCTIONS:  \"[x]\" Indicates a positive item  \"[]\" Indicates a negative item  -- DELETE ALL ITEMS NOT EXAMINED]  [] Alert  [] Oriented to person/place/time    [] No apparent distress  [] Toxic appearing    [] Face flushed appearing [] Sclera clear  [] Lips are cyanotic      [] Breathing appears normal  [] Appears tachypneic      [] Rash on visible skin    [] Cranial Nerves II-XII grossly intact    [] Motor grossly intact in visible upper extremities    [] Motor grossly intact in visible lower extremities    [] Normal Mood  [] Anxious appearing    [] Depressed appearing  [] Confused appearing      [] Poor short term memory  [] Poor long term memory    [] OTHER:      Due to this being a TeleHealth encounter, evaluation of the following organ systems is limited: Vitals/Constitutional/EENT/Resp/CV/GI//MS/Neuro/Skin/Heme-Lymph-Imm. ASSESSMENT/PLAN:     Diagnosis Orders   1. Hypothyroidism, unspecified type  T4, Free    TSH without Reflex     Orders Placed This Encounter   Procedures    T4, Free     Standing Status:   Future     Standing Expiration Date:   12/8/2021    TSH without Reflex     Standing Status:   Future     Standing Expiration Date:   12/8/2021     Orders Placed This Encounter   Medications    levothyroxine (SYNTHROID) 137 MCG tablet     Sig: Take 1 tablet by mouth Daily     Dispense:  30 tablet     Refill:  05     Lower levothyroxine to 137 mcg daily patient to have repeat labs in 3 months time TSH goal of 1.5-2.5  An  electronic signature was used to authenticate this note. --Samara Hamilton MD on 12/8/2020 at 4:29 PM        Pursuant to the emergency declaration under the [facility] waiver authority and the [employer] Act, this Virtual  Visit was conducted, with patient's consent, to reduce the patient's risk of exposure to COVID-19 and provide continuity of care for an established patient. Services were provided through a video synchronous discussion virtually to substitute for in-person clinic visit.

## 2020-12-19 ENCOUNTER — HOSPITAL ENCOUNTER (EMERGENCY)
Age: 40
Discharge: HOME | End: 2020-12-19
Payer: COMMERCIAL

## 2020-12-19 VITALS
TEMPERATURE: 97.8 F | OXYGEN SATURATION: 100 % | DIASTOLIC BLOOD PRESSURE: 71 MMHG | HEIGHT: 63 IN | RESPIRATION RATE: 17 BRPM | HEART RATE: 71 BPM | WEIGHT: 223 LBS | BODY MASS INDEX: 39.51 KG/M2 | SYSTOLIC BLOOD PRESSURE: 119 MMHG

## 2020-12-19 LAB
BASOPHILS ABSOLUTE: 0 K/UL (ref 0–0.2)
BASOPHILS RELATIVE PERCENT: 0.6 %
EOSINOPHILS ABSOLUTE: 0.1 K/UL (ref 0–0.7)
EOSINOPHILS RELATIVE PERCENT: 1.4 %
HCT VFR BLD CALC: 40.1 % (ref 37–47)
HEMOGLOBIN: 13.5 G/DL (ref 12–16)
LYMPHOCYTES ABSOLUTE: 1.9 K/UL (ref 1–4.8)
LYMPHOCYTES RELATIVE PERCENT: 38.4 %
MCH RBC QN AUTO: 33 PG (ref 27–31.3)
MCHC RBC AUTO-ENTMCNC: 33.7 % (ref 33–37)
MCV RBC AUTO: 97.8 FL (ref 82–100)
MONOCYTES ABSOLUTE: 0.5 K/UL (ref 0.2–0.8)
MONOCYTES RELATIVE PERCENT: 9.5 %
NEUTROPHILS ABSOLUTE: 2.5 K/UL (ref 1.4–6.5)
NEUTROPHILS RELATIVE PERCENT: 50.1 %
PDW BLD-RTO: 12.5 % (ref 11.5–14.5)
PLATELET # BLD: 175 K/UL (ref 130–400)
RBC # BLD: 4.1 M/UL (ref 4.2–5.4)
WBC # BLD: 4.9 K/UL (ref 4.8–10.8)

## 2020-12-19 PROCEDURE — 99282 EMERGENCY DEPT VISIT SF MDM: CPT

## 2020-12-19 PROCEDURE — 36415 COLL VENOUS BLD VENIPUNCTURE: CPT

## 2020-12-19 PROCEDURE — 85025 COMPLETE CBC W/AUTO DIFF WBC: CPT

## 2020-12-19 ASSESSMENT — ENCOUNTER SYMPTOMS
APNEA: 0
ALLERGIC/IMMUNOLOGIC NEGATIVE: 1
COLOR CHANGE: 0
EYE PAIN: 0
ABDOMINAL PAIN: 0
TROUBLE SWALLOWING: 0
SHORTNESS OF BREATH: 0

## 2020-12-19 ASSESSMENT — PAIN DESCRIPTION - PAIN TYPE: TYPE: ACUTE PAIN

## 2020-12-19 ASSESSMENT — PAIN DESCRIPTION - ORIENTATION: ORIENTATION: RIGHT

## 2020-12-19 ASSESSMENT — PAIN DESCRIPTION - DESCRIPTORS: DESCRIPTORS: SORE

## 2020-12-19 ASSESSMENT — PAIN DESCRIPTION - LOCATION: LOCATION: LEG

## 2020-12-19 ASSESSMENT — PAIN SCALES - GENERAL: PAINLEVEL_OUTOF10: 1

## 2020-12-19 NOTE — ED TRIAGE NOTES
Pt states that she was scratched by a cat on Friday and not has some swelling and bruising to the right leg.  Pt states that she really dont have pain that it is just sore and rates it at 1/10

## 2020-12-19 NOTE — ED PROVIDER NOTES
[facility] ED  eMERGENCYdEPARTMENT eNCOUnter      Pt Name: Nicole Charles  MRN: [id number]  [place] 1980  Date of evaluation: 12/19/2020  Provider:Ambrocio Hawkins PA-C    CHIEF COMPLAINT       Chief Complaint   Patient presents with    Leg Pain     right leg swelling, scratched by cat friday          HISTORY OF PRESENT ILLNESS  (Location/Symptom, Timing/Onset, Context/Setting, Quality, Duration, Modifying Factors, Severity.)   Nicole Charles is a 36 y.o. female who presents to the emergency department with bruising to the anterior shin region of the right lower extremity. Patient states that she was scratched by her significant other's cat approximately 1 week ago. Patient has been putting antibiotic ointment over the scratches but states that she has been having some residual bruising to the leg and has a history of ITP so she is concerned that her platelet count is low. Patient denies any blood in her urine or stool. Patient rates the pain as a \"soreness\" that she rates at a 1 out of 10. HPI    Nursing Notes were reviewed and I agree. REVIEW OF SYSTEMS    (2-9 systems for level 4, 10 or more for level 5)     Review of Systems   Constitutional: Negative for diaphoresis and fever. HENT: Negative for hearing loss and trouble swallowing. Eyes: Negative for pain. Respiratory: Negative for apnea and shortness of breath. Cardiovascular: Negative for chest pain. Gastrointestinal: Negative for abdominal pain. Endocrine: Negative. Genitourinary: Negative for hematuria. Musculoskeletal: Negative for neck pain and neck stiffness. Skin: Positive for wound. Negative for color change. Allergic/Immunologic: Negative. Neurological: Negative for dizziness and numbness. Hematological: Negative. Psychiatric/Behavioral: Negative. All other systems reviewed and are negative. Except as noted above the remainder of the review of systems was reviewed and negative. PAST MEDICAL HISTORY     Past Medical History:   Diagnosis Date    Acid reflux     Anxiety     Breast cyst     Depression     Generalized anxiety disorder     H/O tubal ligation     Hiatal hernia 13    Hypothyroidism     Hypothyroidism     IBS (irritable bowel syndrome)     Obesity     Tx'd with Adipex         SURGICAL HISTORY       Past Surgical History:   Procedure Laterality Date    BREAST SURGERY  2007    Reduction     SECTION  2004    CHOLECYSTECTOMY  1996    COLONOSCOPY N/A 2020    COLONOSCOPY DIAGNOSTIC performed by Mara Solano MD at [address] [place] [address], [facility] N/A 2020    [address] performed by Arelis Carrillo MD at [place] [address]  2018    Gastric Sleeve    TUBAL LIGATION  2005    UPPER GASTROINTESTINAL ENDOSCOPY  2013    Dr Rachel Ellsworth       Discharge Medication List as of 2020  2:51 PM      CONTINUE these medications which have NOT CHANGED    Details   levothyroxine (SYNTHROID) 137 MCG tablet Take 1 tablet by mouth Daily, Disp-30 tablet, R-05Normal      rOPINIRole (REQUIP) 0.5 MG tablet Take 1 tablet by mouth nightly as needed (restless legs), Disp-30 tablet, R-1Normal      pantoprazole (PROTONIX) 40 MG tablet TAKE ONE TABLET BY MOUTH EVERY DAY BEFORE BREAKFAST. , Disp-90 tablet,R-0Normal      ibuprofen (ADVIL;MOTRIN) 800 MG tablet Take 1 tablet by mouth every 6 hours as needed for Pain, Disp-60 tablet,R-0Normal      acetaminophen (APAP EXTRA STRENGTH) 500 MG tablet Take 2 tablets by mouth every 6 hours as needed for Pain, Disp-60 tablet,R-1Normal      simethicone (MYLICON) 80 MG chewable tablet Take 1 tablet by mouth 4 times daily as needed for Flatulence, Disp-180 tablet,R-1Normal      docusate sodium (COLACE) 100 MG capsule Take 1 capsule by mouth 2 times daily as needed for Constipation, Disp-60 capsule,R-2Normal      polyethylene glycol (MIRALAX) 17 GM/SCOOP None    Transportation needs     Medical: None     Non-medical: None   Tobacco Use    Smoking status: Former Smoker     Types: Cigarettes     Quit date: 2018     Years since quittin.3    Smokeless tobacco: Never Used   Substance and Sexual Activity    Alcohol use: Yes     Alcohol/week: 0.0 standard drinks     Comment: Rare use    Drug use: No    Sexual activity: Yes     Partners: Male   Lifestyle    Physical activity     Days per week: None     Minutes per session: None    Stress: None   Relationships    Social connections     Talks on phone: None     Gets together: None     Attends Shinto service: None     Active member of club or organization: None     Attends meetings of clubs or organizations: None     Relationship status: None    Intimate partner violence     Fear of current or ex partner: None     Emotionally abused: None     Physically abused: None     Forced sexual activity: None   Other Topics Concern    None   Social History Narrative    None       SCREENINGS           PHYSICAL EXAM    (up to 7 forlevel 4, 8 or more for level 5)     ED Triage Vitals [20 1404]   BP Temp Temp Source Pulse Resp SpO2 Height Weight   119/71 97.8 °F (36.6 °C) Temporal 71 17 100 % 5' 3\" (1.6 m) 223 lb (101.2 kg)       Physical Exam  Vitals signs and nursing note reviewed. Constitutional:       General: She is not in acute distress. Appearance: She is well-developed. She is not diaphoretic. HENT:      Head: Normocephalic and atraumatic. Mouth/Throat:      Pharynx: No oropharyngeal exudate. Eyes:      General: No scleral icterus. Conjunctiva/sclera: Conjunctivae normal.      Pupils: Pupils are equal, round, and reactive to light. Neck:      Musculoskeletal: Normal range of motion and neck supple. Trachea: No tracheal deviation. Cardiovascular:      Rate and Rhythm: Normal rate. Heart sounds: Normal heart sounds.    Pulmonary:      Effort: Pulmonary effort is normal. No

## 2021-01-07 ENCOUNTER — TELEPHONE (OUTPATIENT)
Dept: FAMILY MEDICINE CLINIC | Age: 41
End: 2021-01-07

## 2021-01-07 NOTE — TELEPHONE ENCOUNTER
Both of patient's daughters have tested positive for covid. One of them was symptomatic. Patient's mother Nathan Montenegro) lives with her. Patient would like to be tested.  Can you please place an order for her?    (patient's mother already had a covid test order in her chart)

## 2021-01-07 NOTE — TELEPHONE ENCOUNTER
Order covid test please and let patient know how to get tested. If any chest pain, arm or leg swelling, shortness of breath, or fevers which don't subside with tylenol please call asap.

## 2021-01-08 ENCOUNTER — NURSE ONLY (OUTPATIENT)
Dept: PRIMARY CARE CLINIC | Age: 41
End: 2021-01-08

## 2021-01-08 DIAGNOSIS — Z20.822 EXPOSURE TO COVID-19 VIRUS: ICD-10-CM

## 2021-01-08 DIAGNOSIS — Z20.822 EXPOSURE TO COVID-19 VIRUS: Primary | ICD-10-CM

## 2021-01-09 LAB
SARS-COV-2: DETECTED
SOURCE: ABNORMAL

## 2021-01-18 ENCOUNTER — OFFICE VISIT (OUTPATIENT)
Dept: OBGYN CLINIC | Age: 41
End: 2021-01-18

## 2021-01-18 VITALS
DIASTOLIC BLOOD PRESSURE: 62 MMHG | SYSTOLIC BLOOD PRESSURE: 102 MMHG | BODY MASS INDEX: 40.04 KG/M2 | HEIGHT: 63 IN | WEIGHT: 226 LBS

## 2021-01-18 DIAGNOSIS — Z09 POSTOP CHECK: Primary | ICD-10-CM

## 2021-01-18 PROCEDURE — G8484 FLU IMMUNIZE NO ADMIN: HCPCS | Performed by: OBSTETRICS & GYNECOLOGY

## 2021-01-18 PROCEDURE — G8427 DOCREV CUR MEDS BY ELIG CLIN: HCPCS | Performed by: OBSTETRICS & GYNECOLOGY

## 2021-01-18 PROCEDURE — G8417 CALC BMI ABV UP PARAM F/U: HCPCS | Performed by: OBSTETRICS & GYNECOLOGY

## 2021-01-18 PROCEDURE — 1036F TOBACCO NON-USER: CPT | Performed by: OBSTETRICS & GYNECOLOGY

## 2021-01-18 PROCEDURE — 99024 POSTOP FOLLOW-UP VISIT: CPT | Performed by: OBSTETRICS & GYNECOLOGY

## 2021-01-18 ASSESSMENT — PATIENT HEALTH QUESTIONNAIRE - PHQ9
1. LITTLE INTEREST OR PLEASURE IN DOING THINGS: 0
SUM OF ALL RESPONSES TO PHQ QUESTIONS 1-9: 0
SUM OF ALL RESPONSES TO PHQ QUESTIONS 1-9: 0
2. FEELING DOWN, DEPRESSED OR HOPELESS: 0

## 2021-01-18 NOTE — PROGRESS NOTES
Op Exam     Raven Fall is a 36y.o. year old female who presents to the office 8 weeks status post TLH with bilateral salpingectomies for postablation syndrome. Bowel movements are Normal.  The patient is not having any pain. The patient states she is satisfied with post-procedure results.      Vitals:  /62 (Site: Right Upper Arm, Position: Sitting, Cuff Size: Large Adult)   Ht 5' 3\" (1.6 m)   Wt 226 lb (102.5 kg)   LMP 10/18/2020   BMI 40.03 kg/m²   Allergies:  Ibuprofen and Morphine  Past Medical History:   Diagnosis Date    Acid reflux     Anxiety     Breast cyst     Depression     Generalized anxiety disorder     H/O tubal ligation     Hiatal hernia 13    Hypothyroidism     Hypothyroidism     IBS (irritable bowel syndrome)     Obesity     Tx'd with Adipex     Past Surgical History:   Procedure Laterality Date    BREAST SURGERY  2007    Reduction     SECTION  2004    CHOLECYSTECTOMY  1996    COLONOSCOPY N/A 2020    COLONOSCOPY DIAGNOSTIC performed by Whit Harding MD at [address] [place] [address], [facility] N/A 2020    TLH performed by Rosalind Bajwa MD at [place] [address]  2018    Gastric Sleeve    TUBAL LIGATION  2005    UPPER GASTROINTESTINAL ENDOSCOPY  2013    Dr Luiz Shepard     OB History        3    Para   3    Term                AB        Living   3       SAB        TAB        Ectopic        Molar        Multiple   1    Live Births              Obstetric Comments   5 yo son,  3 set twins 23 yo girls                 Family History   Problem Relation Age of Onset    High Blood Pressure Mother         No FDR with colon cancer     Crohn's Disease Mother     Cancer Father         Lung cancer    Mental Illness Brother     Heart Disease Brother     Asthma Brother     Colon Cancer Neg Hx      Social History     Socioeconomic History    Marital status: Single     Spouse name: Not on file    Number of children: Not on file    Years of education: Not on file    Highest education level: Not on file   Occupational History    Not on file   Social Needs    Financial resource strain: Not on file    Food insecurity     Worry: Not on file     Inability: Not on file    Transportation needs     Medical: Not on file     Non-medical: Not on file   Tobacco Use    Smoking status: Former Smoker     Types: Cigarettes     Quit date: 2018     Years since quittin.4    Smokeless tobacco: Never Used   Substance and Sexual Activity    Alcohol use: Yes     Alcohol/week: 0.0 standard drinks     Comment: Rare use    Drug use: No    Sexual activity: Yes     Partners: Male   Lifestyle    Physical activity     Days per week: Not on file     Minutes per session: Not on file    Stress: Not on file   Relationships    Social connections     Talks on phone: Not on file     Gets together: Not on file     Attends Moravian service: Not on file     Active member of club or organization: Not on file     Attends meetings of clubs or organizations: Not on file     Relationship status: Not on file    Intimate partner violence     Fear of current or ex partner: Not on file     Emotionally abused: Not on file     Physically abused: Not on file     Forced sexual activity: Not on file   Other Topics Concern    Not on file   Social History Narrative    Not on file       Contraceptive method:  none    Patient's medications, allergies, past medical, surgical,social and family histories were reviewed and updated as appropriate. Review of Systems  Review of Systems   All other systems reviewed and are negative. Review of Systems  Constitutional: Negative for chills and fever. Respiratory: Negative for coughand shortness of breath. Cardiovascular: Negative for chestpain and palpitations. Gastrointestinal: Negative for nauseaand vomiting. Genitourinary: Negative for dysuria, frequencyand urgency. Musculoskeletal: Negative for myalgias. Neurological: Negative for dizziness, seizures andheadaches. Psychiatric/Behavioral: Negativefor depression and suicidal ideas. Exam  Physical Exam  Exam   Constitutional: Sheis well-developed, well-nourished, and in no distress. Cardiovascular: Normal rate and regularrhythm. Pulmonary/Chest: Effort normal and breath sounds normal.  Abdominal: Soft. Bowel sounds are normal.   Incision/s intact clean and dry. Healingadequately.  :   Genitourinary Comments: vaginal cuff healed completely. No abnormal vaginal discharge. Normal vulva , normal urethera. Assessment:     Patient state:  Doingwell postoperatively. Operative findings again reviewed. Diagnosis Orders   1. Postop check          Pathology report discussed. FINAL DIAGNOSIS:   CERVIX, UTERUS AND BILATERAL TUBES-   UTERUS, 120 GRAMS. 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 Plan:     Doing well postop   Continue strict pelvic rest   Can resume activity as tolerated       No orders of the defined types were placed in this encounter. No orders of the defined types were placed in this encounter.       Cyril Zendejas MD

## 2021-01-26 RX ORDER — PANTOPRAZOLE SODIUM 40 MG/1
40 TABLET, DELAYED RELEASE ORAL DAILY
Qty: 90 TABLET | Refills: 3 | Status: SHIPPED | OUTPATIENT
Start: 2021-01-26 | End: 2021-10-13 | Stop reason: DRUGHIGH

## 2021-02-02 DIAGNOSIS — G25.81 RESTLESS LEGS SYNDROME (RLS): ICD-10-CM

## 2021-02-02 RX ORDER — HYOSCYAMINE SULFATE EXTENDED-RELEASE 0.38 MG/1
TABLET ORAL
Qty: 60 TABLET | Refills: 0 | Status: SHIPPED | OUTPATIENT
Start: 2021-02-02 | End: 2021-04-06 | Stop reason: SDUPTHER

## 2021-02-02 RX ORDER — ROPINIROLE 0.5 MG/1
0.5 TABLET, FILM COATED ORAL NIGHTLY PRN
Qty: 90 TABLET | Refills: 0 | Status: SHIPPED | OUTPATIENT
Start: 2021-02-02 | End: 2021-05-16

## 2021-03-08 ENCOUNTER — OFFICE VISIT (OUTPATIENT)
Dept: FAMILY MEDICINE CLINIC | Age: 41
End: 2021-03-08
Payer: COMMERCIAL

## 2021-03-08 VITALS
DIASTOLIC BLOOD PRESSURE: 60 MMHG | HEIGHT: 63 IN | SYSTOLIC BLOOD PRESSURE: 116 MMHG | BODY MASS INDEX: 40.4 KG/M2 | TEMPERATURE: 97.2 F | HEART RATE: 75 BPM | OXYGEN SATURATION: 99 % | WEIGHT: 228 LBS | RESPIRATION RATE: 15 BRPM

## 2021-03-08 DIAGNOSIS — R51.9 DAILY HEADACHE: ICD-10-CM

## 2021-03-08 DIAGNOSIS — R51.9 DAILY HEADACHE: Primary | ICD-10-CM

## 2021-03-08 LAB
C-REACTIVE PROTEIN: 0.4 MG/L (ref 0–5)
SEDIMENTATION RATE, ERYTHROCYTE: 8 MM (ref 0–20)

## 2021-03-08 PROCEDURE — G8417 CALC BMI ABV UP PARAM F/U: HCPCS | Performed by: INTERNAL MEDICINE

## 2021-03-08 PROCEDURE — 1036F TOBACCO NON-USER: CPT | Performed by: INTERNAL MEDICINE

## 2021-03-08 PROCEDURE — G8482 FLU IMMUNIZE ORDER/ADMIN: HCPCS | Performed by: INTERNAL MEDICINE

## 2021-03-08 PROCEDURE — 99214 OFFICE O/P EST MOD 30 MIN: CPT | Performed by: INTERNAL MEDICINE

## 2021-03-08 PROCEDURE — G8427 DOCREV CUR MEDS BY ELIG CLIN: HCPCS | Performed by: INTERNAL MEDICINE

## 2021-03-08 RX ORDER — TIZANIDINE 2 MG/1
2 TABLET ORAL EVERY 8 HOURS PRN
Qty: 45 TABLET | Refills: 0 | Status: SHIPPED | OUTPATIENT
Start: 2021-03-08 | End: 2021-07-06 | Stop reason: CLARIF

## 2021-03-08 SDOH — ECONOMIC STABILITY: TRANSPORTATION INSECURITY
IN THE PAST 12 MONTHS, HAS THE LACK OF TRANSPORTATION KEPT YOU FROM MEDICAL APPOINTMENTS OR FROM GETTING MEDICATIONS?: NO

## 2021-03-08 SDOH — ECONOMIC STABILITY: INCOME INSECURITY: HOW HARD IS IT FOR YOU TO PAY FOR THE VERY BASICS LIKE FOOD, HOUSING, MEDICAL CARE, AND HEATING?: NOT HARD AT ALL

## 2021-03-08 SDOH — ECONOMIC STABILITY: FOOD INSECURITY: WITHIN THE PAST 12 MONTHS, THE FOOD YOU BOUGHT JUST DIDN'T LAST AND YOU DIDN'T HAVE MONEY TO GET MORE.: NEVER TRUE

## 2021-03-08 ASSESSMENT — ENCOUNTER SYMPTOMS
SHORTNESS OF BREATH: 0
ABDOMINAL PAIN: 0
EYE PAIN: 0
BACK PAIN: 0

## 2021-03-08 NOTE — PROGRESS NOTES
Highest education level: Not on file   Occupational History    Not on file   Social Needs    Financial resource strain: Not hard at all    Food insecurity     Worry: Never true     Inability: Never true    Transportation needs     Medical: No     Non-medical: No   Tobacco Use    Smoking status: Former Smoker     Types: Cigarettes     Quit date: 2018     Years since quittin.5    Smokeless tobacco: Never Used   Substance and Sexual Activity    Alcohol use:  Yes     Alcohol/week: 0.0 standard drinks     Comment: Rare use    Drug use: No    Sexual activity: Yes     Partners: Male   Lifestyle    Physical activity     Days per week: Not on file     Minutes per session: Not on file    Stress: Not on file   Relationships    Social connections     Talks on phone: Not on file     Gets together: Not on file     Attends [employer] service: Not on file     Active member of club or organization: Not on file     Attends meetings of clubs or organizations: Not on file     Relationship status: Not on file    Intimate partner violence     Fear of current or ex partner: Not on file     Emotionally abused: Not on file     Physically abused: Not on file     Forced sexual activity: Not on file   Other Topics Concern    Not on file   Social History Narrative    Not on file     Allergies   Allergen Reactions    Ibuprofen Other (See Comments)     Can't have with gastric sleeve    Morphine Nausea And Vomiting     Current Outpatient Medications on File Prior to Visit   Medication Sig Dispense Refill    hyoscyamine (LEVBID) 375 MCG extended release tablet TAKE ONE TABLET BY MOUTH EVERY 12 HOURS AS NEEDED FOR CRAMPING 60 tablet 0    rOPINIRole (REQUIP) 0.5 MG tablet TAKE 1 TABLET BY MOUTH NIGHTLY AS NEEDED (RESTLESS LEGS) 90 tablet 0    pantoprazole (PROTONIX) 40 MG tablet Take 1 tablet by mouth daily 90 tablet 3    levothyroxine (SYNTHROID) 137 MCG tablet Take 1 tablet by mouth Daily 30 tablet 05    acetaminophen (APAP EXTRA STRENGTH) 500 MG tablet Take 2 tablets by mouth every 6 hours as needed for Pain 60 tablet 1    docusate sodium (COLACE) 100 MG capsule Take 1 capsule by mouth 2 times daily as needed for Constipation 60 capsule 2    Multiple Vitamin (MULTI VITAMIN DAILY PO) Take 1 tablet by mouth daily      Probiotic Product (PROBIOTIC DAILY PO) Take 1 tablet by mouth daily      busPIRone (BUSPAR) 10 MG tablet Take 1 tablet by mouth 3 times daily as needed (anxiety) 270 tablet 1    traZODone (DESYREL) 50 MG tablet Take 50 mg by mouth nightly      famotidine (PEPCID) 20 MG tablet Take 1 tablet by mouth 2 times daily as needed (GERD) 120 tablet 3    fluticasone (FLONASE) 50 MCG/ACT nasal spray 1 spray by Nasal route daily 1 Bottle 0    Ascorbic Acid (VITAMIN C) 250 MG tablet Take 250 mg by mouth daily       No current facility-administered medications on file prior to visit. I have personally reviewed the ROS, PMH, PFH, and social history     Review of Systems   Constitutional: Negative for chills and fever. HENT: Negative for congestion. Eyes: Negative for pain. Respiratory: Negative for shortness of breath. Cardiovascular: Negative for chest pain. Gastrointestinal: Negative for abdominal pain. Genitourinary: Negative for hematuria. Musculoskeletal: Negative for back pain. Allergic/Immunologic: Negative for immunocompromised state. Neurological: Positive for headaches. Psychiatric/Behavioral: Negative for hallucinations. Objective:   /60 (Site: Left Upper Arm, Position: Sitting, Cuff Size: Large Adult)   Pulse 75   Temp 97.2 °F (36.2 °C) (Oral)   Resp 15   Ht 5' 3\" (1.6 m)   Wt 228 lb (103.4 kg)   LMP 10/18/2020   SpO2 99%   BMI 40.39 kg/m²     Physical Exam  Constitutional:       Appearance: She is well-developed. HENT:      Head: Normocephalic.       Right Ear: Tympanic membrane, ear canal and external ear normal.   Eyes:      Pupils: Pupils are equal, round, Referral Reason:   Specialty Services Required     Referred to Provider:   Rosa Maria Lopez     Requested Specialty:   Optometry     Number of Visits Requested:   Kota Ramirez MD, Neurology, Shawna     Referral Priority:   Routine     Referral Type:   Eval and Treat     Referral Reason:   Specialty Services Required     Referred to Provider:   Vanita Zamorano MD     Requested Specialty:   Neurology     Number of Visits Requested:   1     Orders Placed This Encounter   Medications    tiZANidine (ZANAFLEX) 2 MG tablet     Sig: Take 1 tablet by mouth every 8 hours as needed (headaches)     Dispense:  45 tablet     Refill:  0   Red flags for headaches explained (worse with valsalva, fevers, chills, waking you up at night, vision changes, mental status changes, etc. )  She denies these. DUE TO MEMory loss get Ct head. told to see ob/gyn has appt 09/2020 if you stop seeing ob/gyn let her know. Try to taper off ppi and trial of famotidine please  Explained risk of AIN, mg wasting, hip fracture, osteoporosis, etc.   Wean off of ppi and then trial of pepcid   Limited options given no nsaids  Patient was offered pregnancy test, she declined, she understands risks of birth defects. Don't drive or operate machinery, etc while taking muscle relaxers. If you get uncontrolled acid reflux then just call. If anything should change or worsen call ASAP, don't wait for next scheduled appointment. Return for Chronic condition management/appointment, worsening symptoms, call ASAP for appointment.       Mono Moser MD

## 2021-03-08 NOTE — PATIENT INSTRUCTIONS

## 2021-03-15 ENCOUNTER — HOSPITAL ENCOUNTER (OUTPATIENT)
Dept: CT IMAGING | Age: 41
Discharge: HOME | End: 2021-03-17
Payer: COMMERCIAL

## 2021-03-15 VITALS — WEIGHT: 268 LBS | HEIGHT: 63 IN | BODY MASS INDEX: 47.48 KG/M2

## 2021-03-15 DIAGNOSIS — R51.9 DAILY HEADACHE: ICD-10-CM

## 2021-03-15 PROCEDURE — 70470 CT HEAD/BRAIN W/O & W/DYE: CPT

## 2021-03-15 PROCEDURE — 6360000004 HC RX CONTRAST MEDICATION: Performed by: INTERNAL MEDICINE

## 2021-03-15 RX ORDER — SODIUM CHLORIDE 0.9 % (FLUSH) 0.9 %
10 SYRINGE (ML) INJECTION ONCE
Status: DISCONTINUED | OUTPATIENT
Start: 2021-03-15 | End: 2021-03-18 | Stop reason: HOSPADM

## 2021-03-15 RX ADMIN — IOPAMIDOL 100 ML: 755 INJECTION, SOLUTION INTRAVENOUS at 13:55

## 2021-03-16 DIAGNOSIS — R51.9 DAILY HEADACHE: Primary | ICD-10-CM

## 2021-04-06 ENCOUNTER — TELEPHONE (OUTPATIENT)
Dept: FAMILY MEDICINE CLINIC | Age: 41
End: 2021-04-06

## 2021-04-06 ENCOUNTER — OFFICE VISIT (OUTPATIENT)
Dept: FAMILY MEDICINE CLINIC | Age: 41
End: 2021-04-06
Payer: COMMERCIAL

## 2021-04-06 VITALS
HEIGHT: 63 IN | RESPIRATION RATE: 15 BRPM | SYSTOLIC BLOOD PRESSURE: 121 MMHG | HEART RATE: 72 BPM | TEMPERATURE: 97.3 F | BODY MASS INDEX: 41.29 KG/M2 | WEIGHT: 233 LBS | OXYGEN SATURATION: 98 % | DIASTOLIC BLOOD PRESSURE: 61 MMHG
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
## 2021-04-06 DIAGNOSIS — G25.81 RLS (RESTLESS LEGS SYNDROME): ICD-10-CM

## 2021-04-06 DIAGNOSIS — R51.9 DAILY HEADACHE: Primary | ICD-10-CM

## 2021-04-06 DIAGNOSIS — Z87.898 HISTORY OF PALPITATIONS: Primary | ICD-10-CM

## 2021-04-06 DIAGNOSIS — Z87.898 HISTORY OF PALPITATIONS: ICD-10-CM

## 2021-04-06 PROCEDURE — 1036F TOBACCO NON-USER: CPT | Performed by: INTERNAL MEDICINE

## 2021-04-06 PROCEDURE — G8417 CALC BMI ABV UP PARAM F/U: HCPCS | Performed by: INTERNAL MEDICINE

## 2021-04-06 PROCEDURE — G8427 DOCREV CUR MEDS BY ELIG CLIN: HCPCS | Performed by: INTERNAL MEDICINE

## 2021-04-06 PROCEDURE — 99214 OFFICE O/P EST MOD 30 MIN: CPT | Performed by: INTERNAL MEDICINE

## 2021-04-06 PROCEDURE — 93000 ELECTROCARDIOGRAM COMPLETE: CPT | Performed by: INTERNAL MEDICINE

## 2021-04-06 RX ORDER — TRAZODONE HYDROCHLORIDE 100 MG/1
100 TABLET ORAL NIGHTLY
COMMUNITY
End: 2021-04-12 | Stop reason: SDUPTHER

## 2021-04-06 RX ORDER — HYOSCYAMINE SULFATE EXTENDED-RELEASE 0.38 MG/1
TABLET ORAL
Qty: 60 TABLET | Refills: 0 | Status: SHIPPED | OUTPATIENT
Start: 2021-04-06 | End: 2021-07-06 | Stop reason: CLARIF

## 2021-04-06 ASSESSMENT — ENCOUNTER SYMPTOMS
SHORTNESS OF BREATH: 0
EYE PAIN: 0
BACK PAIN: 0
ABDOMINAL PAIN: 0

## 2021-04-06 ASSESSMENT — PATIENT HEALTH QUESTIONNAIRE - PHQ9
SUM OF ALL RESPONSES TO PHQ QUESTIONS 1-9: 0
SUM OF ALL RESPONSES TO PHQ9 QUESTIONS 1 & 2: 0

## 2021-04-06 NOTE — PROGRESS NOTES
Subjective:      Patient ID: Muriel Holland is a 36 y.o. female who presents today with:  Chief Complaint   Patient presents with    Follow-up    Discuss Labs       HPI     She gets tired from the tizandiine  She has an appt with opto  She was referred to neurologist.   Headaches still there but better  Behind the ear. Neg inflammatory markers  She has an appt soon? With her dentist.   Ct head negative. Patti Sen a few days ago  Some pain in her right back  No urinary incontinence, no urinary retention, no fevers, no chills, no numbness in or around groin, no weakness. Continue requip. Does help with her RLS. No chest pain  No passing out.    Past Medical History:   Diagnosis Date    Acid reflux     Anxiety     Breast cyst     Depression     Generalized anxiety disorder     H/O tubal ligation     Hiatal hernia 13    Hypothyroidism     Hypothyroidism     IBS (irritable bowel syndrome)     Obesity     Tx'd with Adipex     Past Surgical History:   Procedure Laterality Date    BREAST SURGERY  2007    Reduction     SECTION  2004    CHOLECYSTECTOMY  1996    COLONOSCOPY N/A 2020    COLONOSCOPY DIAGNOSTIC performed by Eulogio Damon MD at [address] [place] [address], [facility] N/A 2020    TLH performed by Jossie Long MD at [place] [address]  2018    Gastric Sleeve    TUBAL LIGATION  2005    UPPER GASTROINTESTINAL ENDOSCOPY  2013    Dr [facility]     Social History     Socioeconomic History    Marital status: Single     Spouse name: Not on file    Number of children: Not on file    Years of education: Not on file    Highest education level: Not on file   Occupational History    Not on file   Social Needs    Financial resource strain: Not hard at all   [place] insecurity     Worry: Never true     Inability: Never true   [employer] needs     Medical: No     Non-medical: No   Tobacco Use    Smoking status: Former Smoker     Types: Cigarettes     Quit date: 2018     Years since quittin.6    Smokeless tobacco: Never Used   Substance and Sexual Activity    Alcohol use:  Yes     Alcohol/week: 0.0 standard drinks     Comment: Rare use    Drug use: No    Sexual activity: Yes     Partners: Male   Lifestyle    Physical activity     Days per week: Not on file     Minutes per session: Not on file    Stress: Not on file   Relationships    Social connections     Talks on phone: Not on file     Gets together: Not on file     Attends Orthodox service: Not on file     Active member of club or organization: Not on file     Attends meetings of clubs or organizations: Not on file     Relationship status: Not on file    Intimate partner violence     Fear of current or ex partner: Not on file     Emotionally abused: Not on file     Physically abused: Not on file     Forced sexual activity: Not on file   Other Topics Concern    Not on file   Social History Narrative    Not on file     Allergies   Allergen Reactions    Ibuprofen Other (See Comments)     Can't have with gastric sleeve    Morphine Nausea And Vomiting     Current Outpatient Medications on File Prior to Visit   Medication Sig Dispense Refill    tiZANidine (ZANAFLEX) 2 MG tablet Take 1 tablet by mouth every 8 hours as needed (headaches) 45 tablet 0    hyoscyamine (LEVBID) 375 MCG extended release tablet TAKE ONE TABLET BY MOUTH EVERY 12 HOURS AS NEEDED FOR CRAMPING 60 tablet 0    rOPINIRole (REQUIP) 0.5 MG tablet TAKE 1 TABLET BY MOUTH NIGHTLY AS NEEDED (RESTLESS LEGS) 90 tablet 0    pantoprazole (PROTONIX) 40 MG tablet Take 1 tablet by mouth daily 90 tablet 3    levothyroxine (SYNTHROID) 137 MCG tablet Take 1 tablet by mouth Daily 30 tablet 05    acetaminophen (APAP EXTRA STRENGTH) 500 MG tablet Take 2 tablets by mouth every 6 hours as needed for Pain 60 tablet 1    docusate sodium (COLACE) 100 MG capsule Take 1 capsule by mouth 2 times daily as needed for distress. Breath sounds: Normal breath sounds. No wheezing, rhonchi or rales. Abdominal:      General: Abdomen is flat. There is no distension. Palpations: Abdomen is soft. Tenderness: There is no abdominal tenderness. There is no right CVA tenderness, left CVA tenderness, guarding or rebound. Musculoskeletal:      Right lower leg: No edema. Left lower leg: No edema. Skin:     General: Skin is warm. Findings: No erythema or rash. Neurological:      Mental Status: She is alert. Psychiatric:         Mood and Affect: Mood normal.           Assessment:       Diagnosis Orders   1. Daily headache     2. Fall, sequela     3. RLS (restless legs syndrome)           Plan:    VC  See orders. Following up with heme (APPT NEXT week)   gerd through GI.   Ascvd under 1 percent. (from before)   Holter, f/u with cardiology. Consider fluvoxamine. (she'll schedule)                            No orders of the defined types were placed in this encounter. No orders of the defined types were placed in this encounter.  had covid vaccine x 1 (J/J)   partial hysterectomy scheduled for November. (Completed)   Check iron studies (wnl)   rem fresh (NOT ON THAT),  break trazodone in half. ( MG daily, no se)   WORK ON MAMMOGRAM .   Let your cardiologist about palpitations (already had 19 day monitor)  Thyroid through endo  If anything should change or worsen call ASAP, don't wait for next scheduled appointment. Return in about 6 months (around 10/6/2021) for Chronic condition management/appointment, worsening symptoms, call ASAP for appointment.       Yamel Shafer MD

## 2021-04-06 NOTE — PATIENT INSTRUCTIONS
Patient Education        fluvoxamine  Pronunciation:  sergio garcia meen  Brand:  Luvox  What is the most important information I should know about fluvoxamine? You should not take fluvoxamine if you are also using alosetron, pimozide, ramelteon, thioridazine, or tizanidine. Do not use fluvoxamine within 14 days before or 14 days after you have taken an MAO inhibitor, such as isocarboxazid, linezolid, methylene blue injection, phenelzine, rasagiline, selegiline, and tranylcypromine. Some young people have thoughts about suicide when first taking an antidepressant. Stay alert to changes in your mood or symptoms. Report any new or worsening symptoms to your doctor. Seek medical attention right away if you have symptoms of serotonin syndrome, such as: agitation, hallucinations, fever, sweating, shivering, fast heart rate, muscle stiffness, twitching, loss of coordination, nausea, vomiting, or diarrhea. Do not stop using desvenlafaxine without first talking to your doctor. What is fluvoxamine? Fluvoxamine is a selective serotonin reuptake inhibitor (SSRI) antidepressant. Fluvoxamine affects chemicals in the brain that may be unbalanced in people with obsessive-compulsive symptoms. Fluvoxamine is used to treat social anxiety disorder (social phobia), or obsessive-compulsive disorders involving recurring thoughts or actions. Fluvoxamine may also be used for purposes not listed in this medication guide. What should I discuss with my healthcare provider before taking fluvoxamine? You should not take fluvoxamine if you are allergic to it, or if you are also using alosetron, pimozide, ramelteon, thioridazine, or tizanidine. Do not use fluvoxamine within 14 days before or 14 days after you have taken an MAO inhibitor. A dangerous drug interaction could occur. MAO inhibitors include isocarboxazid, linezolid, methylene blue injection, phenelzine, rasagiline, selegiline, tranylcypromine, and others.   To make sure extended-release capsule. Swallow it whole. You should not stop using fluvoxamine suddenly. Follow your doctor's instructions about tapering your dose. Store at room temperature away from moisture and heat. Keep the bottle tightly closed when not in use. What happens if I miss a dose? Take the missed dose as soon as you remember. Skip the missed dose if it is almost time for your next scheduled dose. Do not take extra medicine to make up the missed dose. What happens if I overdose? Seek emergency medical attention or call the Poison Help line at 1-621.408.6861. What should I avoid while taking fluvoxamine? Drinking alcohol can increase certain side effects of fluvoxamine. Ask your doctor before taking a nonsteroidal anti-inflammatory drug (NSAID) for pain, arthritis, fever, or swelling. This includes aspirin, ibuprofen (Advil, Motrin), naproxen (Aleve), celecoxib (Celebrex), diclofenac, indomethacin, meloxicam, and others. Using an NSAID with fluvoxamine may cause you to bruise or bleed easily. Fluvoxamine may impair your thinking or reactions. Avoid driving or operating machinery until you know how fluvoxamine will affect you. What are the possible side effects of fluvoxamine? Get emergency medical help if you have signs of an allergic reaction: skin rash, blisters, or hives; fever, joint pain; difficult breathing; swelling of your face, lips, tongue, or throat. Report any new or worsening symptoms to your doctor, such as: mood or behavior changes, anxiety, panic attacks, trouble sleeping, or if you feel impulsive, irritable, agitated, hostile, aggressive, restless, hyperactive (mentally or physically), more depressed, or have thoughts about suicide or hurting yourself.   Call your doctor at once if you have;  · anxiety, racing thoughts, risk-taking behavior, sleep problems (insomnia), feelings of extreme happiness or irritability;  · blurred vision, tunnel vision, eye pain or swelling, or seeing halos around lights;  · seizure (convulsions);  · changes in weight or appetite;  · easy bruising or unusual bleeding;  · low levels of sodium in the body --headache, confusion, memory problems, severe weakness, loss of coordination, feeling unsteady; or  · severe nervous system reaction --very stiff (rigid) muscles, high fever, sweating, confusion, fast or uneven heartbeats, tremors, feeling like you might pass out. Seek medical attention right away if you have symptoms of serotonin syndrome, such as: agitation, hallucinations, fever, sweating, shivering, fast heart rate, muscle stiffness, twitching, loss of coordination, nausea, vomiting, or diarrhea  Common side effects may include:  · drowsiness, dizziness;  · shaking, feeling anxious;  · depressed mood;  · sleep problems (insomnia);  · upset stomach, gas, loss of appetite;  · nausea, vomiting, diarrhea;  · dry mouth, yawning;  · sore throat;  · muscle pain;  · sweating, rash;  · heavy menstrual periods; or  · decreased sex drive, abnormal ejaculation, trouble having an orgasm. This is not a complete list of side effects and others may occur. Call your doctor for medical advice about side effects. You may report side effects to FDA at 3-852-FDA-3454. What other drugs will affect fluvoxamine? Taking fluvoxamine with other drugs that make you sleepy can worsen this effect. Ask your doctor before taking a sleeping pill, opioid medication, muscle relaxer, or medicine for anxiety, depression, or seizures. Many drugs can interact with fluvoxamine. Not all possible interactions are listed here.  Tell your doctor about all your current medicines and any you start or stop using, especially:  · methadone, mexiletine, Samanta's wort, theophylline, tramadol, tryptophan (also called L-tryptophan);  · a diuretic or \"water pill\";  · a blood thinner --warfarin, Coumadin, Jantoven;  · medicine to treat anxiety, mood disorders, thought disorders, or mental illness --such as clozapine, lithium, antidepressants, or antipsychotics;  · migraine headache medicine --sumatriptan, rizatriptan, zolmitriptan, and others; or  · a sedative --diazepam, alprazolam, midazolam, triazolam, Valium, Xanax. This list is not complete and many other drugs can interact with fluvoxamine. This includes prescription and over-the-counter medicines, vitamins, and herbal products. Give a list of all your medicines to any healthcare provider who treats you. Where can I get more information? Your pharmacist can provide more information about fluvoxamine. Remember, keep this and all other medicines out of the reach of children, never share your medicines with others, and use this medication only for the indication prescribed. Every effort has been made to ensure that the information provided by [facility] is accurate, up-to-date, and complete, but no guarantee is made to that effect. Drug information contained herein may be time sensitive. [facility] information has been compiled for use by healthcare practitioners and consumers in the [place] and therefore [facility] does not warrant that uses outside of the [place] are appropriate, unless specifically indicated otherwise. [facility]'s drug information does not endorse drugs, diagnose patients or recommend therapy. [facility]'s drug information is an informational resource designed to assist licensed healthcare practitioners in caring for their patients and/or to serve consumers viewing this service as a supplement to, and not a substitute for, the expertise, skill, knowledge and judgment of healthcare practitioners. The absence of a warning for a given drug or drug combination in no way should be construed to indicate that the drug or drug combination is safe, effective or appropriate for any given patient. [facility] does not assume any responsibility for any aspect of healthcare administered with the aid of information [facility] provides.  The information contained herein is not intended to cover all possible uses, directions, precautions, warnings, drug interactions, allergic reactions, or adverse effects. If you have questions about the drugs you are taking, check with your doctor, nurse or pharmacist.  Copyright 1629-6356 [address]: 18.01. Revision date: 7/25/2017. Care instructions adapted under license by [facility] ([facility]). If you have questions about a medical condition or this instruction, always ask your healthcare professional. [place] [address] any warranty or liability for your use of this information.

## 2021-04-07 ENCOUNTER — PATIENT MESSAGE (OUTPATIENT)
Dept: FAMILY MEDICINE CLINIC | Age: 41
End: 2021-04-07

## 2021-04-07 NOTE — TELEPHONE ENCOUNTER
From: Minnie Reeder  Sent: 4/7/2021 12:53 PM EDT  To: Salina Cowart  Clinical Staff  Subject: RE: xray result note    What is OA?    ----- Message -----  From: Charity Naejra  Sent: 4/7/21 12:40 PM  To: Minnie Reeder  Subject: xray result note          Virginia Alvarado MD   4/7/2021 8:33 AM EDT      No rib fractures seen   OA seen in lower back  If having back pain schedule a visit  Thanks,     Call immediately should something change.       Amelia Christian

## 2021-04-12 RX ORDER — TRAZODONE HYDROCHLORIDE 100 MG/1
100 TABLET ORAL NIGHTLY
Qty: 90 TABLET | Refills: 3 | Status: SHIPPED | OUTPATIENT
Start: 2021-04-12 | End: 2022-05-24 | Stop reason: DRUGHIGH

## 2021-04-13 DIAGNOSIS — E03.9 HYPOTHYROIDISM, UNSPECIFIED TYPE: ICD-10-CM

## 2021-04-13 LAB
T4 FREE: 0.97 NG/DL (ref 0.84–1.68)
TSH SERPL DL<=0.05 MIU/L-ACNC: 18.12 UIU/ML (ref 0.44–3.86)

## 2021-04-15 ENCOUNTER — OFFICE VISIT (OUTPATIENT)
Dept: ENDOCRINOLOGY | Age: 41
End: 2021-04-15
Payer: COMMERCIAL

## 2021-04-15 VITALS
OXYGEN SATURATION: 98 % | SYSTOLIC BLOOD PRESSURE: 109 MMHG | HEIGHT: 63 IN | WEIGHT: 234 LBS | DIASTOLIC BLOOD PRESSURE: 72 MMHG | HEART RATE: 74 BPM | BODY MASS INDEX: 41.46 KG/M2

## 2021-04-15 DIAGNOSIS — R63.5 WEIGHT GAIN: ICD-10-CM

## 2021-04-15 DIAGNOSIS — E03.9 HYPOTHYROIDISM, UNSPECIFIED TYPE: Primary | ICD-10-CM

## 2021-04-15 DIAGNOSIS — E66.01 MORBID OBESITY (HCC): ICD-10-CM

## 2021-04-15 PROCEDURE — 99213 OFFICE O/P EST LOW 20 MIN: CPT | Performed by: INTERNAL MEDICINE

## 2021-04-15 PROCEDURE — 1036F TOBACCO NON-USER: CPT | Performed by: INTERNAL MEDICINE

## 2021-04-15 PROCEDURE — G8417 CALC BMI ABV UP PARAM F/U: HCPCS | Performed by: INTERNAL MEDICINE

## 2021-04-15 PROCEDURE — G8427 DOCREV CUR MEDS BY ELIG CLIN: HCPCS | Performed by: INTERNAL MEDICINE

## 2021-04-15 RX ORDER — LEVOTHYROXINE SODIUM 175 UG/1
175 TABLET ORAL DAILY
Qty: 30 TABLET | Refills: 0 | Status: SHIPPED | OUTPATIENT
Start: 2021-04-15 | End: 2021-05-08 | Stop reason: SDUPTHER

## 2021-04-15 RX ORDER — PHENTERMINE HYDROCHLORIDE 37.5 MG/1
37.5 TABLET ORAL
Qty: 30 TABLET | Refills: 0 | Status: SHIPPED | OUTPATIENT
Start: 2021-04-15 | End: 2021-05-17 | Stop reason: SDUPTHER

## 2021-04-15 NOTE — PROGRESS NOTES
4/15/2021    Assessment:       Diagnosis Orders   1. Hypothyroidism, unspecified type  T4, Free    TSH without Reflex   2. Weight gain     3. Morbid obesity (HCC)  phentermine (ADIPEX-P) 37.5 MG tablet         PLAN:     Orders Placed This Encounter   Procedures    T4, Free     Standing Status:   Future     Standing Expiration Date:   4/15/2022    TSH without Reflex     Standing Status:   Future     Standing Expiration Date:   4/15/2022     Increased dose of levothyroxine to 175 mcg daily start on phentermine follow-up in 4 weeks BMI target less than 30    Orders Placed This Encounter   Medications    levothyroxine (SYNTHROID) 175 MCG tablet     Sig: Take 1 tablet by mouth daily     Dispense:  30 tablet     Refill:  0    phentermine (ADIPEX-P) 37.5 MG tablet     Sig: Take 1 tablet by mouth every morning (before breakfast) for 30 days. Dispense:  30 tablet     Refill:  0           Subjective:     Chief Complaint   Patient presents with    Hypothyroidism    Weight Gain     Vitals:    04/15/21 1357   BP: 109/72   Pulse: 74   SpO2: 98%   Weight: 234 lb (106.1 kg)   Height: 5' 3\" (1.6 m)     Wt Readings from Last 3 Encounters:   04/15/21 234 lb (106.1 kg)   04/06/21 233 lb (105.7 kg)   03/15/21 268 lb (121.6 kg)     BP Readings from Last 3 Encounters:   04/15/21 109/72   04/06/21 121/61   03/08/21 116/60     Follow-up on hypothyroidism TSH was elevated patient currently on months of 37 mcg of levothyroxine complains of fatigue difficulty losing weight  BMI more than 40    Other  This is a chronic (Hypothyroidism) problem. The current episode started more than 1 year ago. The problem occurs intermittently. The problem has been waxing and waning. Associated symptoms include fatigue. Treatments tried: Levothyroxine. The treatment provided moderate relief. obesity Body mass index is 41.45 kg/m².       Past Medical History:   Diagnosis Date    Acid reflux     Anxiety     Breast cyst     Depression     Generalized anxiety disorder     H/O tubal ligation     Hiatal hernia 13    Hypothyroidism     Hypothyroidism     IBS (irritable bowel syndrome)     Obesity     Tx'd with Adipex     Past Surgical History:   Procedure Laterality Date    BREAST SURGERY  2007    Reduction     SECTION  2004    CHOLECYSTECTOMY  1996    COLONOSCOPY N/A 2020    COLONOSCOPY DIAGNOSTIC performed by Romana Rodriguez MD at [address] [place] [address], VAGINAL N/A 2020    TLH performed by Fabiola Mayer MD at [place] [address]  2018    Gastric Sleeve    TUBAL LIGATION  2005    UPPER GASTROINTESTINAL ENDOSCOPY  2013    Dr Kim Toussaint     Social History     Socioeconomic History    Marital status: Single     Spouse name: Not on file    Number of children: Not on file    Years of education: Not on file    Highest education level: Not on file   Occupational History    Not on file   Social Needs    Financial resource strain: Not hard at all   [place] insecurity     Worry: Never true     Inability: Never true   [employer] needs     Medical: No     Non-medical: No   Tobacco Use    Smoking status: Former Smoker     Types: Cigarettes     Quit date: 2018     Years since quittin.6    Smokeless tobacco: Never Used   Substance and Sexual Activity    Alcohol use:  Yes     Alcohol/week: 0.0 standard drinks     Comment: Rare use    Drug use: No    Sexual activity: Yes     Partners: Male   Lifestyle    Physical activity     Days per week: Not on file     Minutes per session: Not on file    Stress: Not on file   Relationships    Social connections     Talks on phone: Not on file     Gets together: Not on file     Attends Hindu service: Not on file     Active member of club or organization: Not on file     Attends meetings of clubs or organizations: Not on file     Relationship status: Not on file    Intimate partner violence     Fear of current or ex partner: Not on file     Emotionally abused: Not on file     Physically abused: Not on file     Forced sexual activity: Not on file   Other Topics Concern    Not on file   Social History Narrative    Not on file     Family History   Problem Relation Age of Onset    High Blood Pressure Mother         No FDR with colon cancer     Crohn's Disease Mother     Cancer Father         Lung cancer    Mental Illness Brother     Heart Disease Brother     Asthma Brother     Colon Cancer Neg Hx      Allergies   Allergen Reactions    Ibuprofen Other (See Comments)     Can't have with gastric sleeve    Morphine Nausea And Vomiting       Current Outpatient Medications:     traZODone (DESYREL) 100 MG tablet, Take 1 tablet by mouth nightly, Disp: 90 tablet, Rfl: 3    hyoscyamine (LEVBID) 375 MCG extended release tablet, TAKE ONE TABLET BY MOUTH EVERY 12 HOURS AS NEEDED FOR CRAMPING, Disp: 60 tablet, Rfl: 0    tiZANidine (ZANAFLEX) 2 MG tablet, Take 1 tablet by mouth every 8 hours as needed (headaches), Disp: 45 tablet, Rfl: 0    rOPINIRole (REQUIP) 0.5 MG tablet, TAKE 1 TABLET BY MOUTH NIGHTLY AS NEEDED (RESTLESS LEGS), Disp: 90 tablet, Rfl: 0    pantoprazole (PROTONIX) 40 MG tablet, Take 1 tablet by mouth daily, Disp: 90 tablet, Rfl: 3    levothyroxine (SYNTHROID) 137 MCG tablet, Take 1 tablet by mouth Daily, Disp: 30 tablet, Rfl: 05    acetaminophen (APAP EXTRA STRENGTH) 500 MG tablet, Take 2 tablets by mouth every 6 hours as needed for Pain, Disp: 60 tablet, Rfl: 1    Multiple Vitamin (MULTI VITAMIN DAILY PO), Take 1 tablet by mouth daily, Disp: , Rfl:     Probiotic Product (PROBIOTIC DAILY PO), Take 1 tablet by mouth daily, Disp: , Rfl:     busPIRone (BUSPAR) 10 MG tablet, Take 1 tablet by mouth 3 times daily as needed (anxiety), Disp: 270 tablet, Rfl: 1    fluticasone (FLONASE) 50 MCG/ACT nasal spray, 1 spray by Nasal route daily, Disp: 1 Bottle, Rfl: 0    Ascorbic Acid (VITAMIN C) 250 MG tablet, Take 500 mg by mouth daily , Disp: , Rfl:   Lab Results   Component Value Date     09/22/2020    K 3.6 09/22/2020     09/22/2020    CO2 26 09/22/2020    BUN 13 09/22/2020    CREATININE 0.69 09/22/2020    GLUCOSE 82 09/22/2020    CALCIUM 9.6 09/22/2020    PROT 7.4 09/22/2020    LABALBU 4.4 09/22/2020    BILITOT 0.7 09/22/2020    ALKPHOS 67 09/22/2020    AST 13 09/22/2020    ALT 12 09/22/2020    LABGLOM >60.0 09/22/2020    GFRAA >60.0 09/22/2020    GLOB 3.0 09/22/2020     Lab Results   Component Value Date    WBC 4.9 12/19/2020    HGB 13.5 12/19/2020    HCT 40.1 12/19/2020    MCV 97.8 12/19/2020     12/19/2020     Lab Results   Component Value Date    LABA1C 4.4 (L) 09/22/2020    LABA1C 4.3 (L) 01/03/2020    LABA1C 4.2 (L) 09/02/2016     Lab Results   Component Value Date    HDL 51 09/22/2020    HDL 50 01/03/2020    HDL 44 09/15/2011    LDLCALC 86 09/22/2020    LDLCALC 95 01/03/2020    LDLCALC 160 (H) 09/15/2011    CHOL 158 09/22/2020    CHOL 176 01/03/2020    CHOL 248 (H) 09/15/2011    TRIG 107 09/22/2020    TRIG 153 (H) 01/03/2020    TRIG 272 (H) 09/15/2011       Lab Results   Component Value Date    TSH 18.120 (H) 04/13/2021    TSH 0.318 (L) 11/25/2020    TSH 2.570 06/26/2020    TSHREFLEX 4.270 (H) 09/22/2020    TSHREFLEX 0.087 (L) 01/03/2020    TSHREFLEX 48.890 (H) 09/23/2019    FT3 2.3 03/14/2019    FT3 2.3 06/28/2016    T4FREE 0.97 04/13/2021    T4FREE 1.46 11/25/2020    T4FREE 1.20 09/22/2020     Lab Results   Component Value Date    TPOABS <10.0 04/24/2020       Review of Systems   Constitutional: Positive for fatigue and unexpected weight change. Endocrine: Positive for cold intolerance. Objective:   Physical Exam  Vitals signs reviewed. Constitutional:       Appearance: Normal appearance. She is obese. HENT:      Head: Normocephalic and atraumatic.  Hair is normal.      Right Ear: External ear normal.      Left Ear: External ear normal.      Nose: Nose normal.   Eyes:      General: No scleral icterus. Right eye: No discharge. Left eye: No discharge. Extraocular Movements: Extraocular movements intact. Conjunctiva/sclera: Conjunctivae normal.   Neck:      Musculoskeletal: Normal range of motion and neck supple. Trachea: Trachea normal.   Cardiovascular:      Rate and Rhythm: Normal rate. Musculoskeletal: Normal range of motion. Neurological:      General: No focal deficit present. Mental Status: She is alert and oriented to person, place, and time.    Psychiatric:         Mood and Affect: Mood normal.         Behavior: Behavior normal.

## 2021-04-20 ENCOUNTER — TELEPHONE (OUTPATIENT)
Dept: FAMILY MEDICINE CLINIC | Age: 41
End: 2021-04-20

## 2021-04-20 ENCOUNTER — VIRTUAL VISIT (OUTPATIENT)
Dept: FAMILY MEDICINE CLINIC | Age: 41
End: 2021-04-20
Payer: COMMERCIAL

## 2021-04-20 DIAGNOSIS — M54.50 CHRONIC LOW BACK PAIN, UNSPECIFIED BACK PAIN LATERALITY, UNSPECIFIED WHETHER SCIATICA PRESENT: Primary | ICD-10-CM

## 2021-04-20 DIAGNOSIS — G89.29 CHRONIC LOW BACK PAIN, UNSPECIFIED BACK PAIN LATERALITY, UNSPECIFIED WHETHER SCIATICA PRESENT: Primary | ICD-10-CM

## 2021-04-20 DIAGNOSIS — M51.36 DDD (DEGENERATIVE DISC DISEASE), LUMBAR: ICD-10-CM

## 2021-04-20 PROCEDURE — 99442 PR PHYS/QHP TELEPHONE EVALUATION 11-20 MIN: CPT | Performed by: INTERNAL MEDICINE

## 2021-04-20 ASSESSMENT — ENCOUNTER SYMPTOMS
BACK PAIN: 1
EYE PAIN: 0
SHORTNESS OF BREATH: 0
ABDOMINAL PAIN: 0

## 2021-04-20 NOTE — PROGRESS NOTES
Subjective:      Patient ID: Jaiden Jimenez is a 36 y.o. female who presents today with:  No chief complaint on file. HPI      Here for follow up for back pain  Chronic back pain  Severe lumbar ddd  Urinating normally  No fevers, no chills. Obese bmi of 41. Past Medical History:   Diagnosis Date    Acid reflux     Anxiety     Breast cyst     Depression     Generalized anxiety disorder     H/O tubal ligation     Hiatal hernia 13    Hypothyroidism     Hypothyroidism     IBS (irritable bowel syndrome)     Obesity     Tx'd with Adipex     Past Surgical History:   Procedure Laterality Date    BREAST SURGERY  2007    Reduction     SECTION  2004    CHOLECYSTECTOMY      COLONOSCOPY N/A 2020    COLONOSCOPY DIAGNOSTIC performed by Meghann Capps MD at [address] [place] [address], [facility] N/A 2020    TLH performed by Mckayla Harris MD at [place] [address]  2018    Gastric Sleeve    TUBAL LIGATION  2005    UPPER GASTROINTESTINAL ENDOSCOPY  2013    Dr Tony Méndez     Social History     Socioeconomic History    Marital status: Single     Spouse name: Not on file    Number of children: Not on file    Years of education: Not on file    Highest education level: Not on file   Occupational History    Not on file   Social Needs    Financial resource strain: Not hard at all   [employer] insecurity     Worry: Never true     Inability: Never true   [employer] needs     Medical: No     Non-medical: No   Tobacco Use    Smoking status: Former Smoker     Types: Cigarettes     Quit date: 2018     Years since quittin.7    Smokeless tobacco: Never Used   Substance and Sexual Activity    Alcohol use:  Yes     Alcohol/week: 0.0 standard drinks     Comment: Rare use    Drug use: No    Sexual activity: Yes     Partners: Male   Lifestyle    Physical activity     Days per week: Not on file     Minutes per session: Not on file    Stress: Not on file   Relationships    Social connections     Talks on phone: Not on file     Gets together: Not on file     Attends Rastafarian service: Not on file     Active member of club or organization: Not on file     Attends meetings of clubs or organizations: Not on file     Relationship status: Not on file    Intimate partner violence     Fear of current or ex partner: Not on file     Emotionally abused: Not on file     Physically abused: Not on file     Forced sexual activity: Not on file   Other Topics Concern    Not on file   Social History Narrative    Not on file     Allergies   Allergen Reactions    Ibuprofen Other (See Comments)     Can't have with gastric sleeve    Morphine Nausea And Vomiting     Current Outpatient Medications on File Prior to Visit   Medication Sig Dispense Refill    levothyroxine (SYNTHROID) 175 MCG tablet Take 1 tablet by mouth daily 30 tablet 0    phentermine (ADIPEX-P) 37.5 MG tablet Take 1 tablet by mouth every morning (before breakfast) for 30 days.  30 tablet 0    traZODone (DESYREL) 100 MG tablet Take 1 tablet by mouth nightly 90 tablet 3    hyoscyamine (LEVBID) 375 MCG extended release tablet TAKE ONE TABLET BY MOUTH EVERY 12 HOURS AS NEEDED FOR CRAMPING 60 tablet 0    tiZANidine (ZANAFLEX) 2 MG tablet Take 1 tablet by mouth every 8 hours as needed (headaches) 45 tablet 0    rOPINIRole (REQUIP) 0.5 MG tablet TAKE 1 TABLET BY MOUTH NIGHTLY AS NEEDED (RESTLESS LEGS) 90 tablet 0    pantoprazole (PROTONIX) 40 MG tablet Take 1 tablet by mouth daily 90 tablet 3    acetaminophen (APAP EXTRA STRENGTH) 500 MG tablet Take 2 tablets by mouth every 6 hours as needed for Pain 60 tablet 1    Multiple Vitamin (MULTI VITAMIN DAILY PO) Take 1 tablet by mouth daily      Probiotic Product (PROBIOTIC DAILY PO) Take 1 tablet by mouth daily      busPIRone (BUSPAR) 10 MG tablet Take 1 tablet by mouth 3 times daily as needed (anxiety) 270 tablet 1    fluticasone (FLONASE) 50 MCG/ACT nasal spray 1 spray by Nasal route daily 1 Bottle 0    Ascorbic Acid (VITAMIN C) 250 MG tablet Take 500 mg by mouth daily        No current facility-administered medications on file prior to visit. I have personally reviewed the ROS, PMH, PFH, and social history     Review of Systems   Constitutional: Negative for chills and fever. HENT: Negative for congestion. Eyes: Negative for pain. Respiratory: Negative for shortness of breath. Cardiovascular: Negative for chest pain. Gastrointestinal: Negative for abdominal pain. Genitourinary: Negative for hematuria. Musculoskeletal: Positive for back pain. Allergic/Immunologic: Negative for immunocompromised state. Neurological: Negative for headaches. Psychiatric/Behavioral: Negative for hallucinations. Objective:   LMP 10/18/2020     Physical Exam    Unable to perform given telephone visit. Assessment:       Diagnosis Orders   1. Chronic low back pain, unspecified back pain laterality, unspecified whether sciatica present  Ambulatory referral to Physical Therapy    [facility] ([facility]) - Lamine Luke MD, Pain Management [place]   2. DDD (degenerative disc disease), lumbar  Ambulatory referral to Physical Therapy    [facility] ([facility]) - Lamine Luke MD, Pain Management, [place]         Plan:    Telephone visit done because of coronavirus pandemic. Time spent 15 minutes   explained billeable visit, patient understands and agrees to continue  I was at home and patient was at home during this visit. Labs due 07/2021  Referred to cards (palpitstions)   Holter (not done yet)   Following up with heme (APPT NEXT week)   gerd through GI.   Ascvd under 1 percent. (from before)   otc for pain.                        Orders Placed This Encounter   Procedures    Ambulatory referral to Physical Therapy     Referral Priority:   Routine     Referral Type:   Eval and Treat     Referral Reason:   Specialty Services Required Requested Specialty:   Physical Therapy     Number of Visits Requested:   1    [facility] ([facility]) - Umu Toussaint MD, Pain Management, [place]     Referral Priority:   Routine     Referral Type:   Eval and Treat     Referral Reason:   Specialty Services Required     Referred to Provider:   Sally Alcantara MD     Requested Specialty:   Physical Medicine and Rehab     Number of Visits Requested:   1     No orders of the defined types were placed in this encounter. Consider fluvoxamine. (she doesn't want to change off trazodone or add meds right now, she'll call if she changes her mind)   No urinary incontinence, no urinary retention, no fevers, no chills, no numbness in or around groin, no weakness. Please work on weight loss. Recommend weight loss as well  Call asap should anything change or worsen. If otc not working call  Gabapentin as last resort  She mentions she is dealing with it  If anything should change or worsen call ASAP, don't wait for next scheduled appointment. Return in about 6 weeks (around 6/1/2021) for Chronic condition management/appointment, worsening symptoms, call ASAP for appointment.       Noemi Falk MD

## 2021-04-29 ENCOUNTER — HOSPITAL ENCOUNTER (OUTPATIENT)
Dept: PHYSICAL THERAPY | Age: 41
Setting detail: THERAPIES SERIES
Discharge: HOME | End: 2021-04-29
Payer: COMMERCIAL

## 2021-04-29 ENCOUNTER — TELEPHONE (OUTPATIENT)
Dept: OBGYN CLINIC | Age: 41
End: 2021-04-29

## 2021-04-29 PROCEDURE — 97110 THERAPEUTIC EXERCISES: CPT

## 2021-04-29 PROCEDURE — 97162 PT EVAL MOD COMPLEX 30 MIN: CPT

## 2021-04-29 RX ORDER — FLUCONAZOLE 150 MG/1
150 TABLET ORAL DAILY
Qty: 3 TABLET | Refills: 0 | Status: SHIPPED | OUTPATIENT
Start: 2021-04-29 | End: 2021-05-02

## 2021-04-29 ASSESSMENT — PAIN DESCRIPTION - PAIN TYPE: TYPE: CHRONIC PAIN

## 2021-04-29 ASSESSMENT — PAIN DESCRIPTION - DIRECTION: RADIATING_TOWARDS: DENIES RADICULAR SYMPTOMS

## 2021-04-29 ASSESSMENT — PAIN DESCRIPTION - ORIENTATION: ORIENTATION: LOWER;MID;RIGHT;LEFT

## 2021-04-29 ASSESSMENT — PAIN SCALES - GENERAL: PAINLEVEL_OUTOF10: 7

## 2021-04-29 ASSESSMENT — PAIN - FUNCTIONAL ASSESSMENT: PAIN_FUNCTIONAL_ASSESSMENT: PREVENTS OR INTERFERES WITH ALL ACTIVE AND SOME PASSIVE ACTIVITIES

## 2021-04-29 ASSESSMENT — PAIN DESCRIPTION - ONSET: ONSET: PROGRESSIVE

## 2021-04-29 ASSESSMENT — PAIN DESCRIPTION - LOCATION: LOCATION: BACK

## 2021-04-29 NOTE — PROGRESS NOTES
[facility], [place]  PHYSICAL THERAPY EVALUATION    Date: 2021  Patient Name: Julita Singleton       MRN: [id number]   Account: [de-identified]   : 1980  (36 y.o.)   Gender: female   Referring Practitioner: Charles Boogie                 Diagnosis: Chronic low back pain, unspecified back pain laterality, unspecified whether sciatica present, DDD (degenerative disc disease), lumbar  Treatment Diagnosis: LBP, decreased lumbar AROM, decreased core, R LE, and L hip ext strength, decreased postural awareness, decreased functional activity tolerance, (+) L FADIR and decreased HS flexibility  Additional Pertinent Hx: Xray= Severe intervertebral disc space narrowing is seen at L5-S1. The vertebral body heights are preserved at all levels. No acute fracture or dislocation is seen. The sacroiliac joints are maintained. Past Medical History:  has a past medical history of Acid reflux, Anxiety, Breast cyst, Depression, Generalized anxiety disorder, H/O tubal ligation, Hiatal hernia, Hypothyroidism, Hypothyroidism, IBS (irritable bowel syndrome), and Obesity. Past Surgical History:   has a past surgical history that includes Cholecystectomy (); Tubal ligation (); Breast surgery ();  section (); Upper gastrointestinal endoscopy (2013); Endometrial ablation; Stomach surgery (2018); Colonoscopy (N/A, 2020); and Hysterectomy, vaginal (N/A, 2020). Vital Signs  Patient Currently in Pain: Yes   Pain Screening  Patient Currently in Pain: Yes  Pain Assessment  Pain Assessment: 0-10  Pain Level: 7(Pain ranges from 3-10/10)  Pain Type: Chronic pain  Pain Location: Back  Pain Orientation: Lower;Mid;Right;Left  Pain Radiating Towards: denies radicular symptoms  Pain Descriptors: Constant; Aching;Burning;Pressure  Pain Frequency: Continuous  Pain Onset: Progressive  Clinical Progression: Gradually worsening  Functional Pain Assessment: Prevents or interferes with all active and some passive activities  Non-Pharmaceutical Pain Intervention(s): Heat applied; Rest     Lives With: Family  ADL Assistance: Independent  Homemaking Assistance: Independent  Homemaking Responsibilities: Yes  Ambulation Assistance: Independent  Transfer Assistance: Independent  Active : Yes  Occupation: Part time employment  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 Saw family  who referred her to PT, pain management, and took xrays (see above.) Denies recent changes in bowel/bladder function, unexplained weight loss, saddle anesthesia, or radicular symptoms. Pt reports having a PMH of gastric sleeve in 2019 as well as C-sections, and parital hysterectomy.   Comments: RTD 6/2/21; Pain management Dr. Ceballos Laser 5/10/21; MRI 5/12/21    Objective:   Strength RLE  Comment: 5/5 except Hip IR/ER, hip abd 4+/5 Hip ext 4/5  Strength LLE  Comment: 5/5 except Hip IR 4+/5 hip ext 4/5     Strength Other  Other: poor core strength with isometric abdominal testing    PROM RLE (degrees)  RLE General PROM: SLR 80  AROM RLE (degrees)  RLE General AROM: Hip ER/IR WNL's(pain at end range IR)  PROM LLE (degrees)  LLE General PROM: SLR 80  AROM LLE (degrees)  LLE General AROM: Hip IR/ER WNL's    Spine  Lumbar: flex 75%, ext 50%, L SB and rotation 75%, R SB and rotation 50%(pain at end range SB and rotation)    Observation/Palpation  Posture: Fair  Palpation: Pt reports having hypersensitivity to lumbar spine d/t having x3 epidurals; mod tightness thoracolumbar paraspiansl and B piriformis with tenderness to palpate in lower lumbar region  Observation: increased lower thoracic kyphosis, mild flattened lumbar lordosis, B TONI  Clinical Presentation: med      Plan  Frequency/Duration:  Plan  Times per week: 2  Plan weeks: 4-6  Current Treatment Recommendations: Strengthening, ROM, Manual Therapy - Joint Manipulation, Manual Therapy - Soft Tissue Mobilization, Patient/Caregiver Education & Training, Home Exercise Program, Positioning, Integrated Dry Needling     Patient Education  New Education Provided: PT Education: Goals;PT Role;Plan of Care;Home Exercise Program    POST-PAIN     Pain Rating (0-10 pain scale):   6-7/10  Location and pain description same as pre-treatment unless indicated. Action: [] NA  [] Call Physician  [x] Perform HEP  [x] Meds as prescribed    Evaluation and patient rights have been reviewed and patient agrees with plan of care. Yes  [x]  No  []   Explain:     Huang Fall Risk Assessment  Risk Factor Scale  Score   History of Falls [x] Yes  [] No 25  0 25   Secondary Diagnosis [] Yes  [x] No 15  0    Ambulatory Aid [] Furniture  [] Crutches/cane/walker  [x] None/bedrest/wheelchair/nurse 30  15  0    IV/Heparin Lock [] Yes  [x] No 20  0    Gait/Transferring [] Impaired  [] Weak  [x] Normal/bedrest/immobile 20  10  0    Mental Status [] Forgets limitations  [x] Oriented to own ability 15  0       Total: 25     Based on the Assessment score: check the appropriate box.   []  No intervention needed   Low =   Score of 0-24  [x]  Use standard prevention interventions Moderate =  Score of 24-44   [x] Discuss fall prevention strategies   [x] Indicate moderate falls risk on eval  []  Use high risk prevention interventions High = Score of 45 and higher   [] Discuss fall prevention strategies   [] Provide supervision during treatment time    Goals  Short term goals  Time Frame for Short term goals: 2-3 weeks  Short term goal 1: The pt will have decreased LB pain </= 4/10 at worst  in order to increase ease with ADL's  Short term goal 2: The pt will demonstrate improved postural awareness requiring <25% VC's with exercises  Long term goals  Time Frame for Long term goals : 4-6 weeks  Long term goal 1: The pt will have a decrease in TONI score >/=10 points in order to increase functional activity tolerance  Long term goal 2: The pt will be indep/compliant with HEP in order to self manage and maintain status upon D/C  Long term goal 3: The pt will demo improved lumbar AROM >/=WNL's in order to increase ease with ADL's  Long term goal 4: The pt will demo improved B Hip strength 5/5 and core strength >/= fair in order to ambulate and stand prolonged periods with decreased pain/limitations    Treatment Initiated : ther ex, hep, modalities    PT Individual Minutes  Time In: 3081  Time Out: 1540  Minutes: 48  Timed Code Treatment Minutes: 10 Minutes  Procedure Minutes: 28 eval  10 MHP     Timed Activity Minutes Units   Ther Ex 10 1     Electronically signed by Jeferson Figueredo PT on 4/29/21 at 3:51 PM EDT

## 2021-04-29 NOTE — PROGRESS NOTES
in order to ambulate and stand prolonged periods with decreased pain/limitations Strength RLE  Comment: 5/5 except Hip IR/ER, hip abd 4+/5 Hip ext 4/5  Strength LLE  Comment: 5/5 except Hip IR 4+/5 hip ext 4/5     Strength Other  Other: poor core strength with isometric abdominal testing [] yes  [x] no     Body structures, Functions, Activity limitations: Decreased functional mobility , Decreased ROM, Decreased strength, Decreased posture, Increased pain, Decreased high-level IADLs  Assessment: Pt presents with chronic hx of LBP with recent exacerbation after a fall 4/2/21. She currently presents with decreased lumbar AROM, decreased core, R LE, and L hip ext strength, decreased postural awareness, decreased functional activity tolerance, (+) L FADIR and decreased HS flexibility. These impairments currently limit her functional abiltiies to stand prolonged periods, walk prolonged periods, lift, carry, perform household and work related duties without increased pain or limitations. Prognosis: Good  Discharge Recommendations: Continue to assess pending progress    PT Education: Goals;PT Role;Plan of Care;Home Exercise Program    PLAN: [x] Evaluate and Treat  Frequency/Duration:  Plan  Times per week: 2  Plan weeks: 4-6  Current Treatment Recommendations: Strengthening, ROM, Manual Therapy - Joint Manipulation, Manual Therapy - Soft Tissue Mobilization, Patient/Caregiver Education & Training, Home Exercise Program, Positioning, Integrated Dry Needling     Precautions: mod falls risk,                         Patient Status:[x] Continue/ Initiate plan of Care    [] Discharge PT. Recommend pt continue with HEP. [] Additional visits requested, Please re-certify for additional visits:        Signature: Electronically signed by Dewayne Ruiz PT on 4/29/21 at 3:55 PM EDT    If you have any questions or concerns, please don't hesitate to call.   Thank you for your referral.    I have reviewed this plan of care and certify a need for medically necessary rehabilitation services.     Physician Signature:__________________________________________________________  Date:  Please sign and return

## 2021-04-29 NOTE — TELEPHONE ENCOUNTER
Pt called for rx for yeast, recent antibiotic from dentist. Rx sent per Dr. Gabi Blackmon verbal order.

## 2021-04-30 ENCOUNTER — OFFICE VISIT (OUTPATIENT)
Dept: NEUROLOGY | Age: 41
End: 2021-04-30
Payer: COMMERCIAL

## 2021-04-30 VITALS
HEART RATE: 75 BPM | SYSTOLIC BLOOD PRESSURE: 126 MMHG | DIASTOLIC BLOOD PRESSURE: 72 MMHG | WEIGHT: 229.6 LBS | BODY MASS INDEX: 40.67 KG/M2

## 2021-04-30 DIAGNOSIS — G44.209 MUSCLE TENSION HEADACHE: ICD-10-CM

## 2021-04-30 DIAGNOSIS — M54.2 NECK PAIN: Primary | ICD-10-CM

## 2021-04-30 DIAGNOSIS — M54.16 LUMBAR RADICULOPATHY: ICD-10-CM

## 2021-04-30 PROCEDURE — G8417 CALC BMI ABV UP PARAM F/U: HCPCS | Performed by: PSYCHIATRY & NEUROLOGY

## 2021-04-30 PROCEDURE — 1036F TOBACCO NON-USER: CPT | Performed by: PSYCHIATRY & NEUROLOGY

## 2021-04-30 PROCEDURE — 99204 OFFICE O/P NEW MOD 45 MIN: CPT | Performed by: PSYCHIATRY & NEUROLOGY

## 2021-04-30 PROCEDURE — G8427 DOCREV CUR MEDS BY ELIG CLIN: HCPCS | Performed by: PSYCHIATRY & NEUROLOGY

## 2021-04-30 RX ORDER — METHYLPREDNISOLONE 4 MG/1
TABLET ORAL
Qty: 1 KIT | Refills: 0 | Status: SHIPPED | OUTPATIENT
Start: 2021-04-30 | End: 2021-05-06

## 2021-04-30 RX ORDER — CYCLOBENZAPRINE HCL 10 MG
10 TABLET ORAL NIGHTLY
Qty: 30 TABLET | Refills: 3 | Status: SHIPPED | OUTPATIENT
Start: 2021-04-30 | End: 2021-09-09 | Stop reason: SDUPTHER

## 2021-04-30 ASSESSMENT — ENCOUNTER SYMPTOMS
PHOTOPHOBIA: 0
TROUBLE SWALLOWING: 0
NAUSEA: 0
COLOR CHANGE: 0
CHOKING: 0
BACK PAIN: 1
VOMITING: 0
SHORTNESS OF BREATH: 0

## 2021-04-30 NOTE — PROGRESS NOTES
Subjective:      Patient ID: Josh Dominique is a 36 y.o. female who presents today for:  Chief Complaint   Patient presents with    New Patient     PT is being seen today for headaches. Onset was around last year. She says that they were usually associated with her sinus's. She says that they are mostly in her temple area. She states that she has a headache in average once a day sometimes more. She states she has a pressure feeling with them, some nausea occasionally. She did see the eye doctor, they told her she has an astigmatism. HPI 40-year-old right-handed female with a history of headaches. Patient has chronic daily headaches. Patient has chronic pain syndrome and lumbar pain. Patient has chronic neck pain. Patient has temporal pain. She had eye exam which did not rating significant. Patient has no nausea phonophobia or photophobia with these headaches. The headache come from the back of the neck into the front. There is no history of sleep apnea or snoring. She has no rash joint swelling. She denies any numbness or tingling.     Past Medical History:   Diagnosis Date    Acid reflux     Anxiety     Breast cyst     Depression     Generalized anxiety disorder     H/O tubal ligation     Hiatal hernia 13    Hypothyroidism     Hypothyroidism     IBS (irritable bowel syndrome)     Obesity     Tx'd with Adipex     Past Surgical History:   Procedure Laterality Date    BREAST SURGERY  2007    Reduction     SECTION  2004    CHOLECYSTECTOMY  1996    COLONOSCOPY N/A 2020    COLONOSCOPY DIAGNOSTIC performed by Reynaldo Bolton MD at [address] [place] [address], [facility] N/A 2020    [address] performed by Florentino Barrett MD at [place] [address]  2018    Gastric Sleeve    TUBAL LIGATION  2005    UPPER GASTROINTESTINAL ENDOSCOPY  2013    Dr Raúl Caba History     Socioeconomic History    Marital status: Single Spouse name: Not on file    Number of children: Not on file    Years of education: Not on file    Highest education level: Not on file   Occupational History    Not on file   Social Needs    Financial resource strain: Not hard at all    Food insecurity     Worry: Never true     Inability: Never true   [employer] needs     Medical: No     Non-medical: No   Tobacco Use    Smoking status: Former Smoker     Types: Cigarettes     Quit date: 2018     Years since quittin.7    Smokeless tobacco: Never Used   Substance and Sexual Activity    Alcohol use:  Yes     Alcohol/week: 0.0 standard drinks     Comment: Rare use    Drug use: No    Sexual activity: Yes     Partners: Male   Lifestyle    Physical activity     Days per week: Not on file     Minutes per session: Not on file    Stress: Not on file   Relationships    Social connections     Talks on phone: Not on file     Gets together: Not on file     Attends Gnosticist service: Not on file     Active member of club or organization: Not on file     Attends meetings of clubs or organizations: Not on file     Relationship status: Not on file    Intimate partner violence     Fear of current or ex partner: Not on file     Emotionally abused: Not on file     Physically abused: Not on file     Forced sexual activity: Not on file   Other Topics Concern    Not on file   Social History Narrative    Not on file     Family History   Problem Relation Age of Onset    High Blood Pressure Mother         No FDR with colon cancer     Crohn's Disease Mother     Cancer Father         Lung cancer    Mental Illness Brother     Heart Disease Brother     Asthma Brother     Colon Cancer Neg Hx      Allergies   Allergen Reactions    Ibuprofen Other (See Comments)     Can't have with gastric sleeve    Morphine Nausea And Vomiting       Current Outpatient Medications   Medication Sig Dispense Refill    methylPREDNISolone (MEDROL DOSEPACK) 4 MG tablet Take by mouth. 1 kit 0    cyclobenzaprine (FLEXERIL) 10 MG tablet Take 1 tablet by mouth nightly 30 tablet 3    diclofenac sodium (VOLTAREN) 1 % GEL Apply topically 2 times daily 100 g 3    fluconazole (DIFLUCAN) 150 MG tablet Take 1 tablet by mouth daily for 3 days 3 tablet 0    levothyroxine (SYNTHROID) 175 MCG tablet Take 1 tablet by mouth daily 30 tablet 0    phentermine (ADIPEX-P) 37.5 MG tablet Take 1 tablet by mouth every morning (before breakfast) for 30 days. 30 tablet 0    traZODone (DESYREL) 100 MG tablet Take 1 tablet by mouth nightly 90 tablet 3    tiZANidine (ZANAFLEX) 2 MG tablet Take 1 tablet by mouth every 8 hours as needed (headaches) 45 tablet 0    rOPINIRole (REQUIP) 0.5 MG tablet TAKE 1 TABLET BY MOUTH NIGHTLY AS NEEDED (RESTLESS LEGS) 90 tablet 0    pantoprazole (PROTONIX) 40 MG tablet Take 1 tablet by mouth daily 90 tablet 3    acetaminophen (APAP EXTRA STRENGTH) 500 MG tablet Take 2 tablets by mouth every 6 hours as needed for Pain 60 tablet 1    Multiple Vitamin (MULTI VITAMIN DAILY PO) Take 1 tablet by mouth daily      Probiotic Product (PROBIOTIC DAILY PO) Take 1 tablet by mouth daily      busPIRone (BUSPAR) 10 MG tablet Take 1 tablet by mouth 3 times daily as needed (anxiety) 270 tablet 1    fluticasone (FLONASE) 50 MCG/ACT nasal spray 1 spray by Nasal route daily 1 Bottle 0    Ascorbic Acid (VITAMIN C) 250 MG tablet Take 500 mg by mouth daily       hyoscyamine (LEVBID) 375 MCG extended release tablet TAKE ONE TABLET BY MOUTH EVERY 12 HOURS AS NEEDED FOR CRAMPING (Patient not taking: Reported on 4/30/2021) 60 tablet 0     No current facility-administered medications for this visit. Review of Systems   Constitutional: Negative for fever. HENT: Negative for ear pain, tinnitus and trouble swallowing. Eyes: Negative for photophobia and visual disturbance. Respiratory: Negative for choking and shortness of breath.     Cardiovascular: Negative for chest pain and on this combination. If nothing helps and given that she is not able to take anti-inflammatory agents I have recommended botulinum toxin in the future. For now we will treat her with Voltaren gel and if this does not help she may require pain cream which is compounded with multiple medications. CT scan of the head was reviewed and this is normal.    Mohsen Bajwa MD, Sy Almaraz, American Board of Psychiatry & Neurology  Board Certified in Vascular Neurology  Board Certified in Neuromuscular Medicine  Certified in [facility]:      [facility] This Encounter   Procedures    Ambulatory referral to Physical Therapy     Referral Priority:   Routine     Referral Type:   Eval and Treat     Referral Reason:   Specialty Services Required     Requested Specialty:   Physical Therapy     Number of Visits Requested:   1     Orders Placed This Encounter   Medications    methylPREDNISolone (MEDROL DOSEPACK) 4 MG tablet     Sig: Take by mouth. Dispense:  1 kit     Refill:  0    cyclobenzaprine (FLEXERIL) 10 MG tablet     Sig: Take 1 tablet by mouth nightly     Dispense:  30 tablet     Refill:  3    diclofenac sodium (VOLTAREN) 1 % GEL     Sig: Apply topically 2 times daily     Dispense:  100 g     Refill:  3       No follow-ups on file.       Lauri Ward MD

## 2021-05-04 ENCOUNTER — HOSPITAL ENCOUNTER (OUTPATIENT)
Dept: PHYSICAL THERAPY | Age: 41
Setting detail: THERAPIES SERIES
Discharge: HOME | End: 2021-05-04
Payer: COMMERCIAL

## 2021-05-04 PROCEDURE — 97110 THERAPEUTIC EXERCISES: CPT

## 2021-05-04 ASSESSMENT — PAIN DESCRIPTION - LOCATION: LOCATION: BACK

## 2021-05-04 NOTE — PROGRESS NOTES
mobility , Decreased ROM, Decreased strength, Decreased posture, Increased pain, Decreased high-level IADLs  Assessment: Initiated tx per POC w/ focus on gentle stretching for decreased pain, tightness, and increased ROM. Pt denies significant increase in pain thorughout w/ good knowledge of current HEP, therefore progressed today with good understanding. Treatment Diagnosis: LBP, decreased lumbar AROM, decreased core, R LE, and L hip ext strength, decreased postural awareness, decreased functional activity tolerance, (+) L FADIR and decreased HS flexibility  Prognosis: Good     Goals:  Short term goals  Time Frame for Short term goals: 2-3 weeks  Short term goal 1: The pt will have decreased LB pain </= 4/10 at worst  in order to increase ease with ADL's  Short term goal 2: The pt will demonstrate improved postural awareness requiring <25% VC's with exercises  Long term goals  Time Frame for Long term goals : 4-6 weeks  Long term goal 1: The pt will have a decrease in TONI score >/=10 points in order to increase functional activity tolerance  Long term goal 2: The pt will be indep/compliant with HEP in order to self manage and maintain status upon D/C  Long term goal 3: The pt will demo improved lumbar AROM >/=WNL's in order to increase ease with ADL's  Long term goal 4: The pt will demo improved B Hip strength 5/5 and core strength >/= fair in order to ambulate and stand prolonged periods with decreased pain/limitations  Progress toward goals: increase strength, ROM, decrease pain    POST-PAIN       Pain Rating (0-10 pain scale):   4-5/10   Location and pain description same as pre-treatment unless indicated.    Action: [] NA   [x] Perform HEP  [] Meds as prescribed  [] Modalities as prescribed   [] Call Physician     Frequency/Duration:  Plan  Times per week: 2  Plan weeks: 4-6  Current Treatment Recommendations: Strengthening, ROM, Manual Therapy - Joint Manipulation, Manual Therapy - Soft Tissue Mobilization, Patient/Caregiver Education & Training, Home Exercise Program, Positioning, Integrated Dry Needling  Plan Comment: pt to schedule second body part eval     Pt to continue current HEP. See objective section for any therapeutic exercise changes, additions or modifications this date.     PT Individual Minutes  Time In: 1215  Time Out: 2950  Minutes: 48  Timed Code Treatment Minutes: 38 Minutes  Procedure Minutes: 10     Timed Activity Minutes Units   Ther Ex 38 3     Signature:  Electronically signed by Mirna Jerry PTA on 5/4/21 at 1:06 PM EDT

## 2021-05-07 ENCOUNTER — OFFICE VISIT (OUTPATIENT)
Dept: CARDIOLOGY CLINIC | Age: 41
End: 2021-05-07
Payer: COMMERCIAL

## 2021-05-07 ENCOUNTER — HOSPITAL ENCOUNTER (OUTPATIENT)
Dept: PHYSICAL THERAPY | Age: 41
Setting detail: THERAPIES SERIES
Discharge: HOME | End: 2021-05-07
Payer: COMMERCIAL

## 2021-05-07 VITALS
DIASTOLIC BLOOD PRESSURE: 76 MMHG | BODY MASS INDEX: 40.04 KG/M2 | RESPIRATION RATE: 18 BRPM | WEIGHT: 226 LBS | HEART RATE: 81 BPM | HEIGHT: 63 IN | SYSTOLIC BLOOD PRESSURE: 124 MMHG | OXYGEN SATURATION: 100 %

## 2021-05-07 DIAGNOSIS — R07.9 CHEST PAIN, UNSPECIFIED TYPE: ICD-10-CM

## 2021-05-07 DIAGNOSIS — R00.2 PALPITATIONS: Primary | ICD-10-CM

## 2021-05-07 DIAGNOSIS — R06.09 DOE (DYSPNEA ON EXERTION): ICD-10-CM

## 2021-05-07 DIAGNOSIS — E03.9 HYPOTHYROIDISM, UNSPECIFIED TYPE: ICD-10-CM

## 2021-05-07 PROCEDURE — 99204 OFFICE O/P NEW MOD 45 MIN: CPT | Performed by: INTERNAL MEDICINE

## 2021-05-07 PROCEDURE — G8417 CALC BMI ABV UP PARAM F/U: HCPCS | Performed by: INTERNAL MEDICINE

## 2021-05-07 PROCEDURE — 1036F TOBACCO NON-USER: CPT | Performed by: INTERNAL MEDICINE

## 2021-05-07 PROCEDURE — 97110 THERAPEUTIC EXERCISES: CPT

## 2021-05-07 PROCEDURE — G8427 DOCREV CUR MEDS BY ELIG CLIN: HCPCS | Performed by: INTERNAL MEDICINE

## 2021-05-07 ASSESSMENT — ENCOUNTER SYMPTOMS
RESPIRATORY NEGATIVE: 1
GASTROINTESTINAL NEGATIVE: 1
CHEST TIGHTNESS: 0
EYES NEGATIVE: 1
SHORTNESS OF BREATH: 0
STRIDOR: 0
BLOOD IN STOOL: 0
COUGH: 0
NAUSEA: 0
WHEEZING: 0

## 2021-05-07 ASSESSMENT — PAIN DESCRIPTION - ORIENTATION: ORIENTATION: RIGHT;LEFT;LOWER

## 2021-05-07 ASSESSMENT — PAIN DESCRIPTION - PAIN TYPE: TYPE: CHRONIC PAIN

## 2021-05-07 NOTE — PROGRESS NOTES
NEW PATIENT        Patient: Joel Alfaro  YOB: 1980  MRN: [id number]    Chief Complaint: fluttering CP NASH Hypothyroid   Chief Complaint   Patient presents with   [address] [place] [address] Cardiologist     referral Dr. Oskar Dockery Palpitations    Shortness of Breath     NASH       CV Data:    Subjective/HPI  For few years has had heart fluttering. Occurs at least everyother day. She also has vague cp complaints. She has NASH. No dizzy.   No prior CVA MI    Former smoker  Rare ETOH  Work- [employer] w son and mom     EKG: SR 72    Past Medical History:   Diagnosis Date    Acid reflux     Anxiety     Breast cyst     Depression     Generalized anxiety disorder     H/O tubal ligation     Hiatal hernia 13    Hypothyroidism     Hypothyroidism     IBS (irritable bowel syndrome)     Obesity     Tx'd with Adipex       Past Surgical History:   Procedure Laterality Date    BREAST SURGERY  2007    Reduction     SECTION  2004    CHOLECYSTECTOMY  1996    COLONOSCOPY N/A 2020    COLONOSCOPY DIAGNOSTIC performed by Breonna Chavez MD at [address] [place] [address] N/A 2020    TLH performed by Jada Tafoya MD at [place] [address]  2018    Gastric Sleeve    TUBAL LIGATION  2005    UPPER GASTROINTESTINAL ENDOSCOPY  2013    Dr Jesús Schmitt       Family History   Problem Relation Age of Onset    High Blood Pressure Mother         No FDR with colon cancer     Crohn's Disease Mother     Cancer Father         Lung cancer    Mental Illness Brother     Heart Disease Brother     Asthma Brother     Colon Cancer Neg Hx        Social History     Socioeconomic History    Marital status: Single     Spouse name: None    Number of children: None    Years of education: None    Highest education level: None   Occupational History    None   Social Needs    Financial resource strain: Not hard at all   [place] insecurity     Worry: Never true     Inability: Never true    Transportation needs     Medical: No     Non-medical: No   Tobacco Use    Smoking status: Former Smoker     Types: Cigarettes     Quit date: 2018     Years since quittin.7    Smokeless tobacco: Never Used   Substance and Sexual Activity    Alcohol use: Yes     Alcohol/week: 0.0 standard drinks     Comment: Rare use    Drug use: No    Sexual activity: Yes     Partners: Male   Lifestyle    Physical activity     Days per week: None     Minutes per session: None    Stress: None   Relationships    Social connections     Talks on phone: None     Gets together: None     Attends Pentecostalism service: None     Active member of club or organization: None     Attends meetings of clubs or organizations: None     Relationship status: None    Intimate partner violence     Fear of current or ex partner: None     Emotionally abused: None     Physically abused: None     Forced sexual activity: None   Other Topics Concern    None   Social History Narrative    None       Allergies   Allergen Reactions    Ibuprofen Other (See Comments)     Can't have with gastric sleeve    Morphine Nausea And Vomiting       Current Outpatient Medications   Medication Sig Dispense Refill    cyclobenzaprine (FLEXERIL) 10 MG tablet Take 1 tablet by mouth nightly 30 tablet 3    diclofenac sodium (VOLTAREN) 1 % GEL Apply topically 2 times daily 100 g 3    levothyroxine (SYNTHROID) 175 MCG tablet Take 1 tablet by mouth daily 30 tablet 0    phentermine (ADIPEX-P) 37.5 MG tablet Take 1 tablet by mouth every morning (before breakfast) for 30 days.  30 tablet 0    traZODone (DESYREL) 100 MG tablet Take 1 tablet by mouth nightly 90 tablet 3    tiZANidine (ZANAFLEX) 2 MG tablet Take 1 tablet by mouth every 8 hours as needed (headaches) 45 tablet 0    rOPINIRole (REQUIP) 0.5 MG tablet TAKE 1 TABLET BY MOUTH NIGHTLY AS NEEDED (RESTLESS LEGS) 90 tablet 0    pantoprazole (PROTONIX) 40 MG tablet Take 1 tablet by mouth daily 90 tablet 3    acetaminophen (APAP EXTRA STRENGTH) 500 MG tablet Take 2 tablets by mouth every 6 hours as needed for Pain 60 tablet 1    Multiple Vitamin (MULTI VITAMIN DAILY PO) Take 1 tablet by mouth daily      Probiotic Product (PROBIOTIC DAILY PO) Take 1 tablet by mouth daily      busPIRone (BUSPAR) 10 MG tablet Take 1 tablet by mouth 3 times daily as needed (anxiety) 270 tablet 1    fluticasone (FLONASE) 50 MCG/ACT nasal spray 1 spray by Nasal route daily 1 Bottle 0    Ascorbic Acid (VITAMIN C) 250 MG tablet Take 500 mg by mouth daily       hyoscyamine (LEVBID) 375 MCG extended release tablet TAKE ONE TABLET BY MOUTH EVERY 12 HOURS AS NEEDED FOR CRAMPING (Patient not taking: Reported on 4/30/2021) 60 tablet 0     No current facility-administered medications for this visit. Review of Systems:   Review of Systems   Constitutional: Negative. Negative for diaphoresis and fatigue. HENT: Negative. Eyes: Negative. Respiratory: Negative. Negative for cough, chest tightness, shortness of breath, wheezing and stridor. Cardiovascular: Negative. Negative for chest pain, palpitations and leg swelling. Gastrointestinal: Negative. Negative for blood in stool and nausea. Genitourinary: Negative. Musculoskeletal: Negative. Skin: Negative. Neurological: Negative. Negative for dizziness, syncope, weakness and light-headedness. Hematological: Negative. Psychiatric/Behavioral: Negative. Physical Examination:    /76 (Site: Left Upper Arm, Position: Sitting, Cuff Size: Large Adult)   Pulse 81   Resp 18   Ht 5' 3\" (1.6 m)   Wt 226 lb (102.5 kg)   LMP 10/18/2020   SpO2 100%   BMI 40.03 kg/m²    Physical Exam   Constitutional: She appears healthy. No distress. HENT:   Normal cephalic and Atraumatic   Eyes: Pupils are equal, round, and reactive to light. Neck: Normal range of motion and thyroid normal. Neck supple. No JVD present.  No neck adenopathy. No thyromegaly present. Cardiovascular: Normal rate, regular rhythm, normal heart sounds, intact distal pulses and normal pulses. Pulmonary/Chest: Effort normal and breath sounds normal. She has no wheezes. She has no rales. She exhibits no tenderness. Abdominal: Soft. Bowel sounds are normal. There is no abdominal tenderness. Musculoskeletal: Normal range of motion. General: No tenderness or edema. Neurological: She is alert and oriented to person, place, and time. Skin: Skin is warm. No cyanosis. Nails show no clubbing.        LABS:  CBC:   Lab Results   Component Value Date    WBC 4.9 12/19/2020    RBC 4.10 12/19/2020    RBC 4.13 10/01/2018    HGB 13.5 12/19/2020    HCT 40.1 12/19/2020    MCV 97.8 12/19/2020    MCH 33.0 12/19/2020    MCHC 33.7 12/19/2020    RDW 12.5 12/19/2020     12/19/2020    MPV 10.0 10/01/2018     Lipids:  Lab Results   Component Value Date    CHOL 158 09/22/2020    CHOL 176 01/03/2020    CHOL 248 (H) 09/15/2011     Lab Results   Component Value Date    TRIG 107 09/22/2020    TRIG 153 (H) 01/03/2020    TRIG 272 (H) 09/15/2011     Lab Results   Component Value Date    HDL 51 09/22/2020    HDL 50 01/03/2020    HDL 44 09/15/2011     Lab Results   Component Value Date    LDLCALC 86 09/22/2020    LDLCALC 95 01/03/2020    LDLCALC 160 (H) 09/15/2011     No results found for: LABVLDL, VLDL  Lab Results   Component Value Date    CHOLHDLRATIO 5.6 09/15/2011     CMP:    Lab Results   Component Value Date     09/22/2020    K 3.6 09/22/2020    K 4.5 12/04/2019     09/22/2020    CO2 26 09/22/2020    BUN 13 09/22/2020    CREATININE 0.69 09/22/2020    GFRAA >60.0 09/22/2020    LABGLOM >60.0 09/22/2020    GLUCOSE 82 09/22/2020    GLUCOSE 97 10/01/2018    PROT 7.4 09/22/2020    LABALBU 4.4 09/22/2020    LABALBU 4.8 09/15/2011    CALCIUM 9.6 09/22/2020    BILITOT 0.7 09/22/2020    ALKPHOS 67 09/22/2020    AST 13 09/22/2020    ALT 12 09/22/2020     BMP:    Lab Results   Component Value Date     09/22/2020    K 3.6 09/22/2020    K 4.5 12/04/2019     09/22/2020    CO2 26 09/22/2020    BUN 13 09/22/2020    LABALBU 4.4 09/22/2020    LABALBU 4.8 09/15/2011    CREATININE 0.69 09/22/2020    CALCIUM 9.6 09/22/2020    GFRAA >60.0 09/22/2020    LABGLOM >60.0 09/22/2020    GLUCOSE 82 09/22/2020    GLUCOSE 97 10/01/2018     Magnesium:    Lab Results   Component Value Date    MG 2.2 09/22/2020     TSH:  Lab Results   Component Value Date    TSH 18.120 (H) 04/13/2021             Patient Active Problem List   Diagnosis    Primary hypothyroidism    GERD (gastroesophageal reflux disease)    Generalized anxiety disorder    Obesity    Bone disease    Vitamin D deficiency    Breast mass, right    Post endometrial ablation syndrome    Neck pain    Muscle tension headache    Lumbar radiculopathy       There are no discontinued medications. Modified Medications    No medications on file       No orders of the defined types were placed in this encounter. Assessment/Plan:    1. Palpitations   HM and Echo     2. Chest pain, unspecified type   GXT     3. NASH (dyspnea on exertion)       4. Hypothyroidism, unspecified type          Counseling:  Heart Healthy Lifestyle, Low Salt Diet, Take Precautions to Prevent Falls and Walk Daily    Return for AFTER TESTS.     Electronically signed by Nicholas Owens MD on 5/7/2021 at 3:28 PM

## 2021-05-07 NOTE — PROGRESS NOTES
[address]  Outpatient Physical Therapy    Treatment Note        Date: 2021  Patient: Muriel Holland  : 1980  ACCT #: [de-identified]  Referring Practitioner: Rudine Blizzard  Diagnosis: Chronic low back pain, unspecified back pain laterality, unspecified whether sciatica present, DDD (degenerative disc disease), lumbar    Visit Information:  PT Visit Information  Onset Date: 21  PT Insurance Information: [employer]ource  Total # of Visits Approved: 30  Total # of Visits to Date: 3  No Show: 0  Canceled Appointment: 0  Progress Note Counter: 3/8-    Subjective: Pt reports \"it's been about a 9/10 the last few days. work has been really busy. \" Pt reports compliance with HEP, but states relief only lasts for a very short time.   Comments: RTD 21; Pain management Dr. Drea Arita 5/10/21; MRI 21  HEP Compliance:  [x] Good [] Fair [] Poor [] Reports not doing due to:    Vital Signs  Patient Currently in Pain: Yes   Pain Screening  Patient Currently in Pain: Yes  Pain Assessment  Pain Assessment: 0-10  Pain Level: 9  Pain Type: Chronic pain  Pain Location: Back  Pain Orientation: Right;Left;Lower  Pain Descriptors: Aching;Constant;Burning    OBJECTIVE:   Exercises  Exercise 1: LTR 5\"x10  Exercise 2: H/L piriformis cross body stretch 30\"x3  Exercise 3: SKTC 30\"x3  Exercise 4: TA enriqueta 5\"x10  Exercise 5: supine crossbody stretch 30''x3  Exercise 6: DKTC 30''x3  Exercise 7: HS stretch 30''x3  Exercise 9: bridges 5''x10  Exercise 19: Discussed towel roll in small of chair as pt states she's awaiting a lumbar support that was ordered for her work chair    Strength: [x] NT  [] MMT completed:     ROM: [x] NT  [] ROM measurements:     Manual:   Manual therapy  Manual traction: manual lumbar distraction w b/l LE pulls 20'' hold x5 - no significant change in pain    Modalities:  Modalities  Moist heat: x10 min to lumbar spine to decrease post exertion pain and mm tightness  E-stim (parameters): hold d/t hypersensitivity     *Indicates exercise, modality, or manual techniques to be initiated when appropriate    Assessment: Body structures, Functions, Activity limitations: Decreased functional mobility , Decreased ROM, Decreased strength, Decreased posture, Increased pain, Decreased high-level IADLs  Assessment: Cont with tx per POC for increased strength, ROM, and decreased pain. Pt demo'ing increased difficulty lifting b/l LE's into flexion for stretches today vs LV. Initiated manual lumbar distraction with no significant change in pain, pt only reporting \"I can feel the pull/soreness. \" Concluded with MHP to further manage pain. Treatment Diagnosis: LBP, decreased lumbar AROM, decreased core, R LE, and L hip ext strength, decreased postural awareness, decreased functional activity tolerance, (+) L FADIR and decreased HS flexibility  Prognosis: Good     Goals:  Short term goals  Time Frame for Short term goals: 2-3 weeks  Short term goal 1: The pt will have decreased LB pain </= 4/10 at worst  in order to increase ease with ADL's  Short term goal 2: The pt will demonstrate improved postural awareness requiring <25% VC's with exercises  Long term goals  Time Frame for Long term goals : 4-6 weeks  Long term goal 1: The pt will have a decrease in TONI score >/=10 points in order to increase functional activity tolerance  Long term goal 2: The pt will be indep/compliant with HEP in order to self manage and maintain status upon D/C  Long term goal 3: The pt will demo improved lumbar AROM >/=WNL's in order to increase ease with ADL's  Long term goal 4: The pt will demo improved B Hip strength 5/5 and core strength >/= fair in order to ambulate and stand prolonged periods with decreased pain/limitations  Progress toward goals: increased strength, ROM, decreased pain    POST-PAIN       Pain Rating (0-10 pain scale):  7-8 /10   Location and pain description same as pre-treatment unless indicated.    Action: [] NA

## 2021-05-08 DIAGNOSIS — F41.1 GENERALIZED ANXIETY DISORDER: ICD-10-CM

## 2021-05-10 RX ORDER — LEVOTHYROXINE SODIUM 175 UG/1
175 TABLET ORAL DAILY
Qty: 30 TABLET | Refills: 0 | Status: SHIPPED | OUTPATIENT
Start: 2021-05-10 | End: 2021-06-02 | Stop reason: CLARIF

## 2021-05-10 RX ORDER — BUSPIRONE HYDROCHLORIDE 10 MG/1
10 TABLET ORAL EVERY 8 HOURS PRN
Qty: 270 TABLET | Refills: 3 | Status: SHIPPED | OUTPATIENT
Start: 2021-05-10 | End: 2022-05-10

## 2021-05-11 ENCOUNTER — HOSPITAL ENCOUNTER (OUTPATIENT)
Dept: PHYSICAL THERAPY | Age: 41
Setting detail: THERAPIES SERIES
Discharge: HOME | End: 2021-05-11
Payer: COMMERCIAL

## 2021-05-11 PROCEDURE — 97161 PT EVAL LOW COMPLEX 20 MIN: CPT

## 2021-05-11 PROCEDURE — 97110 THERAPEUTIC EXERCISES: CPT

## 2021-05-11 ASSESSMENT — PAIN DESCRIPTION - DESCRIPTORS: DESCRIPTORS: ACHING;DISCOMFORT

## 2021-05-11 ASSESSMENT — PAIN DESCRIPTION - INTENSITY: RATING_2: 9

## 2021-05-11 ASSESSMENT — PAIN DESCRIPTION - LOCATION: LOCATION: HEAD

## 2021-05-11 NOTE — PROGRESS NOTES
[address]  PHYSICAL THERAPY PLAN OF CARE   [address], [address] [facility]         Ph: [phone]  Fax: [phone]    [] Certification  [] Recertification [x]  Plan of Care  [] Progress Note [] Discharge      To:  Larry Ziegler;  Fredderick Fleischer      From:  Sera Soria, PT, DPT  Patient: Alexx Gold     : 1980  Diagnosis: Chronic low back pain, unspecified back pain laterality, unspecified whether sciatica present, DDD (degenerative disc disease), lumbar; Neck pain     Date: 2021  Treatment Diagnosis: LBP, decreased lumbar AROM, decreased core, R LE, and L hip ext strength, decreased postural awareness, decreased functional activity tolerance, (+) L FADIR and decreased HS flexibility     Progress Report Period from:  2021  to 2021    Total # of Visits to Date: 4   No Show: 0    Canceled Appointment: 0     OBJECTIVE:   Short Term Goals - Time Frame for Short term goals: 2-3 weeks    Goals Current/Discharge status  Met   Short term goal 1: The pt will have decreased LB pain </= 4/10 at worst  in order to increase ease with ADL's  9/10 at worst [] yes  [x] no   Short term goal 2: The pt will demonstrate improved postural awareness requiring <25% VC's with exercises  fair [] yes  [x] no   Short term goal 3: The pt will have decrease frequency of HA's by >/=75% in order to increase ease with ADL's  Daily HA's that range from 0-7/10  [] yes  [x] no     Long Term Goals - Time Frame for Long term goals : 4-6 weeks  Goals Current/ Discharge status Met   Long term goal 1: The pt will have a decrease in TONI score >/=10 points in order to increase functional activity tolerance NT d/t cervical spine eval this date  [] yes  [x] no   Long term goal 2: The pt will be indep/compliant with HEP in order to self manage and maintain status upon D/C ongoing [] yes  [x] no   Long term goal 3: The pt will demo improved lumbar AROM >/=WNL's in order to increase ease with ADL's NT secondary to cervical spine eval this date  [] yes  [x] no   Long term goal 4: The pt will demo improved B Hip strength 5/5 and core strength >/= fair in order to ambulate and stand prolonged periods with decreased pain/limitations NT secondary to cervical spine eval this date  [] yes  [x] no   Long term goal 5: The pt will demo improved periscapul strength >/=4+/5 in order to improve postural awareness and cervical alignment to decrease frequency of HA's Strength RUE  Comment: 4+/5 shoulder abd, ER, IR, elbow ext 4/5 shoulder abd, elbow flex; 4/5 MT, Rhomboids, Lats 4-/5 LT  Strength LUE  Comment: 4+/5 shoulder abd, ER, IR, elbow ext 4/5 shoulder abd, elbow flex; 4/5 MT, rhomboids, LT, Lats [] yes  [x] no    Long term goal 6: The pt will demo improved B cervical rotation WNL's to improve atlantoaxial mobility and decrease sub-occipital tightness to decrease HA frequency Spine  Cervical: flex 60, ext 50, SB L 50 R 45, Rot L 55 R 68(denies pain or HA's with mvmt)   [] yes  [x] no      Body structures, Functions, Activity limitations: Decreased functional mobility , Decreased ROM, Decreased strength, Decreased posture, Increased pain, Decreased high-level IADLs  Assessment: Pt evaluated for additional cervical spine and HA's with decreased cervical rotation AROM, decreased postural awareness, decreased periscapular strength, (+) L cervical flexion/rotation special test, and cervical mm tightness. These impairments currently contribute to pt frequency of HA's and limit her fucntional activity tolerance. Pt would benefit from further PT with additional goals to address these impairments under current POC for lumbar spine which also conts to limit her functional activity tolerance with high pain levels.   Prognosis: Good  Discharge Recommendations: Continue to assess pending progress    PT Education: Goals;Plan of Care;Home Exercise Program  Patient Education: evaluative findings    PLAN: [x] Evaluate and Treat  Frequency/Duration:  Plan  Times per week: 2  Plan weeks: 4-6  Current Treatment Recommendations: Strengthening, ROM, Manual Therapy - Joint Manipulation, Manual Therapy - Soft Tissue Mobilization, Patient/Caregiver Education & Training, Home Exercise Program, Positioning, Integrated Dry Needling, Modalities     Precautions: mod falls risk                         Patient Status:[x] Continue/ Initiate plan of Care    [] Discharge PT. Recommend pt continue with HEP. [] Additional visits requested, Please re-certify for additional visits:        Signature: Electronically signed by Radha Flores PT on 5/11/21 at 2:42 PM EDT    If you have any questions or concerns, please don't hesitate to call. Thank you for your referral.    I have reviewed this plan of care and certify a need for medically necessary rehabilitation services.     Physician Signature:__________________________________________________________  Date:  Please sign and return

## 2021-05-11 NOTE — PROGRESS NOTES
[address]  Outpatient Physical Therapy    Treatment Note        Date: 2021  Patient: Mary Tejeda  : 1980  ACCT #: [de-identified]  Referring Practitioner: Adriana Roa  Diagnosis: Chronic low back pain, unspecified back pain laterality, unspecified whether sciatica present, DDD (degenerative disc disease), lumbar; Neck pain    Visit Information:  PT Visit Information  Onset Date: 21  [facility] [place]  Total # of Visits Approved: 30  Total # of Visits to Date: 4  No Show: 0  Canceled Appointment: 0  Progress Note Counter: -    Subjective: Pt presents this date with order for neck pain. Pt rpeorts she doesnt have neck pain per say, saw neurologist d/t HA's that have been reoccurring for >/=1 year. Pt reports MD thinks they are caused from tension in her neck and stress. Pt reports she has HA's daily that are intermittent. Pt denies having any dizziness, changes in vision, changes in speech or swallowing, unexplained weight loss, or episodes of LOC. Pt reports conts to have constant back pain that hasnt changed much. Has MRI tomorrow and saw pain management yesterday who told her they may do a nerve ablasion after PT is completed. Pt reports she has been compliant with HEP.   Comments: RTD 21; Pain management Dr. Manfred Fritz 5/10/21; MRI 21  HEP Compliance:  [x] Good [] Fair [] Poor [] Reports not doing due to:    Vital Signs  Patient Currently in Pain: Denies   Pain Screening  Patient Currently in Pain: Denies  Pain Assessment  Pain Assessment: 0-10  Pain Level: (Pain ranges from 0-7/10)  Pain Type: Chronic pain  Pain Location: Head  Pain Descriptors: Aching;Discomfort  Non-Pharmaceutical Pain Intervention(s): Heat applied  Multiple Pain Sites: Yes  Pain 2  Pain Rating 2: 9  Pain Location 2: Back  Pain Orientation 2: Lower;Mid  Pain Descriptors 2: Aching;Burning    OBJECTIVE:   Exercises  Exercise 1: LTR 5\"x10  Exercise 3: SKTC 30\"x3- with towel  Exercise 4: TA enriqueta 5\"x10  Exercise 9: bridges 5''[id number]  Exercise 13: chin nods 5\"x10  Exercise 14: chin nods with rotation 5\"x10 B in supine  Exercise 15: scap retract 5\"x10  Exercise 16: UBE*  Exercise 17: prone scap*  Exercise 18: tband rows/lats*  Exercise 20: HEP: chin nods, chin nod with rotation, scap retract    Strength: [] NT  [x] MMT completed:  Strength RUE  Comment: 4+/5 shoulder abd, ER, IR, elbow ext 4/5 shoulder abd, elbow flex; 4/5 MT, Rhomboids, Lats 4-/5 LT  Strength LUE  Comment: 4+/5 shoulder abd, ER, IR, elbow ext 4/5 shoulder abd, elbow flex; 4/5 MT, rhomboids, LT, Lats     ROM: [] NT  [x] ROM measurements:  AROM RUE (degrees)  RUE AROM : WNL     AROM LUE (degrees)  LUE AROM : WNL  Spine  Cervical: flex 60, ext 50, SB L 50 R 45, Rot L 55 R 68(denies pain or HA's with mvmt)  Manual:   Manual therapy  Muscle energy: L [place]-axial (AA) contract relax 10\"/30\"x3  Soft Tissue Mobalization: B UT, cervical paraspinals, LS, sub-occipitals*    Modalities:  Modalities  Moist heat: x10 min to lumbar spine to decrease post exertion pain and mm tightness  E-stim (parameters): hold d/t hypersensitivity     *Indicates exercise, modality, or manual techniques to be initiated when appropriate    Assessment:   Activity Tolerance  Activity Tolerance: Pt with good tolerance to tx of cervical spine with no c/o HA's throughout; decreased activity tolerance with LBP this date without significant relief during exercises though mild relief with MHP    Body structures, Functions, Activity limitations: Decreased functional mobility , Decreased ROM, Decreased strength, Decreased posture, Increased pain, Decreased high-level IADLs  Assessment: Pt evaluated for additional cervical spine and HA's with decreased cervical rotation AROM, decreased postural awareness, decreased periscapular strength, (+) L cervical flexion/rotation special test, and cervical mm tightness.  These impairments currently contribute to pt frequency of HA's and limit her fucntional activity tolerance. Pt would benefit from further PT with additional goals to address these impairments under current POC for lumbar spine which also conts to limit her functional activity tolerance with high pain levels. Treatment Diagnosis: LBP, decreased lumbar AROM, decreased core, R LE, and L hip ext strength, decreased postural awareness, decreased functional activity tolerance, (+) L FADIR and decreased HS flexibility  Prognosis: Good  PT Education: Goals, Plan of Care, Home Exercise Program  Patient Education: evaluative findings    Goals:  Short term goals  Time Frame for Short term goals: 2-3 weeks  Short term goal 1: The pt will have decreased LB pain </= 4/10 at worst  in order to increase ease with ADL's  Short term goal 2: The pt will demonstrate improved postural awareness requiring <25% VC's with exercises  Short term goal 3: The pt will have decrease frequency of HA's by >/=75% in order to increase ease with ADL's    Long term goals  Time Frame for Long term goals : 4-6 weeks  Long term goal 1: The pt will have a decrease in TONI score >/=10 points in order to increase functional activity tolerance  Long term goal 2: The pt will be indep/compliant with HEP in order to self manage and maintain status upon D/C  Long term goal 3: The pt will demo improved lumbar AROM >/=WNL's in order to increase ease with ADL's  Long term goal 4: The pt will demo improved B Hip strength 5/5 and core strength >/= fair in order to ambulate and stand prolonged periods with decreased pain/limitations  Long term goal 5:  The pt will demo improved periscapul strength >/=4+/5 in order to improve postural awareness and cervical alignment to decrease frequency of HA's  Long term goal 6: The pt will demo improved B cervical rotation WNL's to improve atlantoaxial mobility and decrease sub-occipital tightness to decrease HA frequency  Progress toward goals: poor towards

## 2021-05-11 NOTE — PROGRESS NOTES
[facility]  PHYSICAL THERAPY EVALUATION    Date: 2021  Patient Name: Di Christine       MRN: [id number]   Account: [de-identified]   : 1980  (36 y.o.)   Gender: female   Referring Practitioner: Rosario Vicente; Mohsen oRa                 Diagnosis: Chronic low back pain, unspecified back pain laterality, unspecified whether sciatica present, DDD (degenerative disc disease), lumbar; Neck pain  Treatment Diagnosis: LBP, decreased lumbar AROM, decreased core, R LE, and L hip ext strength, decreased postural awareness, decreased functional activity tolerance, (+) L FADIR and decreased HS flexibility  Additional Pertinent Hx: Xray= Severe intervertebral disc space narrowing is seen at L5-S1. The vertebral body heights are preserved at all levels. No acute fracture or dislocation is seen. The sacroiliac joints are maintained. Past Medical History:  has a past medical history of Acid reflux, Anxiety, Breast cyst, Depression, Generalized anxiety disorder, H/O tubal ligation, Hiatal hernia, Hypothyroidism, Hypothyroidism, IBS (irritable bowel syndrome), and Obesity. Past Surgical History:   has a past surgical history that includes Cholecystectomy (); Tubal ligation (); Breast surgery ();  section (); Upper gastrointestinal endoscopy (2013); Endometrial ablation; Stomach surgery (2018); Colonoscopy (N/A, 2020); and Hysterectomy, vaginal (N/A, 2020).     Vital Signs  Patient Currently in Pain: Denies   Pain Screening  Patient Currently in Pain: Denies  Pain Assessment  Pain Assessment: 0-10  Pain Level: (Pain ranges from 0-7/10)  Pain Type: Chronic pain  Pain Location: Head  Pain Descriptors: Aching;Discomfort  Non-Pharmaceutical Pain Intervention(s): Heat applied  Multiple Pain Sites: Yes  Pain 2  Pain Rating 2: 9  Pain Location 2: Back  Pain Orientation 2: Lower;Mid  Pain Descriptors 2: Aching;Burning     Lives With: Family  ADL Assistance: Independent  Homemaking Assistance: Independent  Homemaking Responsibilities: Yes  Ambulation Assistance: Independent  Transfer Assistance: Independent  Active : Yes  Occupation: Part time employment  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 Pt rpeorts she doesnt have neck pain per say, saw neurologist d/t HA's that have been reoccurring for >/=1 year. Pt reports MD thinks they are caused from tension in her neck and stress. Pt reports she has HA's daily that are intermittent. Pt denies having any dizziness, changes in vision, changes in speech or swallowing, unexplained weight loss, or episodes of LOC. Pt reports conts to have constant back pain that hasnt changed much. Has MRI tomorrow and saw pain management yesterday who told her they may do a nerve ablasion after PT is completed. Pt reports she has been compliant with HEP.   Comments: RTD 6/2/21; Pain management Dr. Sidney Jaimes 5/10/21; MRI 5/12/21    Objective:   Strength RUE  Comment: 4+/5 shoulder abd, ER, IR, elbow ext 4/5 shoulder abd, elbow flex; 4/5 MT, Rhomboids, Lats 4-/5 LT  Strength LUE  Comment: 4+/5 shoulder abd, ER, IR, elbow ext 4/5 shoulder abd, elbow flex; 4/5 MT, rhomboids, LT, Lats     AROM RUE (degrees)  RUE AROM : WNL  AROM LUE (degrees)  LUE AROM : WNL    Spine  Cervical: flex 60, ext 50, SB L 50 R 45, Rot L 55 R 68(denies pain or HA's with mvmt)    Observation/Palpation  Palpation: mild tightness B cervical parapsinals, sub-occiptials, mod tightness B UT, LS  Observation: forward head, rounded shoulders, protracted scaps    Additional Measures  Special Tests: (-) cervical compression, distraction (+) L cervical flexion/rotation     Exercises:   Exercises  Exercise 1: LTR 5\"x10  Exercise 3: SKTC 30\"x3- with Therapy - Soft Tissue Mobilization, Patient/Caregiver Education & Training, Home Exercise Program, Positioning, Integrated Dry Needling, Modalities     Patient Education  New Education Provided: PT Education: Goals;Plan of Care;Home Exercise Program  Patient Education: evaluative findings    POST-PAIN     Pain Rating (0-10 pain scale):   0/10 HA   Location and pain description same as pre-treatment unless indicated. Action: [] NA  [] Call Physician  [x] Perform HEP  [] Meds as prescribed    Evaluation and patient rights have been reviewed and patient agrees with plan of care. Yes  [x]  No  []   Explain:     Reina Fall Risk Assessment  Risk Factor Scale  Score   History of Falls [] Yes  [x] No 25  0    Secondary Diagnosis [] Yes  [x] No 15  0    Ambulatory Aid [] Furniture  [] Crutches/cane/walker  [x] None/bedrest/wheelchair/nurse 30  15  0    IV/Heparin Lock [] Yes  [x] No 20  0    Gait/Transferring [] Impaired  [] Weak  [x] Normal/bedrest/immobile 20  10  0    Mental Status [] Forgets limitations  [x] Oriented to own ability 15  0       Total:     Based on the Assessment score: check the appropriate box.   [x]  No intervention needed   Low =   Score of 0-24  []  Use standard prevention interventions Moderate =  Score of 24-44   [] Discuss fall prevention strategies   [] Indicate moderate falls risk on eval  []  Use high risk prevention interventions High = Score of 45 and higher   [] Discuss fall prevention strategies   [] Provide supervision during treatment time    Goals  Short term goals  Time Frame for Short term goals: 2-3 weeks  Short term goal 1: The pt will have decreased LB pain </= 4/10 at worst  in order to increase ease with ADL's  Short term goal 2: The pt will demonstrate improved postural awareness requiring <25% VC's with exercises  Short term goal 3: The pt will have decrease frequency of HA's by >/=75% in order to increase ease with ADL's  Long term goals  Time Frame for Long term goals : 4-6 weeks  Long term goal 1: The pt will have a decrease in TONI score >/=10 points in order to increase functional activity tolerance  Long term goal 2: The pt will be indep/compliant with HEP in order to self manage and maintain status upon D/C  Long term goal 3: The pt will demo improved lumbar AROM >/=WNL's in order to increase ease with ADL's  Long term goal 4: The pt will demo improved B Hip strength 5/5 and core strength >/= fair in order to ambulate and stand prolonged periods with decreased pain/limitations  Long term goal 5:  The pt will demo improved periscapul strength >/=4+/5 in order to improve postural awareness and cervical alignment to decrease frequency of HA's  Long term goal 6: The pt will demo improved B cervical rotation WNL's to improve atlantoaxial mobility and decrease sub-occipital tightness to decrease HA frequency    PT Individual Minutes  Time In: 1401  Time Out: 1446  Minutes: 45  Timed Code Treatment Minutes: 18 Minutes  Procedure Minutes: 10 MHP 17 Eval     Timed Activity Minutes Units   Ther Ex 16 1   Manual  2 0       Electronically signed by Dilip Marie PT on 5/11/21 at 2:41 PM EDT

## 2021-05-12 ENCOUNTER — HOSPITAL ENCOUNTER (OUTPATIENT)
Dept: WOMENS IMAGING | Age: 41
Discharge: HOME | End: 2021-05-14
Payer: COMMERCIAL

## 2021-05-12 ENCOUNTER — HOSPITAL ENCOUNTER (OUTPATIENT)
Dept: MRI IMAGING | Age: 41
Discharge: HOME | End: 2021-05-14
Payer: COMMERCIAL

## 2021-05-12 DIAGNOSIS — M51.36 DDD (DEGENERATIVE DISC DISEASE), LUMBAR: ICD-10-CM

## 2021-05-12 DIAGNOSIS — G89.29 CHRONIC LOW BACK PAIN, UNSPECIFIED BACK PAIN LATERALITY, UNSPECIFIED WHETHER SCIATICA PRESENT: ICD-10-CM

## 2021-05-12 DIAGNOSIS — Z12.39 SCREENING FOR BREAST CANCER: ICD-10-CM

## 2021-05-12 DIAGNOSIS — M54.50 CHRONIC LOW BACK PAIN, UNSPECIFIED BACK PAIN LATERALITY, UNSPECIFIED WHETHER SCIATICA PRESENT: ICD-10-CM

## 2021-05-12 PROCEDURE — 72148 MRI LUMBAR SPINE W/O DYE: CPT

## 2021-05-12 PROCEDURE — 77063 BREAST TOMOSYNTHESIS BI: CPT

## 2021-05-16 DIAGNOSIS — G25.81 RESTLESS LEGS SYNDROME (RLS): ICD-10-CM

## 2021-05-16 RX ORDER — ROPINIROLE 0.5 MG/1
0.5 TABLET, FILM COATED ORAL NIGHTLY PRN
Qty: 90 TABLET | Refills: 3 | Status: SHIPPED | OUTPATIENT
Start: 2021-05-16 | End: 2022-06-01

## 2021-05-16 NOTE — TELEPHONE ENCOUNTER
Requesting medication refill.  Please approve or deny this request.    Rx requested:  Requested Prescriptions     Pending Prescriptions Disp Refills    rOPINIRole (REQUIP) 0.5 MG tablet [Pharmacy Med Name: rOPINIRole HCl Oral Tablet 0.5 MG] 90 tablet 0     Sig: take 1 tablet by mouth nightly as needed (restless legs)       Last Office Visit:   4/20/2021    Last Filled:      Last Labs:      Next Visit Date:  Future Appointments   Date Time Provider Sangeeta Oleai   5/17/2021  2:15 PM Cornell Huber MD [facility]   5/18/2021  2:15 PM Nash Battiest, [address]   5/21/2021  1:30 PM [facility]   5/25/2021  1:45 PM Charity Steeletiest, [address]   5/28/2021  2:00 PM Nash Battiest, [address]   6/1/2021  2:00 PM [facility]   6/2/2021  2:30 PM Doreen Weaver MD [facility]   6/4/2021  2:00 PM Nash Battiest, [address]   6/8/2021  2:00 PM Nash Battiest, [address]   6/10/2021  8:00 AM [facility] STRESS ROOM 1 [facility] RAD   6/10/2021  8:30 AM [facility] ECHO  [facility]   6/11/2021  2:00 PM Charity Aquino, [address]   6/14/2021  2:00 PM [facility] EKG  [facility]   6/18/2021  2:15 PM Jewel Sarmiento, [id number] [facility]   7/6/2021  1:45 PM Doreen Weaver MD [facility]   7/30/2021 10:00 AM Anya Lovell  [facility]

## 2021-05-17 ENCOUNTER — OFFICE VISIT (OUTPATIENT)
Dept: ENDOCRINOLOGY | Age: 41
End: 2021-05-17
Payer: COMMERCIAL

## 2021-05-17 VITALS
OXYGEN SATURATION: 96 % | HEART RATE: 83 BPM | WEIGHT: 228 LBS | DIASTOLIC BLOOD PRESSURE: 62 MMHG | SYSTOLIC BLOOD PRESSURE: 99 MMHG | HEIGHT: 63 IN | BODY MASS INDEX: 40.4 KG/M2

## 2021-05-17 DIAGNOSIS — E03.9 HYPOTHYROIDISM, UNSPECIFIED TYPE: Primary | ICD-10-CM

## 2021-05-17 DIAGNOSIS — E66.01 MORBID OBESITY (HCC): ICD-10-CM

## 2021-05-17 PROCEDURE — G8417 CALC BMI ABV UP PARAM F/U: HCPCS | Performed by: INTERNAL MEDICINE

## 2021-05-17 PROCEDURE — G8427 DOCREV CUR MEDS BY ELIG CLIN: HCPCS | Performed by: INTERNAL MEDICINE

## 2021-05-17 PROCEDURE — 1036F TOBACCO NON-USER: CPT | Performed by: INTERNAL MEDICINE

## 2021-05-17 PROCEDURE — 99213 OFFICE O/P EST LOW 20 MIN: CPT | Performed by: INTERNAL MEDICINE

## 2021-05-17 RX ORDER — PHENTERMINE HYDROCHLORIDE 37.5 MG/1
37.5 TABLET ORAL
Qty: 30 TABLET | Refills: 0 | Status: SHIPPED | OUTPATIENT
Start: 2021-05-17 | End: 2021-06-16

## 2021-05-17 NOTE — PROGRESS NOTES
2021    Assessment:       Diagnosis Orders   1. Hypothyroidism, unspecified type     2. Morbid obesity ([facility] [address]           PLAN:     Orders Placed This Encounter   Medications    phentermine (ADIPEX-P) 37.5 MG tablet     Sig: Take 1 tablet by mouth every morning (before breakfast) for 30 days. Dispense:  30 tablet     Refill:  0     Continue Synthroid 175 mcg daily labs to be done in 3 weeks continue phentermine follow-up in 4 weeks BMI target less than 30  OARRS report reviewed    Subjective:     Chief Complaint   Patient presents with    Obesity     Vitals:    21 1428   BP: 99/62   Pulse: 83   SpO2: 96%   Weight: 228 lb (103.4 kg)   Height: 5' 3\" (1.6 m)     Wt Readings from Last 3 Encounters:   21 228 lb (103.4 kg)   05/10/21 226 lb (102.5 kg)   21 226 lb (102.5 kg)     BP Readings from Last 3 Encounters:   21 99/62   21 124/76   21 126/72     Follow-up on morbid obesity patient lost 8 pounds over the last 4 weeks and phentermine history of hypothyroidism on thyroid replacement 522 mcg daily complications of obesity include acid reflux    Other  This is a chronic (Hypothyroidism) problem. The current episode started more than 1 year ago. The problem has been waxing and waning. Associated symptoms include fatigue. Treatments tried: Levothyroxine. The treatment provided moderate relief.           Past Medical History:   Diagnosis Date    Acid reflux     Anxiety     Breast cyst     Depression     Generalized anxiety disorder     H/O tubal ligation     Hiatal hernia 13    Hypothyroidism     Hypothyroidism     IBS (irritable bowel syndrome)     Obesity     Tx'd with Adipex     Past Surgical History:   Procedure Laterality Date    BREAST REDUCTION SURGERY      BREAST SURGERY  2007    Reduction     SECTION      CHOLECYSTECTOMY      COLONOSCOPY N/A 2020    COLONOSCOPY DIAGNOSTIC performed by Daina Cavazos MD at [address] [facility] ENDOMETRIAL ABLATION      HYSTERECTOMY, VAGINAL N/A 2020    TLH performed by Elda Ibarra MD at [place] [address]  2018    Gastric Sleeve    TUBAL LIGATION  2005    UPPER GASTROINTESTINAL ENDOSCOPY  2013    Dr Cody Mcclain     Social History     Socioeconomic History    Marital status: Single     Spouse name: Not on file    Number of children: Not on file    Years of education: Not on file    Highest education level: Not on file   Occupational History    Not on file   Tobacco Use    Smoking status: Former Smoker     Packs/day: 0.50     Years: 3.00     Pack years: 1.50     Types: Cigarettes     Quit date: 2018     Years since quittin.7    Smokeless tobacco: Never Used   Vaping Use    Vaping Use: Never used   Substance and Sexual Activity    Alcohol use: Never     Comment: Rare use    Drug use: No    Sexual activity: Yes     Partners: Male   Other Topics Concern    Not on file   Social History Narrative    Not on file     Social Determinants of Health     Financial Resource Strain: Low Risk     Difficulty of Paying Living Expenses: Not hard at all   Food Insecurity: No Food Insecurity    Worried About [facility] in the Last Year: Never true    Peggy of Food in the Last Year: Never true   Transportation Needs: No Transportation Needs    Lack of Transportation (Medical): No    Lack of Transportation (Non-Medical):  No   Physical Activity:     Days of Exercise per Week:     Minutes of Exercise per Session:    Stress:     Feeling of Stress :    Social Connections:     Frequency of Communication with Friends and Family:     Frequency of Social Gatherings with Friends and Family:     Attends Temple Services:     Active Member of Clubs or Organizations:     Attends Club or Organization Meetings:     Marital Status:    Intimate Partner Violence:     Fear of Current or Ex-Partner:     Emotionally Abused:     Physically Abused:     Sexually Abused: Family History   Problem Relation Age of Onset    High Blood Pressure Mother         No FDR with colon cancer     Crohn's Disease Mother     Cancer Father         Lung cancer    Mental Illness Brother     Heart Disease Brother     Asthma Brother     Colon Cancer Neg Hx      Allergies   Allergen Reactions    Ibuprofen Other (See Comments)     Can't have with gastric sleeve    Morphine Nausea And Vomiting       Current Outpatient Medications:     rOPINIRole (REQUIP) 0.5 MG tablet, take 1 tablet by mouth nightly as needed (restless legs), Disp: 90 tablet, Rfl: 3    levothyroxine (SYNTHROID) 175 MCG tablet, Take 1 tablet by mouth daily, Disp: 30 tablet, Rfl: 0    busPIRone (BUSPAR) 10 MG tablet, Take 1 tablet by mouth every 8 hours as needed (anxiety), Disp: 270 tablet, Rfl: 3    cyclobenzaprine (FLEXERIL) 10 MG tablet, Take 1 tablet by mouth nightly, Disp: 30 tablet, Rfl: 3    diclofenac sodium (VOLTAREN) 1 % GEL, Apply topically 2 times daily, Disp: 100 g, Rfl: 3    traZODone (DESYREL) 100 MG tablet, Take 1 tablet by mouth nightly, Disp: 90 tablet, Rfl: 3    hyoscyamine (LEVBID) 375 MCG extended release tablet, TAKE ONE TABLET BY MOUTH EVERY 12 HOURS AS NEEDED FOR CRAMPING, Disp: 60 tablet, Rfl: 0    tiZANidine (ZANAFLEX) 2 MG tablet, Take 1 tablet by mouth every 8 hours as needed (headaches), Disp: 45 tablet, Rfl: 0    pantoprazole (PROTONIX) 40 MG tablet, Take 1 tablet by mouth daily, Disp: 90 tablet, Rfl: 3    acetaminophen (APAP EXTRA STRENGTH) 500 MG tablet, Take 2 tablets by mouth every 6 hours as needed for Pain, Disp: 60 tablet, Rfl: 1    Multiple Vitamin (MULTI VITAMIN DAILY PO), Take 1 tablet by mouth daily, Disp: , Rfl:     Probiotic Product (PROBIOTIC DAILY PO), Take 1 tablet by mouth daily, Disp: , Rfl:     fluticasone (FLONASE) 50 MCG/ACT nasal spray, 1 spray by Nasal route daily, Disp: 1 Bottle, Rfl: 0    Ascorbic Acid (VITAMIN C) 250 MG tablet, Take 500 mg by mouth daily , Disp: , Rfl:   Lab Results   Component Value Date     09/22/2020    K 3.6 09/22/2020     09/22/2020    CO2 26 09/22/2020    BUN 13 09/22/2020    CREATININE 0.69 09/22/2020    GLUCOSE 82 09/22/2020    CALCIUM 9.6 09/22/2020    PROT 7.4 09/22/2020    LABALBU 4.4 09/22/2020    BILITOT 0.7 09/22/2020    ALKPHOS 67 09/22/2020    AST 13 09/22/2020    ALT 12 09/22/2020    LABGLOM >60.0 09/22/2020    GFRAA >60.0 09/22/2020    GLOB 3.0 09/22/2020     Lab Results   Component Value Date    WBC 4.9 12/19/2020    HGB 13.5 12/19/2020    HCT 40.1 12/19/2020    MCV 97.8 12/19/2020     12/19/2020     Lab Results   Component Value Date    LABA1C 4.4 (L) 09/22/2020    LABA1C 4.3 (L) 01/03/2020    LABA1C 4.2 (L) 09/02/2016     Lab Results   Component Value Date    HDL 51 09/22/2020    HDL 50 01/03/2020    HDL 44 09/15/2011    LDLCALC 86 09/22/2020    LDLCALC 95 01/03/2020    LDLCALC 160 (H) 09/15/2011    CHOL 158 09/22/2020    CHOL 176 01/03/2020    CHOL 248 (H) 09/15/2011    TRIG 107 09/22/2020    TRIG 153 (H) 01/03/2020    TRIG 272 (H) 09/15/2011       Lab Results   Component Value Date    TSH 18.120 (H) 04/13/2021    TSH 0.318 (L) 11/25/2020    TSH 2.570 06/26/2020    TSHREFLEX 4.270 (H) 09/22/2020    TSHREFLEX 0.087 (L) 01/03/2020    TSHREFLEX 48.890 (H) 09/23/2019    FT3 2.3 03/14/2019    FT3 2.3 06/28/2016    T4FREE 0.97 04/13/2021    T4FREE 1.46 11/25/2020    T4FREE 1.20 09/22/2020     Lab Results   Component Value Date    TPOABS <10.0 04/24/2020       Review of Systems   Constitutional: Positive for fatigue. Objective:   Physical Exam  Vitals reviewed. Constitutional:       General: She is not in acute distress. Appearance: Normal appearance. She is obese. HENT:      Head: Normocephalic and atraumatic. Hair is normal.      Right Ear: External ear normal.      Left Ear: External ear normal.      Nose: Nose normal.   Eyes:      General: No scleral icterus. Right eye: No discharge. Left eye: No discharge. Extraocular Movements: Extraocular movements intact. Conjunctiva/sclera: Conjunctivae normal.   Neck:      Trachea: Trachea normal.   Cardiovascular:      Rate and Rhythm: Normal rate. Pulmonary:      Effort: Pulmonary effort is normal.   Musculoskeletal:         General: Normal range of motion. Cervical back: Normal range of motion and neck supple. Neurological:      General: No focal deficit present. Mental Status: She is alert and oriented to person, place, and time.    Psychiatric:         Mood and Affect: Mood normal.         Behavior: Behavior normal.

## 2021-05-18 ENCOUNTER — HOSPITAL ENCOUNTER (OUTPATIENT)
Dept: PHYSICAL THERAPY | Age: 41
Setting detail: THERAPIES SERIES
Discharge: HOME | End: 2021-05-18
Payer: COMMERCIAL

## 2021-05-18 PROCEDURE — 97110 THERAPEUTIC EXERCISES: CPT

## 2021-05-18 PROCEDURE — 97140 MANUAL THERAPY 1/> REGIONS: CPT

## 2021-05-18 ASSESSMENT — PAIN DESCRIPTION - ORIENTATION: ORIENTATION: RIGHT;LEFT;LOWER

## 2021-05-18 ASSESSMENT — PAIN DESCRIPTION - PAIN TYPE: TYPE: CHRONIC PAIN

## 2021-05-18 ASSESSMENT — PAIN DESCRIPTION - INTENSITY: RATING_2: 7

## 2021-05-18 ASSESSMENT — PAIN DESCRIPTION - LOCATION
LOCATION: BACK
LOCATION_2: HEAD

## 2021-05-18 NOTE — PROGRESS NOTES
Manual therapy  Muscle energy: L Atlanto-axial (AA) contract relax 10\"/30\"x3  Soft Tissue Mobalization: B UT, cervical paraspinals, LS, sub-occipitals    Modalities:  Modalities  Moist heat: x10 min to lumbar and cervical spine to decrease post exertion pain and mm tightness     *Indicates exercise, modality, or manual techniques to be initiated when appropriate    Assessment: Body structures, Functions, Activity limitations: Decreased functional mobility , Decreased ROM, Decreased strength, Decreased posture, Increased pain, Decreased high-level IADLs  Assessment: initiated therex for increased cervical/scapular strength and alantoaxial mobility. Also progressed core strengthening for improved stability and decrease in pain. Pt w/o significant c/o increased pain throughout session, though did note a constant LBP. Concluded with MHP to both LB and cervical spine for decreased pain and mm tightness.   Treatment Diagnosis: LBP, decreased lumbar AROM, decreased core, R LE, and L hip ext strength, decreased postural awareness, decreased functional activity tolerance, (+) L FADIR and decreased HS flexibility  Prognosis: Good     Goals:  Short term goals  Time Frame for Short term goals: 2-3 weeks  Short term goal 1: The pt will have decreased LB pain </= 4/10 at worst  in order to increase ease with ADL's  Short term goal 2: The pt will demonstrate improved postural awareness requiring <25% VC's with exercises  Short term goal 3: The pt will have decrease frequency of HA's by >/=75% in order to increase ease with ADL's  Long term goals  Time Frame for Long term goals : 4-6 weeks  Long term goal 1: The pt will have a decrease in TONI score >/=10 points in order to increase functional activity tolerance  Long term goal 2: The pt will be indep/compliant with HEP in order to self manage and maintain status upon D/C  Long term goal 3: The pt will demo improved lumbar AROM >/=WNL's in order to increase ease with ADL's  Long

## 2021-05-21 ENCOUNTER — HOSPITAL ENCOUNTER (OUTPATIENT)
Dept: PHYSICAL THERAPY | Age: 41
Setting detail: THERAPIES SERIES
Discharge: HOME | End: 2021-05-21
Payer: COMMERCIAL

## 2021-05-21 PROCEDURE — 97110 THERAPEUTIC EXERCISES: CPT

## 2021-05-21 RX ORDER — LEVOTHYROXINE SODIUM 175 UG/1
TABLET ORAL
Qty: 30 TABLET | Refills: 3 | Status: SHIPPED | OUTPATIENT
Start: 2021-05-21 | End: 2021-07-06 | Stop reason: CLARIF

## 2021-05-21 ASSESSMENT — PAIN SCALES - GENERAL: PAINLEVEL_OUTOF10: 9

## 2021-05-21 ASSESSMENT — PAIN DESCRIPTION - LOCATION: LOCATION: BACK

## 2021-05-21 ASSESSMENT — PAIN DESCRIPTION - PAIN TYPE: TYPE: CHRONIC PAIN

## 2021-05-21 NOTE — PROGRESS NOTES
[address] [facility]  Outpatient Physical Therapy    Treatment Note        Date: 2021  Patient: Allyssa Perez  : 1980  ACCT #: [de-identified]  Referring Practitioner: Susu Roa  Diagnosis: Chronic low back pain, unspecified back pain laterality, unspecified whether sciatica present, DDD (degenerative disc disease), lumbar; Neck pain    Visit Information:  PT Visit Information  Onset Date: 21  PT Insurance Information: [facility]  Total # of Visits Approved: 30  Total # of Visits to Date: 6  No Show: 0  Canceled Appointment: 0  Progress Note Counter: -    Subjective: Pt reports she has been painting kitchen the last few days therefore hasnt been as compliant with HEP. Neck and head have been okay. Comments: RTD 21  HEP Compliance:  [x] Good [] Fair [] Poor [] Reports not doing due to:    Vital Signs  Patient Currently in Pain: Yes   Pain Screening  Patient Currently in Pain: Yes  Pain Assessment  Pain Assessment: 0-10  Pain Level: 9 (8-9/10)  Pain Type: Chronic pain  Pain Location: Back  Pain Orientation: Lower  Pain Descriptors: Throbbing    OBJECTIVE:   Exercises  Exercise 4: TA enriqueta 5\"x15  Exercise 8: DLS 3-way supine H67 each  Exercise 9: bridges 5''o18-ultuncitl  Exercise 10: TA enriqueta hip add w/ ball, hip abd w/ RTB 5''x15 ea  Exercise 11: H/L alt ER x15 with TA enriqueta  Exercise 13: chin nods 5\"x15  Exercise 16: UBE L1.0 x5 mins UE and LE's  Exercise 18: tband rows/lats RTB x10 ea  Exercise 19: UT/LS stretch seated 15''x3 b/l  Exercise 20: HEP: cont current    Strength: [x] NT  [] MMT completed:    ROM: [] NT  [x] ROM measurements:  Spine  Lumbar: flex 75%, ext 50%, L SB WNL's L rotation 50%, R SB and rotation 50%    Modalities:  Modalities  Moist heat: x10 min to lumbar spine to decrease post exertion pain and mm tightness     *Indicates exercise, modality, or manual techniques to be initiated when appropriate    Assessment:    Body structures, Functions, Activity limitations: Decreased functional mobility , Decreased ROM, Decreased strength, Decreased posture, Increased pain, Decreased high-level IADLs  Assessment: Pt without significant change in lumbar AROM aside from L SB improvement and slight decrease in L thoracolumbar rotation. Pt also with contd high pain levels in lumbar spine witout much variance. Short decrease in pain with MHP though pain returns. Focused tx on core and postural stability to further improve functional atcivity tolerance. Treatment Diagnosis: LBP, decreased lumbar AROM, decreased core, R LE, and L hip ext strength, decreased postural awareness, decreased functional activity tolerance, (+) L FADIR and decreased HS flexibility  Prognosis: Good     Goals:  Short term goals  Time Frame for Short term goals: 2-3 weeks  Short term goal 1: The pt will have decreased LB pain </= 4/10 at worst  in order to increase ease with ADL's  Short term goal 2: The pt will demonstrate improved postural awareness requiring <25% VC's with exercises  Short term goal 3: The pt will have decrease frequency of HA's by >/=75% in order to increase ease with ADL's    Long term goals  Time Frame for Long term goals : 4-6 weeks  Long term goal 1: The pt will have a decrease in TONI score >/=10 points in order to increase functional activity tolerance  Long term goal 2: The pt will be indep/compliant with HEP in order to self manage and maintain status upon D/C  Long term goal 3: The pt will demo improved lumbar AROM >/=WNL's in order to increase ease with ADL's  Long term goal 4: The pt will demo improved B Hip strength 5/5 and core strength >/= fair in order to ambulate and stand prolonged periods with decreased pain/limitations  Long term goal 5:  The pt will demo improved periscapul strength >/=4+/5 in order to improve postural awareness and cervical alignment to decrease frequency of HA's  Long term goal 6: The pt will demo improved B cervical rotation

## 2021-05-25 ENCOUNTER — HOSPITAL ENCOUNTER (OUTPATIENT)
Dept: PHYSICAL THERAPY | Age: 41
Setting detail: THERAPIES SERIES
Discharge: HOME | End: 2021-05-25
Payer: COMMERCIAL

## 2021-05-25 PROCEDURE — 97110 THERAPEUTIC EXERCISES: CPT

## 2021-05-25 ASSESSMENT — PAIN DESCRIPTION - LOCATION: LOCATION: BACK

## 2021-05-25 NOTE — PROGRESS NOTES
[address]  PHYSICAL THERAPY PLAN OF CARE   [address], [address] [facility]         Ph: [phone]  Fax: [phone]    [] Certification  [] Recertification []  Plan of Care  [x] Progress Note [] Discharge      To:  Kanu Sellers;  Kate Mines      From:  Eladio Sahu PT, DPT  Patient: Connor Harrington     : 1980  Diagnosis: Chronic low back pain, unspecified back pain laterality, unspecified whether sciatica present, DDD (degenerative disc disease), lumbar; Neck pain     Date: 2021  Treatment Diagnosis: LBP, decreased lumbar AROM, decreased core, R LE, and L hip ext strength, decreased postural awareness, decreased functional activity tolerance, (+) L FADIR and decreased HS flexibility     Progress Report Period from:  21  to 2021    Total # of Visits to Date: 7   No Show: 0    Canceled Appointment: 0     OBJECTIVE:   Short Term Goals - Time Frame for Short term goals: 2-3 weeks    Goals Current/Discharge status  Met   Short term goal 1: The pt will have decreased LB pain </= 4/10 at worst  in order to increase ease with ADL's  Pt reports LBP 10/10 at worse [] yes  [x] no   Short term goal 2: The pt will demonstrate improved postural awareness requiring <25% VC's with exercises  Pt demo's improved postural awareness requiring <25% VC'ing [x] yes  [] no   Short term goal 3: The pt will have decrease frequency of HA's by >/=75% in order to increase ease with ADL's  Pt reports decrease in HA's >75% since beginning PT [x] yes  [] no   Long Term Goals - Time Frame for Long term goals : 4-6 weeks  Goals Current/ Discharge status Met   Long term goal 1: The pt will have a decrease in TONI score >/=10 points in order to increase functional activity tolerance TONI:  [] yes  [x] no   Long term goal 2: The pt will be indep/compliant with HEP in order to self manage and maintain status upon D/C Pt indep w/ HEP w/ fair compliance [x] yes  [] no   Long term goal 3: The pt will demo improved lumbar AROM >/=WNL's in order to increase ease with ADL's Lumbar: flex 75%, ext 50%, R SB 50% w/ increased pain, L SB 75%, R rotation 75% with increased pain, L rotation 75%    [] yes  [x] no   Long term goal 4: The pt will demo improved B Hip strength 5/5 and core strength >/= fair in order to ambulate and stand prolonged periods with decreased pain/limitations Strength RLE  Comment: 5/5 except Hip IR/ER, hip abd 4+/5 Hip ext 4/5 - pain with all functional testing  Strength LLE  Comment: 5/5 except Hip IR 4+/5 hip ext 4/5 - pain with all functional testing  Strength Other  Other: fair core strength with isometric abdominal testing   Pt reports cont'd difficulty with standing prolonged periods [] yes  [x] no   Long term goal 5: The pt will demo improved periscapul strength >/=4+/5 in order to improve postural awareness and cervical alignment to decrease frequency of HA's Strength RUE  Comment: 4/5 MT, Rhomboids, Lats 4-/5 LT  Strength LUE  Comment: 4/5 MT, rhomboids, LT, Lats   Pt demo's improved postural awareness and cervical alignment w/ decreased frequency of HA's. [x] yes  [x] no    Long term goal 6: The pt will demo improved B cervical rotation WNL's to improve atlantoaxial mobility and decrease sub-occipital tightness to decrease HA frequency Spine  Cervical: roation L 70 deg, R 65 deg with tightness reported R>L  Pt demo's decreased suboccipital tightness with decreased HA frequency [x] yes  [] no        Body structures, Functions, Activity limitations: Decreased functional mobility , Decreased ROM, Decreased strength, Decreased posture, Increased pain, Decreased high-level IADLs  Assessment: Pt without significant change in lumbar AROM aside from L SB improvement and slight decrease in L thoracolumbar rotation. Pt also with contd high pain levels in lumbar spine witout much variance. Short decrease in pain with MHP though pain returns.  Pt demo's increase in L cervical rotation, improved postural awareness, and decreased frequency of HA's since beginning PT. Pt indep w/ current HEP for core strengthening and discussed continuing at home until follow up with MD d/t plateau in pain and functional abilities. Prognosis: Good  Discharge Recommendations: Continue to assess pending progress    PLAN:   Frequency/Duration:  Plan  Plan Comment: Hold until follow up with MD on 6/2/21     Precautions: mod falls risk                            Patient Status:Hold as above      Signature: Electronically signed by Eladio Sahu PT on 5/25/2021 at 2:27 PM  Objective info by: Electronically signed by Remedios Howe PTA on 5/25/21 at 1:13 PM EDT      If you have any questions or concerns, please don't hesitate to call. Thank you for your referral.    I have reviewed this plan of care and certify a need for medically necessary rehabilitation services.     Physician Signature:__________________________________________________________  Date:  Please sign and return

## 2021-05-25 NOTE — PROGRESS NOTES
will be indep/compliant with HEP in order to self manage and maintain status upon D/C  Long term goal 3: The pt will demo improved lumbar AROM >/=WNL's in order to increase ease with ADL's  Long term goal 4: The pt will demo improved B Hip strength 5/5 and core strength >/= fair in order to ambulate and stand prolonged periods with decreased pain/limitations  Long term goal 5: The pt will demo improved periscapul strength >/=4+/5 in order to improve postural awareness and cervical alignment to decrease frequency of HA's  Long term goal 6: The pt will demo improved B cervical rotation WNL's to improve atlantoaxial mobility and decrease sub-occipital tightness to decrease HA frequency  Progress toward goals: see PN    POST-PAIN       Pain Rating (0-10 pain scale):   8/10   Location and pain description same as pre-treatment unless indicated. Action: [] NA   [x] Perform HEP  [] Meds as prescribed  [] Modalities as prescribed   [] Call Physician     Frequency/Duration:  Plan  Plan Comment: Hold until follow up with MD on 6/2/21     Pt to continue current HEP. See objective section for any therapeutic exercise changes, additions or modifications this date.     PT Individual Minutes  Time In: 2485  Time Out: 8066  Minutes: 53  Timed Code Treatment Minutes: 43 Minutes  Procedure Minutes: 10     Timed Activity Minutes Units   Ther Ex 43 3     Signature:  Electronically signed by Tramaine Cunha PTA on 5/25/21 at 1:13 PM EDT

## 2021-05-28 ENCOUNTER — APPOINTMENT (OUTPATIENT)
Dept: PHYSICAL THERAPY | Age: 41
End: 2021-05-28
Payer: COMMERCIAL

## 2021-06-02 ENCOUNTER — OFFICE VISIT (OUTPATIENT)
Dept: FAMILY MEDICINE CLINIC | Age: 41
End: 2021-06-02
Payer: COMMERCIAL

## 2021-06-02 ENCOUNTER — TELEPHONE (OUTPATIENT)
Dept: FAMILY MEDICINE CLINIC | Age: 41
End: 2021-06-02

## 2021-06-02 VITALS
WEIGHT: 230 LBS | SYSTOLIC BLOOD PRESSURE: 118 MMHG | TEMPERATURE: 97.6 F | HEART RATE: 96 BPM | BODY MASS INDEX: 40.75 KG/M2 | HEIGHT: 63 IN | RESPIRATION RATE: 11 BRPM | DIASTOLIC BLOOD PRESSURE: 70 MMHG | OXYGEN SATURATION: 99 %

## 2021-06-02 DIAGNOSIS — R93.7 ABNORMAL MAGNETIC RESONANCE IMAGING OF LUMBAR SPINE: Primary | ICD-10-CM

## 2021-06-02 DIAGNOSIS — Z12.31 ENCOUNTER FOR SCREENING MAMMOGRAM FOR MALIGNANT NEOPLASM OF BREAST: ICD-10-CM

## 2021-06-02 PROCEDURE — G8427 DOCREV CUR MEDS BY ELIG CLIN: HCPCS | Performed by: INTERNAL MEDICINE

## 2021-06-02 PROCEDURE — 1036F TOBACCO NON-USER: CPT | Performed by: INTERNAL MEDICINE

## 2021-06-02 PROCEDURE — 99214 OFFICE O/P EST MOD 30 MIN: CPT | Performed by: INTERNAL MEDICINE

## 2021-06-02 PROCEDURE — G8417 CALC BMI ABV UP PARAM F/U: HCPCS | Performed by: INTERNAL MEDICINE

## 2021-06-02 ASSESSMENT — ENCOUNTER SYMPTOMS
EYE PAIN: 0
SHORTNESS OF BREATH: 0
ABDOMINAL PAIN: 0
BACK PAIN: 0

## 2021-06-02 ASSESSMENT — PATIENT HEALTH QUESTIONNAIRE - PHQ9
2. FEELING DOWN, DEPRESSED OR HOPELESS: 1
1. LITTLE INTEREST OR PLEASURE IN DOING THINGS: 0
SUM OF ALL RESPONSES TO PHQ QUESTIONS 1-9: 1
SUM OF ALL RESPONSES TO PHQ9 QUESTIONS 1 & 2: 1

## 2021-06-02 NOTE — PROGRESS NOTES
Subjective:      Patient ID: Carol Alcantara is a 36 y.o. female who presents today with:  Chief Complaint   Patient presents with    Follow-up    Results       HPI    Past Medical History:   Diagnosis Date    Acid reflux     Anxiety     Breast cyst     Depression     Generalized anxiety disorder     H/O tubal ligation     Hiatal hernia 13    Hypothyroidism     Hypothyroidism     IBS (irritable bowel syndrome)     Obesity     Tx'd with Adipex     Past Surgical History:   Procedure Laterality Date    BREAST REDUCTION SURGERY      BREAST SURGERY  2007    Reduction     SECTION  2004    CHOLECYSTECTOMY  1996    COLONOSCOPY N/A 2020    COLONOSCOPY DIAGNOSTIC performed by Walt Blas MD at [address] [place] [address], [facility] N/A 2020    TLH performed by Joaquín Saucedo MD at [place] [address]  2018    Gastric Sleeve    TUBAL LIGATION  2005    UPPER GASTROINTESTINAL ENDOSCOPY  2013    Dr Dayami Bajwa     Social History     Socioeconomic History    Marital status: Single     Spouse name: Not on file    Number of children: Not on file    Years of education: Not on file    Highest education level: Not on file   Occupational History    Not on file   Tobacco Use    Smoking status: Former Smoker     Packs/day: 0.50     Years: 3.00     Pack years: 1.50     Types: Cigarettes     Quit date: 2018     Years since quittin.8    Smokeless tobacco: Never Used   Vaping Use    Vaping Use: Never used   Substance and Sexual Activity    Alcohol use: Never     Comment: Rare use    Drug use: No    Sexual activity: Yes     Partners: Male   Other Topics Concern    Not on file   Social History Narrative    Not on file     Social Determinants of Health     Financial Resource Strain: Low Risk     Difficulty of Paying Living Expenses: Not hard at all   Food Insecurity: No Food Insecurity    Worried About [facility] in the Last Year: Never true   [address] in the Last Year: Never true   Transportation Needs: No Transportation Needs    Lack of Transportation (Medical): No    Lack of Transportation (Non-Medical): No   Physical Activity:     Days of Exercise per Week:     Minutes of Exercise per Session:    Stress:     Feeling of Stress :    Social Connections:     Frequency of Communication with Friends and Family:     Frequency of Social Gatherings with Friends and Family:     Attends Jew Services:     Active Member of Clubs or Organizations:     Attends Club or Organization Meetings:     Marital Status:    Intimate Partner Violence:     Fear of Current or Ex-Partner:     Emotionally Abused:     Physically Abused:     Sexually Abused: Allergies   Allergen Reactions    Ibuprofen Other (See Comments)     Can't have with gastric sleeve    Morphine Nausea And Vomiting     Current Outpatient Medications on File Prior to Visit   Medication Sig Dispense Refill    levothyroxine (SYNTHROID) 175 MCG tablet TAKE ONE TABLET BY MOUTH DAILY 30 tablet 3    phentermine (ADIPEX-P) 37.5 MG tablet Take 1 tablet by mouth every morning (before breakfast) for 30 days.  30 tablet 0    rOPINIRole (REQUIP) 0.5 MG tablet take 1 tablet by mouth nightly as needed (restless legs) 90 tablet 3    busPIRone (BUSPAR) 10 MG tablet Take 1 tablet by mouth every 8 hours as needed (anxiety) 270 tablet 3    diclofenac sodium (VOLTAREN) 1 % GEL Apply topically 2 times daily 100 g 3    traZODone (DESYREL) 100 MG tablet Take 1 tablet by mouth nightly 90 tablet 3    hyoscyamine (LEVBID) 375 MCG extended release tablet TAKE ONE TABLET BY MOUTH EVERY 12 HOURS AS NEEDED FOR CRAMPING 60 tablet 0    tiZANidine (ZANAFLEX) 2 MG tablet Take 1 tablet by mouth every 8 hours as needed (headaches) 45 tablet 0    pantoprazole (PROTONIX) 40 MG tablet Take 1 tablet by mouth daily 90 tablet 3    acetaminophen (APAP EXTRA STRENGTH) 500 MG tablet Take 2 tablets by mouth every 6 hours as needed for Pain 60 tablet 1    Multiple Vitamin (MULTI VITAMIN DAILY PO) Take 1 tablet by mouth daily      Probiotic Product (PROBIOTIC DAILY PO) Take 1 tablet by mouth daily      fluticasone (FLONASE) 50 MCG/ACT nasal spray 1 spray by Nasal route daily 1 Bottle 0    Ascorbic Acid (VITAMIN C) 250 MG tablet Take 500 mg by mouth daily        No current facility-administered medications on file prior to visit. I have personally reviewed the ROS, PMH, PFH, and social history     Review of Systems   Constitutional: Negative for chills and fever. HENT: Negative for congestion. Eyes: Negative for pain. Respiratory: Negative for shortness of breath. Cardiovascular: Negative for chest pain. Gastrointestinal: Negative for abdominal pain. Genitourinary: Negative for hematuria. Musculoskeletal: Negative for back pain. Allergic/Immunologic: Negative for immunocompromised state. Neurological: Negative for headaches. Psychiatric/Behavioral: Negative for hallucinations. Objective:   /70   Pulse 96   Temp 97.6 °F (36.4 °C)   Resp 11   Ht 5' 3\" (1.6 m)   Wt 230 lb (104.3 kg)   LMP 10/18/2020   SpO2 99%   BMI 40.74 kg/m²     Physical Exam  Constitutional:       General: She is not in acute distress. Appearance: Normal appearance. She is not ill-appearing, toxic-appearing or diaphoretic. HENT:      Head: Normocephalic. Neck:      Vascular: No carotid bruit. Cardiovascular:      Rate and Rhythm: Normal rate and regular rhythm. Pulses: Normal pulses. Heart sounds: Normal heart sounds. No murmur heard. No friction rub. No gallop. Pulmonary:      Effort: Pulmonary effort is normal. No respiratory distress. Breath sounds: Normal breath sounds. No wheezing, rhonchi or rales. Abdominal:      General: Abdomen is flat. There is no distension. Palpations: Abdomen is soft. Tenderness:  There is no abdominal tenderness. There is no right CVA tenderness, left CVA tenderness, guarding or rebound. Musculoskeletal:      Cervical back: Neck supple. Right lower leg: No edema. Left lower leg: No edema. Skin:     General: Skin is warm. Findings: No erythema or rash. Neurological:      Mental Status: She is alert. Psychiatric:         Mood and Affect: Mood normal.           Assessment:       Diagnosis Orders   1. Abnormal magnetic resonance imaging of lumbar spine           Plan:     vc  Following up with heme   Referred to cards (palpitstions)   Holter (not done yet)   Ascvd under 1 percent. (from before)   Labs due 07/2021    Suspect hepatic enlargement and borderline splenic size, . Probable functional right ovarian cyst, Get US                      No orders of the defined types were placed in this encounter. No orders of the defined types were placed in this encounter. L5-S1 severe right facet arthrosis. See pain and surgy  L4-5 left subarticular and foraminal focal disc bulge/broad-based protrusion. L4-5 mild canal stenosis predominantly due to ligamentous hypertrophy, and facet arthrosis, accentuated on the left by the disc bulge/protrusion. T11-12 moderate degenerative disc changes without canal or foraminal narrowing.  gerd through GI.   otc for pain.  (From before)   No urinary incontinence, no urinary retention, no fevers, no chills, no numbness in or around groin, no weakness. If anything should change or worsen call ASAP, don't wait for next scheduled appointment. Return in about 3 months (around 9/2/2021) for Chronic condition management/appointment, worsening symptoms, call ASAP for appointment.       Alfonso Hartmann MD

## 2021-06-02 NOTE — PATIENT INSTRUCTIONS
menstrual periods, the procedure is more comfortable when done within 2 weeks after your period has ended. Having your breasts flattened is usually uncomfortable, but it helps the technician get the best images. How long does the test take? · The test will take about 10 to 15 minutes. You may be in the clinic for up to an hour. · You may be asked to wait a few minutes while the images are checked to make sure they don't need to be redone. What happens after the test?  · You will probably be able to go home right away. · You can go back to your usual activities right away. Follow-up care is a key part of your treatment and safety. Be sure to make and go to all appointments, and call your doctor if you are having problems. It's also a good idea to keep a list of the medicines you take. Ask your doctor when you can expect to have your test results. Where can you learn more? Go to https://[email or web address]Blade. org and sign in to your [employer] account. Enter H729 in the Startpack box to learn more about \"Mammogram: About This Test.\"     If you do not have an account, please click on the \"Sign Up Now\" link. Current as of: December 17, 2020               Content Version: 12.8  © 2006-2021 Healthwise, Incorporated. Care instructions adapted under license by [facility] ([facility]). If you have questions about a medical condition or this instruction, always ask your healthcare professional. [place] [address] any warranty or liability for your use of this information.

## 2021-06-03 NOTE — TELEPHONE ENCOUNTER
Im not sure who she is referring to. Manuela Griffin is Cardiology.  Also if patient is already established with Pain Management, she will just need to call and make an appointment with any of the other PM specialists

## 2021-06-04 DIAGNOSIS — G89.29 CHRONIC LOW BACK PAIN, UNSPECIFIED BACK PAIN LATERALITY, UNSPECIFIED WHETHER SCIATICA PRESENT: Primary | ICD-10-CM

## 2021-06-04 DIAGNOSIS — M51.36 DDD (DEGENERATIVE DISC DISEASE), LUMBAR: ICD-10-CM

## 2021-06-04 DIAGNOSIS — M54.50 CHRONIC LOW BACK PAIN, UNSPECIFIED BACK PAIN LATERALITY, UNSPECIFIED WHETHER SCIATICA PRESENT: Primary | ICD-10-CM

## 2021-06-09 DIAGNOSIS — E03.9 HYPOTHYROIDISM, UNSPECIFIED TYPE: ICD-10-CM

## 2021-06-09 LAB
T4 FREE: 1.46 NG/DL (ref 0.84–1.68)
TSH SERPL DL<=0.05 MIU/L-ACNC: 0.14 UIU/ML (ref 0.44–3.86)

## 2021-06-10 ENCOUNTER — APPOINTMENT (OUTPATIENT)
Dept: NON INVASIVE DIAGNOSTICS | Age: 41
End: 2021-06-10
Payer: COMMERCIAL

## 2021-06-10 ENCOUNTER — HOSPITAL ENCOUNTER (OUTPATIENT)
Dept: NON INVASIVE DIAGNOSTICS | Age: 41
Discharge: HOME | End: 2021-06-10
Payer: COMMERCIAL

## 2021-06-10 DIAGNOSIS — R06.09 DOE (DYSPNEA ON EXERTION): ICD-10-CM

## 2021-06-10 DIAGNOSIS — R00.2 PALPITATIONS: ICD-10-CM

## 2021-06-10 DIAGNOSIS — R07.9 CHEST PAIN, UNSPECIFIED TYPE: ICD-10-CM

## 2021-06-10 LAB
LV EF: 55 %
LVEF MODALITY: NORMAL

## 2021-06-10 PROCEDURE — 93306 TTE W/DOPPLER COMPLETE: CPT

## 2021-06-10 PROCEDURE — 93017 CV STRESS TEST TRACING ONLY: CPT

## 2021-06-10 NOTE — PROCEDURES
[address]                      [address], [address] [facility]                              CARDIAC STRESS TEST    PATIENT NAME: Catha Boxer                 :        1980  MED REC NO:   [id number]                            ROOM:  ACCOUNT NO:   [de-identified]                           ADMIT DATE: 06/10/2021  PROVIDER:     Asif Herman MD    CARDIOVASCULAR DIAGNOSTIC DEPARTMENT    DATE OF STUDY:  06/10/2021    GRADED EXERCISE STRESS TEST    INDICATIONS OF THE PROCEDURE:  Palpitations and chest pain. Underlying electrocardiogram is sinus rhythm. Baseline heart rate is 91  beats per minute. The patient was stressed according to standard [facility]  protocol. Total exercise time is 8 minutes obtaining a peak heart rate  of 166 beats per minute, peak blood pressure 152/68. This represents  127% of age maximum predicted heart rate giving her a functional  capacity of 97.3 metabolic equivalents. The patient had no angina complaints. During initial recovery, the  patient had approximately 1 minute of PSVT at a rate of 230 and she was  symptomatic of this. She converted back to sinus mechanism. IMPRESSION:  1. No ischemic ST-segment changes noted in terms of ST-segment  depressions. 2.  Average functional capacity for age. 3.  Exercise-induced rapid PSVT of 230 beats per minute.         Val Ann MD    D: 06/10/2021 #8:47:28       T: 06/10/2021 8:55:18     [email or web address]  Job#: [id number]     Doc#: [id number]    CC:

## 2021-06-10 NOTE — PROGRESS NOTES
Hx,allergies and medications reviewed. Procedure explained and informed consent obtained. Patient held her home medications prior to testing. Tolerated treadmill well. Reached 127 % target HR. SOB noted. As we stopped the treadmill and transferred her over to the bed she complained of fluttering and patient was noted to be is SVT with a rate of 230 bpm. Patient denied cp and this lasted about 1 minute before having a couplet and converted to sinus. Then further returned to baseline in recovery. Denies chest pain or pressure. EKG shows SVT and PVC. Dr. Latrell Magallanes notified and recommended to be seen asap so apt made and given to patient. Patient instructed to no exert herself before being see by cardiology.

## 2021-06-11 ENCOUNTER — OFFICE VISIT (OUTPATIENT)
Dept: CARDIOLOGY CLINIC | Age: 41
End: 2021-06-11
Payer: COMMERCIAL

## 2021-06-11 VITALS
RESPIRATION RATE: 16 BRPM | HEIGHT: 64 IN | OXYGEN SATURATION: 99 % | BODY MASS INDEX: 39.78 KG/M2 | DIASTOLIC BLOOD PRESSURE: 68 MMHG | SYSTOLIC BLOOD PRESSURE: 105 MMHG | WEIGHT: 233 LBS | HEART RATE: 82 BPM

## 2021-06-11 DIAGNOSIS — R07.9 CHEST PAIN, UNSPECIFIED TYPE: ICD-10-CM

## 2021-06-11 DIAGNOSIS — R00.2 PALPITATIONS: Primary | ICD-10-CM

## 2021-06-11 DIAGNOSIS — I47.1 PSVT (PAROXYSMAL SUPRAVENTRICULAR TACHYCARDIA) (HCC): ICD-10-CM

## 2021-06-11 DIAGNOSIS — R06.09 DOE (DYSPNEA ON EXERTION): ICD-10-CM

## 2021-06-11 PROCEDURE — G8417 CALC BMI ABV UP PARAM F/U: HCPCS | Performed by: INTERNAL MEDICINE

## 2021-06-11 PROCEDURE — 1036F TOBACCO NON-USER: CPT | Performed by: INTERNAL MEDICINE

## 2021-06-11 PROCEDURE — G8427 DOCREV CUR MEDS BY ELIG CLIN: HCPCS | Performed by: INTERNAL MEDICINE

## 2021-06-11 PROCEDURE — 99214 OFFICE O/P EST MOD 30 MIN: CPT | Performed by: INTERNAL MEDICINE

## 2021-06-11 RX ORDER — METOPROLOL SUCCINATE 25 MG/1
25 TABLET, EXTENDED RELEASE ORAL NIGHTLY
Qty: 90 TABLET | Refills: 3 | Status: SHIPPED | OUTPATIENT
Start: 2021-06-11 | End: 2022-05-24 | Stop reason: SDUPTHER

## 2021-06-11 ASSESSMENT — ENCOUNTER SYMPTOMS
SHORTNESS OF BREATH: 0
BLOOD IN STOOL: 0
NAUSEA: 0
WHEEZING: 0
RESPIRATORY NEGATIVE: 1
CHEST TIGHTNESS: 0
COUGH: 0
STRIDOR: 0
EYES NEGATIVE: 1
GASTROINTESTINAL NEGATIVE: 1

## 2021-06-11 NOTE — PROGRESS NOTES
Marital status: Single     Spouse name: None    Number of children: None    Years of education: None    Highest education level: None   Occupational History    None   Tobacco Use    Smoking status: Former Smoker     Packs/day: 0.50     Years: 3.00     Pack years: 1.50     Types: Cigarettes     Quit date: 2018     Years since quittin.8    Smokeless tobacco: Never Used   Vaping Use    Vaping Use: Never used   Substance and Sexual Activity    Alcohol use: Never     Comment: Rare use    Drug use: No    Sexual activity: Yes     Partners: Male   Other Topics Concern    None   Social History Narrative    None     Social Determinants of Health     Financial Resource Strain: Low Risk     Difficulty of Paying Living Expenses: Not hard at all   Food Insecurity: No Food Insecurity    Worried About Running Out of Food in the Last Year: Never true    Peggy of Food in the Last Year: Never true   Transportation Needs: No Transportation Needs    Lack of Transportation (Medical): No    Lack of Transportation (Non-Medical): No   Physical Activity:     Days of Exercise per Week:     Minutes of Exercise per Session:    Stress:     Feeling of Stress :    Social Connections:     Frequency of Communication with Friends and Family:     Frequency of Social Gatherings with Friends and Family:     Attends Confucianism Services:     Active Member of Clubs or Organizations:     Attends Club or Organization Meetings:     Marital Status:    Intimate Partner Violence:     Fear of Current or Ex-Partner:     Emotionally Abused:     Physically Abused:     Sexually Abused:         Allergies   Allergen Reactions    Ibuprofen Other (See Comments)     Can't have with gastric sleeve    Morphine Nausea And Vomiting       Current Outpatient Medications   Medication Sig Dispense Refill    metoprolol succinate (TOPROL XL) 25 MG extended release tablet Take 1 tablet by mouth nightly 90 tablet 3    levothyroxine (SYNTHROID) 175 MCG tablet TAKE ONE TABLET BY MOUTH DAILY 30 tablet 3    phentermine (ADIPEX-P) 37.5 MG tablet Take 1 tablet by mouth every morning (before breakfast) for 30 days. 30 tablet 0    rOPINIRole (REQUIP) 0.5 MG tablet take 1 tablet by mouth nightly as needed (restless legs) 90 tablet 3    busPIRone (BUSPAR) 10 MG tablet Take 1 tablet by mouth every 8 hours as needed (anxiety) 270 tablet 3    diclofenac sodium (VOLTAREN) 1 % GEL Apply topically 2 times daily 100 g 3    traZODone (DESYREL) 100 MG tablet Take 1 tablet by mouth nightly 90 tablet 3    hyoscyamine (LEVBID) 375 MCG extended release tablet TAKE ONE TABLET BY MOUTH EVERY 12 HOURS AS NEEDED FOR CRAMPING 60 tablet 0    tiZANidine (ZANAFLEX) 2 MG tablet Take 1 tablet by mouth every 8 hours as needed (headaches) 45 tablet 0    pantoprazole (PROTONIX) 40 MG tablet Take 1 tablet by mouth daily 90 tablet 3    acetaminophen (APAP EXTRA STRENGTH) 500 MG tablet Take 2 tablets by mouth every 6 hours as needed for Pain 60 tablet 1    Multiple Vitamin (MULTI VITAMIN DAILY PO) Take 1 tablet by mouth daily      Probiotic Product (PROBIOTIC DAILY PO) Take 1 tablet by mouth daily      fluticasone (FLONASE) 50 MCG/ACT nasal spray 1 spray by Nasal route daily 1 Bottle 0    Ascorbic Acid (VITAMIN C) 250 MG tablet Take 500 mg by mouth daily        No current facility-administered medications for this visit. Review of Systems:   Review of Systems   Constitutional: Negative. Negative for diaphoresis and fatigue. HENT: Negative. Eyes: Negative. Respiratory: Negative. Negative for cough, chest tightness, shortness of breath, wheezing and stridor. Cardiovascular: Negative. Negative for chest pain, palpitations and leg swelling. Gastrointestinal: Negative. Negative for blood in stool and nausea. Genitourinary: Negative. Musculoskeletal: Negative. Skin: Negative. Neurological: Negative.   Negative for dizziness, syncope, weakness and light-headedness. Hematological: Negative. Psychiatric/Behavioral: Negative. Physical Examination:    /68 (Site: Left Upper Arm, Position: Sitting, Cuff Size: Large Adult)   Pulse 82   Resp 16   Ht 5' 4\" (1.626 m)   Wt 233 lb (105.7 kg)   LMP 10/18/2020   SpO2 99%   BMI 39.99 kg/m²    Physical Exam   Constitutional: She appears healthy. No distress. HENT:   Normal cephalic and Atraumatic   Eyes: Pupils are equal, round, and reactive to light. Neck: Thyroid normal. No JVD present. No neck adenopathy. No thyromegaly present. Cardiovascular: Normal rate, regular rhythm, normal heart sounds, intact distal pulses and normal pulses. Pulmonary/Chest: Effort normal and breath sounds normal. She has no wheezes. She has no rales. She exhibits no tenderness. Abdominal: Soft. Bowel sounds are normal. There is no abdominal tenderness. Musculoskeletal:         General: No tenderness or edema. Normal range of motion. Cervical back: Normal range of motion and neck supple. Neurological: She is alert and oriented to person, place, and time. Skin: Skin is warm. No cyanosis. Nails show no clubbing.        LABS:  CBC:   Lab Results   Component Value Date    WBC 4.9 12/19/2020    RBC 4.10 12/19/2020    RBC 4.13 10/01/2018    HGB 13.5 12/19/2020    HCT 40.1 12/19/2020    MCV 97.8 12/19/2020    MCH 33.0 12/19/2020    MCHC 33.7 12/19/2020    RDW 12.5 12/19/2020     12/19/2020    MPV 10.0 10/01/2018     Lipids:  Lab Results   Component Value Date    CHOL 158 09/22/2020    CHOL 176 01/03/2020    CHOL 248 (H) 09/15/2011     Lab Results   Component Value Date    TRIG 107 09/22/2020    TRIG 153 (H) 01/03/2020    TRIG 272 (H) 09/15/2011     Lab Results   Component Value Date    HDL 51 09/22/2020    HDL 50 01/03/2020    HDL 44 09/15/2011     Lab Results   Component Value Date    LDLCALC 86 09/22/2020    LDLCALC 95 01/03/2020    LDLCALC 160 (H) 09/15/2011     No results found for: LABVLDL, VLDL  Lab Results   Component Value Date    CHOLHDLRATIO 5.6 09/15/2011     CMP:    Lab Results   Component Value Date     09/22/2020    K 3.6 09/22/2020    K 4.5 12/04/2019     09/22/2020    CO2 26 09/22/2020    BUN 13 09/22/2020    CREATININE 0.69 09/22/2020    GFRAA >60.0 09/22/2020    LABGLOM >60.0 09/22/2020    GLUCOSE 82 09/22/2020    GLUCOSE 97 10/01/2018    PROT 7.4 09/22/2020    LABALBU 4.4 09/22/2020    LABALBU 4.8 09/15/2011    CALCIUM 9.6 09/22/2020    BILITOT 0.7 09/22/2020    ALKPHOS 67 09/22/2020    AST 13 09/22/2020    ALT 12 09/22/2020     BMP:    Lab Results   Component Value Date     09/22/2020    K 3.6 09/22/2020    K 4.5 12/04/2019     09/22/2020    CO2 26 09/22/2020    BUN 13 09/22/2020    LABALBU 4.4 09/22/2020    LABALBU 4.8 09/15/2011    CREATININE 0.69 09/22/2020    CALCIUM 9.6 09/22/2020    GFRAA >60.0 09/22/2020    LABGLOM >60.0 09/22/2020    GLUCOSE 82 09/22/2020    GLUCOSE 97 10/01/2018     Magnesium:    Lab Results   Component Value Date    MG 2.2 09/22/2020     TSH:  Lab Results   Component Value Date    TSH 0.144 (L) 06/09/2021             Patient Active Problem List   Diagnosis    Primary hypothyroidism    GERD (gastroesophageal reflux disease)    Generalized anxiety disorder    Obesity    Bone disease    Vitamin D deficiency    Breast mass, right    Post endometrial ablation syndrome    Neck pain    Muscle tension headache    Lumbar radiculopathy       There are no discontinued medications. Modified Medications    No medications on file       Orders Placed This Encounter   Medications    metoprolol succinate (TOPROL XL) 25 MG extended release tablet     Sig: Take 1 tablet by mouth nightly     Dispense:  90 tablet     Refill:  3       Assessment/Plan:    1. Palpitations/ PSVT - start TOprol XL 25 QHS. HM and Echo     2. Chest pain, unspecified type   GXT - negative    3. NASH (dyspnea on exertion)       4.  Hypothyroidism, unspecified type

## 2021-06-14 ENCOUNTER — HOSPITAL ENCOUNTER (OUTPATIENT)
Dept: NON INVASIVE DIAGNOSTICS | Age: 41
Discharge: HOME | End: 2021-06-14
Payer: COMMERCIAL

## 2021-06-14 ENCOUNTER — OFFICE VISIT (OUTPATIENT)
Dept: ENDOCRINOLOGY | Age: 41
End: 2021-06-14
Payer: COMMERCIAL

## 2021-06-14 VITALS
OXYGEN SATURATION: 96 % | BODY MASS INDEX: 39.44 KG/M2 | HEART RATE: 81 BPM | HEIGHT: 64 IN | WEIGHT: 231 LBS | SYSTOLIC BLOOD PRESSURE: 111 MMHG | DIASTOLIC BLOOD PRESSURE: 77 MMHG

## 2021-06-14 DIAGNOSIS — E66.01 MORBID OBESITY (HCC): ICD-10-CM

## 2021-06-14 DIAGNOSIS — E03.9 HYPOTHYROIDISM, UNSPECIFIED TYPE: Primary | ICD-10-CM

## 2021-06-14 DIAGNOSIS — R00.2 PALPITATIONS: ICD-10-CM

## 2021-06-14 PROCEDURE — 1036F TOBACCO NON-USER: CPT | Performed by: INTERNAL MEDICINE

## 2021-06-14 PROCEDURE — G8427 DOCREV CUR MEDS BY ELIG CLIN: HCPCS | Performed by: INTERNAL MEDICINE

## 2021-06-14 PROCEDURE — 93226 XTRNL ECG REC<48 HR SCAN A/R: CPT

## 2021-06-14 PROCEDURE — 93225 XTRNL ECG REC<48 HRS REC: CPT

## 2021-06-14 PROCEDURE — 99213 OFFICE O/P EST LOW 20 MIN: CPT | Performed by: INTERNAL MEDICINE

## 2021-06-14 PROCEDURE — G8417 CALC BMI ABV UP PARAM F/U: HCPCS | Performed by: INTERNAL MEDICINE

## 2021-06-14 RX ORDER — LEVOTHYROXINE SODIUM 0.15 MG/1
150 TABLET ORAL DAILY
Qty: 90 TABLET | Refills: 1 | Status: SHIPPED | OUTPATIENT
Start: 2021-06-14 | End: 2021-12-27

## 2021-06-14 NOTE — PROGRESS NOTES
 HYSTERECTOMY, VAGINAL N/A 2020    TLH performed by Romayne Albright, MD at [facility]  2018    Gastric Sleeve    TUBAL LIGATION  2005    UPPER GASTROINTESTINAL ENDOSCOPY  2013    Dr Kathe Ervin     Social History     Socioeconomic History    Marital status: Single     Spouse name: Not on file    Number of children: Not on file    Years of education: Not on file    Highest education level: Not on file   Occupational History    Not on file   Tobacco Use    Smoking status: Former Smoker     Packs/day: 0.50     Years: 3.00     Pack years: 1.50     Types: Cigarettes     Quit date: 2018     Years since quittin.8    Smokeless tobacco: Never Used   Vaping Use    Vaping Use: Never used   Substance and Sexual Activity    Alcohol use: Never     Comment: Rare use    Drug use: No    Sexual activity: Yes     Partners: Male   Other Topics Concern    Not on file   Social History Narrative    Not on file     Social Determinants of Health     Financial Resource Strain: Low Risk     Difficulty of Paying Living Expenses: Not hard at all   Food Insecurity: No Food Insecurity    Worried About [facility] in the Last Year: Never true    Peggy of Food in the Last Year: Never true   Transportation Needs: No Transportation Needs    Lack of Transportation (Medical): No    Lack of Transportation (Non-Medical):  No   Physical Activity:     Days of Exercise per Week:     Minutes of Exercise per Session:    Stress:     Feeling of Stress :    Social Connections:     Frequency of Communication with Friends and Family:     Frequency of Social Gatherings with Friends and Family:     Attends Voodoo Services:     Active Member of Clubs or Organizations:     Attends Club or Organization Meetings:     Marital Status:    Intimate Partner Violence:     Fear of Current or Ex-Partner:     Emotionally Abused:     Physically Abused:     Sexually Abused:      Family History Problem Relation Age of Onset    High Blood Pressure Mother         No FDR with colon cancer     Crohn's Disease Mother     Cancer Father         Lung cancer    Mental Illness Brother     Heart Disease Brother     Asthma Brother     Colon Cancer Neg Hx      Allergies   Allergen Reactions    Ibuprofen Other (See Comments)     Can't have with gastric sleeve    Morphine Nausea And Vomiting       Current Outpatient Medications:     metoprolol succinate (TOPROL XL) 25 MG extended release tablet, Take 1 tablet by mouth nightly, Disp: 90 tablet, Rfl: 3    levothyroxine (SYNTHROID) 175 MCG tablet, TAKE ONE TABLET BY MOUTH DAILY, Disp: 30 tablet, Rfl: 3    phentermine (ADIPEX-P) 37.5 MG tablet, Take 1 tablet by mouth every morning (before breakfast) for 30 days. , Disp: 30 tablet, Rfl: 0    rOPINIRole (REQUIP) 0.5 MG tablet, take 1 tablet by mouth nightly as needed (restless legs), Disp: 90 tablet, Rfl: 3    busPIRone (BUSPAR) 10 MG tablet, Take 1 tablet by mouth every 8 hours as needed (anxiety), Disp: 270 tablet, Rfl: 3    diclofenac sodium (VOLTAREN) 1 % GEL, Apply topically 2 times daily, Disp: 100 g, Rfl: 3    traZODone (DESYREL) 100 MG tablet, Take 1 tablet by mouth nightly, Disp: 90 tablet, Rfl: 3    hyoscyamine (LEVBID) 375 MCG extended release tablet, TAKE ONE TABLET BY MOUTH EVERY 12 HOURS AS NEEDED FOR CRAMPING, Disp: 60 tablet, Rfl: 0    tiZANidine (ZANAFLEX) 2 MG tablet, Take 1 tablet by mouth every 8 hours as needed (headaches), Disp: 45 tablet, Rfl: 0    pantoprazole (PROTONIX) 40 MG tablet, Take 1 tablet by mouth daily, Disp: 90 tablet, Rfl: 3    acetaminophen (APAP EXTRA STRENGTH) 500 MG tablet, Take 2 tablets by mouth every 6 hours as needed for Pain, Disp: 60 tablet, Rfl: 1    Multiple Vitamin (MULTI VITAMIN DAILY PO), Take 1 tablet by mouth daily, Disp: , Rfl:     Probiotic Product (PROBIOTIC DAILY PO), Take 1 tablet by mouth daily, Disp: , Rfl:     fluticasone (FLONASE) 50 MCG/ACT nasal spray, 1 spray by Nasal route daily, Disp: 1 Bottle, Rfl: 0    Ascorbic Acid (VITAMIN C) 250 MG tablet, Take 500 mg by mouth daily , Disp: , Rfl:   Lab Results   Component Value Date     09/22/2020    K 3.6 09/22/2020     09/22/2020    CO2 26 09/22/2020    BUN 13 09/22/2020    CREATININE 0.69 09/22/2020    GLUCOSE 82 09/22/2020    CALCIUM 9.6 09/22/2020    PROT 7.4 09/22/2020    LABALBU 4.4 09/22/2020    BILITOT 0.7 09/22/2020    ALKPHOS 67 09/22/2020    AST 13 09/22/2020    ALT 12 09/22/2020    LABGLOM >60.0 09/22/2020    GFRAA >60.0 09/22/2020    GLOB 3.0 09/22/2020     Lab Results   Component Value Date    WBC 4.9 12/19/2020    HGB 13.5 12/19/2020    HCT 40.1 12/19/2020    MCV 97.8 12/19/2020     12/19/2020     Lab Results   Component Value Date    LABA1C 4.4 (L) 09/22/2020    LABA1C 4.3 (L) 01/03/2020    LABA1C 4.2 (L) 09/02/2016     Lab Results   Component Value Date    HDL 51 09/22/2020    HDL 50 01/03/2020    HDL 44 09/15/2011    LDLCALC 86 09/22/2020    LDLCALC 95 01/03/2020    LDLCALC 160 (H) 09/15/2011    CHOL 158 09/22/2020    CHOL 176 01/03/2020    CHOL 248 (H) 09/15/2011    TRIG 107 09/22/2020    TRIG 153 (H) 01/03/2020    TRIG 272 (H) 09/15/2011     No results found for: TESTM  Lab Results   Component Value Date    TSH 0.144 (L) 06/09/2021    TSH 18.120 (H) 04/13/2021    TSH 0.318 (L) 11/25/2020    TSHREFLEX 4.270 (H) 09/22/2020    TSHREFLEX 0.087 (L) 01/03/2020    TSHREFLEX 48.890 (H) 09/23/2019    FT3 2.3 03/14/2019    FT3 2.3 06/28/2016    T4FREE 1.46 06/09/2021    T4FREE 0.97 04/13/2021    T4FREE 1.46 11/25/2020     Lab Results   Component Value Date    TPOABS <10.0 04/24/2020       Review of Systems   Cardiovascular: Negative. Endocrine: Negative. All other systems reviewed and are negative. Objective:   Physical Exam  Vitals reviewed. Constitutional:       Appearance: Normal appearance. She is obese. HENT:      Head: Normocephalic and atraumatic.  Hair is normal.      Right Ear: External ear normal.      Left Ear: External ear normal.      Nose: Nose normal.   Eyes:      General: No scleral icterus. Right eye: No discharge. Left eye: No discharge. Extraocular Movements: Extraocular movements intact. Conjunctiva/sclera: Conjunctivae normal.   Neck:      Trachea: Trachea normal.   Cardiovascular:      Rate and Rhythm: Normal rate. Musculoskeletal:         General: Normal range of motion. Cervical back: Normal range of motion and neck supple. Neurological:      General: No focal deficit present. Mental Status: She is alert and oriented to person, place, and time.    Psychiatric:         Mood and Affect: Mood normal.

## 2021-06-16 ENCOUNTER — HOSPITAL ENCOUNTER (OUTPATIENT)
Dept: ULTRASOUND IMAGING | Age: 41
Discharge: HOME | End: 2021-06-18
Payer: COMMERCIAL

## 2021-06-16 ENCOUNTER — TELEPHONE (OUTPATIENT)
Dept: FAMILY MEDICINE CLINIC | Age: 41
End: 2021-06-16

## 2021-06-16 DIAGNOSIS — R93.7 ABNORMAL MAGNETIC RESONANCE IMAGING OF LUMBAR SPINE: ICD-10-CM

## 2021-06-16 DIAGNOSIS — R93.7 ABNORMAL MAGNETIC RESONANCE IMAGING OF LUMBAR SPINE: Primary | ICD-10-CM

## 2021-06-16 PROCEDURE — 76856 US EXAM PELVIC COMPLETE: CPT

## 2021-06-16 PROCEDURE — 76700 US EXAM ABDOM COMPLETE: CPT

## 2021-06-16 PROCEDURE — 76830 TRANSVAGINAL US NON-OB: CPT

## 2021-06-16 NOTE — TELEPHONE ENCOUNTER
Stephen Francisco from [address] called asking if you can please order a US Pelvic non ob complete for this patient. The patient is there right now. Thank you.

## 2021-06-21 NOTE — DISCHARGE SUMMARY
compliance [x] yes  [] no   Long term goal 3: The pt will demo improved lumbar AROM >/=WNL's in order to increase ease with ADL's Lumbar: flex 75%, ext 50%, R SB 50% w/ increased pain, L SB 75%, R rotation 75% with increased pain, L rotation 75%    [] yes  [x] no   Long term goal 4: The pt will demo improved B Hip strength 5/5 and core strength >/= fair in order to ambulate and stand prolonged periods with decreased pain/limitations Strength RLE  Comment: 5/5 except Hip IR/ER, hip abd 4+/5 Hip ext 4/5 - pain with all functional testing  Strength LLE  Comment: 5/5 except Hip IR 4+/5 hip ext 4/5 - pain with all functional testing  Strength Other  Other: fair core strength with isometric abdominal testing   Pt reports cont'd difficulty with standing prolonged periods [] yes  [x] no   Long term goal 5: The pt will demo improved periscapul strength >/=4+/5 in order to improve postural awareness and cervical alignment to decrease frequency of HA's Strength RUE  Comment: 4/5 MT, Rhomboids, Lats 4-/5 LT  Strength LUE  Comment: 4/5 MT, rhomboids, LT, Lats   Pt demo's improved postural awareness and cervical alignment w/ decreased frequency of HA's. [x] yes  [x] no    Long term goal 6: The pt will demo improved B cervical rotation WNL's to improve atlantoaxial mobility and decrease sub-occipital tightness to decrease HA frequency Spine  Cervical: roation L 70 deg, R 65 deg with tightness reported R>L  Pt demo's decreased suboccipital tightness with decreased HA frequency [x] yes  [] no        Body structures, Functions, Activity limitations: Decreased functional mobility , Decreased ROM, Decreased strength, Decreased posture, Increased pain, Decreased high-level IADLs  Assessment: Pt without significant change in lumbar AROM aside from L SB improvement and slight decrease in L thoracolumbar rotation. Pt also with contd high pain levels in lumbar spine witout much variance. Short decrease in pain with MHP though pain returns.

## 2021-07-06 ENCOUNTER — OFFICE VISIT (OUTPATIENT)
Dept: FAMILY MEDICINE CLINIC | Age: 41
End: 2021-07-06
Payer: COMMERCIAL

## 2021-07-06 VITALS
TEMPERATURE: 99.2 F | WEIGHT: 240 LBS | BODY MASS INDEX: 40.97 KG/M2 | OXYGEN SATURATION: 97 % | SYSTOLIC BLOOD PRESSURE: 105 MMHG | HEART RATE: 80 BPM | HEIGHT: 64 IN | DIASTOLIC BLOOD PRESSURE: 65 MMHG | RESPIRATION RATE: 15 BRPM

## 2021-07-06 DIAGNOSIS — E66.01 CLASS 3 SEVERE OBESITY DUE TO EXCESS CALORIES WITHOUT SERIOUS COMORBIDITY WITH BODY MASS INDEX (BMI) OF 40.0 TO 44.9 IN ADULT (HCC): Primary | ICD-10-CM

## 2021-07-06 PROCEDURE — 1036F TOBACCO NON-USER: CPT | Performed by: INTERNAL MEDICINE

## 2021-07-06 PROCEDURE — 99213 OFFICE O/P EST LOW 20 MIN: CPT | Performed by: INTERNAL MEDICINE

## 2021-07-06 PROCEDURE — G8427 DOCREV CUR MEDS BY ELIG CLIN: HCPCS | Performed by: INTERNAL MEDICINE

## 2021-07-06 PROCEDURE — G8417 CALC BMI ABV UP PARAM F/U: HCPCS | Performed by: INTERNAL MEDICINE

## 2021-07-06 RX ORDER — CYCLOBENZAPRINE HCL 10 MG
TABLET ORAL
COMMUNITY
Start: 2021-06-24 | End: 2022-02-28 | Stop reason: SDUPTHER

## 2021-07-06 ASSESSMENT — PATIENT HEALTH QUESTIONNAIRE - PHQ9
SUM OF ALL RESPONSES TO PHQ9 QUESTIONS 1 & 2: 1
SUM OF ALL RESPONSES TO PHQ QUESTIONS 1-9: 1
2. FEELING DOWN, DEPRESSED OR HOPELESS: 1
1. LITTLE INTEREST OR PLEASURE IN DOING THINGS: 0
SUM OF ALL RESPONSES TO PHQ QUESTIONS 1-9: 1
SUM OF ALL RESPONSES TO PHQ QUESTIONS 1-9: 1

## 2021-07-06 ASSESSMENT — ENCOUNTER SYMPTOMS
EYE PAIN: 0
ABDOMINAL PAIN: 0
BACK PAIN: 0
SHORTNESS OF BREATH: 0

## 2021-07-06 NOTE — PROGRESS NOTES
Subjective:      Patient ID: Elisa Nieto is a 36 y.o. female who presents today with:  Chief Complaint   Patient presents with    3 Month Follow-Up       HPI     Obesity  bmi of 41  Trying to lose weight.      Past Medical History:   Diagnosis Date    Acid reflux     Anxiety     Breast cyst     Depression     Generalized anxiety disorder     H/O tubal ligation     Hiatal hernia 13    Hypothyroidism     Hypothyroidism     IBS (irritable bowel syndrome)     Obesity     Tx'd with Adipex     Past Surgical History:   Procedure Laterality Date    BREAST REDUCTION SURGERY      BREAST SURGERY  2007    Reduction     SECTION  2004    CHOLECYSTECTOMY  1996    COLONOSCOPY N/A 2020    COLONOSCOPY DIAGNOSTIC performed by Ping Cruz MD at [address] [place] [address], [facility] N/A 2020    TLH performed by Joel Pacheco MD at [place] [address]  2018    Gastric Sleeve    TUBAL LIGATION  2005    UPPER GASTROINTESTINAL ENDOSCOPY  2013    Dr Rita Angelo     Social History     Socioeconomic History    Marital status: Single     Spouse name: Not on file    Number of children: Not on file    Years of education: Not on file    Highest education level: Not on file   Occupational History    Not on file   Tobacco Use    Smoking status: Former Smoker     Packs/day: 0.50     Years: 3.00     Pack years: 1.50     Types: Cigarettes     Quit date: 2018     Years since quittin.9    Smokeless tobacco: Never Used   Vaping Use    Vaping Use: Never used   Substance and Sexual Activity    Alcohol use: Never     Comment: Rare use    Drug use: No    Sexual activity: Yes     Partners: Male   Other Topics Concern    Not on file   Social History Narrative    Not on file     Social Determinants of Health     Financial Resource Strain: Low Risk     Difficulty of Paying Living Expenses: Not hard at all   Food Insecurity: No Food Insecurity  Worried About Running Out of Food in the Last Year: Never true    Peggy of Food in the Last Year: Never true   Transportation Needs: No Transportation Needs    Lack of Transportation (Medical): No    Lack of Transportation (Non-Medical): No   Physical Activity:     Days of Exercise per Week:     Minutes of Exercise per Session:    Stress:     Feeling of Stress :    Social Connections:     Frequency of Communication with Friends and Family:     Frequency of Social Gatherings with Friends and Family:     Attends Hoahaoism Services:     Active Member of Clubs or Organizations:     Attends Club or Organization Meetings:     Marital Status:    Intimate Partner Violence:     Fear of Current or Ex-Partner:     Emotionally Abused:     Physically Abused:     Sexually Abused:       Allergies   Allergen Reactions    Ibuprofen Other (See Comments)     Can't have with gastric sleeve    Morphine Nausea And Vomiting     Current Outpatient Medications on File Prior to Visit   Medication Sig Dispense Refill    cyclobenzaprine (FLEXERIL) 10 MG tablet TAKE 1 TABLET BY MOUTH NIGHTLY      levothyroxine (SYNTHROID) 150 MCG tablet Take 1 tablet by mouth daily 90 tablet 1    metoprolol succinate (TOPROL XL) 25 MG extended release tablet Take 1 tablet by mouth nightly 90 tablet 3    rOPINIRole (REQUIP) 0.5 MG tablet take 1 tablet by mouth nightly as needed (restless legs) 90 tablet 3    busPIRone (BUSPAR) 10 MG tablet Take 1 tablet by mouth every 8 hours as needed (anxiety) 270 tablet 3    diclofenac sodium (VOLTAREN) 1 % GEL Apply topically 2 times daily 100 g 3    traZODone (DESYREL) 100 MG tablet Take 1 tablet by mouth nightly 90 tablet 3    pantoprazole (PROTONIX) 40 MG tablet Take 1 tablet by mouth daily 90 tablet 3    acetaminophen (APAP EXTRA STRENGTH) 500 MG tablet Take 2 tablets by mouth every 6 hours as needed for Pain 60 tablet 1    Multiple Vitamin (MULTI VITAMIN DAILY PO) Take 1 tablet by mouth daily      Probiotic Product (PROBIOTIC DAILY PO) Take 1 tablet by mouth daily      fluticasone (FLONASE) 50 MCG/ACT nasal spray 1 spray by Nasal route daily 1 Bottle 0    Ascorbic Acid (VITAMIN C) 250 MG tablet Take 500 mg by mouth daily        No current facility-administered medications on file prior to visit. I have personally reviewed the ROS, PMH, PFH, and social history     Review of Systems   Constitutional: Negative for chills and fever. HENT: Negative for congestion. Eyes: Negative for pain. Respiratory: Negative for shortness of breath. Cardiovascular: Negative for chest pain. Gastrointestinal: Negative for abdominal pain. Genitourinary: Negative for hematuria. Musculoskeletal: Negative for back pain. Allergic/Immunologic: Negative for immunocompromised state. Neurological: Negative for headaches. Psychiatric/Behavioral: Negative for hallucinations. Objective:   /65 (Site: Left Upper Arm, Position: Sitting, Cuff Size: Large Adult)   Pulse 80   Temp 99.2 °F (37.3 °C) (Tympanic)   Resp 15   Ht 5' 4\" (1.626 m)   Wt 240 lb (108.9 kg)   LMP 10/18/2020   SpO2 97%   BMI 41.20 kg/m²     Physical Exam  Constitutional:       General: She is not in acute distress. Appearance: Normal appearance. She is not ill-appearing, toxic-appearing or diaphoretic. HENT:      Head: Normocephalic. Neck:      Vascular: No carotid bruit. Cardiovascular:      Rate and Rhythm: Normal rate and regular rhythm. Pulses: Normal pulses. Heart sounds: Normal heart sounds. No murmur heard. No friction rub. No gallop. Pulmonary:      Effort: Pulmonary effort is normal. No respiratory distress. Breath sounds: Normal breath sounds. No wheezing, rhonchi or rales. Abdominal:      General: Abdomen is flat. There is no distension. Palpations: Abdomen is soft. Tenderness: There is no abdominal tenderness.  There is no right CVA tenderness, left CVA tenderness, guarding or rebound. Musculoskeletal:      Cervical back: Neck supple. Right lower leg: No edema. Left lower leg: No edema. Skin:     General: Skin is warm. Findings: No erythema or rash. Neurological:      Mental Status: She is alert. Psychiatric:         Mood and Affect: Mood normal.           Assessment:       Diagnosis Orders   1. Class 3 severe obesity due to excess calories without serious comorbidity with body mass index (BMI) of 40.0 to 44.9 in adult [facility])           Plan:     vc  Labs due 07/2021  Following up with heme   Ascvd under 1 percent. (from before)   Keep cards f/u ([facility])  Holter (Per cards)     NONVISUALIZATION OF THE LEFT OVARY. (talk with OB/GYN)   Consider bariatric in future  Get NS opinion first.   If lft normal observe (abn hepatic echotexture) Liver US           No orders of the defined types were placed in this encounter. No orders of the defined types were placed in this encounter. NO SI/HI  Some depression  Doesn't want treatment  Call if you you change your mind. Please exercise. F/u will depend on labs. If anything should change or worsen call ASAP, don't wait for next scheduled appointment. Return in about 2 months (around 9/6/2021) for Chronic condition management/appointment, worsening symptoms, call ASAP for appointment.       Cassy Michele MD

## 2021-07-09 ENCOUNTER — TELEPHONE (OUTPATIENT)
Dept: FAMILY MEDICINE CLINIC | Age: 41
End: 2021-07-09

## 2021-07-09 NOTE — TELEPHONE ENCOUNTER
On recent US her left ovary was non visualized  Did you want to see her for f/u , etc?   This was done because mri l spine showed probably right functional ovarian cyst

## 2021-07-10 ENCOUNTER — TELEPHONE (OUTPATIENT)
Dept: OBGYN CLINIC | Age: 41
End: 2021-07-10
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
## 2021-07-10 DIAGNOSIS — Z87.898 HISTORY OF PALPITATIONS: ICD-10-CM

## 2021-07-10 DIAGNOSIS — R51.9 DAILY HEADACHE: ICD-10-CM

## 2021-07-10 DIAGNOSIS — G25.81 RLS (RESTLESS LEGS SYNDROME): ICD-10-CM

## 2021-07-10 LAB
ALBUMIN SERPL-MCNC: 4.3 G/DL (ref 3.5–4.6)
ALP BLD-CCNC: 76 U/L (ref 40–130)
ALT SERPL-CCNC: 22 U/L (ref 0–33)
ANION GAP SERPL CALCULATED.3IONS-SCNC: 10 MEQ/L (ref 9–15)
AST SERPL-CCNC: 17 U/L (ref 0–35)
BASOPHILS ABSOLUTE: 0 K/UL (ref 0–0.2)
BASOPHILS RELATIVE PERCENT: 0.5 %
BILIRUB SERPL-MCNC: 0.5 MG/DL (ref 0.2–0.7)
BUN BLDV-MCNC: 14 MG/DL (ref 6–20)
CALCIUM SERPL-MCNC: 9.7 MG/DL (ref 8.5–9.9)
CHLORIDE BLD-SCNC: 100 MEQ/L (ref 95–107)
CHOLESTEROL, TOTAL: 203 MG/DL (ref 0–199)
CO2: 27 MEQ/L (ref 20–31)
CREAT SERPL-MCNC: 0.71 MG/DL (ref 0.5–0.9)
EOSINOPHILS ABSOLUTE: 0 K/UL (ref 0–0.7)
EOSINOPHILS RELATIVE PERCENT: 0.9 %
GFR AFRICAN AMERICAN: >60
GFR NON-AFRICAN AMERICAN: >60
GLOBULIN: 3.4 G/DL (ref 2.3–3.5)
GLUCOSE BLD-MCNC: 81 MG/DL (ref 70–99)
HBA1C MFR BLD: <4.2 % (ref 4.8–5.9)
HCT VFR BLD CALC: 40.9 % (ref 37–47)
HDLC SERPL-MCNC: 54 MG/DL (ref 40–59)
HEMOGLOBIN: 14 G/DL (ref 12–16)
LDL CHOLESTEROL CALCULATED: 129 MG/DL (ref 0–129)
LYMPHOCYTES ABSOLUTE: 1.8 K/UL (ref 1–4.8)
LYMPHOCYTES RELATIVE PERCENT: 34.5 %
MCH RBC QN AUTO: 33.3 PG (ref 27–31.3)
MCHC RBC AUTO-ENTMCNC: 34.3 % (ref 33–37)
MCV RBC AUTO: 97.3 FL (ref 82–100)
MONOCYTES ABSOLUTE: 0.4 K/UL (ref 0.2–0.8)
MONOCYTES RELATIVE PERCENT: 7.8 %
NEUTROPHILS ABSOLUTE: 2.9 K/UL (ref 1.4–6.5)
NEUTROPHILS RELATIVE PERCENT: 56.3 %
PDW BLD-RTO: 12.3 % (ref 11.5–14.5)
PLATELET # BLD: 222 K/UL (ref 130–400)
POTASSIUM SERPL-SCNC: 3.9 MEQ/L (ref 3.4–4.9)
RBC # BLD: 4.21 M/UL (ref 4.2–5.4)
SODIUM BLD-SCNC: 137 MEQ/L (ref 135–144)
T4 FREE: 0.94 NG/DL (ref 0.84–1.68)
TOTAL PROTEIN: 7.7 G/DL (ref 6.3–8)
TRIGL SERPL-MCNC: 98 MG/DL (ref 0–150)
TSH REFLEX: 8.26 UIU/ML (ref 0.44–3.86)
VITAMIN D 25-HYDROXY: 32.2 NG/ML (ref 30–100)
WBC # BLD: 5.2 K/UL (ref 4.8–10.8)

## 2021-07-10 NOTE — TELEPHONE ENCOUNTER
Tried to reach by phone. Unable to leave message vm full.  Pt needs to schedule follow up with Dr. Elias Harris per his request ov. cyst

## 2021-07-13 ENCOUNTER — INITIAL CONSULT (OUTPATIENT)
Dept: PAIN MANAGEMENT | Age: 41
End: 2021-07-13
Payer: COMMERCIAL

## 2021-07-13 VITALS
BODY MASS INDEX: 40.8 KG/M2 | DIASTOLIC BLOOD PRESSURE: 79 MMHG | HEIGHT: 64 IN | TEMPERATURE: 97.9 F | WEIGHT: 239 LBS | SYSTOLIC BLOOD PRESSURE: 119 MMHG

## 2021-07-13 DIAGNOSIS — M47.817 LUMBOSACRAL SPONDYLOSIS WITHOUT MYELOPATHY: ICD-10-CM

## 2021-07-13 DIAGNOSIS — M51.36 DDD (DEGENERATIVE DISC DISEASE), LUMBAR: Primary | ICD-10-CM

## 2021-07-13 PROCEDURE — G8427 DOCREV CUR MEDS BY ELIG CLIN: HCPCS | Performed by: PAIN MEDICINE

## 2021-07-13 PROCEDURE — 1036F TOBACCO NON-USER: CPT | Performed by: PAIN MEDICINE

## 2021-07-13 PROCEDURE — G8417 CALC BMI ABV UP PARAM F/U: HCPCS | Performed by: PAIN MEDICINE

## 2021-07-13 PROCEDURE — 99204 OFFICE O/P NEW MOD 45 MIN: CPT | Performed by: PAIN MEDICINE

## 2021-07-13 ASSESSMENT — ENCOUNTER SYMPTOMS
SHORTNESS OF BREATH: 0
CONSTIPATION: 0
DIARRHEA: 0
BACK PAIN: 0
NAUSEA: 0

## 2021-07-13 NOTE — PROGRESS NOTES
[facility]) Physicians  Neurosurgery and Pain [facility]  [address], [address] [place] , [address] [facility], [place] [address]: [phone]  F: [phone]        Janette Small  (1980)    2021    Subjective:     Janette Small is 36 y.o. female who complains today of:     Chief Complaint   Patient presents with    Back Pain    Neck Pain    Hip Pain     Skip Lakhani is here today for initial evaluation requested by her primary care provider. She has right-sided low back pain going on for 3 years. She works as a . No back surgery, she is not diabetic she is not on blood thinners. She quit smoking about 3 years ago. She had an MRI recently. Sitting, standing, bending, laying ,walking all bother her. Pain affects her quality life. She cannot take anti-inflammatories due to gastric sleeve. She takes Tylenol. Pain can be achy or sharp get spasms. She is at therapy in the past.    Plan: We discussed options. I went over the MRI results from 2021 in detail. I showed her an anatomic model pathology. She has significant arthritis. She has failed conservative treatment. I would like to authorize right L3-4-5 medial branch blocks with steroid for diagnostic purposes. She may be candidate for radiofrequency ablation. She has failed conservative treatment. It is great that she has quit smoking. She understands plan, is in agreement, is eager to proceed.   Allergies:  Ibuprofen and Morphine    Past Medical History:   Diagnosis Date    Acid reflux     Anxiety     Breast cyst     Depression     Generalized anxiety disorder     H/O tubal ligation     Hiatal hernia 13    Hypothyroidism     Hypothyroidism     IBS (irritable bowel syndrome)     Obesity     Tx'd with Adipex     Past Surgical History:   Procedure Laterality Date    BREAST REDUCTION SURGERY      BREAST SURGERY  2007    Reduction     SECTION     [facility] CHOLECYSTECTOMY      COLONOSCOPY N/A 2020    COLONOSCOPY DIAGNOSTIC performed by Khushbu Dela Cruz MD at [address] [facility] N/A 2020    TLH performed by Nneka Gunderson MD at [place] [address]  2018    Gastric Sleeve    TUBAL LIGATION  2005    UPPER GASTROINTESTINAL ENDOSCOPY  2013    Dr Imelda Mckinley     Family History   Problem Relation Age of Onset    High Blood Pressure Mother         No FDR with colon cancer     Crohn's Disease Mother     Cancer Father         Lung cancer    Mental Illness Brother     Heart Disease Brother     Asthma Brother     Colon Cancer Neg Hx      Social History     Socioeconomic History    Marital status: Single     Spouse name: Not on file    Number of children: Not on file    Years of education: Not on file    Highest education level: Not on file   Occupational History    Not on file   Tobacco Use    Smoking status: Former Smoker     Packs/day: 0.50     Years: 3.00     Pack years: 1.50     Types: Cigarettes     Quit date: 2018     Years since quittin.9    Smokeless tobacco: Never Used   Vaping Use    Vaping Use: Never used   Substance and Sexual Activity    Alcohol use: Never     Comment: Rare use    Drug use: No    Sexual activity: Yes     Partners: Male   Other Topics Concern    Not on file   Social History Narrative    Not on file     Social Determinants of Health     Financial Resource Strain: Low Risk     Difficulty of Paying Living Expenses: Not hard at all   Food Insecurity: No Food Insecurity    Worried About Running Out of Food in the Last Year: Never true    Peggy of Food in the Last Year: Never true   Transportation Needs: No Transportation Needs    Lack of Transportation (Medical): No    Lack of Transportation (Non-Medical):  No   Physical Activity:     Days of Exercise per Week:     Minutes of Exercise per Session:    Stress:     Feeling of Stress :    Social Connections:     Frequency of Communication with Friends and Family:     Frequency of Social Gatherings with Friends and Family:     Attends Taoism Services:     Active Member of Clubs or Organizations:     Attends Club or Organization Meetings:     Marital Status:    Intimate Partner Violence:     Fear of Current or Ex-Partner:     Emotionally Abused:     Physically Abused:     Sexually Abused:        Current Outpatient Medications on File Prior to Visit   Medication Sig Dispense Refill    cyclobenzaprine (FLEXERIL) 10 MG tablet TAKE 1 TABLET BY MOUTH NIGHTLY      levothyroxine (SYNTHROID) 150 MCG tablet Take 1 tablet by mouth daily 90 tablet 1    metoprolol succinate (TOPROL XL) 25 MG extended release tablet Take 1 tablet by mouth nightly 90 tablet 3    rOPINIRole (REQUIP) 0.5 MG tablet take 1 tablet by mouth nightly as needed (restless legs) 90 tablet 3    busPIRone (BUSPAR) 10 MG tablet Take 1 tablet by mouth every 8 hours as needed (anxiety) 270 tablet 3    diclofenac sodium (VOLTAREN) 1 % GEL Apply topically 2 times daily 100 g 3    traZODone (DESYREL) 100 MG tablet Take 1 tablet by mouth nightly 90 tablet 3    pantoprazole (PROTONIX) 40 MG tablet Take 1 tablet by mouth daily 90 tablet 3    acetaminophen (APAP EXTRA STRENGTH) 500 MG tablet Take 2 tablets by mouth every 6 hours as needed for Pain 60 tablet 1    Multiple Vitamin (MULTI VITAMIN DAILY PO) Take 1 tablet by mouth daily      Probiotic Product (PROBIOTIC DAILY PO) Take 1 tablet by mouth daily      fluticasone (FLONASE) 50 MCG/ACT nasal spray 1 spray by Nasal route daily 1 Bottle 0    Ascorbic Acid (VITAMIN C) 250 MG tablet Take 500 mg by mouth daily        No current facility-administered medications on file prior to visit. HPI    Review of Systems   Constitutional: Negative for fever. HENT: Negative for hearing loss. Respiratory: Negative for shortness of breath.     Gastrointestinal: Negative for constipation, diarrhea and nausea. Genitourinary: Negative for difficulty urinating. Musculoskeletal: Negative for back pain and neck pain. Skin: Negative for rash. Neurological: Negative for headaches. Hematological: Does not bruise/bleed easily. Psychiatric/Behavioral: Negative for sleep disturbance. Objective:     Vitals:  /79   Temp 97.9 °F (36.6 °C)   Ht 5' 4\" (1.626 m)   Wt 239 lb (108.4 kg)   LMP 10/18/2020   BMI 41.02 kg/m² Pain Score:   8      Physical Exam  Patient is AO X 3 , recent and remote memory is intact,mood and affect normal,judgement and insight normal. Head normacephalic,eyes - PERRLA, EOMI, No obvious external Ears, Nose, mouth masses seen, neck supple, trachae midline, no abnormal lymph nodes visualized in neck or supraclavicular region. CN's 2-12 grossly intact. Strength functional for ambulation, balance functional, coordination normal. Visualized skin intact, no obvious lesion or visualized cyanosis. No swelling. Pulses intact. No obvious upper motor neuron signs. Sensation grossly intact to light touch. Strength functional upper extremities. SLR negative. Tender along R lumber facet joints with pain and decreased ROM. Extension and rotation with muscle spasms. Assessment:      Diagnosis Orders   1. DDD (degenerative disc disease), lumbar     2.  Lumbosacral spondylosis without myelopathy         Plan:

## 2021-07-14 ENCOUNTER — TELEPHONE (OUTPATIENT)
Dept: PAIN MANAGEMENT | Age: 41
End: 2021-07-14

## 2021-07-14 NOTE — TELEPHONE ENCOUNTER
ORDER PLACED:    Date: 7/13/2021  Description: RIGHT L3,4,5 MBB  Order Number: [id number]  Ordering Provider: TALI  Performing Provider: Elvia Porter  CPT Codes: 29235,75683  ICD10 Codes: S93.090

## 2021-07-17 NOTE — TELEPHONE ENCOUNTER
AUTHORIZATION: RIGHT L3,4,5 MBB     INSURANCE: Don LING VIA: PORTAL     AUTH #: [id number]    DATE RANGE: 7/19/2021 TO 9/24/2021     TELEPHONE CALL ROUTED TO MA TO SCHEDULE.

## 2021-07-17 NOTE — TELEPHONE ENCOUNTER
SK- MBB AUTH- RIGHT L3,4,5 X1 - 7/19/2021 TO 9/24/2021     OK to schedule procedure approved as above. Please note sides/levels approved and date range.    (If applicable, sides/levels approved may differ from those ordered)    TO BE SCHEDULED WITH DR Jessica Johnston

## 2021-07-27 PROBLEM — H92.01 OTALGIA OF RIGHT EAR: Status: ACTIVE | Noted: 2021-07-27

## 2021-07-27 PROBLEM — N35.12 POSTINFECTIVE URETHRAL STRICTURE IN FEMALE: Status: ACTIVE | Noted: 2021-07-27

## 2021-07-27 PROBLEM — R10.9 FLANK PAIN: Status: ACTIVE | Noted: 2021-07-27

## 2021-07-27 PROBLEM — R31.9 HEMATURIA: Status: ACTIVE | Noted: 2021-07-27

## 2021-07-27 PROBLEM — J31.0 CHRONIC RHINITIS: Status: ACTIVE | Noted: 2021-07-27

## 2021-07-27 PROBLEM — N39.0 URINARY TRACT INFECTION: Status: ACTIVE | Noted: 2021-07-27

## 2021-07-27 PROBLEM — N39.46 MIXED STRESS AND URGE URINARY INCONTINENCE: Status: ACTIVE | Noted: 2021-07-27

## 2021-07-27 PROBLEM — R31.0 HEMATURIA, GROSS: Status: ACTIVE | Noted: 2021-07-27

## 2021-07-29 ENCOUNTER — PROCEDURE VISIT (OUTPATIENT)
Dept: PAIN MANAGEMENT | Age: 41
End: 2021-07-29
Payer: COMMERCIAL

## 2021-07-29 DIAGNOSIS — M47.817 LUMBOSACRAL SPONDYLOSIS WITHOUT MYELOPATHY: ICD-10-CM

## 2021-07-29 PROCEDURE — 64494 INJ PARAVERT F JNT L/S 2 LEV: CPT | Performed by: PAIN MEDICINE

## 2021-07-29 PROCEDURE — 64493 INJ PARAVERT F JNT L/S 1 LEV: CPT | Performed by: PAIN MEDICINE

## 2021-07-29 RX ORDER — LIDOCAINE HYDROCHLORIDE 10 MG/ML
2 INJECTION, SOLUTION EPIDURAL; INFILTRATION; INTRACAUDAL; PERINEURAL ONCE
Status: COMPLETED | OUTPATIENT
Start: 2021-07-29 | End: 2021-07-29

## 2021-07-29 RX ORDER — BETAMETHASONE SODIUM PHOSPHATE AND BETAMETHASONE ACETATE 3; 3 MG/ML; MG/ML
6 INJECTION, SUSPENSION INTRA-ARTICULAR; INTRALESIONAL; INTRAMUSCULAR; SOFT TISSUE ONCE
Status: COMPLETED | OUTPATIENT
Start: 2021-07-29 | End: 2021-07-29

## 2021-07-29 RX ADMIN — LIDOCAINE HYDROCHLORIDE 2 ML: 10 INJECTION, SOLUTION EPIDURAL; INFILTRATION; INTRACAUDAL; PERINEURAL at 14:30

## 2021-07-29 RX ADMIN — BETAMETHASONE SODIUM PHOSPHATE AND BETAMETHASONE ACETATE 6 MG: 3; 3 INJECTION, SUSPENSION INTRA-ARTICULAR; INTRALESIONAL; INTRAMUSCULAR; SOFT TISSUE at 14:30

## 2021-07-29 NOTE — PROGRESS NOTES
[facility]) Physicians  Neurosurgery and Pain [facility]  [address] [facility] , [address] [facility] 82: [phone]  F: [phone]      [address]      Provider: Viktor Jackson DO          Patient Name: Cynthia Stoddard : 1980        Date: 2021       Cynthia Stoddard is here today for interventional pain management. Standard ASIPP guidelines were followed and sterile technique used. Area was cleaned with Betadine x3. Informed consent was obtained. Fluoroscopic guidance was used for this procedure. Junction of superior articular process and transverse process was identified. For L5 dorsal primary ramus groove of sacral ala and SAP of S1 was identified. Appropriate length spinal needle was used and advanced to correct anatomic location. Negative aspiration was achieved. At each site approximately 1/2cc of 1% preservative free Lidocaine was injected without difficulty. Patient tolerated the procedure well, no obvious complications occurred during the procedure. Patient was appropriately monitored and discharged home in stable condition with their usual motor strength. The patient will keep close track of pain over the next several hours and call our office tomorrow and let us know what percentage of pain relief is experienced. Post op Instructions were given to patient. Relevant and recent imaging reviewed with patient today. [] Bilateral [] T12 [] S1      [] L1 [] S2     [x] Right [] L2 [] S3      [x] L3      [] Left [x] L4         [x] L5 [] S. I. Patient had >80% pain relief following procedure with positive facet joint provocative maneuvers, schedule RF ablation.     Viktor Jackson DO

## 2021-07-30 ENCOUNTER — OFFICE VISIT (OUTPATIENT)
Dept: NEUROLOGY | Age: 41
End: 2021-07-30
Payer: COMMERCIAL

## 2021-07-30 VITALS
BODY MASS INDEX: 42.12 KG/M2 | DIASTOLIC BLOOD PRESSURE: 78 MMHG | SYSTOLIC BLOOD PRESSURE: 100 MMHG | WEIGHT: 245.4 LBS | OXYGEN SATURATION: 98 % | HEART RATE: 73 BPM

## 2021-07-30 DIAGNOSIS — M54.16 LUMBAR RADICULOPATHY: ICD-10-CM

## 2021-07-30 DIAGNOSIS — M54.2 NECK PAIN: ICD-10-CM

## 2021-07-30 DIAGNOSIS — G44.209 TENSION HEADACHE: Primary | ICD-10-CM

## 2021-07-30 PROCEDURE — G8417 CALC BMI ABV UP PARAM F/U: HCPCS | Performed by: PSYCHIATRY & NEUROLOGY

## 2021-07-30 PROCEDURE — G8427 DOCREV CUR MEDS BY ELIG CLIN: HCPCS | Performed by: PSYCHIATRY & NEUROLOGY

## 2021-07-30 PROCEDURE — 1036F TOBACCO NON-USER: CPT | Performed by: PSYCHIATRY & NEUROLOGY

## 2021-07-30 PROCEDURE — 99213 OFFICE O/P EST LOW 20 MIN: CPT | Performed by: PSYCHIATRY & NEUROLOGY

## 2021-07-30 ASSESSMENT — ENCOUNTER SYMPTOMS
CHOKING: 0
TROUBLE SWALLOWING: 0
COLOR CHANGE: 0
VOMITING: 0
BACK PAIN: 0
PHOTOPHOBIA: 0
SHORTNESS OF BREATH: 0
NAUSEA: 0

## 2021-07-30 NOTE — PROGRESS NOTES
Subjective:      Patient ID: Marianela Dougherty is a 36 y.o. female who presents today for:  Chief Complaint   Patient presents with    Follow-up     Pt states thing are a lot better she states she doesnt use the gel every night but when she feels the pain coming on shell put some on an it helps her and shes still taking the Flexeril at night and thats been helping her as well. Pt states she has no questions or concerns at this time. HPI 80-year-old right-handed female with a history of pain. Patient has chronic daily headaches for the same. She has temporal pain. She has no nausea vomiting or photophobia. When last seen we had given her Medrol Dosepak and physical therapy and Flexeril at night. She is already under pain management. We are discussing about amatoxin. We had given her Voltaren gel as well. CT scan of the head was normal.  Does report that the gel does help when she uses it and Flexeril is helping as well. Reports that this combination is helping and therefore we will continue to keep her on the same.   He has not any major side effects    Past Medical History:   Diagnosis Date    Acid reflux     Anxiety     Breast cyst     Depression     Generalized anxiety disorder     H/O tubal ligation     Hiatal hernia 13    Hypothyroidism     Hypothyroidism     IBS (irritable bowel syndrome)     Obesity     Tx'd with Adipex     Past Surgical History:   Procedure Laterality Date    BREAST REDUCTION SURGERY      BREAST SURGERY  2007    Reduction     SECTION  2004    CHOLECYSTECTOMY      COLONOSCOPY N/A 2020    COLONOSCOPY DIAGNOSTIC performed by Jelani Kidd MD at [address] [place] [address], VAGINAL N/A 2020    [address] performed by Arden Calhoun MD at [place] [address]  2018    Gastric Sleeve    TUBAL LIGATION  2005    UPPER GASTROINTESTINAL ENDOSCOPY  2013    Dr Freida Fay     Social History Socioeconomic History    Marital status: Single     Spouse name: Not on file    Number of children: Not on file    Years of education: Not on file    Highest education level: Not on file   Occupational History    Not on file   Tobacco Use    Smoking status: Former Smoker     Packs/day: 0.50     Years: 3.00     Pack years: 1.50     Types: Cigarettes     Quit date: 2018     Years since quittin.9    Smokeless tobacco: Never Used   Vaping Use    Vaping Use: Never used   Substance and Sexual Activity    Alcohol use: Never     Comment: Rare use    Drug use: No    Sexual activity: Yes     Partners: Male   Other Topics Concern    Not on file   Social History Narrative    Not on file     Social Determinants of Health     Financial Resource Strain: Low Risk     Difficulty of Paying Living Expenses: Not hard at all   Food Insecurity: No Food Insecurity    Worried About [address] [facility] in the Last Year: Never true    [facility] [employer] in the Last Year: Never true   Transportation Needs: No Transportation Needs    Lack of Transportation (Medical): No    Lack of Transportation (Non-Medical):  No   Physical Activity:     Days of Exercise per Week:     Minutes of Exercise per Session:    Stress:     Feeling of Stress :    Social Connections:     Frequency of Communication with Friends and Family:     Frequency of Social Gatherings with Friends and Family:     Attends [employer] Services:     Active Member of Clubs or Organizations:     Attends Club or Organization Meetings:     Marital Status:    Intimate Partner Violence:     Fear of Current or Ex-Partner:     Emotionally Abused:     Physically Abused:     Sexually Abused:      Family History   Problem Relation Age of Onset    High Blood Pressure Mother         No FDR with colon cancer     Crohn's Disease Mother     Cancer Father         Lung cancer    Mental Illness Brother     Heart Disease Brother     Asthma Brother     Colon Cancer Neg Hx      Allergies   Allergen Reactions    Ibuprofen Other (See Comments)     Can't have with gastric sleeve    Morphine Nausea And Vomiting       Current Outpatient Medications   Medication Sig Dispense Refill    cyclobenzaprine (FLEXERIL) 10 MG tablet TAKE 1 TABLET BY MOUTH NIGHTLY      levothyroxine (SYNTHROID) 150 MCG tablet Take 1 tablet by mouth daily 90 tablet 1    metoprolol succinate (TOPROL XL) 25 MG extended release tablet Take 1 tablet by mouth nightly 90 tablet 3    rOPINIRole (REQUIP) 0.5 MG tablet take 1 tablet by mouth nightly as needed (restless legs) 90 tablet 3    busPIRone (BUSPAR) 10 MG tablet Take 1 tablet by mouth every 8 hours as needed (anxiety) 270 tablet 3    diclofenac sodium (VOLTAREN) 1 % GEL Apply topically 2 times daily 100 g 3    traZODone (DESYREL) 100 MG tablet Take 1 tablet by mouth nightly 90 tablet 3    pantoprazole (PROTONIX) 40 MG tablet Take 1 tablet by mouth daily 90 tablet 3    acetaminophen (APAP EXTRA STRENGTH) 500 MG tablet Take 2 tablets by mouth every 6 hours as needed for Pain 60 tablet 1    Multiple Vitamin (MULTI VITAMIN DAILY PO) Take 1 tablet by mouth daily      Probiotic Product (PROBIOTIC DAILY PO) Take 1 tablet by mouth daily      fluticasone (FLONASE) 50 MCG/ACT nasal spray 1 spray by Nasal route daily 1 Bottle 0    Ascorbic Acid (VITAMIN C) 250 MG tablet Take 500 mg by mouth daily        No current facility-administered medications for this visit. Review of Systems   Constitutional: Negative for fever. HENT: Negative for ear pain, tinnitus and trouble swallowing. Eyes: Negative for photophobia and visual disturbance. Respiratory: Negative for choking and shortness of breath. Cardiovascular: Negative for chest pain and palpitations. Gastrointestinal: Negative for nausea and vomiting. Musculoskeletal: Positive for neck pain. Negative for back pain, gait problem, joint swelling, myalgias and neck stiffness.    Skin: Negative for color change. Allergic/Immunologic: Negative for food allergies. Neurological: Negative for dizziness, tremors, seizures, syncope, facial asymmetry, speech difficulty, weakness, light-headedness, numbness and headaches. Psychiatric/Behavioral: Negative for behavioral problems, confusion, hallucinations and sleep disturbance. Objective:   /78 (Site: Left Upper Arm, Position: Sitting, Cuff Size: Large Adult)   Pulse 73   Wt 245 lb 6.4 oz (111.3 kg)   LMP 10/18/2020   SpO2 98%   BMI 42.12 kg/m²     Physical Exam  Vitals reviewed. Eyes:      Pupils: Pupils are equal, round, and reactive to light. Cardiovascular:      Rate and Rhythm: Normal rate and regular rhythm. Heart sounds: No murmur heard. Pulmonary:      Effort: Pulmonary effort is normal.      Breath sounds: Normal breath sounds. Abdominal:      General: Bowel sounds are normal.   Musculoskeletal:         General: Normal range of motion. Cervical back: Normal range of motion. Skin:     General: Skin is warm. Neurological:      Mental Status: She is alert and oriented to person, place, and time. Cranial Nerves: No cranial nerve deficit. Sensory: No sensory deficit. Motor: No abnormal muscle tone. Coordination: Coordination normal.      Deep Tendon Reflexes: Reflexes are normal and symmetric. Babinski sign absent on the right side. Babinski sign absent on the left side. Psychiatric:         Mood and Affect: Mood normal.         US ABDOMEN COMPLETE    Result Date: 6/16/2021  EXAMINATION: US ABDOMEN COMPLETE CLINICAL HISTORY: 36year-old with possible enlarged liver and spleen on MRI of the lumbar spine COMPARISONS: MRI lumbar spine 5/12/2021 FINDINGS: Complete upper abdominal sonography was performed. The study is compromised due to patient body habitus and sonographic window. Visualized liver demonstrates a nonspecific coarsened echotexture throughout.  However no definite hepatic masses or intrahepatic biliary ductal dilatations. There is a prominent Riedel's right lobe of the liver. Gallbladder is surgically absent. Common duct in the tripp hepatis is normal in caliber measuring 5 mm. Pancreas is mostly obscured by bowel gas and not adequately assessed on this examination. Limited views of the upper abdominal aorta and IVC are within normal limits. Spleen measures about 12 cm in craniocaudal dimension and has a volume of approximately  230 mL which is not significantly enlarged. On very compromised views no large splenic cysts or masses. No definite perisplenic fluid collections. Kidneys are normal in size and position. Right kidney measures 10.9 cm in length and left kidney measures 10.5 cm in length. There is a prominent right extrarenal pelvis. No ultrasound signs of hydronephrosis. Kidneys are incompletely assessed in their entirety     NONSPECIFIC COARSENING OF THE HEPATIC ECHOTEXTURE. OTHERWISE, ESSENTIALLY UNREMARKABLE UPPER ABDOMINAL ULTRASONOGRAPHY GIVEN LIMITATIONS AS DETAILED ABOVE    US NON OB TRANSVAGINAL    Result Date: 6/16/2021  EXAMINATION: US NON OB TRANSVAGINAL, US PELVIS COMPLETE CLINICAL HISTORY: 36year-old with functional ovarian cysts demonstrated on MRI of the lumbar spine. Patient is status post hysterectomy. She has an elevated body mass index COMPARISONS: Pelvic ultrasound 10/11/2020 FINDINGS: Transabdominal and transvaginal ultrasounds of the pelvis were performed. The transabdominal study is compromised due to poor bladder distention as well as patient body habitus. Patient is status post hysterectomy allowing bowel loops to fall into the pelvis. On these transabdominal views no large cystic adnexal masses or significant free fluid in the pelvis. On the transvaginal examination, the right ovary is imaged in the right adnexa. It is normal in size and echogenicity.  Right ovary measures 2.5 x 1.6 x 1.8 cm with a volume of 3.7 mL and contains several follicles the 07/10/2021     07/10/2021    MPV 10.0 10/01/2018     Lab Results   Component Value Date     07/10/2021    K 3.9 07/10/2021    K 4.5 12/04/2019     07/10/2021    CO2 27 07/10/2021    BUN 14 07/10/2021    CREATININE 0.71 07/10/2021    GFRAA >60.0 07/10/2021    LABGLOM >60.0 07/10/2021    GLUCOSE 81 07/10/2021    GLUCOSE 97 10/01/2018    PROT 7.7 07/10/2021    LABALBU 4.3 07/10/2021    LABALBU 4.8 09/15/2011    CALCIUM 9.7 07/10/2021    BILITOT 0.5 07/10/2021    ALKPHOS 76 07/10/2021    AST 17 07/10/2021    ALT 22 07/10/2021     Lab Results   Component Value Date    PROTIME 14.1 12/03/2019    INR 1.0 12/03/2019     Lab Results   Component Value Date    TSH 0.144 06/09/2021    BQGRFOHB17 386 12/03/2019    FOLATE 11.1 12/03/2019    FERRITIN 119.2 11/25/2020    IRON 93 11/25/2020    TIBC 269 11/25/2020     Lab Results   Component Value Date    TRIG 98 07/10/2021    HDL 54 07/10/2021    LDLCALC 129 07/10/2021     No results found for: LABAMPH, BARBSCNU, LABBENZ, CANNAB, COCAINESCRN, LABMETH, OPIATESCREENURINE, PHENCYCLIDINESCREENURINE, PPXUR, ETOH  No results found for: LITHIUM, DILFRTOT, VALPROATE    Assessment:       Diagnosis Orders   1. Tension headache     2. Lumbar radiculopathy     3. Neck pain     Tension Muscular headaches with tightness of the muscles. Patient actually did well with the Voltaren gel and it uses intermittently with addition of Flexeril at night. She actually feels much better. Patient also has not back issues and was injected recently. She has not developed any other vascular headaches of concern. We did discuss that we still have an option of botulinum toxin if nothing helps patient will follow up with us in a few months and earlier if there are any concerns or if the headache type changes. Plan:      No orders of the defined types were placed in this encounter. No orders of the defined types were placed in this encounter. No follow-ups on file.       Mohsen POOLE

## 2021-08-18 ENCOUNTER — OFFICE VISIT (OUTPATIENT)
Dept: PAIN MANAGEMENT | Age: 41
End: 2021-08-18
Payer: COMMERCIAL

## 2021-08-18 VITALS
DIASTOLIC BLOOD PRESSURE: 80 MMHG | BODY MASS INDEX: 41.66 KG/M2 | HEIGHT: 64 IN | TEMPERATURE: 98.4 F | WEIGHT: 244 LBS | SYSTOLIC BLOOD PRESSURE: 124 MMHG

## 2021-08-18 DIAGNOSIS — M47.817 LUMBOSACRAL SPONDYLOSIS WITHOUT MYELOPATHY: Primary | ICD-10-CM

## 2021-08-18 DIAGNOSIS — M51.36 DDD (DEGENERATIVE DISC DISEASE), LUMBAR: ICD-10-CM

## 2021-08-18 PROCEDURE — G8427 DOCREV CUR MEDS BY ELIG CLIN: HCPCS | Performed by: NURSE PRACTITIONER

## 2021-08-18 PROCEDURE — 1036F TOBACCO NON-USER: CPT | Performed by: NURSE PRACTITIONER

## 2021-08-18 PROCEDURE — G8417 CALC BMI ABV UP PARAM F/U: HCPCS | Performed by: NURSE PRACTITIONER

## 2021-08-18 PROCEDURE — 99213 OFFICE O/P EST LOW 20 MIN: CPT | Performed by: NURSE PRACTITIONER

## 2021-08-18 ASSESSMENT — ENCOUNTER SYMPTOMS
GASTROINTESTINAL NEGATIVE: 1
EYES NEGATIVE: 1
SHORTNESS OF BREATH: 0
TROUBLE SWALLOWING: 0
COUGH: 0
DIARRHEA: 0
BACK PAIN: 1
CONSTIPATION: 0

## 2021-08-18 NOTE — PATIENT INSTRUCTIONS
Patient Education        Learning About Medial Branch Block and Neurotomy  What are medial branch block and neurotomy? Facet joints connect your vertebrae to each other. Problems in these joints can cause chronic (long-term) pain in the neck or back. Medial branch nerves are the nerves that carry many of the pain messages from your facet joints. Radiofrequency medial branch neurotomy is a type of medial branch neurotomy that is used to relieve arthritis pain. It uses radio waves to damage nerves in your neck or back so that they can no longer send pain messages to your brain. Before your doctor knows if a neurotomy will help you, you will get a medial branch block to find out if certain nerves are the ones that are a source of your pain. You will need two separate visits to the outpatient center or hospital to have both procedures. You will need someone to drive you home. How is a medial branch block done? The doctor will use a tiny needle to numb the skin where you will get the block. Then the doctor puts the block needle into the numbed area. You may feel some pressure, but you should not feel pain. Using fluoroscopy (live X-ray) to guide the needle, the doctor injects medicine onto one or more nerves to make them numb. If you get relief from your pain in the next 4 to 6 hours, it's a sign that those nerves may be contributing to your pain. The relief will last only a short time. You may then have a medial branch neurotomy at a later visit to try to get longer relief. How is a medial branch neurotomy done? The doctor will use a tiny needle to numb the skin where you will get the neurotomy. Then the doctor puts the neurotomy needle into the numbed area. You may feel some pressure. Using fluoroscopy (live X-ray) to guide the needle, the doctor sends radio waves through the needle to the nerve for 60 to 90 seconds. The radio waves heat the nerve, which damages it. The doctor may do this several times.

## 2021-08-18 NOTE — PROGRESS NOTES
Rita Ortiz  ([id number])    2021    Subjective:     Rita Ortiz is 39 y.o. female who complains today of:    Chief Complaint   Patient presents with    Follow-up     lumbar         Allergies:  Ibuprofen and Morphine    Past Medical History:   Diagnosis Date    Acid reflux     Anxiety     Breast cyst     Depression     Generalized anxiety disorder     H/O tubal ligation     Hiatal hernia 13    Hypothyroidism     Hypothyroidism     IBS (irritable bowel syndrome)     Obesity     Tx'd with Adipex     Past Surgical History:   Procedure Laterality Date    BREAST REDUCTION SURGERY      BREAST SURGERY  2007    Reduction     SECTION  2004    CHOLECYSTECTOMY  1996    COLONOSCOPY N/A 2020    COLONOSCOPY DIAGNOSTIC performed by Nevin Morales MD at [address] [place] [address], [facility] N/A 2020    TLH performed by Iveth Jane MD at [place] [address]  2018    Gastric Sleeve    TUBAL LIGATION  2005    UPPER GASTROINTESTINAL ENDOSCOPY  2013    Dr Eugenia Martinez     Family History   Problem Relation Age of Onset    High Blood Pressure Mother         No FDR with colon cancer     Crohn's Disease Mother     Cancer Father         Lung cancer    Mental Illness Brother     Heart Disease Brother     Asthma Brother     Colon Cancer Neg Hx      Social History     Socioeconomic History    Marital status: Single     Spouse name: Not on file    Number of children: Not on file    Years of education: Not on file    Highest education level: Not on file   Occupational History    Not on file   Tobacco Use    Smoking status: Former Smoker     Packs/day: 0.50     Years: 3.00     Pack years: 1.50     Types: Cigarettes     Quit date: 2018     Years since quitting: 3.0    Smokeless tobacco: Never Used   Vaping Use    Vaping Use: Never used   Substance and Sexual Activity    Alcohol use: Never     Comment: Rare use    Drug use: No    Sexual activity: Yes     Partners: Male   Other Topics Concern    Not on file   Social History Narrative    Not on file     Social Determinants of Health     Financial Resource Strain: Low Risk     Difficulty of Paying Living Expenses: Not hard at all   Food Insecurity: No Food Insecurity    Worried About Running Out of Food in the Last Year: Never true    [address] in the Last Year: Never true   Transportation Needs: No Transportation Needs    Lack of Transportation (Medical): No    Lack of Transportation (Non-Medical):  No   Physical Activity:     Days of Exercise per Week:     Minutes of Exercise per Session:    Stress:     Feeling of Stress :    Social Connections:     Frequency of Communication with Friends and Family:     Frequency of Social Gatherings with Friends and Family:     Attends Tenriism Services:     Active Member of Clubs or Organizations:     Attends Club or Organization Meetings:     Marital Status:    Intimate Partner Violence:     Fear of Current or Ex-Partner:     Emotionally Abused:     Physically Abused:     Sexually Abused:        Current Outpatient Medications on File Prior to Visit   Medication Sig Dispense Refill    cyclobenzaprine (FLEXERIL) 10 MG tablet TAKE 1 TABLET BY MOUTH NIGHTLY      levothyroxine (SYNTHROID) 150 MCG tablet Take 1 tablet by mouth daily 90 tablet 1    metoprolol succinate (TOPROL XL) 25 MG extended release tablet Take 1 tablet by mouth nightly 90 tablet 3    rOPINIRole (REQUIP) 0.5 MG tablet take 1 tablet by mouth nightly as needed (restless legs) 90 tablet 3    busPIRone (BUSPAR) 10 MG tablet Take 1 tablet by mouth every 8 hours as needed (anxiety) 270 tablet 3    diclofenac sodium (VOLTAREN) 1 % GEL Apply topically 2 times daily 100 g 3    traZODone (DESYREL) 100 MG tablet Take 1 tablet by mouth nightly 90 tablet 3    pantoprazole (PROTONIX) 40 MG tablet Take 1 tablet by mouth daily 90 tablet 3    acetaminophen (APAP EXTRA STRENGTH) 500 MG tablet Take 2 tablets by mouth every 6 hours as needed for Pain 60 tablet 1    Multiple Vitamin (MULTI VITAMIN DAILY PO) Take 1 tablet by mouth daily      Probiotic Product (PROBIOTIC DAILY PO) Take 1 tablet by mouth daily      fluticasone (FLONASE) 50 MCG/ACT nasal spray 1 spray by Nasal route daily 1 Bottle 0    Ascorbic Acid (VITAMIN C) 250 MG tablet Take 500 mg by mouth daily        No current facility-administered medications on file prior to visit. Pt presents today for a f/u of her pain. PCP is Dr. Reji Lopez MD.  Patient saw Dr. Denzel Levine on 7/29/2021 for L3-4-5 MBB. Right after procedure she reported greater than 80% pain relief. Says initially helped, but didn't last.  Prior she had her initial consult with Dr. Denzel Levine for her right low back pain that has been going on for 3 years. She works as a . Denies back surgery. She denies blood thinners or diabetes. She says she quit smoking about 3 years ago. She has a history of a gastric sleeve and is unable to use NSAIDs. Past Surgical Hx: partial hysterectomy, breast reduction, gallbladder removed, gastric sleeve. She says she lost about 60 pounds but put at least 25 pounds back on. She will take Tylenol as needed over-the-counter. Lumbar MRI dated 5/12/2021 report shows significant arthritis with bulging disks diffuse. L4-5 with some foraminal narrowing noted. 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 Incidental finding of suspected hepatic enlargement and borderline splenic size and possible right ovarian cyst.  X-ray report from 4/6/2020 one of the lumbar spine shows significant arthritis and narrowing at L5-S1. Pt feels pain level 5-10/10  Pt feels that walking, standing, working, lifting makes the pain worse, and tylenol PRN makes the pain better. Pt denies radiating numbness and tingling, but admits to occasional shooting, pinching pain. Denies recent falls, injuries or trauma. Pt denies new weakness. Pt reports PT has been done. Review of Systems   Constitutional: Negative. Negative for fatigue. HENT: Negative. Negative for trouble swallowing. Eyes: Negative. Respiratory: Negative for cough and shortness of breath. Cardiovascular: Negative for chest pain. Gastrointestinal: Negative. Negative for constipation and diarrhea. Endocrine: Negative. Genitourinary: Negative. Musculoskeletal: Positive for back pain. Negative for neck pain. Skin: Negative. Neurological: Negative for dizziness, weakness and headaches. Hematological: Negative. Psychiatric/Behavioral: Negative. Objective:     Vitals:  /80   Temp 98.4 °F (36.9 °C) (Infrared)   Ht 5' 3.5\" (1.613 m)   Wt 244 lb (110.7 kg)   LMP 10/18/2020   BMI 42.54 kg/m² Pain Score:  10 - Worst pain ever      Physical Exam  Vitals and nursing note reviewed. This is a pleasant female who answers questions appropriately and follows commands. Pt is alert and oriented x 3. Recent and remote memory is intact. Mood and affect, judgement and insight are normal.  No signs of distress, no dyspnea or SOB noted. HEENT: PERRL. Neck is supple, trachea midline. No lymphadenopathy noted. Decreased ROM with flexion and extension of low back. Quite tender with palpation to lumbar spine with palpation on bilateral with positive provacative maneuvers noted. Negative SLR but reproduces low back pain. Tightness in both hamstrings noted. Pt is able to briefly rise up on heels and toes. Balance and coordination normal.  Strength is functional for ambulation. Cranial nerves II-XII are intact. Assessment:      Diagnosis Orders   1.  Lumbosacral spondylosis without myelopathy  CHG FLUOR NEEDLE/CATH SPINE/PARASPINAL DX/THER ADDON    UT INJ DX/THER AGNT PARAVERT FACET JOINT, LUMBAR/SAC, 1ST LEVEL    UT INJ DX/THER AGNT PARAVERT FACET JOINT, LUMBAR/SAC, 2ND LEVEL   2. DDD (degenerative disc disease), lumbar Plan:          No orders of the defined types were placed in this encounter. Orders Placed This Encounter   Procedures    CHG FLUOR NEEDLE/CATH SPINE/PARASPINAL DX/THER ADDON     Standing Status:   Future     Standing Expiration Date:   11/16/2021    HI INJ DX/THER AGNT PARAVERT FACET JOINT, LUMBAR/SAC, 1ST LEVEL     B/L L3,4,5 MBB with SK with steroid and lidocaine. Standing Status:   Future     Standing Expiration Date:   11/16/2021    HI INJ DX/THER AGNT PARAVERT FACET JOINT, LUMBAR/SAC, 2ND LEVEL     Standing Status:   Future     Standing Expiration Date:   11/16/2021     Discussed options with the patient today. Anatomic model pathology was shown and reviewed with pt. had significant relief temporarily status post first set of MBB's on the right. Discussed possible RF ablation in great detail and education material sent home with patient today concerning this. We will go ahead and order diagnostic bilateral L3, L4, L5 medial branch blocks with Dr. Katharina Fuentes to see if the patient is a candidate for RF ablation. she has failed conservative treatment in the past. Anatomic model of pathology was shown. Risks and benefits of the procedure were discussed. All questions were answered and patient understands and agrees with the plan. May need to consider right L4-5 NAZARIO in the future if leg pain persists. She had a negative straight leg raise on exam today however. May continue to use over-the-counter Tylenol as needed. Reviewed MRI report and x-ray reports in detail with patient today. Discussed home exercise program.  Relevant imaging and pain generators reviewed. Pt verbalized understanding and agrees with above plan. Pt has chronic pain. OARRS was reviewed. This NP saw pt under direct supervision of Dr. Katharina Fuentes. Follow up:  Return for for procedure with Dr. Katharina Fuentes.     Frances Mayfield, APRN - CNP

## 2021-08-19 ENCOUNTER — TELEPHONE (OUTPATIENT)
Dept: PAIN MANAGEMENT | Age: 41
End: 2021-08-19

## 2021-08-19 NOTE — TELEPHONE ENCOUNTER
ORDER PLACED:    Date: 8/18/21  Description: Katlyn BRAND  Order Number: [id number]  Ordering Provider: Jadyn Spangler  Performing Provider: [facility]  CPT Codes: 54054,26812  ICD10 Codes: J71.959    ORDER GIVEN TO Sharron Miller TO Luna Stewart

## 2021-08-26 PROBLEM — N39.0 URINARY TRACT INFECTION: Status: RESOLVED | Noted: 2021-07-27 | Resolved: 2021-08-26

## 2021-08-30 ENCOUNTER — OFFICE VISIT (OUTPATIENT)
Dept: ENDOCRINOLOGY | Age: 41
End: 2021-08-30
Payer: COMMERCIAL

## 2021-08-30 VITALS
SYSTOLIC BLOOD PRESSURE: 109 MMHG | BODY MASS INDEX: 41.32 KG/M2 | WEIGHT: 242 LBS | HEIGHT: 64 IN | DIASTOLIC BLOOD PRESSURE: 75 MMHG | HEART RATE: 82 BPM | OXYGEN SATURATION: 98 %

## 2021-08-30 DIAGNOSIS — E03.9 HYPOTHYROIDISM, UNSPECIFIED TYPE: Primary | ICD-10-CM

## 2021-08-30 PROCEDURE — G8417 CALC BMI ABV UP PARAM F/U: HCPCS | Performed by: INTERNAL MEDICINE

## 2021-08-30 PROCEDURE — 99213 OFFICE O/P EST LOW 20 MIN: CPT | Performed by: INTERNAL MEDICINE

## 2021-08-30 PROCEDURE — 1036F TOBACCO NON-USER: CPT | Performed by: INTERNAL MEDICINE

## 2021-08-30 PROCEDURE — G8427 DOCREV CUR MEDS BY ELIG CLIN: HCPCS | Performed by: INTERNAL MEDICINE

## 2021-08-30 NOTE — PROGRESS NOTES
2021    Assessment:       Diagnosis Orders   1. Hypothyroidism, unspecified type  T4, Free    TSH with Reflex         PLAN:     Orders Placed This Encounter   Procedures    T4, Free     Standing Status:   Future     Standing Expiration Date:   2022    TSH with Reflex     Standing Status:   Future     Standing Expiration Date:   2022     Continue Synthroid 150 mcg daily follow-up in 3 to 6 months time    Subjective:     Chief Complaint   Patient presents with    Hypothyroidism     Vitals:    21 1356   BP: 109/75   Pulse: 82   SpO2: 98%   Weight: 242 lb (109.8 kg)   Height: 5' 4\" (1.626 m)     Wt Readings from Last 3 Encounters:   21 242 lb (109.8 kg)   21 244 lb (110.7 kg)   21 245 lb 6.4 oz (111.3 kg)     BP Readings from Last 3 Encounters:   21 109/75   21 124/80   21 100/78     2 to 3-month follow-up on hypothyroidism thyroid function test has improved currently on levothyroxine 150 mcg daily    Other  This is a chronic (Hypothyroidism) problem. The current episode started more than 1 year ago. The problem occurs rarely. The problem has been waxing and waning. Nothing aggravates the symptoms. Treatments tried:   Levothyroxine. The treatment provided moderate relief. Results for Munir Brown (MRN [id number]) as of 2021 19:51   Ref.  Range 7/10/2021 09:13 2021 14:40 2021 07:31   TSH Latest Ref Range: 0.440 - 3.860 uIU/mL 8.260 (H)  1.450   T4 Free Latest Ref Range: 0.84 - 1.68 ng/dL 0.94  1.42       Past Medical History:   Diagnosis Date    Acid reflux     Anxiety     Breast cyst     Depression     Generalized anxiety disorder     H/O tubal ligation     Hiatal hernia 13    Hypothyroidism     Hypothyroidism     IBS (irritable bowel syndrome)     Obesity     Tx'd with Adipex     Past Surgical History:   Procedure Laterality Date    BREAST REDUCTION SURGERY      BREAST SURGERY  2007    Reduction     SECTION 2004    CHOLECYSTECTOMY  1996    COLONOSCOPY N/A 8/31/2020    COLONOSCOPY DIAGNOSTIC performed by Jose Mann MD at [address] [facility], [facility] N/A 11/19/2020    TLH performed by Shiloh Berry MD at [place] [address]  11/2018    Gastric Sleeve    TUBAL LIGATION  2005    UPPER GASTROINTESTINAL ENDOSCOPY  11/18/2013    Dr Osito Fernandez     Social History     Socioeconomic History    Marital status: Single     Spouse name: Not on file    Number of children: Not on file    Years of education: Not on file    Highest education level: Not on file   Occupational History    Not on file   Tobacco Use    Smoking status: Former Smoker     Packs/day: 0.50     Years: 3.00     Pack years: 1.50     Types: Cigarettes     Quit date: 8/6/2018     Years since quitting: 3.0    Smokeless tobacco: Never Used   Vaping Use    Vaping Use: Never used   Substance and Sexual Activity    Alcohol use: Never     Comment: Rare use    Drug use: No    Sexual activity: Yes     Partners: Male   Other Topics Concern    Not on file   Social History Narrative    Not on file     Social Determinants of Health     Financial Resource Strain: Low Risk     Difficulty of Paying Living Expenses: Not hard at all   Food Insecurity: No Food Insecurity    Worried About [facility] in the Last Year: Never true    Peggy of Food in the Last Year: Never true   Transportation Needs: No Transportation Needs    Lack of Transportation (Medical): No    Lack of Transportation (Non-Medical):  No   Physical Activity:     Days of Exercise per Week:     Minutes of Exercise per Session:    Stress:     Feeling of Stress :    Social Connections:     Frequency of Communication with Friends and Family:     Frequency of Social Gatherings with Friends and Family:     Attends Spiritism Services:     Active Member of Clubs or Organizations:     Attends Club or Organization Meetings:     Marital Status:    Intimate Partner Violence:     Fear of Current or Ex-Partner:     Emotionally Abused:     Physically Abused:     Sexually Abused:      Family History   Problem Relation Age of Onset    High Blood Pressure Mother         No FDR with colon cancer     Crohn's Disease Mother     Cancer Father         Lung cancer    Mental Illness Brother     Heart Disease Brother     Asthma Brother     Colon Cancer Neg Hx      Allergies   Allergen Reactions    Ibuprofen Other (See Comments)     Can't have with gastric sleeve    Morphine Nausea And Vomiting       Current Outpatient Medications:     cyclobenzaprine (FLEXERIL) 10 MG tablet, TAKE 1 TABLET BY MOUTH NIGHTLY, Disp: , Rfl:     levothyroxine (SYNTHROID) 150 MCG tablet, Take 1 tablet by mouth daily, Disp: 90 tablet, Rfl: 1    metoprolol succinate (TOPROL XL) 25 MG extended release tablet, Take 1 tablet by mouth nightly, Disp: 90 tablet, Rfl: 3    rOPINIRole (REQUIP) 0.5 MG tablet, take 1 tablet by mouth nightly as needed (restless legs), Disp: 90 tablet, Rfl: 3    busPIRone (BUSPAR) 10 MG tablet, Take 1 tablet by mouth every 8 hours as needed (anxiety), Disp: 270 tablet, Rfl: 3    diclofenac sodium (VOLTAREN) 1 % GEL, Apply topically 2 times daily, Disp: 100 g, Rfl: 3    traZODone (DESYREL) 100 MG tablet, Take 1 tablet by mouth nightly, Disp: 90 tablet, Rfl: 3    pantoprazole (PROTONIX) 40 MG tablet, Take 1 tablet by mouth daily, Disp: 90 tablet, Rfl: 3    acetaminophen (APAP EXTRA STRENGTH) 500 MG tablet, Take 2 tablets by mouth every 6 hours as needed for Pain, Disp: 60 tablet, Rfl: 1    Multiple Vitamin (MULTI VITAMIN DAILY PO), Take 1 tablet by mouth daily, Disp: , Rfl:     Probiotic Product (PROBIOTIC DAILY PO), Take 1 tablet by mouth daily, Disp: , Rfl:     fluticasone (FLONASE) 50 MCG/ACT nasal spray, 1 spray by Nasal route daily, Disp: 1 Bottle, Rfl: 0    Ascorbic Acid (VITAMIN C) 250 MG tablet, Take 500 mg by mouth daily , Disp: , Rfl:   Lab Results   Component Value Date     07/10/2021    K 3.9 07/10/2021     07/10/2021    CO2 27 07/10/2021    BUN 14 07/10/2021    CREATININE 0.71 07/10/2021    GLUCOSE 81 07/10/2021    CALCIUM 9.7 07/10/2021    PROT 7.7 07/10/2021    LABALBU 4.3 07/10/2021    BILITOT 0.5 07/10/2021    ALKPHOS 76 07/10/2021    AST 17 07/10/2021    ALT 22 07/10/2021    LABGLOM >60.0 07/10/2021    GFRAA >60.0 07/10/2021    GLOB 3.4 07/10/2021     Lab Results   Component Value Date    WBC 5.2 07/10/2021    HGB 14.0 07/10/2021    HCT 40.9 07/10/2021    MCV 97.3 07/10/2021     07/10/2021     Lab Results   Component Value Date    LABA1C <4.2 (L) 07/10/2021    LABA1C 4.4 (L) 09/22/2020    LABA1C 4.3 (L) 01/03/2020     Lab Results   Component Value Date    HDL 54 07/10/2021    HDL 51 09/22/2020    HDL 50 01/03/2020    LDLCALC 129 07/10/2021    LDLCALC 86 09/22/2020    LDLCALC 95 01/03/2020    CHOL 203 (H) 07/10/2021    CHOL 158 09/22/2020    CHOL 176 01/03/2020    TRIG 98 07/10/2021    TRIG 107 09/22/2020    TRIG 153 (H) 01/03/2020     No results found for: TESTM  Lab Results   Component Value Date    TSH 0.144 (L) 06/09/2021    TSH 18.120 (H) 04/13/2021    TSH 0.318 (L) 11/25/2020    TSHREFLEX 1.450 08/24/2021    TSHREFLEX 8.260 (H) 07/10/2021    TSHREFLEX 4.270 (H) 09/22/2020    FT3 2.3 03/14/2019    FT3 2.3 06/28/2016    T4FREE 1.42 08/24/2021    T4FREE 0.94 07/10/2021    T4FREE 1.46 06/09/2021     Lab Results   Component Value Date    TPOABS <10.0 04/24/2020       Review of Systems   Cardiovascular: Negative. Endocrine: Negative. All other systems reviewed and are negative. Objective:   Physical Exam  Vitals reviewed. Constitutional:       Appearance: Normal appearance. She is obese. HENT:      Head: Normocephalic and atraumatic. Hair is normal.      Right Ear: External ear normal.      Left Ear: External ear normal.      Nose: Nose normal.   Eyes:      General: No scleral icterus.         Right eye: No discharge. Left eye: No discharge. Extraocular Movements: Extraocular movements intact. Conjunctiva/sclera: Conjunctivae normal.   Neck:      Trachea: Trachea normal.   Cardiovascular:      Rate and Rhythm: Normal rate. Pulmonary:      Effort: Pulmonary effort is normal.   Musculoskeletal:         General: Normal range of motion. Cervical back: Normal range of motion and neck supple. Neurological:      General: No focal deficit present. Mental Status: She is alert and oriented to person, place, and time.    Psychiatric:         Mood and Affect: Mood normal.         Behavior: Behavior normal.

## 2021-08-31 NOTE — TELEPHONE ENCOUNTER
BILAT L3,4,5 MBB    AUTH APPROVED FROM 8/24/21-11/24/21    OK to schedule procedure approved as above. Please note sides/levels approved and date range.    (If applicable, sides/levels approved may differ from those ordered)     [address]

## 2021-09-09 RX ORDER — CYCLOBENZAPRINE HCL 10 MG
10 TABLET ORAL NIGHTLY
Qty: 30 TABLET | Refills: 3 | Status: SHIPPED | OUTPATIENT
Start: 2021-09-09 | End: 2022-02-02 | Stop reason: SDUPTHER

## 2021-09-09 NOTE — TELEPHONE ENCOUNTER
Patient is requesting medication refill.  Please approve or deny this request.    Rx requested:  Requested Prescriptions     Pending Prescriptions Disp Refills    cyclobenzaprine (FLEXERIL) 10 MG tablet 30 tablet 3     Sig: Take 1 tablet by mouth nightly         Last Office Visit:   7/30/2021      Next Visit Date:  Future Appointments   Date Time Provider Sangeeta Any   9/13/2021  3:00 PM DO GREGORIA Mishra [facility] EMERGENCY [facility] [place]   10/13/2021  1:45 PM Renny Yuan MD [facility]   10/15/2021  2:00 PM Dinora Whelan  [facility]   1/28/2022 11:00 AM John Fernandes  [facility]   2/28/2022  1:30 PM Nathanael Gonzalez MD [address] [place] [address]

## 2021-10-13 ENCOUNTER — OFFICE VISIT (OUTPATIENT)
Dept: FAMILY MEDICINE CLINIC | Age: 41
End: 2021-10-13
Payer: COMMERCIAL

## 2021-10-13 VITALS
SYSTOLIC BLOOD PRESSURE: 120 MMHG | BODY MASS INDEX: 41.32 KG/M2 | RESPIRATION RATE: 15 BRPM | DIASTOLIC BLOOD PRESSURE: 75 MMHG | TEMPERATURE: 98.4 F | HEIGHT: 64 IN | WEIGHT: 242 LBS | HEART RATE: 84 BPM | OXYGEN SATURATION: 100 %

## 2021-10-13 DIAGNOSIS — R93.89 ABNORMAL PELVIC ULTRASOUND: ICD-10-CM

## 2021-10-13 DIAGNOSIS — K21.9 GASTROESOPHAGEAL REFLUX DISEASE WITHOUT ESOPHAGITIS: ICD-10-CM

## 2021-10-13 DIAGNOSIS — Z23 ENCOUNTER FOR IMMUNIZATION: ICD-10-CM

## 2021-10-13 DIAGNOSIS — J40 BRONCHITIS: Primary | ICD-10-CM

## 2021-10-13 LAB
Lab: NORMAL
PERFORMING INSTRUMENT: NORMAL
QC PASS/FAIL: NORMAL
SARS-COV-2, POC: NORMAL

## 2021-10-13 PROCEDURE — 90674 CCIIV4 VAC NO PRSV 0.5 ML IM: CPT | Performed by: INTERNAL MEDICINE

## 2021-10-13 PROCEDURE — 1036F TOBACCO NON-USER: CPT | Performed by: INTERNAL MEDICINE

## 2021-10-13 PROCEDURE — G8482 FLU IMMUNIZE ORDER/ADMIN: HCPCS | Performed by: INTERNAL MEDICINE

## 2021-10-13 PROCEDURE — 90471 IMMUNIZATION ADMIN: CPT | Performed by: INTERNAL MEDICINE

## 2021-10-13 PROCEDURE — 87426 SARSCOV CORONAVIRUS AG IA: CPT | Performed by: INTERNAL MEDICINE

## 2021-10-13 PROCEDURE — G8427 DOCREV CUR MEDS BY ELIG CLIN: HCPCS | Performed by: INTERNAL MEDICINE

## 2021-10-13 PROCEDURE — G8417 CALC BMI ABV UP PARAM F/U: HCPCS | Performed by: INTERNAL MEDICINE

## 2021-10-13 PROCEDURE — 93000 ELECTROCARDIOGRAM COMPLETE: CPT | Performed by: INTERNAL MEDICINE

## 2021-10-13 PROCEDURE — 99214 OFFICE O/P EST MOD 30 MIN: CPT | Performed by: INTERNAL MEDICINE

## 2021-10-13 RX ORDER — ALBUTEROL SULFATE 90 UG/1
1 AEROSOL, METERED RESPIRATORY (INHALATION) EVERY 6 HOURS PRN
Qty: 18 G | Refills: 0 | Status: SHIPPED | OUTPATIENT
Start: 2021-10-13 | End: 2022-10-12 | Stop reason: SDUPTHER

## 2021-10-13 RX ORDER — PANTOPRAZOLE SODIUM 40 MG/1
40 TABLET, DELAYED RELEASE ORAL
Qty: 60 TABLET | Refills: 1 | Status: SHIPPED | OUTPATIENT
Start: 2021-10-13 | End: 2022-07-17 | Stop reason: SDUPTHER

## 2021-10-13 RX ORDER — PREDNISONE 20 MG/1
40 TABLET ORAL DAILY
Qty: 10 TABLET | Refills: 0 | Status: SHIPPED | OUTPATIENT
Start: 2021-10-13 | End: 2021-10-18

## 2021-10-13 ASSESSMENT — ENCOUNTER SYMPTOMS
EYE PAIN: 0
BACK PAIN: 0
SHORTNESS OF BREATH: 0
ABDOMINAL PAIN: 0

## 2021-10-13 NOTE — PROGRESS NOTES
Subjective:      Patient ID: Miles Zelaya is a 39 y.o. female who presents today with:  Chief Complaint   Patient presents with    3 Month Follow-Up    Discuss Labs       HPI    No fevers or chills  Cough 2 weeks  nyquil helping   Mild  Bringing up phlegm only or mostly   Better as day goes on   Past Medical History:   Diagnosis Date    Acid reflux     Anxiety     Breast cyst     Depression     Generalized anxiety disorder     H/O tubal ligation     Hiatal hernia 13    Hypothyroidism     Hypothyroidism     IBS (irritable bowel syndrome)     Obesity     Tx'd with Adipex     Past Surgical History:   Procedure Laterality Date    BREAST REDUCTION SURGERY      BREAST SURGERY  2007    Reduction     SECTION  2004    CHOLECYSTECTOMY  1996    COLONOSCOPY N/A 2020    COLONOSCOPY DIAGNOSTIC performed by James Springer MD at [address] [place] [address], [facility] N/A 2020    TLH performed by Linwood Luciano MD at [place] [address]  2018    Gastric Sleeve    TUBAL LIGATION  2005    UPPER GASTROINTESTINAL ENDOSCOPY  2013    Dr Tio Vicente     Social History     Socioeconomic History    Marital status: Single     Spouse name: Not on file    Number of children: Not on file    Years of education: Not on file    Highest education level: Not on file   Occupational History    Not on file   Tobacco Use    Smoking status: Former Smoker     Packs/day: 0.50     Years: 3.00     Pack years: 1.50     Types: Cigarettes     Quit date: 2018     Years since quitting: 3.1    Smokeless tobacco: Never Used   Vaping Use    Vaping Use: Never used   Substance and Sexual Activity    Alcohol use: Never     Comment: Rare use    Drug use: No    Sexual activity: Yes     Partners: Male   Other Topics Concern    Not on file   Social History Narrative    Not on file     Social Determinants of Health     Financial Resource Strain: Low Risk     Difficulty of Paying Living Expenses: Not hard at all   Food Insecurity: No Food Insecurity    Worried About Running Out of Food in the Last Year: Never true    Ran Out of Food in the Last Year: Never true   Transportation Needs: No Transportation Needs    Lack of Transportation (Medical): No    Lack of Transportation (Non-Medical): No   Physical Activity:     Days of Exercise per Week:     Minutes of Exercise per Session:    Stress:     Feeling of Stress :    Social Connections:     Frequency of Communication with Friends and Family:     Frequency of Social Gatherings with Friends and Family:     Attends Hoahaoism Services:     Active Member of Clubs or Organizations:     Attends Club or Organization Meetings:     Marital Status:    Intimate Partner Violence:     Fear of Current or Ex-Partner:     Emotionally Abused:     Physically Abused:     Sexually Abused:       Allergies   Allergen Reactions    Ibuprofen Other (See Comments)     Can't have with gastric sleeve    Morphine Nausea And Vomiting     Current Outpatient Medications on File Prior to Visit   Medication Sig Dispense Refill    cyclobenzaprine (FLEXERIL) 10 MG tablet TAKE 1 TABLET BY MOUTH NIGHTLY      levothyroxine (SYNTHROID) 150 MCG tablet Take 1 tablet by mouth daily 90 tablet 1    metoprolol succinate (TOPROL XL) 25 MG extended release tablet Take 1 tablet by mouth nightly 90 tablet 3    rOPINIRole (REQUIP) 0.5 MG tablet take 1 tablet by mouth nightly as needed (restless legs) 90 tablet 3    busPIRone (BUSPAR) 10 MG tablet Take 1 tablet by mouth every 8 hours as needed (anxiety) 270 tablet 3    diclofenac sodium (VOLTAREN) 1 % GEL Apply topically 2 times daily 100 g 3    traZODone (DESYREL) 100 MG tablet Take 1 tablet by mouth nightly 90 tablet 3    pantoprazole (PROTONIX) 40 MG tablet Take 1 tablet by mouth daily 90 tablet 3    acetaminophen (APAP EXTRA STRENGTH) 500 MG tablet Take 2 tablets by mouth every 6 hours as needed for Pain 60 tablet 1    Multiple Vitamin (MULTI VITAMIN DAILY PO) Take 1 tablet by mouth daily      Probiotic Product (PROBIOTIC DAILY PO) Take 1 tablet by mouth daily      fluticasone (FLONASE) 50 MCG/ACT nasal spray 1 spray by Nasal route daily 1 Bottle 0    Ascorbic Acid (VITAMIN C) 250 MG tablet Take 500 mg by mouth daily        No current facility-administered medications on file prior to visit. I have personally reviewed the ROS, PMH, PFH, and social history     Review of Systems   Constitutional: Negative for chills and fever. HENT: Negative for congestion. Eyes: Negative for pain. Respiratory: Negative for shortness of breath. Cardiovascular: Negative for chest pain. Gastrointestinal: Negative for abdominal pain. Genitourinary: Negative for hematuria. Musculoskeletal: Negative for back pain. Allergic/Immunologic: Negative for immunocompromised state. Neurological: Negative for headaches. Psychiatric/Behavioral: Negative for hallucinations. Objective:   /75 (Site: Left Upper Arm, Position: Sitting, Cuff Size: Large Adult)   Pulse 84   Temp 98.4 °F (36.9 °C) (Tympanic)   Resp 15   Ht 5' 4\" (1.626 m)   Wt 242 lb (109.8 kg)   LMP 10/18/2020   SpO2 100%   BMI 41.54 kg/m²     Physical Exam  Constitutional:       General: She is not in acute distress. Appearance: Normal appearance. She is not ill-appearing, toxic-appearing or diaphoretic. HENT:      Head: Normocephalic. Neck:      Vascular: No carotid bruit. Cardiovascular:      Rate and Rhythm: Normal rate and regular rhythm. Pulses: Normal pulses. Heart sounds: Normal heart sounds. No murmur heard. No friction rub. No gallop. Pulmonary:      Effort: Pulmonary effort is normal. No respiratory distress. Breath sounds: Normal breath sounds. No wheezing, rhonchi or rales. Abdominal:      General: Abdomen is flat. There is no distension. Palpations: Abdomen is soft. Tenderness: There is no abdominal tenderness. There is no right CVA tenderness, left CVA tenderness, guarding or rebound. Musculoskeletal:      Cervical back: Neck supple. Right lower leg: No edema. Left lower leg: No edema. Skin:     General: Skin is warm. Findings: No erythema or rash. Neurological:      Mental Status: She is alert. Psychiatric:         Mood and Affect: Mood normal.           Assessment:       Diagnosis Orders   1. Bronchitis  POCT COVID-19, Antigen   2. Encounter for immunization  INFLUENZA, MDCK QUADV, 2 YRS AND OLDER, IM, PF, PREFILL SYR OR SDV, 0.5ML (FLUCELVAX QUADV, PF)   3. Abnormal pelvic ultrasound  Mahogany Marquez MD, [place], [address] [place] [address] Road:     [facility] labs 07/2022   NONVISUALIZATION OF THE LEFT OVARY. (please make dominique tiwht dr Wolff Paget)   Keep cards f/u ([facility])  Following up with heme   Consider bariatric in future  Increase ppi   Referred to NS    ASCVD 1%               Orders Placed This Encounter   Procedures    INFLUENZA, MDCK QUADV, 2 YRS AND OLDER, IM, PF, PREFILL SYR OR SDV, 0.5ML (Rosalynd Dire, PF)   Shruti Adam MD, OB-GYN, [facility]     Referral Priority:   Routine     Referral Type:   Eval and Treat     Referral Reason:   Specialty Services Required     Referred to Provider:   Fly Ramon MD     Requested Specialty:   Obstetrics & Gynecology     Number of Visits Requested:   1    POCT COVID-19, Antigen     Order Specific Question:   Is this test for diagnosis or screening? Answer:   Diagnosis of ill patient     Order Specific Question:   Symptomatic for COVID-19 as defined by CDC? Answer:   Yes     Order Specific Question:   Date of Symptom Onset     Answer:   9/29/2021     Order Specific Question:   Hospitalized for COVID-19? Answer:   No     Order Specific Question:   Admitted to ICU for COVID-19? Answer:   No     Order Specific Question:   Employed in healthcare setting?      Answer:   No     Order Specific Question:   Resident in a congregate (group) care setting? Answer:   No     Order Specific Question:   Pregnant? Answer:   No     Order Specific Question:   Previously tested for COVID-19? Answer:   Yes     Orders Placed This Encounter   Medications    predniSONE (DELTASONE) 20 MG tablet     Sig: Take 2 tablets by mouth daily for 5 days     Dispense:  10 tablet     Refill:  0    albuterol sulfate HFA (VENTOLIN HFA) 108 (90 Base) MCG/ACT inhaler     Sig: Inhale 1 puff into the lungs every 6 hours as needed for Wheezing or Shortness of Breath     Dispense:  18 g     Refill:  0   if not improving will pursue cxr and procalcitonin  SHE HAS mucinex at home. Explained risk of AIN, mg wasting, hip fracture, osteoporosis, etc.   If anything should change or worsen call ASAP, don't wait for next scheduled appointment. Return for Chronic condition management/appointment, worsening symptoms, call ASAP for appointment.       Denice Stanton MD

## 2021-10-13 NOTE — PROGRESS NOTES
Vaccine Information Sheet, \"Influenza - Inactivated\"  given to Nneka Pathak, or parent/legal guardian of  Nneka Pathak and verbalized understanding. Patient responses:    Have you ever had a reaction to a flu vaccine? No  Are you able to eat eggs without adverse effects? Yes  Do you have any current illness? No  Have you ever had Guillian [place] Syndrome? No    Flu vaccine given per order. Please see immunization tab.

## 2021-10-13 NOTE — PATIENT INSTRUCTIONS
Patient Education        Bronchitis: Care Instructions  Your Care Instructions     Bronchitis is inflammation of the bronchial tubes, which carry air to the lungs. The tubes swell and produce mucus, or phlegm. The mucus and inflamed bronchial tubes make you cough. You may have trouble breathing. Most cases of bronchitis are caused by viruses like those that cause colds. Antibiotics usually do not help and they may be harmful. Bronchitis usually develops rapidly and lasts about 2 to 3 weeks in otherwise healthy people. Follow-up care is a key part of your treatment and safety. Be sure to make and go to all appointments, and call your doctor if you are having problems. It's also a good idea to know your test results and keep a list of the medicines you take. How can you care for yourself at home? · Take all medicines exactly as prescribed. Call your doctor if you think you are having a problem with your medicine. · Get some extra rest.  · Take an over-the-counter pain medicine, such as acetaminophen (Tylenol), ibuprofen (Advil, Motrin), or naproxen (Aleve) to reduce fever and relieve body aches. Read and follow all instructions on the label. · Do not take two or more pain medicines at the same time unless the doctor told you to. Many pain medicines have acetaminophen, which is Tylenol. Too much acetaminophen (Tylenol) can be harmful. · Take an over-the-counter cough medicine to help quiet a dry, hacking cough so that you can sleep. Avoid cough medicines that have more than one active ingredient. Read and follow all instructions on the label. · Do not smoke. Smoking can make bronchitis worse. If you need help quitting, talk to your doctor about stop-smoking programs and medicines. These can increase your chances of quitting for good. When should you call for help? Call 911 anytime you think you may need emergency care. For example, call if:    · You have severe trouble breathing.    Call your doctor now or seek immediate medical care if:    · You have new or worse trouble breathing.     · You cough up dark brown or bloody mucus (sputum).     · You have a new or higher fever.     · You have a new rash. Watch closely for changes in your health, and be sure to contact your doctor if:    · You cough more deeply or more often, especially if you notice more mucus or a change in the color of your mucus.     · You are not getting better as expected. Where can you learn more? Go to [email or web address]. org and sign in to your [employer] account. Enter H333 in the [employer] box to learn more about \"Bronchitis: Care Instructions. \"     If you do not have an account, please click on the \"Sign Up Now\" link. Current as of: July 6, 2021               Content Version: 13.0  © 7066-1954 Healthwise, Incorporated. Care instructions adapted under license by [facility] ([facility]). If you have questions about a medical condition or this instruction, always ask your healthcare professional. [address] any warranty or liability for your use of this information.

## 2021-10-15 ENCOUNTER — OFFICE VISIT (OUTPATIENT)
Dept: CARDIOLOGY CLINIC | Age: 41
End: 2021-10-15
Payer: COMMERCIAL

## 2021-10-15 VITALS
WEIGHT: 238 LBS | DIASTOLIC BLOOD PRESSURE: 68 MMHG | BODY MASS INDEX: 40.63 KG/M2 | HEIGHT: 64 IN | SYSTOLIC BLOOD PRESSURE: 124 MMHG | OXYGEN SATURATION: 98 % | RESPIRATION RATE: 16 BRPM | HEART RATE: 85 BPM

## 2021-10-15 DIAGNOSIS — R00.2 PALPITATIONS: Primary | ICD-10-CM

## 2021-10-15 DIAGNOSIS — I47.1 PSVT (PAROXYSMAL SUPRAVENTRICULAR TACHYCARDIA) (HCC): ICD-10-CM

## 2021-10-15 PROCEDURE — 99214 OFFICE O/P EST MOD 30 MIN: CPT | Performed by: INTERNAL MEDICINE

## 2021-10-15 PROCEDURE — G8417 CALC BMI ABV UP PARAM F/U: HCPCS | Performed by: INTERNAL MEDICINE

## 2021-10-15 PROCEDURE — G8427 DOCREV CUR MEDS BY ELIG CLIN: HCPCS | Performed by: INTERNAL MEDICINE

## 2021-10-15 PROCEDURE — G8482 FLU IMMUNIZE ORDER/ADMIN: HCPCS | Performed by: INTERNAL MEDICINE

## 2021-10-15 PROCEDURE — 1036F TOBACCO NON-USER: CPT | Performed by: INTERNAL MEDICINE

## 2021-10-15 ASSESSMENT — ENCOUNTER SYMPTOMS
STRIDOR: 0
BLOOD IN STOOL: 0
EYES NEGATIVE: 1
CHEST TIGHTNESS: 0
NAUSEA: 0
GASTROINTESTINAL NEGATIVE: 1
SHORTNESS OF BREATH: 0
WHEEZING: 0
COUGH: 0
RESPIRATORY NEGATIVE: 1

## 2021-10-15 NOTE — PROGRESS NOTES
OFFICE VISIT        Patient: Karina Lugo  YOB: 1980  MRN: [id number]    Chief Complaint: fluttering CP NASH Hypothyroid   Chief Complaint   Patient presents with    Follow-up     4 month    Palpitations    Chest Pain    Shortness of Breath       CV Data:   Echo EF 55   GXT negative but had PSVT 230 during recovery. Subjective/HPI  For few years has had heart fluttering. Occurs at least everyother day. She also has vague cp complaints. She has NASH. No dizzy. No prior CVA MI    21 still with NASH no cp. After GXT during recovery phase she had rapid PSVT 230 lasted about 1 minute. She can tell something was worng. She never had this kind of pals before. 10/15/21 doing better no cp no flutters. Has SOB and getting steroid from Pulmonary.      Former smoker  Rare ETOH  Work- [employer] w son and mom     EKG: SR 80    Past Medical History:   Diagnosis Date    Acid reflux     Anxiety     Breast cyst     Depression     Generalized anxiety disorder     H/O tubal ligation     Hiatal hernia 13    Hypothyroidism     Hypothyroidism     IBS (irritable bowel syndrome)     Obesity     Tx'd with Adipex       Past Surgical History:   Procedure Laterality Date    BREAST REDUCTION SURGERY      BREAST SURGERY  2007    Reduction     SECTION  2004    CHOLECYSTECTOMY  1996    COLONOSCOPY N/A 2020    COLONOSCOPY DIAGNOSTIC performed by Zenon Fischer MD at [address] [place] [address] N/A 2020    TLH performed by Randal Sainz MD at [place] [address]  2018    Gastric Sleeve    TUBAL LIGATION  2005    UPPER GASTROINTESTINAL ENDOSCOPY  2013    Dr Kendra Gamble       Family History   Problem Relation Age of Onset    High Blood Pressure Mother         No FDR with colon cancer     Crohn's Disease Mother     Cancer Father         Lung cancer    Mental Illness Brother     Heart Disease Brother  Asthma Brother     Colon Cancer Neg Hx        Social History     Socioeconomic History    Marital status: Single     Spouse name: None    Number of children: None    Years of education: None    Highest education level: None   Occupational History    None   Tobacco Use    Smoking status: Former Smoker     Packs/day: 0.50     Years: 3.00     Pack years: 1.50     Types: Cigarettes     Quit date: 8/6/2018     Years since quitting: 3.1    Smokeless tobacco: Never Used   Vaping Use    Vaping Use: Never used   Substance and Sexual Activity    Alcohol use: Never     Comment: Rare use    Drug use: No    Sexual activity: Yes     Partners: Male   Other Topics Concern    None   Social History Narrative    None     Social Determinants of Health     Financial Resource Strain: Low Risk     Difficulty of Paying Living Expenses: Not hard at all   Food Insecurity: No Food Insecurity    Worried About Running Out of Food in the Last Year: Never true    Peggy of Food in the Last Year: Never true   Transportation Needs: No Transportation Needs    Lack of Transportation (Medical): No    Lack of Transportation (Non-Medical): No   Physical Activity:     Days of Exercise per Week:     Minutes of Exercise per Session:    Stress:     Feeling of Stress :    Social Connections:     Frequency of Communication with Friends and Family:     Frequency of Social Gatherings with Friends and Family:     Attends Church Services:     Active Member of Clubs or Organizations:     Attends Club or Organization Meetings:     Marital Status:    Intimate Partner Violence:     Fear of Current or Ex-Partner:     Emotionally Abused:     Physically Abused:     Sexually Abused:         Allergies   Allergen Reactions    Ibuprofen Other (See Comments)     Can't have with gastric sleeve    Morphine Nausea And Vomiting       Current Outpatient Medications   Medication Sig Dispense Refill    predniSONE (DELTASONE) 20 MG tablet Take 2 tablets by mouth daily for 5 days 10 tablet 0    albuterol sulfate HFA (VENTOLIN HFA) 108 (90 Base) MCG/ACT inhaler Inhale 1 puff into the lungs every 6 hours as needed for Wheezing or Shortness of Breath 18 g 0    pantoprazole (PROTONIX) 40 MG tablet Take 1 tablet by mouth 2 times daily (before meals) 60 tablet 1    cyclobenzaprine (FLEXERIL) 10 MG tablet TAKE 1 TABLET BY MOUTH NIGHTLY      levothyroxine (SYNTHROID) 150 MCG tablet Take 1 tablet by mouth daily 90 tablet 1    metoprolol succinate (TOPROL XL) 25 MG extended release tablet Take 1 tablet by mouth nightly 90 tablet 3    rOPINIRole (REQUIP) 0.5 MG tablet take 1 tablet by mouth nightly as needed (restless legs) 90 tablet 3    busPIRone (BUSPAR) 10 MG tablet Take 1 tablet by mouth every 8 hours as needed (anxiety) 270 tablet 3    diclofenac sodium (VOLTAREN) 1 % GEL Apply topically 2 times daily 100 g 3    traZODone (DESYREL) 100 MG tablet Take 1 tablet by mouth nightly 90 tablet 3    acetaminophen (APAP EXTRA STRENGTH) 500 MG tablet Take 2 tablets by mouth every 6 hours as needed for Pain 60 tablet 1    Multiple Vitamin (MULTI VITAMIN DAILY PO) Take 1 tablet by mouth daily      Probiotic Product (PROBIOTIC DAILY PO) Take 1 tablet by mouth daily      fluticasone (FLONASE) 50 MCG/ACT nasal spray 1 spray by Nasal route daily 1 Bottle 0    Ascorbic Acid (VITAMIN C) 250 MG tablet Take 500 mg by mouth daily        No current facility-administered medications for this visit. Review of Systems:   Review of Systems   Constitutional: Negative. Negative for diaphoresis and fatigue. HENT: Negative. Eyes: Negative. Respiratory: Negative. Negative for cough, chest tightness, shortness of breath, wheezing and stridor. Cardiovascular: Negative. Negative for chest pain, palpitations and leg swelling. Gastrointestinal: Negative. Negative for blood in stool and nausea. Genitourinary: Negative. Musculoskeletal: Negative. Skin: Negative. Neurological: Negative. Negative for dizziness, syncope, weakness and light-headedness. Hematological: Negative. Psychiatric/Behavioral: Negative. Physical Examination:    /68 (Site: Right Upper Arm, Position: Sitting, Cuff Size: Large Adult)   Pulse 85   Resp 16   Ht 5' 4\" (1.626 m)   Wt 238 lb (108 kg)   LMP 10/18/2020   SpO2 98%   BMI 40.85 kg/m²    Physical Exam   Constitutional: She appears healthy. No distress. HENT:   Normal cephalic and Atraumatic   Eyes: Pupils are equal, round, and reactive to light. Neck: Thyroid normal. No JVD present. No neck adenopathy. No thyromegaly present. Cardiovascular: Normal rate, regular rhythm, normal heart sounds, intact distal pulses and normal pulses. Pulmonary/Chest: Effort normal and breath sounds normal. She has no wheezes. She has no rales. She exhibits no tenderness. Abdominal: Soft. Bowel sounds are normal. There is no abdominal tenderness. Musculoskeletal:         General: No tenderness or edema. Normal range of motion. Cervical back: Normal range of motion and neck supple. Neurological: She is alert and oriented to person, place, and time. Skin: Skin is warm. No cyanosis. Nails show no clubbing.        LABS:  CBC:   Lab Results   Component Value Date    WBC 5.2 07/10/2021    RBC 4.21 07/10/2021    RBC 4.13 10/01/2018    HGB 14.0 07/10/2021    HCT 40.9 07/10/2021    MCV 97.3 07/10/2021    MCH 33.3 07/10/2021    MCHC 34.3 07/10/2021    RDW 12.3 07/10/2021     07/10/2021    MPV 10.0 10/01/2018     Lipids:  Lab Results   Component Value Date    CHOL 203 (H) 07/10/2021    CHOL 158 09/22/2020    CHOL 176 01/03/2020     Lab Results   Component Value Date    TRIG 98 07/10/2021    TRIG 107 09/22/2020    TRIG 153 (H) 01/03/2020     Lab Results   Component Value Date    HDL 54 07/10/2021    HDL 51 09/22/2020    HDL 50 01/03/2020     Lab Results   Component Value Date    LDLCALC 129 07/10/2021 LDLCALC 86 09/22/2020    LDLCALC 95 01/03/2020     No results found for: LABVLDL, VLDL  Lab Results   Component Value Date    CHOLHDLRATIO 5.6 09/15/2011     CMP:    Lab Results   Component Value Date     07/10/2021    K 3.9 07/10/2021    K 4.5 12/04/2019     07/10/2021    CO2 27 07/10/2021    BUN 14 07/10/2021    CREATININE 0.71 07/10/2021    GFRAA >60.0 07/10/2021    LABGLOM >60.0 07/10/2021    GLUCOSE 81 07/10/2021    GLUCOSE 97 10/01/2018    PROT 7.7 07/10/2021    LABALBU 4.3 07/10/2021    LABALBU 4.8 09/15/2011    CALCIUM 9.7 07/10/2021    BILITOT 0.5 07/10/2021    ALKPHOS 76 07/10/2021    AST 17 07/10/2021    ALT 22 07/10/2021     BMP:    Lab Results   Component Value Date     07/10/2021    K 3.9 07/10/2021    K 4.5 12/04/2019     07/10/2021    CO2 27 07/10/2021    BUN 14 07/10/2021    LABALBU 4.3 07/10/2021    LABALBU 4.8 09/15/2011    CREATININE 0.71 07/10/2021    CALCIUM 9.7 07/10/2021    GFRAA >60.0 07/10/2021    LABGLOM >60.0 07/10/2021    GLUCOSE 81 07/10/2021    GLUCOSE 97 10/01/2018     Magnesium:    Lab Results   Component Value Date    MG 2.2 09/22/2020     TSH:  Lab Results   Component Value Date    TSH 0.144 (L) 06/09/2021             Patient Active Problem List   Diagnosis    Primary hypothyroidism    GERD (gastroesophageal reflux disease)    Generalized anxiety disorder    Obesity    Bone disease    Vitamin D deficiency    Breast mass, right    Post endometrial ablation syndrome    Neck pain    Tension headache    Lumbar radiculopathy    Chronic rhinitis    Flank pain    Hematuria    Hematuria, gross    Mixed stress and urge urinary incontinence    Otalgia of right ear    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 Modified Medications    No medications on file       No orders of the defined types were placed in this encounter. Assessment/Plan:    1. Palpitations/ PSVT - start TOprol XL 25 QHS.     stable - continue BB    2. Chest pain, unspecified type   GXT - negative    3. NASH (dyspnea on exertion)       4. Hypothyroidism, unspecified type    5. HTN -conitnue meds. Low salt diet. Counseling:  Heart Healthy Lifestyle, Low Salt Diet, Take Precautions to Prevent Falls and Walk Daily    Return in about 6 months (around 4/15/2022).     Electronically signed by Frankie Hall MD on 10/15/2021 at 1:53 PM

## 2021-10-25 ENCOUNTER — PROCEDURE VISIT (OUTPATIENT)
Dept: PAIN MANAGEMENT | Age: 41
End: 2021-10-25
Payer: COMMERCIAL

## 2021-10-25 DIAGNOSIS — M47.817 LUMBOSACRAL SPONDYLOSIS WITHOUT MYELOPATHY: ICD-10-CM

## 2021-10-25 PROCEDURE — 64494 INJ PARAVERT F JNT L/S 2 LEV: CPT | Performed by: PAIN MEDICINE

## 2021-10-25 PROCEDURE — 64493 INJ PARAVERT F JNT L/S 1 LEV: CPT | Performed by: PAIN MEDICINE

## 2021-10-25 RX ORDER — LIDOCAINE HYDROCHLORIDE 10 MG/ML
10 INJECTION, SOLUTION EPIDURAL; INFILTRATION; INTRACAUDAL; PERINEURAL ONCE
Status: COMPLETED | OUTPATIENT
Start: 2021-10-25 | End: 2021-10-25

## 2021-10-25 RX ADMIN — LIDOCAINE HYDROCHLORIDE 10 MG: 10 INJECTION, SOLUTION EPIDURAL; INFILTRATION; INTRACAUDAL; PERINEURAL at 14:41

## 2021-10-26 ENCOUNTER — TELEPHONE (OUTPATIENT)
Dept: PAIN MANAGEMENT | Age: 41
End: 2021-10-26

## 2021-10-26 DIAGNOSIS — M47.817 LUMBOSACRAL SPONDYLOSIS WITHOUT MYELOPATHY: Primary | ICD-10-CM

## 2021-10-27 ENCOUNTER — TELEPHONE (OUTPATIENT)
Dept: PAIN MANAGEMENT | Age: 41
End: 2021-10-27

## 2021-10-27 NOTE — TELEPHONE ENCOUNTER
ORDER PLACED:    Date: 10/26/21  Description: RIGHT L3,4,5 RFA  Order Number: [id number]  Ordering Provider: ONELIA  Performing Provider: Rachel Roberts  CPT Codes: 98587, 50217  ICD10 Codes: H60.081

## 2021-10-29 NOTE — TELEPHONE ENCOUNTER
SK- RFA AUTH- RIGHT L3,4,5 X1- 11/1/2021 TO 12/31/2021     OK to schedule procedure approved as above. Please note sides/levels approved and date range.    (If applicable, sides/levels approved may differ from those ordered)    TO BE SCHEDULED WITH DR Ivin Pallas

## 2021-10-29 NOTE — TELEPHONE ENCOUNTER
AUTHORIZATION: RIGHT L3,4,5 RFA     [facility]    AUTH #: [id number]    DATE RANGE: 11/1/2021 TO 12/31/2021     TELEPHONE CALL ROUTED TO MA TO SCHEDULE.

## 2021-11-08 ENCOUNTER — OFFICE VISIT (OUTPATIENT)
Dept: PAIN MANAGEMENT | Age: 41
End: 2021-11-08
Payer: COMMERCIAL

## 2021-11-08 DIAGNOSIS — M47.817 LUMBOSACRAL SPONDYLOSIS WITHOUT MYELOPATHY: ICD-10-CM

## 2021-11-08 PROCEDURE — 64636 DESTROY L/S FACET JNT ADDL: CPT | Performed by: PAIN MEDICINE

## 2021-11-08 PROCEDURE — 64635 DESTROY LUMB/SAC FACET JNT: CPT | Performed by: PAIN MEDICINE

## 2021-11-08 RX ORDER — BETAMETHASONE SODIUM PHOSPHATE AND BETAMETHASONE ACETATE 3; 3 MG/ML; MG/ML
6 INJECTION, SUSPENSION INTRA-ARTICULAR; INTRALESIONAL; INTRAMUSCULAR; SOFT TISSUE ONCE
Status: COMPLETED | OUTPATIENT
Start: 2021-11-08 | End: 2021-11-08

## 2021-11-08 RX ORDER — LIDOCAINE HYDROCHLORIDE 10 MG/ML
10 INJECTION, SOLUTION EPIDURAL; INFILTRATION; INTRACAUDAL; PERINEURAL ONCE
Status: COMPLETED | OUTPATIENT
Start: 2021-11-08 | End: 2021-11-08

## 2021-11-08 RX ADMIN — BETAMETHASONE SODIUM PHOSPHATE AND BETAMETHASONE ACETATE 6 MG: 3; 3 INJECTION, SUSPENSION INTRA-ARTICULAR; INTRALESIONAL; INTRAMUSCULAR; SOFT TISSUE at 14:33

## 2021-11-08 RX ADMIN — LIDOCAINE HYDROCHLORIDE 10 MG: 10 INJECTION, SOLUTION EPIDURAL; INFILTRATION; INTRACAUDAL; PERINEURAL at 14:32

## 2021-11-08 NOTE — PROGRESS NOTES
[facility]) Physicians  [facility]  [address] [facility].., [address] [place] [address]., [place] [address]: [phone]  F: [phone]      Lumbar Radio Frequency Ablation     Provider: Vishal Duran DO          Patient Name: Jose George : 1980        Date: 2021      Jose George is here today for interventional pain management. Standard ASIPP guidelines were followed and sterile technique used. Area was cleaned with Betadine x3. Informed consent was obtained. Fluoroscopic guidance was used for this procedure. Multiple views of fluoroscopy were used during procedure to assist with needle placement. Appropriate sized RF 10mm active tip needle was used and advance to appropriate anatomic location. There was appropriate multifidus contraction noted with motor stimulation at 2 Hz between 0.5-1.5 volts. No limb or gluteal contraction was noted taking it up to 3.5 volts. Prior to lesioning at 80 degrees Celsius for 90 seconds, approximately 0.75mg/1mg of Celestone and ½ cc of 1% preservative free Lidocaine was injected. Impedance was between 200-500 ohms during the procedure. Patient tolerated the procedure well, no obvious complications occurred during the procedure. Patient was appropriately monitored and discharged home in stable condition with their usual motor strength. Post Op instructions were given to patient.           [] Bilateral [] T11 [] L1 [] S1     [] T12 [] L2 [] S2    [x] Right  [x] L3 [] S3      [x] L4 [] S4    [] Left  [x] L5                              Vishal Duran DO

## 2021-11-24 ENCOUNTER — OFFICE VISIT (OUTPATIENT)
Dept: FAMILY MEDICINE CLINIC | Age: 41
End: 2021-11-24
Payer: COMMERCIAL

## 2021-11-24 VITALS
OXYGEN SATURATION: 99 % | DIASTOLIC BLOOD PRESSURE: 78 MMHG | WEIGHT: 244 LBS | TEMPERATURE: 97.3 F | BODY MASS INDEX: 41.66 KG/M2 | SYSTOLIC BLOOD PRESSURE: 131 MMHG | RESPIRATION RATE: 15 BRPM | HEART RATE: 81 BPM | HEIGHT: 64 IN

## 2021-11-24 DIAGNOSIS — K21.9 GASTROESOPHAGEAL REFLUX DISEASE WITHOUT ESOPHAGITIS: Primary | ICD-10-CM

## 2021-11-24 PROCEDURE — G8482 FLU IMMUNIZE ORDER/ADMIN: HCPCS | Performed by: INTERNAL MEDICINE

## 2021-11-24 PROCEDURE — G8417 CALC BMI ABV UP PARAM F/U: HCPCS | Performed by: INTERNAL MEDICINE

## 2021-11-24 PROCEDURE — G8427 DOCREV CUR MEDS BY ELIG CLIN: HCPCS | Performed by: INTERNAL MEDICINE

## 2021-11-24 PROCEDURE — 99213 OFFICE O/P EST LOW 20 MIN: CPT | Performed by: INTERNAL MEDICINE

## 2021-11-24 PROCEDURE — 1036F TOBACCO NON-USER: CPT | Performed by: INTERNAL MEDICINE

## 2021-11-24 ASSESSMENT — ENCOUNTER SYMPTOMS
SHORTNESS OF BREATH: 0
ABDOMINAL PAIN: 0
BACK PAIN: 0
EYE PAIN: 0

## 2021-11-24 NOTE — PROGRESS NOTES
Subjective:      Patient ID: Tay Serrano is a 39 y.o. female who presents today with:  Chief Complaint   Patient presents with    Follow-up       HPI     Since last visit  gerd  Better  But also on bid  But the day she had really bad gerd  She ate chocolate covered pretzels and as laying down   Past Medical History:   Diagnosis Date    Acid reflux     Anxiety     Breast cyst     Depression     Generalized anxiety disorder     H/O tubal ligation     Hiatal hernia 13    Hypothyroidism     Hypothyroidism     IBS (irritable bowel syndrome)     Obesity     Tx'd with Adipex     Past Surgical History:   Procedure Laterality Date    BARIATRIC SURGERY  2018    GASTRIC SLEEVE    BREAST REDUCTION SURGERY      BREAST SURGERY  2007    Reduction     SECTION  2004    CHOLECYSTECTOMY  1996    COLONOSCOPY N/A 2020    COLONOSCOPY DIAGNOSTIC performed by Belem Shelton MD at [address] [place] [address], [facility] N/A 2020    [address] performed by Berlin Sanchez MD at [place] [address]    UPPER GASTROINTESTINAL ENDOSCOPY  2013    Dr Thu Srivastava     Social History     Socioeconomic History    Marital status: Single     Spouse name: Not on file    Number of children: Not on file    Years of education: Not on file    Highest education level: Not on file   Occupational History    Not on file   Tobacco Use    Smoking status: Former Smoker     Packs/day: 0.50     Years: 3.00     Pack years: 1.50     Types: Cigarettes     Quit date: 2018     Years since quitting: 3.3    Smokeless tobacco: Never Used   Vaping Use    Vaping Use: Never used   Substance and Sexual Activity    Alcohol use: Never     Comment: Rare use    Drug use: No    Sexual activity: Yes     Partners: Male   Other Topics Concern    Not on file   Social History Narrative    Not on file     Social Determinants of Health     Financial Resource Strain: [address] Difficulty of Paying Living Expenses: Not hard at all   Food Insecurity: No Food Insecurity    Worried About Running Out of Food in the Last Year: Never true    Ran Out of Food in the Last Year: Never true   Transportation Needs: No Transportation Needs    Lack of Transportation (Medical): No    Lack of Transportation (Non-Medical):  No   Physical Activity:     Days of Exercise per Week: Not on file    Minutes of Exercise per Session: Not on file   Stress:     Feeling of Stress : Not on file   Social Connections:     Frequency of Communication with Friends and Family: Not on file    Frequency of Social Gatherings with Friends and Family: Not on file    Attends Spiritism Services: Not on file    Active Member of [address] [place] [address] or Organizations: Not on file    Attends Club or Organization Meetings: Not on file    Marital Status: Not on file   Intimate Partner Violence:     Fear of Current or Ex-Partner: Not on file    Emotionally Abused: Not on file    Physically Abused: Not on file    Sexually Abused: Not on file   Housing Stability:     Unable to Pay for Housing in the Last Year: Not on file    Number of [place] in the Last Year: Not on file    Unstable Housing in the Last Year: Not on file     Allergies   Allergen Reactions    Ibuprofen Other (See Comments)     Can't have with gastric sleeve    Morphine Nausea And Vomiting     Current Outpatient Medications on File Prior to Visit   Medication Sig Dispense Refill    albuterol sulfate HFA (VENTOLIN HFA) 108 (90 Base) MCG/ACT inhaler Inhale 1 puff into the lungs every 6 hours as needed for Wheezing or Shortness of Breath 18 g 0    pantoprazole (PROTONIX) 40 MG tablet Take 1 tablet by mouth 2 times daily (before meals) (Patient taking differently: Take 40 mg by mouth daily ) 60 tablet 1    cyclobenzaprine (FLEXERIL) 10 MG tablet TAKE 1 TABLET BY MOUTH NIGHTLY      levothyroxine (SYNTHROID) 150 MCG tablet Take 1 tablet by mouth daily 90 tablet 1    metoprolol succinate (TOPROL XL) 25 MG extended release tablet Take 1 tablet by mouth nightly 90 tablet 3    rOPINIRole (REQUIP) 0.5 MG tablet take 1 tablet by mouth nightly as needed (restless legs) 90 tablet 3    busPIRone (BUSPAR) 10 MG tablet Take 1 tablet by mouth every 8 hours as needed (anxiety) 270 tablet 3    diclofenac sodium (VOLTAREN) 1 % GEL Apply topically 2 times daily 100 g 3    traZODone (DESYREL) 100 MG tablet Take 1 tablet by mouth nightly 90 tablet 3    acetaminophen (APAP EXTRA STRENGTH) 500 MG tablet Take 2 tablets by mouth every 6 hours as needed for Pain 60 tablet 1    Multiple Vitamin (MULTI VITAMIN DAILY PO) Take 1 tablet by mouth daily      Probiotic Product (PROBIOTIC DAILY PO) Take 1 tablet by mouth daily      fluticasone (FLONASE) 50 MCG/ACT nasal spray 1 spray by Nasal route daily 1 Bottle 0    Ascorbic Acid (VITAMIN C) 250 MG tablet Take 500 mg by mouth daily        No current facility-administered medications on file prior to visit. I have personally reviewed the ROS, PMH, PFH, and social history     Review of Systems   Constitutional: Negative for chills and fever. HENT: Negative for congestion. Eyes: Negative for pain. Respiratory: Negative for shortness of breath. Cardiovascular: Negative for chest pain. Gastrointestinal: Negative for abdominal pain. Genitourinary: Negative for hematuria. Musculoskeletal: Negative for back pain. Allergic/Immunologic: Negative for immunocompromised state. Neurological: Negative for headaches. Psychiatric/Behavioral: Negative for hallucinations. Objective:   /78 (Site: Left Upper Arm, Position: Sitting, Cuff Size: Large Adult)   Pulse 81   Temp 97.3 °F (36.3 °C) (Temporal)   Resp 15   Ht 5' 4\" (1.626 m)   Wt 244 lb (110.7 kg)   LMP 10/18/2020   SpO2 99%   BMI 41.88 kg/m²     Physical Exam  Constitutional:       General: She is not in acute distress. Appearance: Normal appearance.  She is not ill-appearing, toxic-appearing or diaphoretic. HENT:      Head: Normocephalic. Neck:      Vascular: No carotid bruit. Cardiovascular:      Rate and Rhythm: Normal rate and regular rhythm. Pulses: Normal pulses. Heart sounds: Normal heart sounds. No murmur heard. No friction rub. No gallop. Pulmonary:      Effort: Pulmonary effort is normal. No respiratory distress. Breath sounds: Normal breath sounds. No wheezing, rhonchi or rales. Abdominal:      General: Abdomen is flat. There is no distension. Palpations: Abdomen is soft. Tenderness: There is no abdominal tenderness. There is no right CVA tenderness, left CVA tenderness, guarding or rebound. Musculoskeletal:      Cervical back: Neck supple. Right lower leg: No edema. Left lower leg: No edema. Skin:     General: Skin is warm. Findings: No erythema or rash. Neurological:      Mental Status: She is alert. Psychiatric:         Mood and Affect: Mood normal.           Assessment:       Diagnosis Orders   1. Gastroesophageal reflux disease without esophagitis           Plan:     vc  She will get covid booster  Referred to ob/gyn (10/2021)   Non visualized of left ovary (please make dominique tiwht dr Presley Griffin)   Keep cards f/u ([facility])  Taper ppi to qd and call if breatk through gerd   Following up with heme   Ccm labs 07/2022  S/P gastric sleeve, she wants life style changes  Seeing pain mgmt, was referred to NS. (refusing surgery )                      No orders of the defined types were placed in this encounter. No orders of the defined types were placed in this encounter. if gerd worsens on taper call me  Don't wait for next appt    ASCVD 1%   Albuterol only when \"sick\"   If anything should change or worsen call ASAP, don't wait for next scheduled appointment. Return in about 6 months (around 5/24/2022) for Chronic condition management/appointment, worsening symptoms, call ASAP for appointment.       Camelia Faust Helen Elias MD

## 2021-12-08 ENCOUNTER — OFFICE VISIT (OUTPATIENT)
Dept: PAIN MANAGEMENT | Age: 41
End: 2021-12-08
Payer: COMMERCIAL

## 2021-12-08 VITALS
HEART RATE: 90 BPM | TEMPERATURE: 97.2 F | DIASTOLIC BLOOD PRESSURE: 67 MMHG | HEIGHT: 64 IN | WEIGHT: 242 LBS | BODY MASS INDEX: 41.32 KG/M2 | SYSTOLIC BLOOD PRESSURE: 122 MMHG

## 2021-12-08 DIAGNOSIS — M46.1 SACROILIITIS (HCC): Primary | ICD-10-CM

## 2021-12-08 DIAGNOSIS — M47.817 LUMBOSACRAL SPONDYLOSIS WITHOUT MYELOPATHY: ICD-10-CM

## 2021-12-08 PROCEDURE — G8427 DOCREV CUR MEDS BY ELIG CLIN: HCPCS | Performed by: NURSE PRACTITIONER

## 2021-12-08 PROCEDURE — 1036F TOBACCO NON-USER: CPT | Performed by: NURSE PRACTITIONER

## 2021-12-08 PROCEDURE — 99213 OFFICE O/P EST LOW 20 MIN: CPT | Performed by: NURSE PRACTITIONER

## 2021-12-08 PROCEDURE — G8482 FLU IMMUNIZE ORDER/ADMIN: HCPCS | Performed by: NURSE PRACTITIONER

## 2021-12-08 PROCEDURE — G8417 CALC BMI ABV UP PARAM F/U: HCPCS | Performed by: NURSE PRACTITIONER

## 2021-12-08 ASSESSMENT — ENCOUNTER SYMPTOMS
GASTROINTESTINAL NEGATIVE: 1
SHORTNESS OF BREATH: 0
CONSTIPATION: 0
COUGH: 0
EYES NEGATIVE: 1
DIARRHEA: 0
BACK PAIN: 1
TROUBLE SWALLOWING: 0

## 2021-12-08 NOTE — PATIENT INSTRUCTIONS
Patient Education        Sacroiliac Pain: Exercises  Introduction  Here are some examples of exercises for you to try. The exercises may be suggested for a condition or for rehabilitation. Start each exercise slowly. Ease off the exercises if you start to have pain. You will be told when to start these exercises and which ones will work best for you. How to do the exercises  Knee-to-chest stretch    1. Do not do the knee-to-chest exercise if it causes or increases back or leg pain. 2. Lie on your back with your knees bent and your feet flat on the floor. You can put a small pillow under your head and neck if it is more comfortable. 3. Grasp your hands under one knee and bring the knee to your chest, keeping the other foot flat on the floor. 4. Keep your lower back pressed to the floor. Hold for at least 15 to 30 seconds. 5. Relax and lower the knee to the starting position. Repeat with the other leg. 6. Repeat 2 to 4 times with each leg. 7. To get more stretch, keep your other leg flat on the floor while pulling your knee to your chest.  Bridging    1. Lie on your back with both knees bent. Your knees should be bent about 90 degrees. 2. Tighten your belly muscles by pulling in your belly button toward your spine. Then push your feet into the floor, squeeze your buttocks, and lift your hips off the floor until your shoulders, hips, and knees are all in a straight line. 3. Hold for about 6 seconds as you continue to breathe normally, and then slowly lower your hips back down to the floor and rest for up to 10 seconds. 4. Repeat 8 to 12 times. Hip extension    1. Get down on your hands and knees on the floor. 2. Keeping your back and neck straight, lift one leg straight out behind you. When you lift your leg, keep your hips level. Don't let your back twist, and don't let your hip drop toward the floor. 3. Hold for 6 seconds. Repeat 8 to 12 times with each leg.   4. If you feel steady and strong when you one foot off the floor and bring your knee toward your chest, so that your knee is straight above your hip and your leg is bent like the letter \"L. \"  4. Lift the other knee up to the same position. 5. Lower one leg at a time to the starting position. 6. Keep alternating legs until you have lifted each leg 8 to 12 times. 7. Be sure to keep your belly muscles tight and your back still as you are moving your legs. Be sure to breathe normally. Piriformis stretch    1. Lie on your back with your legs straight. 2. Lift your affected leg, and bend your knee. With your opposite hand, reach across your body, and then gently pull your knee toward your opposite shoulder. 3. Hold the stretch for 15 to 30 seconds. 4. Switch legs and repeat steps 1 through 3.  5. Repeat 2 to 4 times. Follow-up care is a key part of your treatment and safety. Be sure to make and go to all appointments, and call your doctor if you are having problems. It's also a good idea to know your test results and keep a list of the medicines you take. Where can you learn more? Go to https://[employer] Healthe[employer].fromAtoB. org and sign in to your [employer] account. Enter P245 in the [facility] box to learn more about \"Sacroiliac Pain: Exercises. \"     If you do not have an account, please click on the \"Sign Up Now\" link. Current as of: July 1, 2021               Content Version: 13.0  © 2006-2021 [employer]wise[employer] Inc[employer]. Care instructions adapted under license by [facility] ([facility]). If you have questions about a medical condition or this instruction, always ask your healthcare professional. [place] [address] any warranty or liability for your use of this information.

## 2021-12-08 NOTE — PROGRESS NOTES
Jose George  (1980)    2021    Subjective:     Jose George is 39 y.o. female who complains today of:    Chief Complaint   Patient presents with    Back Pain     Lower, right         Allergies:  Ibuprofen and Morphine    Past Medical History:   Diagnosis Date    Acid reflux     Anxiety     Breast cyst     Depression     Generalized anxiety disorder     H/O tubal ligation     Hiatal hernia 13    Hypothyroidism     Hypothyroidism     IBS (irritable bowel syndrome)     Obesity     Tx'd with Adipex     Past Surgical History:   Procedure Laterality Date    BARIATRIC SURGERY  2018    GASTRIC SLEEVE    BREAST REDUCTION SURGERY      BREAST SURGERY  2007    Reduction     SECTION  2004    CHOLECYSTECTOMY      COLONOSCOPY N/A 2020    COLONOSCOPY DIAGNOSTIC performed by Jhoana Mckeon MD at [address] [place] [address], [facility] N/A 2020    TL[facility] performed by Dottie Harding MD at [place] [address]      UPPER GASTROINTESTINAL ENDOSCOPY  2013    Dr Abimael Ponce     Family History   Problem Relation Age of Onset    High Blood Pressure Mother         No FDR with colon cancer     Crohn's Disease Mother     Cancer Father         Lung cancer    Mental Illness Brother     Heart Disease Brother     Asthma Brother     Colon Cancer Neg Hx      Social History     Socioeconomic History    Marital status: Single     Spouse name: Not on file    Number of children: Not on file    Years of education: Not on file    Highest education level: Not on file   Occupational History    Not on file   Tobacco Use    Smoking status: Former Smoker     Packs/day: 0.50     Years: 3.00     Pack years: 1.50     Types: Cigarettes     Quit date: 2018     Years since quitting: 3.3    Smokeless tobacco: Never Used   Vaping Use    Vaping Use: Never used   Substance and Sexual Activity    Alcohol use: Never     Comment: Rare use    Drug use: No    Sexual activity: Yes     Partners: Male   Other Topics Concern    Not on file   Social History Narrative    Not on file     Social Determinants of Health     Financial Resource Strain: Low Risk     Difficulty of Paying Living Expenses: Not hard at all   Food Insecurity: No Food Insecurity    Worried About Running Out of Food in the Last Year: Never true    [address] in the Last Year: Never true   Transportation Needs: No Transportation Needs    Lack of Transportation (Medical): No    Lack of Transportation (Non-Medical):  No   Physical Activity:     Days of Exercise per Week: Not on file    Minutes of Exercise per Session: Not on file   Stress:     Feeling of Stress : Not on file   Social Connections:     Frequency of Communication with Friends and Family: Not on file    Frequency of Social Gatherings with Friends and Family: Not on file    Attends Temple Services: Not on file    Active Member of [address] [place] [address] or Organizations: Not on file    Attends Club or Organization Meetings: Not on file    Marital Status: Not on file   Intimate Partner Violence:     Fear of Current or Ex-Partner: Not on file    Emotionally Abused: Not on file    Physically Abused: Not on file    Sexually Abused: Not on file   Housing Stability:     Unable to Pay for Housing in the Last Year: Not on file    Number of [place] in the Last Year: Not on file    Unstable Housing in the Last Year: Not on file       Current Outpatient Medications on File Prior to Visit   Medication Sig Dispense Refill    albuterol sulfate HFA (VENTOLIN HFA) 108 (90 Base) MCG/ACT inhaler Inhale 1 puff into the lungs every 6 hours as needed for Wheezing or Shortness of Breath 18 g 0    pantoprazole (PROTONIX) 40 MG tablet Take 1 tablet by mouth 2 times daily (before meals) (Patient taking differently: Take 40 mg by mouth daily ) 60 tablet 1    cyclobenzaprine (FLEXERIL) 10 MG tablet TAKE 1 TABLET BY MOUTH NIGHTLY with some foraminal narrowing noted. 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 Incidental finding of suspected hepatic enlargement and borderline splenic size and possible right ovarian cyst.  X-ray report from 4/6/2020 one of the lumbar spine shows significant arthritis and narrowing at L5-S1.       Pt feels pain level 9/10. Pt feels that weather change, lifting/work, being on feet makes the pain worse, and maxfreeze makes the pain better. Pt denies radiating numbness and tingling. Denies recent falls, injuries or trauma. Pt denies new weakness. Pt reports PT has been done in the past.         Review of Systems   Constitutional: Negative. Negative for fatigue. HENT: Negative. Negative for trouble swallowing. Eyes: Negative. Respiratory: Negative for cough and shortness of breath. Cardiovascular: Negative for chest pain. Gastrointestinal: Negative. Negative for constipation and diarrhea. Endocrine: Negative. Genitourinary: Negative. Musculoskeletal: Positive for back pain. Negative for neck pain. Skin: Negative. Neurological: Negative for dizziness, weakness and headaches. Hematological: Negative. Psychiatric/Behavioral: Negative. Objective:     Vitals:  /67 (Site: Right Upper Arm, Position: Sitting)   Pulse 90   Temp 97.2 °F (36.2 °C)   Ht 5' 4\" (1.626 m)   Wt 242 lb (109.8 kg)   LMP 10/18/2020   BMI 41.54 kg/m² Pain Score:   8      Physical Exam  Vitals and nursing note reviewed.          This is a pleasant female who answers questions appropriately and follows commands. Pt is alert and oriented x 3. Recent and remote memory is intact. Mood and affect, judgement and insight are normal.  No signs of distress, no dyspnea or SOB noted. HEENT: PERRL. Neck is supple, trachea midline. No lymphadenopathy noted. Decreased ROM with flexion and extension of low back.   Not too tender with palpation to lumbar but quite tender over SI joints bilaterally with positive provacative maneuvers noted. Positive Raegan's test, Gaenslen test, thigh thrust, and distraction test bilaterally noted on exam. Negative SLR but reproduces low back pain. Tightness in both hamstrings noted. Pt is able to briefly rise up on heels and toes. Balance and coordination normal.  Strength is functional for ambulation. Cranial nerves II-XII are intact.         Assessment:      Diagnosis Orders   1. Sacroiliitis (HCC)  DC INJECT SI JOINT ARTHRGRPHY&/ANES/STEROID W/IMAGE    CHG FLUOR NEEDLE/CATH SPINE/PARASPINAL DX/THER ADDON   2. Lumbosacral spondylosis without myelopathy         Plan:          No orders of the defined types were placed in this encounter. Orders Placed This Encounter   Procedures    CHG FLUOR NEEDLE/CATH SPINE/PARASPINAL DX/THER ADDON     Standing Status:   Future     Standing Expiration Date:   3/8/2022    DC INJECT SI JOINT ARTHRGRPHY&/ANES/STEROID W/IMAGE     B/L SI joint inj with SK     Standing Status:   Future     Standing Expiration Date:   3/8/2022     Discussed options with the patient today. Anatomic model pathology was shown and reviewed with pt. We will order bilateral SI joint injections with Dr. Aniket Arrington to settle down her symptoms. She is not tender over the lumbar spine where she had RF ablation today on exam.  Discussed possibility of a right L4-5 transforaminal epidural injection however she is not complaining of any radicular symptoms. Discussed SI joint stretches in detail. If no relief may need to consult with surgeon as discussed. All questions were answered. Discussed home exercise program.  Relevant imaging and pain generators reviewed. Pt verbalized understanding and agrees with above plan. Pt has chronic pain. OARRS was reviewed. This NP saw pt under direct supervision of Dr. Aniket Arrington. Follow up:  Return for for procedure with Dr. Aniket Arrington.     Martina Mayfield, APRN - CNP

## 2021-12-27 RX ORDER — LEVOTHYROXINE SODIUM 0.15 MG/1
TABLET ORAL
Qty: 90 TABLET | Refills: 0 | Status: SHIPPED | OUTPATIENT
Start: 2021-12-27 | End: 2022-04-11

## 2022-01-03 ENCOUNTER — OFFICE VISIT (OUTPATIENT)
Dept: OBGYN CLINIC | Age: 42
End: 2022-01-03
Payer: COMMERCIAL

## 2022-01-03 VITALS
WEIGHT: 245 LBS | DIASTOLIC BLOOD PRESSURE: 62 MMHG | HEART RATE: 112 BPM | BODY MASS INDEX: 41.83 KG/M2 | HEIGHT: 64 IN | SYSTOLIC BLOOD PRESSURE: 108 MMHG

## 2022-01-03 DIAGNOSIS — R10.2 PELVIC PAIN IN FEMALE: Primary | ICD-10-CM

## 2022-01-03 PROCEDURE — 1036F TOBACCO NON-USER: CPT | Performed by: OBSTETRICS & GYNECOLOGY

## 2022-01-03 PROCEDURE — G8482 FLU IMMUNIZE ORDER/ADMIN: HCPCS | Performed by: OBSTETRICS & GYNECOLOGY

## 2022-01-03 PROCEDURE — G8417 CALC BMI ABV UP PARAM F/U: HCPCS | Performed by: OBSTETRICS & GYNECOLOGY

## 2022-01-03 PROCEDURE — 99213 OFFICE O/P EST LOW 20 MIN: CPT | Performed by: OBSTETRICS & GYNECOLOGY

## 2022-01-03 PROCEDURE — G8427 DOCREV CUR MEDS BY ELIG CLIN: HCPCS | Performed by: OBSTETRICS & GYNECOLOGY

## 2022-01-03 NOTE — PROGRESS NOTES
Results Review      Zena Aly is a 39y.o. year old female here to discuss US results. PREVIOUS VISIT: pelvic pain .      Vitals:  /62 (Site: Right Upper Arm, Position: Sitting, Cuff Size: Large Adult)   Pulse 112   Ht 5' 4\" (1.626 m)   Wt 245 lb (111.1 kg)   LMP 10/18/2020   BMI 42.05 kg/m²   Allergies:  Ibuprofen and Morphine  Past Medical History:   Diagnosis Date    Acid reflux     Anxiety     Breast cyst     Depression     Generalized anxiety disorder     H/O tubal ligation     Hiatal hernia 13    Hypothyroidism     Hypothyroidism     IBS (irritable bowel syndrome)     Obesity     Tx'd with Adipex     Past Surgical History:   Procedure Laterality Date    BARIATRIC SURGERY  2018    GASTRIC SLEEVE    BREAST REDUCTION SURGERY      BREAST SURGERY  2007    Reduction     SECTION      CHOLECYSTECTOMY      COLONOSCOPY N/A 2020    COLONOSCOPY DIAGNOSTIC performed by Carlos Marr MD at [address] [place] [address], VAGINAL N/A 2020    TLH performed by Rafael Asher MD at [place] [address]    UPPER GASTROINTESTINAL ENDOSCOPY  2013    Dr Aaliyah Williamson     OB History        3    Para   3    Term                AB        Living   3       SAB        IAB        Ectopic        Molar        Multiple   1    Live Births              Obstetric Comments   7 yo son,  3 set twins 25 yo girls                    Family History   Problem Relation Age of Onset    High Blood Pressure Mother         No FDR with colon cancer     Crohn's Disease Mother     Cancer Father         Lung cancer    Mental Illness Brother     Heart Disease Brother     Asthma Brother     Colon Cancer Neg Hx      Social History     Socioeconomic History    Marital status: Single     Spouse name: Not on file    Number of children: Not on file    Years of education: Not on file    Highest education level: Not on file Occupational History    Not on file   Tobacco Use    Smoking status: Former Smoker     Packs/day: 0.50     Years: 3.00     Pack years: 1.50     Types: Cigarettes     Quit date: 8/6/2018     Years since quitting: 3.4    Smokeless tobacco: Never Used   Vaping Use    Vaping Use: Never used   Substance and Sexual Activity    Alcohol use: Never     Comment: Rare use    Drug use: No    Sexual activity: Yes     Partners: Male   Other Topics Concern    Not on file   Social History Narrative    Not on file     Social Determinants of Health     Financial Resource Strain: Low Risk     Difficulty of Paying Living Expenses: Not hard at all   Food Insecurity: No Food Insecurity    Worried About Running Out of Food in the Last Year: Never true    Peggy of Food in the Last Year: Never true   Transportation Needs: No Transportation Needs    Lack of Transportation (Medical): No    Lack of Transportation (Non-Medical):  No   Physical Activity:     Days of Exercise per Week: Not on file    Minutes of Exercise per Session: Not on file   Stress:     Feeling of Stress : Not on file   Social Connections:     Frequency of Communication with Friends and Family: Not on file    Frequency of Social Gatherings with Friends and Family: Not on file    Attends Bahai Services: Not on file    Active Member of [address] [place] [address] or Organizations: Not on file    Attends Club or Organization Meetings: Not on file    Marital Status: Not on file   Intimate Partner Violence:     Fear of Current or Ex-Partner: Not on file    Emotionally Abused: Not on file    Physically Abused: Not on file    Sexually Abused: Not on file   Housing Stability:     Unable to Pay for Housing in the Last Year: Not on file    Number of [place] in the Last Year: Not on file    Unstable Housing in the Last Year: Not on file       Contraceptive method:  none    Patient's medications, allergies, past medical, surgical,social and family histories were reviewed and updated as appropriate. Review of Systems  Review of Systems   All other systems reviewed and are negative. Physical exam :   General Appearance: alert and oriented to person, place and time, well-developedand well-nourished, in no acute distress  Skin: warm and dry, no rash or erythema  Eyes: pupils equal, round, and reactive to light, extraocular eye movements intact,conjunctivae normal  Neurological : A & O X 3   Pelvic: Deferred    Results:   Results for orders placed or performed in visit on 10/13/21   POCT COVID-19, Antigen   Result Value Ref Range    SARS-COV-2, POC Not-Detected Not Detected    Lot Number [id number]     QC Pass/Fail pass     Performing Instrument BD Veritor       No results found. Assessment:      Diagnosis Orders   1. Pelvic pain in female            Plan:     US reviewed   Patient re-assured  Continue expectant management     No orders of the defined types were placed in this encounter. No orders of the defined types were placed in this encounter. Follow up:  No follow-ups on file. Risks, benefits and alternative therapies fortreatment discussed. Pt elects expectant management for therapy.          Anatoliy Lamas MD

## 2022-01-19 ENCOUNTER — TELEPHONE (OUTPATIENT)
Dept: PAIN MANAGEMENT | Age: 42
End: 2022-01-19

## 2022-01-19 NOTE — TELEPHONE ENCOUNTER
ORDER PLACED:    Date: 12/8/21  Description: BILAT SI JOINT INJ  Order Number: [id number]  Ordering Provider: Rosey Hightower  Performing Provider: Zuleyka Monroe  CPT Codes: 16971  ICD10 Codes: M46.1    DID NOT RECEIVE ORDER IN [facility]

## 2022-01-28 ENCOUNTER — OFFICE VISIT (OUTPATIENT)
Dept: NEUROLOGY | Age: 42
End: 2022-01-28
Payer: COMMERCIAL

## 2022-01-28 VITALS
WEIGHT: 236 LBS | SYSTOLIC BLOOD PRESSURE: 125 MMHG | DIASTOLIC BLOOD PRESSURE: 82 MMHG | HEART RATE: 90 BPM | BODY MASS INDEX: 40.51 KG/M2

## 2022-01-28 DIAGNOSIS — G44.209 TENSION HEADACHE: Primary | ICD-10-CM

## 2022-01-28 DIAGNOSIS — M54.16 LUMBAR RADICULOPATHY: ICD-10-CM

## 2022-01-28 PROCEDURE — 1036F TOBACCO NON-USER: CPT | Performed by: PSYCHIATRY & NEUROLOGY

## 2022-01-28 PROCEDURE — G8427 DOCREV CUR MEDS BY ELIG CLIN: HCPCS | Performed by: PSYCHIATRY & NEUROLOGY

## 2022-01-28 PROCEDURE — G8482 FLU IMMUNIZE ORDER/ADMIN: HCPCS | Performed by: PSYCHIATRY & NEUROLOGY

## 2022-01-28 PROCEDURE — G8417 CALC BMI ABV UP PARAM F/U: HCPCS | Performed by: PSYCHIATRY & NEUROLOGY

## 2022-01-28 PROCEDURE — 99213 OFFICE O/P EST LOW 20 MIN: CPT | Performed by: PSYCHIATRY & NEUROLOGY

## 2022-01-28 ASSESSMENT — ENCOUNTER SYMPTOMS
VOMITING: 0
NAUSEA: 0
BACK PAIN: 0
COLOR CHANGE: 0
TROUBLE SWALLOWING: 0
PHOTOPHOBIA: 0
SHORTNESS OF BREATH: 0
CHOKING: 0

## 2022-01-28 NOTE — PROGRESS NOTES
Subjective:      Patient ID: Linda Lowe is a 39 y.o. female who presents today for:  Chief Complaint   Patient presents with    Follow-up     pt states her head aches are doing better, not as bad as they were before        HPI 22-year-old right-handed female with a history of tension muscular headache. Patient has chronic daily headaches. Patient has temporal pain and when last seen we had given her Medrol Dosepak with physical therapy and Flexeril at night with pain management. Her headaches are somewhat better. Also has Voltaren gel which she uses intermittently this helps. We continue discussed botulinum toxin  Patient sleeps well and he does not have sleep apnea.     Past Medical History:   Diagnosis Date    Acid reflux     Anxiety     Breast cyst     Depression     Generalized anxiety disorder     H/O tubal ligation     Hiatal hernia 13    Hypothyroidism     Hypothyroidism     IBS (irritable bowel syndrome)     Obesity     Tx'd with Adipex     Past Surgical History:   Procedure Laterality Date    BARIATRIC SURGERY  2018    GASTRIC SLEEVE    BREAST REDUCTION SURGERY      BREAST SURGERY  2007    Reduction     SECTION  2004    CHOLECYSTECTOMY      COLONOSCOPY N/A 2020    COLONOSCOPY DIAGNOSTIC performed by Sri Tenorio MD at [address] [place] [address], [facility] N/A 2020    TLH performed by Fernando Najera MD at [place] [address]    UPPER GASTROINTESTINAL ENDOSCOPY  2013    Dr Mardell Hodgkins     Social History     Socioeconomic History    Marital status: Single     Spouse name: Not on file    Number of children: Not on file    Years of education: Not on file    Highest education level: Not on file   Occupational History    Not on file   Tobacco Use    Smoking status: Former Smoker     Packs/day: 0.50     Years: 3.00     Pack years: 1.50     Types: Cigarettes     Quit date: 2018     Years since quitting: 3.4    Smokeless tobacco: Never Used   Vaping Use    Vaping Use: Never used   Substance and Sexual Activity    Alcohol use: Never     Comment: Rare use    Drug use: No    Sexual activity: Yes     Partners: Male   Other Topics Concern    Not on file   Social History Narrative    Not on file     Social Determinants of Health     Financial Resource Strain: Low Risk     Difficulty of Paying Living Expenses: Not hard at all   Food Insecurity: No Food Insecurity    Worried About Running Out of Food in the Last Year: Never true    [address] in the Last Year: Never true   Transportation Needs: No Transportation Needs    Lack of Transportation (Medical): No    Lack of Transportation (Non-Medical):  No   Physical Activity:     Days of Exercise per Week: Not on file    Minutes of Exercise per Session: Not on file   Stress:     Feeling of Stress : Not on file   Social Connections:     Frequency of Communication with Friends and Family: Not on file    Frequency of Social Gatherings with Friends and Family: Not on file    Attends Taoism Services: Not on file    Active Member of [address] [place] [address] or Organizations: Not on file    Attends Club or Organization Meetings: Not on file    Marital Status: Not on file   Intimate Partner Violence:     Fear of Current or Ex-Partner: Not on file    Emotionally Abused: Not on file    Physically Abused: Not on file    Sexually Abused: Not on file   Housing Stability:     Unable to Pay for Housing in the Last Year: Not on file    Number of [place] in the Last Year: Not on file    Unstable Housing in the Last Year: Not on file     Family History   Problem Relation Age of Onset    High Blood Pressure Mother         No FDR with colon cancer     Crohn's Disease Mother     Cancer Father         Lung cancer    Mental Illness Brother     Heart Disease Brother     Asthma Brother     Colon Cancer Neg Hx      Allergies   Allergen Reactions    Ibuprofen Other (See Comments)     Can't have with gastric sleeve    Morphine Nausea And Vomiting       Current Outpatient Medications   Medication Sig Dispense Refill    levothyroxine (SYNTHROID) 150 MCG tablet TAKE ONE TABLET BY MOUTH EVERY DAY 90 tablet 0    albuterol sulfate HFA (VENTOLIN HFA) 108 (90 Base) MCG/ACT inhaler Inhale 1 puff into the lungs every 6 hours as needed for Wheezing or Shortness of Breath 18 g 0    pantoprazole (PROTONIX) 40 MG tablet Take 1 tablet by mouth 2 times daily (before meals) (Patient taking differently: Take 40 mg by mouth daily ) 60 tablet 1    cyclobenzaprine (FLEXERIL) 10 MG tablet TAKE 1 TABLET BY MOUTH NIGHTLY      metoprolol succinate (TOPROL XL) 25 MG extended release tablet Take 1 tablet by mouth nightly 90 tablet 3    rOPINIRole (REQUIP) 0.5 MG tablet take 1 tablet by mouth nightly as needed (restless legs) 90 tablet 3    busPIRone (BUSPAR) 10 MG tablet Take 1 tablet by mouth every 8 hours as needed (anxiety) 270 tablet 3    diclofenac sodium (VOLTAREN) 1 % GEL Apply topically 2 times daily 100 g 3    traZODone (DESYREL) 100 MG tablet Take 1 tablet by mouth nightly 90 tablet 3    acetaminophen (APAP EXTRA STRENGTH) 500 MG tablet Take 2 tablets by mouth every 6 hours as needed for Pain 60 tablet 1    Multiple Vitamin (MULTI VITAMIN DAILY PO) Take 1 tablet by mouth daily      Probiotic Product (PROBIOTIC DAILY PO) Take 1 tablet by mouth daily      fluticasone (FLONASE) 50 MCG/ACT nasal spray 1 spray by Nasal route daily 1 Bottle 0    Ascorbic Acid (VITAMIN C) 250 MG tablet Take 500 mg by mouth daily        No current facility-administered medications for this visit. Review of Systems   Constitutional: Negative for fever. HENT: Negative for ear pain, tinnitus and trouble swallowing. Eyes: Negative for photophobia and visual disturbance. Respiratory: Negative for choking and shortness of breath. Cardiovascular: Negative for chest pain and palpitations. Gastrointestinal: Negative for nausea and vomiting. Musculoskeletal: Negative for back pain, gait problem, joint swelling, myalgias, neck pain and neck stiffness. Skin: Negative for color change. Allergic/Immunologic: Negative for food allergies. Neurological: Negative for dizziness, tremors, seizures, syncope, facial asymmetry, speech difficulty, weakness, light-headedness, numbness and headaches. Psychiatric/Behavioral: Negative for behavioral problems, confusion, hallucinations and sleep disturbance. Objective:   /82 (Site: Left Upper Arm, Position: Sitting, Cuff Size: Medium Adult)   Pulse 90   Wt 236 lb (107 kg)   LMP 10/18/2020   BMI 40.51 kg/m²     Physical Exam  Vitals reviewed. Eyes:      Pupils: Pupils are equal, round, and reactive to light. Cardiovascular:      Rate and Rhythm: Normal rate and regular rhythm. Heart sounds: No murmur heard. Pulmonary:      Effort: Pulmonary effort is normal.      Breath sounds: Normal breath sounds. Abdominal:      General: Bowel sounds are normal.   Musculoskeletal:         General: Normal range of motion. Cervical back: Normal range of motion. Skin:     General: Skin is warm. Neurological:      Mental Status: She is alert and oriented to person, place, and time. Cranial Nerves: No cranial nerve deficit. Sensory: No sensory deficit. Motor: No abnormal muscle tone. Coordination: Coordination normal.      Deep Tendon Reflexes: Reflexes are normal and symmetric. Babinski sign absent on the right side. Babinski sign absent on the left side. Psychiatric:         Mood and Affect: Mood normal.         US ABDOMEN COMPLETE    Result Date: 6/16/2021  EXAMINATION: US ABDOMEN COMPLETE CLINICAL HISTORY: 36year-old with possible enlarged liver and spleen on MRI of the lumbar spine COMPARISONS: MRI lumbar spine 5/12/2021 FINDINGS: Complete upper abdominal sonography was performed.  The study is compromised due to patient body habitus and sonographic window. Visualized liver demonstrates a nonspecific coarsened echotexture throughout. However no definite hepatic masses or intrahepatic biliary ductal dilatations. There is a prominent Riedel's right lobe of the liver. Gallbladder is surgically absent. Common duct in the tripp hepatis is normal in caliber measuring 5 mm. Pancreas is mostly obscured by bowel gas and not adequately assessed on this examination. Limited views of the upper abdominal aorta and IVC are within normal limits. Spleen measures about 12 cm in craniocaudal dimension and has a volume of approximately  230 mL which is not significantly enlarged. On very compromised views no large splenic cysts or masses. No definite perisplenic fluid collections. Kidneys are normal in size and position. Right kidney measures 10.9 cm in length and left kidney measures 10.5 cm in length. There is a prominent right extrarenal pelvis. No ultrasound signs of hydronephrosis. Kidneys are incompletely assessed in their entirety     NONSPECIFIC COARSENING OF THE HEPATIC ECHOTEXTURE. OTHERWISE, ESSENTIALLY UNREMARKABLE UPPER ABDOMINAL ULTRASONOGRAPHY GIVEN LIMITATIONS AS DETAILED ABOVE    US NON OB TRANSVAGINAL    Result Date: 6/16/2021  EXAMINATION: US NON OB TRANSVAGINAL, US PELVIS COMPLETE CLINICAL HISTORY: 36year-old with functional ovarian cysts demonstrated on MRI of the lumbar spine. Patient is status post hysterectomy. She has an elevated body mass index COMPARISONS: Pelvic ultrasound 10/11/2020 FINDINGS: Transabdominal and transvaginal ultrasounds of the pelvis were performed. The transabdominal study is compromised due to poor bladder distention as well as patient body habitus. Patient is status post hysterectomy allowing bowel loops to fall into the pelvis. On these transabdominal views no large cystic adnexal masses or significant free fluid in the pelvis.  On the transvaginal examination, the right ovary is imaged in the right adnexa. It is normal in size and echogenicity. Right ovary measures 2.5 x 1.6 x 1.8 cm with a volume of 3.7 mL and contains several follicles the largest measuring 1 cm. Color flow, low resistive arterial venous waveforms are documented within the right ovary. Bowel gas limits assessment of the left adnexa. Left ovary is not imaged. No significant free fluid in the pelvis. LIMITED PELVIC ULTRASOUND. PATIENT IS STATUS POST HYSTERECTOMY. NORMAL TRANSVAGINAL ULTRASOUND APPEARANCE OF THE RIGHT OVARY AND NONVISUALIZATION OF THE LEFT OVARY. US PELVIS COMPLETE    Result Date: 6/16/2021  EXAMINATION: US NON OB TRANSVAGINAL, US PELVIS COMPLETE CLINICAL HISTORY: 36year-old with functional ovarian cysts demonstrated on MRI of the lumbar spine. Patient is status post hysterectomy. She has an elevated body mass index COMPARISONS: Pelvic ultrasound 10/11/2020 FINDINGS: Transabdominal and transvaginal ultrasounds of the pelvis were performed. The transabdominal study is compromised due to poor bladder distention as well as patient body habitus. Patient is status post hysterectomy allowing bowel loops to fall into the pelvis. On these transabdominal views no large cystic adnexal masses or significant free fluid in the pelvis. On the transvaginal examination, the right ovary is imaged in the right adnexa. It is normal in size and echogenicity. Right ovary measures 2.5 x 1.6 x 1.8 cm with a volume of 3.7 mL and contains several follicles the largest measuring 1 cm. Color flow, low resistive arterial venous waveforms are documented within the right ovary. Bowel gas limits assessment of the left adnexa. Left ovary is not imaged. No significant free fluid in the pelvis. LIMITED PELVIC ULTRASOUND. PATIENT IS STATUS POST HYSTERECTOMY. NORMAL TRANSVAGINAL ULTRASOUND APPEARANCE OF THE RIGHT OVARY AND NONVISUALIZATION OF THE LEFT OVARY.       Lab Results   Component Value Date    WBC 5.2 07/10/2021    RBC 4.21 07/10/2021 RBC 4.13 10/01/2018    HGB 14.0 07/10/2021    HCT 40.9 07/10/2021    MCV 97.3 07/10/2021    MCH 33.3 07/10/2021    MCHC 34.3 07/10/2021    RDW 12.3 07/10/2021     07/10/2021    MPV 10.0 10/01/2018     Lab Results   Component Value Date     07/10/2021    K 3.9 07/10/2021    K 4.5 12/04/2019     07/10/2021    CO2 27 07/10/2021    BUN 14 07/10/2021    CREATININE 0.71 07/10/2021    GFRAA >60.0 07/10/2021    LABGLOM >60.0 07/10/2021    GLUCOSE 81 07/10/2021    GLUCOSE 97 10/01/2018    PROT 7.7 07/10/2021    LABALBU 4.3 07/10/2021    LABALBU 4.8 09/15/2011    CALCIUM 9.7 07/10/2021    BILITOT 0.5 07/10/2021    ALKPHOS 76 07/10/2021    AST 17 07/10/2021    ALT 22 07/10/2021     Lab Results   Component Value Date    PROTIME 14.1 12/03/2019    INR 1.0 12/03/2019     Lab Results   Component Value Date    TSH 0.144 06/09/2021    GSGILLBO75 386 12/03/2019    FOLATE 11.1 12/03/2019    FERRITIN 119.2 11/25/2020    IRON 93 11/25/2020    TIBC 269 11/25/2020     Lab Results   Component Value Date    TRIG 98 07/10/2021    HDL 54 07/10/2021    LDLCALC 129 07/10/2021     No results found for: LABAMPH, BARBSCNU, LABBENZ, CANNAB, COCAINESCRN, LABMETH, OPIATESCREENURINE, PHENCYCLIDINESCREENURINE, PPXUR, ETOH  No results found for: LITHIUM, DILFRTOT, VALPROATE    Assessment:       Diagnosis Orders   1. Tension headache     2. Lumbar radiculopathy     Tension muscular headaches with tightness of the muscles. Patient actually doing well with Voltaren gel and occasional use of Flexeril. Patient does have sleep issues with restless leg syndrome but no sleep apnea is described. We have continued her on a combination of this medication and she is doing well without any requirement of changes. We continue to discuss about amatoxin if her headaches get worse. Plan:      No orders of the defined types were placed in this encounter. No orders of the defined types were placed in this encounter.       No follow-ups on file.      Jet Cervantes MD

## 2022-01-31 NOTE — TELEPHONE ENCOUNTER
GORGE SI JOINT INJ    AUTH FROM 1/19/22-3/19/22    OK to schedule procedure approved as above. Please note sides/levels approved and date range.    (If applicable, sides/levels approved may differ from those ordered)    TO BE SCHEDULED WITH DR. Jazmin La

## 2022-01-31 NOTE — TELEPHONE ENCOUNTER
AUTHORIZATION:    INSURANCE: SANYA LING VIA: Marilee Robertsoninter #: [id number]    DATE RANGE: 01/19/22-3/19/22    TELEPHONE CALL ROUTED TO MA TO SCHEDULE.

## 2022-02-01 NOTE — TELEPHONE ENCOUNTER
Patient is requesting medication refill.  Please approve or deny this request.    Rx requested:  Requested Prescriptions     Pending Prescriptions Disp Refills    diclofenac sodium (VOLTAREN) 1 %  g 3     Sig: Apply topically 2 times daily    cyclobenzaprine (FLEXERIL) 10 MG tablet 30 tablet 3     Sig: Take 1 tablet by mouth nightly         Last Office Visit:   1/28/2022      Next Visit Date:  Future Appointments   Date Time Provider Sangeeta Agarwal   2/7/2022  2:30 PM DO GREGORIA Seth PM [facility] EMERGENCY [facility] AT [place]   2/28/2022  1:30 PM Venita Gosselin,  [address]   4/15/2022  1:45 PM Angle Musa MD [facility]   5/24/2022  3:00 PM Leo Chávez MD [address] [place] [address]   7/25/2022  3:00 PM Devin Plata MD [facility]

## 2022-02-02 RX ORDER — CYCLOBENZAPRINE HCL 10 MG
10 TABLET ORAL NIGHTLY
Qty: 30 TABLET | Refills: 3 | Status: SHIPPED | OUTPATIENT
Start: 2022-02-02 | End: 2022-03-04

## 2022-02-07 ENCOUNTER — PROCEDURE VISIT (OUTPATIENT)
Dept: PAIN MANAGEMENT | Age: 42
End: 2022-02-07
Payer: COMMERCIAL

## 2022-02-07 DIAGNOSIS — M46.1 SACROILIITIS (HCC): ICD-10-CM

## 2022-02-07 PROCEDURE — 27096 INJECT SACROILIAC JOINT: CPT | Performed by: PAIN MEDICINE

## 2022-02-07 RX ORDER — BETAMETHASONE SODIUM PHOSPHATE AND BETAMETHASONE ACETATE 3; 3 MG/ML; MG/ML
6 INJECTION, SUSPENSION INTRA-ARTICULAR; INTRALESIONAL; INTRAMUSCULAR; SOFT TISSUE ONCE
Status: COMPLETED | OUTPATIENT
Start: 2022-02-07 | End: 2022-02-07

## 2022-02-07 RX ORDER — LIDOCAINE HYDROCHLORIDE 10 MG/ML
10 INJECTION, SOLUTION EPIDURAL; INFILTRATION; INTRACAUDAL; PERINEURAL ONCE
Status: COMPLETED | OUTPATIENT
Start: 2022-02-07 | End: 2022-02-07

## 2022-02-07 RX ADMIN — BETAMETHASONE SODIUM PHOSPHATE AND BETAMETHASONE ACETATE 6 MG: 3; 3 INJECTION, SUSPENSION INTRA-ARTICULAR; INTRALESIONAL; INTRAMUSCULAR; SOFT TISSUE at 14:25

## 2022-02-07 RX ADMIN — LIDOCAINE HYDROCHLORIDE 10 MG: 10 INJECTION, SOLUTION EPIDURAL; INFILTRATION; INTRACAUDAL; PERINEURAL at 14:24

## 2022-02-07 NOTE — PROGRESS NOTES
[facility]) Physicians  Neurosurgery and Pain [address] [facility] [address]: [phone]  F: [phone]      Patient Name: Jay Santos  : 1980     Date:  2022      Physician: Rosa Weber DO        Jay Santos is here today for interventional pain management. Preoperatively, the patient presents with SI joint mediated pain, as per history and exam. Patient has failed NSAIDs, PT, and conservative treatment. Patient has significant psychological and functional impairment due to this condition. Standard ASIPP guidelines were followed and sterile technique used. Area was cleaned with Betadine x3. Informed consent was obtained. Fluoroscopic guidance was used for this procedure. S.I. JOINT:  Bilateral  Appropriate length spinal needle was advanced to the S.I. Joint. Negative aspiration was achieved. In total, approximately 6mg of Betamethasone and 2ml of 1% preservative free Lidocaine was injected without difficulty. Patient tolerated the procedure well, no obvious complications occurred during the procedure. Patient was appropriately monitored and discharged home in stable condition with their usual motor strength. Post Op Instructions were given to patient. Relevant and recent imaging reviewed with patient today.                                       Rosa Weber DO

## 2022-02-21 DIAGNOSIS — E03.9 HYPOTHYROIDISM, UNSPECIFIED TYPE: ICD-10-CM

## 2022-02-21 LAB
T4 FREE: 1.16 NG/DL (ref 0.84–1.68)
TSH REFLEX: 0.64 UIU/ML (ref 0.44–3.86)

## 2022-02-28 ENCOUNTER — OFFICE VISIT (OUTPATIENT)
Dept: ENDOCRINOLOGY | Age: 42
End: 2022-02-28
Payer: COMMERCIAL

## 2022-02-28 VITALS
SYSTOLIC BLOOD PRESSURE: 109 MMHG | WEIGHT: 241 LBS | BODY MASS INDEX: 41.15 KG/M2 | DIASTOLIC BLOOD PRESSURE: 76 MMHG | HEART RATE: 83 BPM | OXYGEN SATURATION: 99 % | HEIGHT: 64 IN

## 2022-02-28 DIAGNOSIS — E66.01 MORBID OBESITY (HCC): ICD-10-CM

## 2022-02-28 DIAGNOSIS — E03.9 HYPOTHYROIDISM, UNSPECIFIED TYPE: Primary | ICD-10-CM

## 2022-02-28 PROCEDURE — G8417 CALC BMI ABV UP PARAM F/U: HCPCS | Performed by: INTERNAL MEDICINE

## 2022-02-28 PROCEDURE — G8427 DOCREV CUR MEDS BY ELIG CLIN: HCPCS | Performed by: INTERNAL MEDICINE

## 2022-02-28 PROCEDURE — 1036F TOBACCO NON-USER: CPT | Performed by: INTERNAL MEDICINE

## 2022-02-28 PROCEDURE — G8482 FLU IMMUNIZE ORDER/ADMIN: HCPCS | Performed by: INTERNAL MEDICINE

## 2022-02-28 PROCEDURE — 99213 OFFICE O/P EST LOW 20 MIN: CPT | Performed by: INTERNAL MEDICINE

## 2022-02-28 RX ORDER — PHENTERMINE HYDROCHLORIDE 37.5 MG/1
37.5 TABLET ORAL
Qty: 30 TABLET | Refills: 0 | Status: SHIPPED | OUTPATIENT
Start: 2022-02-28 | End: 2022-03-30

## 2022-02-28 NOTE — PROGRESS NOTES
2/28/2022    Assessment:       Diagnosis Orders   1. Hypothyroidism, unspecified type  TSH with Reflex    T4, Free   2. Morbid obesity ([address]  phentermine (ADIPEX-P) 37.5 MG tablet         PLAN:     Orders Placed This Encounter   Procedures    TSH with Reflex     Standing Status:   Future     Standing Expiration Date:   2/28/2023    T4, Free     Standing Status:   Future     Standing Expiration Date:   2/28/2023     Continue Synthroid 50 mcg daily  Orders Placed This Encounter   Medications    phentermine (ADIPEX-P) 37.5 MG tablet     Sig: Take 1 tablet by mouth every morning (before breakfast) for 30 days. Dispense:  30 tablet     Refill:  0       Subjective:     Chief Complaint   Patient presents with    Hypothyroidism     Vitals:    02/28/22 1319   BP: 109/76   Pulse: 83   SpO2: 99%   Weight: 241 lb (109.3 kg)   Height: 5' 4\" (1.626 m)     Wt Readings from Last 3 Encounters:   02/28/22 241 lb (109.3 kg)   01/28/22 236 lb (107 kg)   01/03/22 245 lb (111.1 kg)     BP Readings from Last 3 Encounters:   02/28/22 109/76   01/28/22 125/82   01/03/22 108/62     Follow-up on hypothyroidism obesity patient also start phentermine BMI is 41 on replacement with levothyroxine 150 mcg daily    Other  This is a recurrent (Hypothyroidism) problem. The current episode started more than 1 year ago. The problem occurs intermittently. Treatments tried: Levothyroxine. The treatment provided moderate relief. Results for Mike Su (MRN [id number]) as of 3/6/2022 22:22   Ref.  Range 2/21/2022 13:56   TSH Latest Ref Range: 0.440 - 3.860 uIU/mL 0.640   T4 Free Latest Ref Range: 0.84 - 1.68 ng/dL 1.16       Past Medical History:   Diagnosis Date    Acid reflux     Anxiety     Breast cyst     Depression     Generalized anxiety disorder     H/O tubal ligation     Hiatal hernia 11/18/13    Hypothyroidism     Hypothyroidism     IBS (irritable bowel syndrome)     Obesity     Tx'd with Adipex     Past Surgical History:   Procedure Laterality Date    BARIATRIC SURGERY  2018    GASTRIC SLEEVE    BREAST REDUCTION SURGERY      BREAST SURGERY  2007    Reduction     SECTION  2004    CHOLECYSTECTOMY  1996    COLONOSCOPY N/A 2020    COLONOSCOPY DIAGNOSTIC performed by Steph Najera MD at [address] [place] [address] N/A 2020    TLH performed by Claudia Aldrich MD at [place] [address]    UPPER GASTROINTESTINAL ENDOSCOPY  2013    Dr Oliveira Counts     Social History     Socioeconomic History    Marital status: Single     Spouse name: Not on file    Number of children: Not on file    Years of education: Not on file    Highest education level: Not on file   Occupational History    Not on file   Tobacco Use    Smoking status: Former Smoker     Packs/day: 0.50     Years: 3.00     Pack years: 1.50     Types: Cigarettes     Quit date: 2018     Years since quitting: 3.5    Smokeless tobacco: Never Used   Vaping Use    Vaping Use: Never used   Substance and Sexual Activity    Alcohol use: Never     Comment: Rare use    Drug use: No    Sexual activity: Yes     Partners: Male   Other Topics Concern    Not on file   Social History Narrative    Not on file     Social Determinants of Health     Financial Resource Strain: Low Risk     Difficulty of Paying Living Expenses: Not hard at all   Food Insecurity: No Food Insecurity    Worried About [address] [place] [address] in the Last Year: Never true    Peggy of Food in the Last Year: Never true   Transportation Needs: No Transportation Needs    Lack of Transportation (Medical): No    Lack of Transportation (Non-Medical):  No   Physical Activity:     Days of Exercise per Week: Not on file    Minutes of Exercise per Session: Not on file   Stress:     Feeling of Stress : Not on file   Social Connections:     Frequency of Communication with Friends and Family: Not on file    Frequency of Social Gatherings with Friends and Family: Not on file    Attends Synagogue Services: Not on file    Active Member of Clubs or Organizations: Not on file    Attends Club or Organization Meetings: Not on file    Marital Status: Not on file   Intimate Partner Violence:     Fear of Current or Ex-Partner: Not on file    Emotionally Abused: Not on file    Physically Abused: Not on file    Sexually Abused: Not on file   Housing Stability:     Unable to Pay for Housing in the Last Year: Not on file    Number of [place] in the Last Year: Not on file    Unstable Housing in the Last Year: Not on file     Family History   Problem Relation Age of Onset    High Blood Pressure Mother         No FDR with colon cancer     Crohn's Disease Mother     Cancer Father         Lung cancer    Mental Illness Brother     Heart Disease Brother     Asthma Brother     Colon Cancer Neg Hx      Allergies   Allergen Reactions    Ibuprofen Other (See Comments)     Can't have with gastric sleeve    Morphine Nausea And Vomiting       Current Outpatient Medications:     diclofenac sodium (VOLTAREN) 1 % GEL, Apply topically 2 times daily, Disp: 100 g, Rfl: 3    cyclobenzaprine (FLEXERIL) 10 MG tablet, Take 1 tablet by mouth nightly, Disp: 30 tablet, Rfl: 3    levothyroxine (SYNTHROID) 150 MCG tablet, TAKE ONE TABLET BY MOUTH EVERY DAY, Disp: 90 tablet, Rfl: 0    albuterol sulfate HFA (VENTOLIN HFA) 108 (90 Base) MCG/ACT inhaler, Inhale 1 puff into the lungs every 6 hours as needed for Wheezing or Shortness of Breath, Disp: 18 g, Rfl: 0    pantoprazole (PROTONIX) 40 MG tablet, Take 1 tablet by mouth 2 times daily (before meals) (Patient taking differently: Take 40 mg by mouth daily ), Disp: 60 tablet, Rfl: 1    metoprolol succinate (TOPROL XL) 25 MG extended release tablet, Take 1 tablet by mouth nightly, Disp: 90 tablet, Rfl: 3    rOPINIRole (REQUIP) 0.5 MG tablet, take 1 tablet by mouth nightly as needed (restless legs), Disp: 90 tablet, Rfl: 3    busPIRone (BUSPAR) 10 MG tablet, Take 1 tablet by mouth every 8 hours as needed (anxiety), Disp: 270 tablet, Rfl: 3    traZODone (DESYREL) 100 MG tablet, Take 1 tablet by mouth nightly, Disp: 90 tablet, Rfl: 3    acetaminophen (APAP EXTRA STRENGTH) 500 MG tablet, Take 2 tablets by mouth every 6 hours as needed for Pain, Disp: 60 tablet, Rfl: 1    Multiple Vitamin (MULTI VITAMIN DAILY PO), Take 1 tablet by mouth daily, Disp: , Rfl:     Probiotic Product (PROBIOTIC DAILY PO), Take 1 tablet by mouth daily, Disp: , Rfl:     fluticasone (FLONASE) 50 MCG/ACT nasal spray, 1 spray by Nasal route daily, Disp: 1 Bottle, Rfl: 0    Ascorbic Acid (VITAMIN C) 250 MG tablet, Take 500 mg by mouth daily , Disp: , Rfl:   Lab Results   Component Value Date     07/10/2021    K 3.9 07/10/2021     07/10/2021    CO2 27 07/10/2021    BUN 14 07/10/2021    CREATININE 0.71 07/10/2021    GLUCOSE 81 07/10/2021    CALCIUM 9.7 07/10/2021    PROT 7.7 07/10/2021    LABALBU 4.3 07/10/2021    BILITOT 0.5 07/10/2021    ALKPHOS 76 07/10/2021    AST 17 07/10/2021    ALT 22 07/10/2021    LABGLOM >60.0 07/10/2021    GFRAA >60.0 07/10/2021    GLOB 3.4 07/10/2021     Lab Results   Component Value Date    WBC 5.2 07/10/2021    HGB 14.0 07/10/2021    HCT 40.9 07/10/2021    MCV 97.3 07/10/2021     07/10/2021     Lab Results   Component Value Date    LABA1C <4.2 (L) 07/10/2021    LABA1C 4.4 (L) 09/22/2020    LABA1C 4.3 (L) 01/03/2020     Lab Results   Component Value Date    HDL 54 07/10/2021    HDL 51 09/22/2020    HDL 50 01/03/2020    LDLCALC 129 07/10/2021    LDLCALC 86 09/22/2020    LDLCALC 95 01/03/2020    CHOL 203 (H) 07/10/2021    CHOL 158 09/22/2020    CHOL 176 01/03/2020    TRIG 98 07/10/2021    TRIG 107 09/22/2020    TRIG 153 (H) 01/03/2020     No results found for: TESTM  Lab Results   Component Value Date    TSH 0.144 (L) 06/09/2021    TSH 18.120 (H) 04/13/2021    TSH 0.318 (L) 11/25/2020    TSHREFLEX 0.640 02/21/2022    TSHREFLEX 1.450 08/24/2021    TSHREFLEX 8.260 (H) 07/10/2021    FT3 2.3 03/14/2019    FT3 2.3 06/28/2016    T4FREE 1.16 02/21/2022    T4FREE 1.42 08/24/2021    T4FREE 0.94 07/10/2021     Lab Results   Component Value Date    TPOABS <10.0 04/24/2020       Review of Systems    Objective:   Physical Exam  Vitals reviewed. Constitutional:       Appearance: Normal appearance. She is obese. HENT:      Head: Normocephalic and atraumatic. Hair is normal.      Right Ear: External ear normal.      Left Ear: External ear normal.      Nose: Nose normal.   Eyes:      General: No scleral icterus. Right eye: No discharge. Left eye: No discharge. Extraocular Movements: Extraocular movements intact. Conjunctiva/sclera: Conjunctivae normal.   Neck:      Trachea: Trachea normal.   Cardiovascular:      Rate and Rhythm: Normal rate. Pulmonary:      Effort: Pulmonary effort is normal.   Musculoskeletal:         General: Normal range of motion. Cervical back: Normal range of motion and neck supple. Neurological:      General: No focal deficit present. Mental Status: She is alert and oriented to person, place, and time.    Psychiatric:         Mood and Affect: Mood normal.         Behavior: Behavior normal.

## 2022-04-10 ENCOUNTER — PATIENT MESSAGE (OUTPATIENT)
Dept: ENDOCRINOLOGY | Age: 42
End: 2022-04-10

## 2022-04-10 DIAGNOSIS — E03.9 HYPOTHYROIDISM, UNSPECIFIED TYPE: Primary | ICD-10-CM

## 2022-04-10 DIAGNOSIS — E03.9 HYPOTHYROIDISM, UNSPECIFIED TYPE: ICD-10-CM

## 2022-04-11 RX ORDER — LEVOTHYROXINE SODIUM 0.15 MG/1
TABLET ORAL
Qty: 90 TABLET | Refills: 1 | Status: SHIPPED | OUTPATIENT
Start: 2022-04-11 | End: 2022-04-13 | Stop reason: SDUPTHER

## 2022-04-11 NOTE — TELEPHONE ENCOUNTER
Pharmacy requesting medication refill.  Please approve or deny this request.    Rx requested:  Requested Prescriptions     Pending Prescriptions Disp Refills    levothyroxine (SYNTHROID) 150 MCG tablet [Pharmacy Med Name: Levothyroxine Sodium Oral Tablet 150 MCG] 90 tablet 0     Sig: TAKE ONE TABLET BY MOUTH EVERY DAY         Last Office Visit:   2/28/2022      Next Visit Date:  Future Appointments   Date Time Provider Sangeeta Agarwal   5/2/2022  2:00 PM Jake Lin MD [address] [place] [address]   5/24/2022  3:00 PM [facility]   7/25/2022  3:00 PM Jose Sousa MD [facility]

## 2022-04-13 RX ORDER — LEVOTHYROXINE SODIUM 0.15 MG/1
TABLET ORAL
Qty: 90 TABLET | Refills: 1 | Status: SHIPPED | OUTPATIENT
Start: 2022-04-13

## 2022-04-13 NOTE — TELEPHONE ENCOUNTER
From: [facility]  To: Dr. Alyson Harden  Sent: 4/10/2022 8:59 PM EDT  Subject: Refill    I do not have any refills on my Levothyroxin, if I can get some please

## 2022-05-10 DIAGNOSIS — F41.1 GENERALIZED ANXIETY DISORDER: ICD-10-CM

## 2022-05-10 RX ORDER — BUSPIRONE HYDROCHLORIDE 10 MG/1
TABLET ORAL
Qty: 270 TABLET | Refills: 0 | Status: SHIPPED | OUTPATIENT
Start: 2022-05-10 | End: 2022-10-23 | Stop reason: SDUPTHER

## 2022-05-24 ENCOUNTER — OFFICE VISIT (OUTPATIENT)
Dept: FAMILY MEDICINE CLINIC | Age: 42
End: 2022-05-24
Payer: COMMERCIAL

## 2022-05-24 ENCOUNTER — OFFICE VISIT (OUTPATIENT)
Dept: CARDIOLOGY CLINIC | Age: 42
End: 2022-05-24
Payer: COMMERCIAL

## 2022-05-24 VITALS
OXYGEN SATURATION: 100 % | TEMPERATURE: 97.3 F | HEART RATE: 76 BPM | DIASTOLIC BLOOD PRESSURE: 71 MMHG | WEIGHT: 245 LBS | SYSTOLIC BLOOD PRESSURE: 117 MMHG | BODY MASS INDEX: 42.05 KG/M2

## 2022-05-24 VITALS
RESPIRATION RATE: 18 BRPM | SYSTOLIC BLOOD PRESSURE: 112 MMHG | HEIGHT: 64 IN | WEIGHT: 246 LBS | OXYGEN SATURATION: 100 % | HEART RATE: 73 BPM | BODY MASS INDEX: 42 KG/M2 | DIASTOLIC BLOOD PRESSURE: 72 MMHG

## 2022-05-24 DIAGNOSIS — I47.1 PSVT (PAROXYSMAL SUPRAVENTRICULAR TACHYCARDIA) (HCC): ICD-10-CM

## 2022-05-24 DIAGNOSIS — F51.01 PRIMARY INSOMNIA: ICD-10-CM

## 2022-05-24 DIAGNOSIS — G25.81 RLS (RESTLESS LEGS SYNDROME): ICD-10-CM

## 2022-05-24 DIAGNOSIS — F41.9 ANXIETY: ICD-10-CM

## 2022-05-24 DIAGNOSIS — R00.2 PALPITATIONS: Primary | ICD-10-CM

## 2022-05-24 DIAGNOSIS — E03.9 HYPOTHYROIDISM, UNSPECIFIED TYPE: ICD-10-CM

## 2022-05-24 DIAGNOSIS — I47.1 PSVT (PAROXYSMAL SUPRAVENTRICULAR TACHYCARDIA) (HCC): Primary | ICD-10-CM

## 2022-05-24 PROBLEM — I47.10 PSVT (PAROXYSMAL SUPRAVENTRICULAR TACHYCARDIA): Status: ACTIVE | Noted: 2022-05-24

## 2022-05-24 PROCEDURE — 99214 OFFICE O/P EST MOD 30 MIN: CPT | Performed by: INTERNAL MEDICINE

## 2022-05-24 PROCEDURE — G8427 DOCREV CUR MEDS BY ELIG CLIN: HCPCS | Performed by: INTERNAL MEDICINE

## 2022-05-24 PROCEDURE — 1036F TOBACCO NON-USER: CPT | Performed by: INTERNAL MEDICINE

## 2022-05-24 PROCEDURE — G8417 CALC BMI ABV UP PARAM F/U: HCPCS | Performed by: INTERNAL MEDICINE

## 2022-05-24 RX ORDER — METOPROLOL SUCCINATE 25 MG/1
25 TABLET, EXTENDED RELEASE ORAL NIGHTLY
Qty: 90 TABLET | Refills: 3 | Status: SHIPPED | OUTPATIENT
Start: 2022-05-24

## 2022-05-24 RX ORDER — CYCLOBENZAPRINE HCL 10 MG
1 TABLET ORAL NIGHTLY PRN
COMMUNITY
Start: 2022-05-08 | End: 2022-06-06 | Stop reason: SDUPTHER

## 2022-05-24 RX ORDER — TRAZODONE HYDROCHLORIDE 150 MG/1
150 TABLET ORAL NIGHTLY
Qty: 90 TABLET | Refills: 1 | Status: SHIPPED | OUTPATIENT
Start: 2022-05-24

## 2022-05-24 SDOH — ECONOMIC STABILITY: FOOD INSECURITY: WITHIN THE PAST 12 MONTHS, YOU WORRIED THAT YOUR FOOD WOULD RUN OUT BEFORE YOU GOT MONEY TO BUY MORE.: NEVER TRUE

## 2022-05-24 SDOH — ECONOMIC STABILITY: FOOD INSECURITY: WITHIN THE PAST 12 MONTHS, THE FOOD YOU BOUGHT JUST DIDN'T LAST AND YOU DIDN'T HAVE MONEY TO GET MORE.: NEVER TRUE

## 2022-05-24 ASSESSMENT — ENCOUNTER SYMPTOMS
NAUSEA: 0
RESPIRATORY NEGATIVE: 1
EYE PAIN: 0
EYES NEGATIVE: 1
ABDOMINAL PAIN: 0
STRIDOR: 0
SHORTNESS OF BREATH: 0
COUGH: 0
SHORTNESS OF BREATH: 0
CHEST TIGHTNESS: 0
WHEEZING: 0
GASTROINTESTINAL NEGATIVE: 1
BACK PAIN: 0
BLOOD IN STOOL: 0

## 2022-05-24 ASSESSMENT — SOCIAL DETERMINANTS OF HEALTH (SDOH): HOW HARD IS IT FOR YOU TO PAY FOR THE VERY BASICS LIKE FOOD, HOUSING, MEDICAL CARE, AND HEATING?: NOT HARD AT ALL

## 2022-05-24 ASSESSMENT — PATIENT HEALTH QUESTIONNAIRE - PHQ9
SUM OF ALL RESPONSES TO PHQ QUESTIONS 1-9: 0
SUM OF ALL RESPONSES TO PHQ QUESTIONS 1-9: 0
SUM OF ALL RESPONSES TO PHQ9 QUESTIONS 1 & 2: 0
SUM OF ALL RESPONSES TO PHQ QUESTIONS 1-9: 0
1. LITTLE INTEREST OR PLEASURE IN DOING THINGS: 0
2. FEELING DOWN, DEPRESSED OR HOPELESS: 0
SUM OF ALL RESPONSES TO PHQ QUESTIONS 1-9: 0

## 2022-05-24 NOTE — PROGRESS NOTES
OFFICE VISIT        Patient: Aaliyah Strong  YOB: 1980  MRN: [id number]    Chief Complaint: fluttering CP NASH Hypothyroid   Chief Complaint   Patient presents with    6 Month Follow-Up    Palpitations    Tachycardia     PSVT       CV Data:   Echo EF 55   GXT negative but had PSVT 230 during recovery. Subjective/HPI  For few years has had heart fluttering. Occurs at least everyother day. She also has vague cp complaints. She has NASH. No dizzy. No prior CVA MI    21 still with NASH no cp. After GXT during recovery phase she had rapid PSVT 230 lasted about 1 minute. She can tell something was worng. She never had this kind of pals before. 10/15/21 doing better no cp no flutters. Has SOB and getting steroid from Pulmonary. 22 doing well no cp nosob no falls no bleed.      Former smoker  Rare ETOH  Work- [employer] w son and mom     EKG: SR 80    Past Medical History:   Diagnosis Date    Acid reflux     Anxiety     Breast cyst     Depression     Generalized anxiety disorder     H/O tubal ligation     Hiatal hernia 13    Hypothyroidism     Hypothyroidism     IBS (irritable bowel syndrome)     Obesity     Tx'd with Adipex       Past Surgical History:   Procedure Laterality Date    BARIATRIC SURGERY  2018    GASTRIC SLEEVE    BREAST REDUCTION SURGERY      BREAST SURGERY  2007    Reduction     SECTION  2004    CHOLECYSTECTOMY      COLONOSCOPY N/A 2020    COLONOSCOPY DIAGNOSTIC performed by Jose Mann MD at [address] [place] [address], [facility] N/A 2020    [facility] Shiloh Berry [facility]      UPPER GASTROINTESTINAL ENDOSCOPY  2013    Dr Osito Fernandez       Family History   Problem Relation Age of Onset    High Blood Pressure Mother         No FDR with colon cancer     Crohn's Disease Mother     Cancer Father         Lung cancer    Mental Illness Brother     Heart Disease Brother     Asthma Brother     Colon Cancer Neg Hx        Social History     Socioeconomic History    Marital status: Single     Spouse name: None    Number of children: None    Years of education: None    Highest education level: None   Occupational History    None   Tobacco Use    Smoking status: Former Smoker     Packs/day: 0.50     Years: 3.00     Pack years: 1.50     Types: Cigarettes     Quit date: 8/6/2018     Years since quitting: 3.8    Smokeless tobacco: Never Used   Vaping Use    Vaping Use: Never used   Substance and Sexual Activity    Alcohol use: Never     Comment: Rare use    Drug use: No    Sexual activity: Yes     Partners: Male   Other Topics Concern    None   Social History Narrative    None     Social Determinants of Health     Financial Resource Strain:     Difficulty of Paying Living Expenses: Not on file   Food Insecurity:     Worried About Running Out of Food in the Last Year: Not on file    Peggy of Food in the Last Year: Not on file   Transportation Needs:     Lack of Transportation (Medical): Not on file    Lack of Transportation (Non-Medical):  Not on file   Physical Activity:     Days of Exercise per Week: Not on file    Minutes of Exercise per Session: Not on file   Stress:     Feeling of Stress : Not on file   Social Connections:     Frequency of Communication with Friends and Family: Not on file    Frequency of Social Gatherings with Friends and Family: Not on file    Attends [employer] Services: Not on file    Active Member of Clubs or Organizations: Not on file    Attends Club or Organization Meetings: Not on file    Marital Status: Not on file   Intimate Partner Violence:     Fear of Current or Ex-Partner: Not on file    Emotionally Abused: Not on file    Physically Abused: Not on file    Sexually Abused: Not on file   Housing Stability:     Unable to Pay for Housing in the Last Year: Not on file    Number of Places Lived in the Last Year: Not on file    Unstable Housing in the Last Year: Not on file       Allergies   Allergen Reactions    Ibuprofen Other (See Comments)     Can't have with gastric sleeve    Morphine Nausea And Vomiting       Current Outpatient Medications   Medication Sig Dispense Refill    cyclobenzaprine (FLEXERIL) 10 MG tablet Take 1 tablet by mouth nightly as needed      metoprolol succinate (TOPROL XL) 25 MG extended release tablet Take 1 tablet by mouth nightly 90 tablet 3    busPIRone (BUSPAR) 10 MG tablet TAKE ONE TABLET BY MOUTH EVERY 8 HOURS AS NEEDED FOR ANXIETY. 270 tablet 0    levothyroxine (SYNTHROID) 150 MCG tablet TAKE ONE TABLET BY MOUTH EVERY DAY 90 tablet 1    diclofenac sodium (VOLTAREN) 1 % GEL Apply topically 2 times daily 100 g 3    albuterol sulfate HFA (VENTOLIN HFA) 108 (90 Base) MCG/ACT inhaler Inhale 1 puff into the lungs every 6 hours as needed for Wheezing or Shortness of Breath 18 g 0    pantoprazole (PROTONIX) 40 MG tablet Take 1 tablet by mouth 2 times daily (before meals) (Patient taking differently: Take 40 mg by mouth daily ) 60 tablet 1    rOPINIRole (REQUIP) 0.5 MG tablet take 1 tablet by mouth nightly as needed (restless legs) 90 tablet 3    traZODone (DESYREL) 100 MG tablet Take 1 tablet by mouth nightly 90 tablet 3    acetaminophen (APAP EXTRA STRENGTH) 500 MG tablet Take 2 tablets by mouth every 6 hours as needed for Pain 60 tablet 1    Multiple Vitamin (MULTI VITAMIN DAILY PO) Take 1 tablet by mouth daily      Probiotic Product (PROBIOTIC DAILY PO) Take 1 tablet by mouth daily      fluticasone (FLONASE) 50 MCG/ACT nasal spray 1 spray by Nasal route daily 1 Bottle 0    Ascorbic Acid (VITAMIN C) 250 MG tablet Take 500 mg by mouth daily        No current facility-administered medications for this visit. Review of Systems:   Review of Systems   Constitutional: Negative. Negative for diaphoresis and fatigue. HENT: Negative.     Eyes: Negative. Respiratory: Negative. Negative for cough, chest tightness, shortness of breath, wheezing and stridor. Cardiovascular: Negative. Negative for chest pain, palpitations and leg swelling. Gastrointestinal: Negative. Negative for blood in stool and nausea. Genitourinary: Negative. Musculoskeletal: Negative. Skin: Negative. Neurological: Negative. Negative for dizziness, syncope, weakness and light-headedness. Hematological: Negative. Psychiatric/Behavioral: Negative. Physical Examination:    /72 (Site: Right Upper Arm, Position: Sitting, Cuff Size: Large Adult)   Pulse 73   Resp 18   Ht 5' 4\" (1.626 m)   Wt 246 lb (111.6 kg)   LMP 10/18/2020   SpO2 100%   BMI 42.23 kg/m²    Physical Exam   Constitutional: She appears healthy. No distress. HENT:   Normal cephalic and Atraumatic   Eyes: Pupils are equal, round, and reactive to light. Neck: Thyroid normal. No JVD present. No neck adenopathy. No thyromegaly present. Cardiovascular: Normal rate, regular rhythm, normal heart sounds, intact distal pulses and normal pulses. Pulmonary/Chest: Effort normal and breath sounds normal. She has no wheezes. She has no rales. She exhibits no tenderness. Abdominal: Soft. Bowel sounds are normal. There is no abdominal tenderness. Musculoskeletal:         General: No tenderness or edema. Normal range of motion. Cervical back: Normal range of motion and neck supple. Neurological: She is alert and oriented to person, place, and time. Skin: Skin is warm. No cyanosis. Nails show no clubbing.        LABS:  CBC:   Lab Results   Component Value Date    WBC 5.2 07/10/2021    RBC 4.21 07/10/2021    RBC 4.13 10/01/2018    HGB 14.0 07/10/2021    HCT 40.9 07/10/2021    MCV 97.3 07/10/2021    MCH 33.3 07/10/2021    MCHC 34.3 07/10/2021    RDW 12.3 07/10/2021     07/10/2021    MPV 10.0 10/01/2018     Lipids:  Lab Results   Component Value Date    CHOL 203 (H) 07/10/2021    CHOL 158 09/22/2020    CHOL 176 01/03/2020     Lab Results   Component Value Date    TRIG 98 07/10/2021    TRIG 107 09/22/2020    TRIG 153 (H) 01/03/2020     Lab Results   Component Value Date    HDL 54 07/10/2021    HDL 51 09/22/2020    HDL 50 01/03/2020     Lab Results   Component Value Date    LDLCALC 129 07/10/2021    LDLCALC 86 09/22/2020    LDLCALC 95 01/03/2020     No results found for: LABVLDL, VLDL  Lab Results   Component Value Date    CHOLHDLRATIO 5.6 09/15/2011     CMP:    Lab Results   Component Value Date     07/10/2021    K 3.9 07/10/2021    K 4.5 12/04/2019     07/10/2021    CO2 27 07/10/2021    BUN 14 07/10/2021    CREATININE 0.71 07/10/2021    GFRAA >60.0 07/10/2021    LABGLOM >60.0 07/10/2021    GLUCOSE 81 07/10/2021    GLUCOSE 97 10/01/2018    PROT 7.7 07/10/2021    LABALBU 4.3 07/10/2021    LABALBU 4.8 09/15/2011    CALCIUM 9.7 07/10/2021    BILITOT 0.5 07/10/2021    ALKPHOS 76 07/10/2021    AST 17 07/10/2021    ALT 22 07/10/2021     BMP:    Lab Results   Component Value Date     07/10/2021    K 3.9 07/10/2021    K 4.5 12/04/2019     07/10/2021    CO2 27 07/10/2021    BUN 14 07/10/2021    LABALBU 4.3 07/10/2021    LABALBU 4.8 09/15/2011    CREATININE 0.71 07/10/2021    CALCIUM 9.7 07/10/2021    GFRAA >60.0 07/10/2021    LABGLOM >60.0 07/10/2021    GLUCOSE 81 07/10/2021    GLUCOSE 97 10/01/2018     Magnesium:    Lab Results   Component Value Date    MG 2.2 09/22/2020     TSH:  Lab Results   Component Value Date    TSH 0.144 (L) 06/09/2021             Patient Active Problem List   Diagnosis    Primary hypothyroidism    GERD (gastroesophageal reflux disease)    Generalized anxiety disorder    Obesity    Bone disease    Vitamin D deficiency    Breast mass, right    Post endometrial ablation syndrome    Neck pain    Tension headache    Lumbar radiculopathy    Chronic rhinitis    Flank pain    Hematuria    Hematuria, gross    Mixed stress and urge urinary incontinence    Otalgia of right ear    Postinfective urethral stricture in female       Medications Discontinued During This Encounter   Medication Reason    metoprolol succinate (TOPROL XL) 25 MG extended release tablet REORDER       Modified Medications    Modified Medication Previous Medication    METOPROLOL SUCCINATE (TOPROL XL) 25 MG EXTENDED RELEASE TABLET metoprolol succinate (TOPROL XL) 25 MG extended release tablet       Take 1 tablet by mouth nightly    Take 1 tablet by mouth nightly       Orders Placed This Encounter   Medications    metoprolol succinate (TOPROL XL) 25 MG extended release tablet     Sig: Take 1 tablet by mouth nightly     Dispense:  90 tablet     Refill:  3       Assessment/Plan:    1. Palpitations/ PSVT - start TOprol XL 25 QHS. stable - continue BB    2. Chest pain, unspecified type   GXT - negative    3. NASH (dyspnea on exertion)       4. Hypothyroidism, unspecified type - stable. Labs reviewed w pt.     5. HTN -conitnue meds. Low salt diet. Counseling:  Heart Healthy Lifestyle, Low Salt Diet, Take Precautions to Prevent Falls and Walk Daily    Return in about 1 year (around 5/24/2023).     Electronically signed by Suad De La Torre MD on 5/24/2022 at 1:06 PM

## 2022-05-24 NOTE — PATIENT INSTRUCTIONS
Patient Education        trazodone  Pronunciation: Julieta Nissen oh done  Brand: Juan  What is the most important information I should know about trazodone? Some young people have thoughts about suicide when first taking an antidepressant. Stay alert to changes in your mood or symptoms. Report any new or worsening symptoms to your doctor. Trazodone is not approved for use by anyone younger than 25years old. What is trazodone? Trazodone is an antidepressant that is used to treat major depressive disorder. Trazodone may also be used for purposes not listed in this medication guide. What should I discuss with my healthcare provider before taking trazodone? You should not use trazodone if you are allergic to it. Do not use trazodone if you have used an MAO inhibitor in the past 14 days. A dangerous drug interaction could occur. MAO inhibitors include isocarboxazid, linezolid, methylene blue injection, phenelzine,tranylcypromine, and others. After you stop taking trazodone, you must wait at least 14 days before you start taking an MAOI. Tell your doctor if you also take stimulant medicine, opioid medicine, herbal products, or medicine for depression, mental illness, Parkinson's disease, migraine headaches, serious infections, or prevention of nausea and vomiting. An interaction with trazodone could cause a serious condition called serotonin syndrome. Tell your doctor if you have ever had:   liver or kidney disease;   heart disease, or a recent heart attack;   a bleeding or blood clotting disorder;   seizures or epilepsy;   narrow-angle glaucoma;   long QT syndrome;   drug addiction or suicidal thoughts; or   bipolar disorder (manic depression). Some young people have thoughts about suicide when first taking an antidepressant. Your doctor should check your progress at regular visits. Your family or other caregivers should also be alert to changes in your mood orsymptoms.   Taking this medicine during pregnancy could harm the baby, but stopping the medicine may not be safe for you. Do not start or stop trazodone without asking your doctor. If you are pregnant, your name may be listed on a pregnancy registry to trackthe effects of trazodone on the baby. Ask a doctor if it is safe to breastfeed while using this medicine. Trazodone is not approved for use by anyone younger than 25years old. How should I take trazodone? Follow all directions on your prescription label and read all medication guides or instruction sheets. Your doctor may occasionally change your dose. Use themedicine exactly as directed. Take trazodone after a meal or a snack. Your symptoms may not improve for up to 2 weeks. Do not stop using trazodone suddenly, or you could have unpleasant symptoms (such as agitation, confusion, tinglingor electric shock feelings). Ask your doctor before stopping the medicine. Store at room temperature away from moisture, heat, and light. What happens if I miss a dose? Take the medicine as soon as you can, but skip the missed dose if it is almost time for your next dose. Do not take two doses at one time. What happens if I overdose? Seek emergency medical attention or call the Poison Help line at 1-819.239.9240. An overdose can be fatal when trazodone is taken with alcohol, barbiturates such as phenobarbital, or sedatives such as diazepam (Valium). Overdose symptoms may include extreme drowsiness, vomiting, penis erection that is painful or prolonged, fast or pounding heartbeat, seizure (black-out orconvulsions), or breathing that slows or stops. What should I avoid while taking trazodone? Do not drink alcohol. Dangerous side effects or death could occur. Ask your doctor before taking a nonsteroidal anti-inflammatory drug (NSAID) such as aspirin, ibuprofen, naproxen, Advil, Aleve, Motrin, and others. Usingan NSAID with trazodone may cause you to bruise or bleed easily.   Avoid driving or hazardous activity until you know how this medicine willaffect you. Your reactions could be impaired. Avoid getting up too fast from a sitting or lying position, or you may feeldizzy. What are the possible side effects of trazodone? Get emergency medical help if you have signs of an allergic reaction: hives; difficulty breathing; swelling of your face, lips, tongue, or throat. Stop taking trazodone and call your doctor at once if you have a penis erection that is painful or lasts 6 hours or longer. This is a medical emergency andcould lead to a serious condition that must be corrected with surgery. Report any new or worsening symptoms to your doctor, such as: mood or behavior changes, anxiety, panic attacks, trouble sleeping, or if you feel impulsive, irritable, agitated, hostile, aggressive, restless, hyperactive (mentally or physically), more depressed, or have thoughts aboutsuicide or hurting yourself. Call your doctor at once if you have:   fast or pounding heartbeats, fluttering in your chest, shortness of breath, and sudden dizziness (like you might pass out);   slow heartbeats;   unusual thoughts or behavior;   easy bruising, unusual bleeding; or   low levels of sodium in the body --headache, confusion, slurred speech, severe weakness, vomiting, loss of coordination, feeling unsteady. Seek medical attention right away if you have symptoms of serotonin syndrome, such as: agitation, hallucinations, fever, sweating, shivering, fast heart rate, musclestiffness, twitching, loss of coordination, nausea, vomiting, or diarrhea. Common side effects may include:   drowsiness, dizziness, tiredness;   swelling;   weight loss;   blurred vision;   diarrhea, constipation; or   stuffy nose. This is not a complete list of side effects and others may occur. Call your doctor for medical advice about side effects. You may report side effects toFDA at 1-054-LAI-1532. What other drugs will affect trazodone?   Using trazodone with other drugs that make you drowsy can worsen this effect. Ask your doctor before using opioid medication, a sleeping pill, a musclerelaxer, or medicine for anxiety or seizures. Tell your doctor about all your current medicines. Many drugs can affect trazodone, especially:   any other antidepressants;   phenytoin;   [place] wort;   tramadol;   a diuretic or \"water pill\";   medicine to treat anxiety, mood disorders, or mental illness such as schizophrenia;   a blood thinner --warfarin, Coumadin, Jantoven; or   migraine headache medicine --sumatriptan, Imitrex, Maxalt, Treximet, and others. This list is not complete and many other drugs may affect trazodone. This includes prescription and over-the-counter medicines, vitamins, andherbal products. Not all possible drug interactions are listed here. Where can I get more information? Your pharmacist can provide more information about trazodone. Remember, keep this and all other medicines out of the reach of children, never share your medicines with others, and use this medication only for the indication prescribed. Every effort has been made to ensure that the information provided by [address] is accurate, up-to-date, and complete, but no guarantee is made to that effect. Drug information contained herein may be time sensitive. [facility] information has been compiled for use by healthcare practitioners and consumers in the [place] and therefore [facility] does not warrant that uses outside of the [place] are appropriate, unless specifically indicated otherwise. [facility]'s drug information does not endorse drugs, diagnose patients or recommend therapy.  [facility] drug information is an informational resource designed to assist licensed healthcare practitioners in caring for their patients and/or to serve consumers viewing this service as a supplement to, and not a substitute for, the expertise, skill, knowledge and judgment of healthcare practitioners. The absence of a warning for a given drug or drug combination in no way should be construed to indicate that the drug or drug combination is safe, effective or appropriate for any given patient. [facility] does not assume any responsibility for any aspect of healthcare administered with the aid of information [facility] provides. The information contained herein is not intended to cover all possible uses, directions, precautions, warnings, drug interactions, allergic reactions, or adverse effects. If you have questions about the drugs you are taking, check with yourdoctor, nurse or pharmacist.  Copyright 3420-0671 [address]: 10.04. Revision date:6/14/2021. Care instructions adapted under license by [facility] ([facility]). If you have questions about a medical condition or this instruction, always ask your healthcare professional. [place] [address] any warranty or liability for your use of this information.

## 2022-05-24 NOTE — PROGRESS NOTES
Subjective:      Patient ID: Angélica Rhodes is a 39 y.o. female who presents today with:  Chief Complaint   Patient presents with    6 Month Follow-Up     pt is here for 6mnth f/u       HPI     Here for follow up     Past Medical History:   Diagnosis Date    Acid reflux     Anxiety     Breast cyst     Depression     Generalized anxiety disorder     H/O tubal ligation     Hiatal hernia 13    Hypothyroidism     Hypothyroidism     IBS (irritable bowel syndrome)     Obesity     Tx'd with Adipex     Past Surgical History:   Procedure Laterality Date    BARIATRIC SURGERY  2018    GASTRIC SLEEVE    BREAST REDUCTION SURGERY      BREAST SURGERY  2007    Reduction     SECTION  2004    CHOLECYSTECTOMY  1996    COLONOSCOPY N/A 2020    COLONOSCOPY DIAGNOSTIC performed by Bk Fall MD at [address] [place] [address], VAGINAL N/A 2020    [address] performed by Latrell Medeiros MD at [place] [address]    UPPER GASTROINTESTINAL ENDOSCOPY  2013     [facility]     Social History     Socioeconomic History    Marital status: Single     Spouse name: Not on file    Number of children: Not on file    Years of education: Not on file    Highest education level: Not on file   Occupational History    Not on file   Tobacco Use    Smoking status: Former Smoker     Packs/day: 0.50     Years: 3.00     Pack years: 1.50     Types: Cigarettes     Quit date: 2018     Years since quitting: 3.8    Smokeless tobacco: Never Used   Vaping Use    Vaping Use: Never used   Substance and Sexual Activity    Alcohol use: Never     Comment: Rare use    Drug use: No    Sexual activity: Yes     Partners: Male   Other Topics Concern    Not on file   Social History Narrative    Not on file     Social Determinants of Health     Financial Resource Strain: Low Risk     Difficulty of Paying Living Expenses: Not hard at all   Food Insecurity: No Food Insecurity    Worried About Running Out of Food in the Last Year: Never true    Peggy of Food in the Last Year: Never true   Transportation Needs:     Lack of Transportation (Medical): Not on file    Lack of Transportation (Non-Medical): Not on file   Physical Activity:     Days of Exercise per Week: Not on file    Minutes of Exercise per Session: Not on file   Stress:     Feeling of Stress : Not on file   Social Connections:     Frequency of Communication with Friends and Family: Not on file    Frequency of Social Gatherings with Friends and Family: Not on file    Attends Congregation Services: Not on file    Active Member of [address] [place] [address] or Organizations: Not on file    Attends Club or Organization Meetings: Not on file    Marital Status: Not on file   Intimate Partner Violence:     Fear of Current or Ex-Partner: Not on file    Emotionally Abused: Not on file    Physically Abused: Not on file    Sexually Abused: Not on file   Housing Stability:     Unable to Pay for Housing in the Last Year: Not on file    Number of [place] in the Last Year: Not on file    Unstable Housing in the Last Year: Not on file     Allergies   Allergen Reactions    Ibuprofen Other (See Comments)     Can't have with gastric sleeve    Morphine Nausea And Vomiting     Current Outpatient Medications on File Prior to Visit   Medication Sig Dispense Refill    busPIRone (BUSPAR) 10 MG tablet TAKE ONE TABLET BY MOUTH EVERY 8 HOURS AS NEEDED FOR ANXIETY.  270 tablet 0    levothyroxine (SYNTHROID) 150 MCG tablet TAKE ONE TABLET BY MOUTH EVERY DAY 90 tablet 1    diclofenac sodium (VOLTAREN) 1 % GEL Apply topically 2 times daily 100 g 3    albuterol sulfate HFA (VENTOLIN HFA) 108 (90 Base) MCG/ACT inhaler Inhale 1 puff into the lungs every 6 hours as needed for Wheezing or Shortness of Breath 18 g 0    pantoprazole (PROTONIX) 40 MG tablet Take 1 tablet by mouth 2 times daily (before meals) (Patient taking differently: Take 40 mg by mouth daily ) 60 tablet 1    rOPINIRole (REQUIP) 0.5 MG tablet take 1 tablet by mouth nightly as needed (restless legs) 90 tablet 3    acetaminophen (APAP EXTRA STRENGTH) 500 MG tablet Take 2 tablets by mouth every 6 hours as needed for Pain 60 tablet 1    Multiple Vitamin (MULTI VITAMIN DAILY PO) Take 1 tablet by mouth daily      Probiotic Product (PROBIOTIC DAILY PO) Take 1 tablet by mouth daily      fluticasone (FLONASE) 50 MCG/ACT nasal spray 1 spray by Nasal route daily 1 Bottle 0    Ascorbic Acid (VITAMIN C) 250 MG tablet Take 500 mg by mouth daily        No current facility-administered medications on file prior to visit. I have personally reviewed the ROS, PMH, PFH, and social history     Review of Systems   Constitutional: Negative for chills and fever. HENT: Negative for congestion. Eyes: Negative for pain. Respiratory: Negative for shortness of breath. Cardiovascular: Negative for chest pain. Gastrointestinal: Negative for abdominal pain. Genitourinary: Negative for hematuria. Musculoskeletal: Negative for back pain. Allergic/Immunologic: Negative for immunocompromised state. Neurological: Negative for headaches. Psychiatric/Behavioral: Negative for hallucinations. Objective:   /71 (Site: Left Wrist, Position: Sitting)   Pulse 76   Temp 97.3 °F (36.3 °C) (Temporal)   Wt 245 lb (111.1 kg)   LMP 10/18/2020   SpO2 100%   BMI 42.05 kg/m²     Physical Exam  Constitutional:       General: She is not in acute distress. Appearance: Normal appearance. She is not ill-appearing, toxic-appearing or diaphoretic. HENT:      Head: Normocephalic. Neck:      Vascular: No carotid bruit. Cardiovascular:      Rate and Rhythm: Normal rate and regular rhythm. Pulses: Normal pulses. Heart sounds: Normal heart sounds. No murmur heard. No friction rub. No gallop. Pulmonary:      Effort: Pulmonary effort is normal. No respiratory distress.       Breath sounds: Normal breath sounds. No wheezing, rhonchi or rales. Abdominal:      General: Abdomen is flat. There is no distension. Palpations: Abdomen is soft. Tenderness: There is no abdominal tenderness. There is no right CVA tenderness, left CVA tenderness, guarding or rebound. Musculoskeletal:      Cervical back: Neck supple. Right lower leg: No edema. Left lower leg: No edema. Skin:     General: Skin is warm. Findings: No erythema or rash. Neurological:      Mental Status: She is alert. Psychiatric:         Mood and Affect: Mood normal.           Assessment:       Diagnosis Orders   1. PSVT (paroxysmal supraventricular tachycardia) (HCC)  CBC with Auto Differential    Comprehensive Metabolic Panel    Lipid Panel   2. Anxiety  CBC with Auto Differential    Comprehensive Metabolic Panel    Lipid Panel   3. Primary insomnia  traZODone (DESYREL) 150 MG tablet    CBC with Auto Differential    Comprehensive Metabolic Panel    Lipid Panel   4. RLS (restless legs syndrome)  CBC with Auto Differential    Comprehensive Metabolic Panel    Lipid Panel         Plan:     vc  Sees cardiology and heme once a year. Increase trazodone.    Continue chronic medications          Orders Placed This Encounter   Procedures    CBC with Auto Differential     Standing Status:   Future     Standing Expiration Date:   5/24/2023    Comprehensive Metabolic Panel     Standing Status:   Future     Standing Expiration Date:   5/24/2023    Lipid Panel     Standing Status:   Future     Standing Expiration Date:   5/24/2023     Order Specific Question:   Is Patient Fasting?/# of Hours     Answer:   10     Orders Placed This Encounter   Medications    traZODone (DESYREL) 150 MG tablet     Sig: Take 1 tablet by mouth nightly     Dispense:  90 tablet     Refill:  1   HX of PSVT see cardiology  Following up with heme   Keep cards f/u ([place])  Call me before your next appt if you have any issues with the higher dose of trazodone. Understands SSRI can increase SI and to call immediately if this should occur. Can't remember the last time she took ventolin  Call should something change. Referred to ob/gyn (10/2021)   gerd controlled. S/P gastric sleeve, she wants life style changes (5/24/2022)  If anything should change or worsen call ASAP, don't wait for next scheduled appointment. Return in about 6 months (around 11/24/2022) for Chronic condition management/appointment, worsening symptoms, call ASAP for appointment.       Frances Mcgowan MD

## 2022-05-26 ENCOUNTER — HOSPITAL ENCOUNTER (OUTPATIENT)
Dept: WOMENS IMAGING | Age: 42
Discharge: HOME | End: 2022-05-28
Payer: COMMERCIAL

## 2022-05-26 VITALS — BODY MASS INDEX: 41.83 KG/M2 | WEIGHT: 245 LBS | HEIGHT: 64 IN

## 2022-05-26 DIAGNOSIS — Z12.31 ENCOUNTER FOR SCREENING MAMMOGRAM FOR MALIGNANT NEOPLASM OF BREAST: ICD-10-CM

## 2022-05-26 PROCEDURE — 77063 BREAST TOMOSYNTHESIS BI: CPT

## 2022-05-27 ENCOUNTER — PATIENT MESSAGE (OUTPATIENT)
Dept: FAMILY MEDICINE CLINIC | Age: 42
End: 2022-05-27

## 2022-05-28 NOTE — TELEPHONE ENCOUNTER
I believe she means her mammogram report  She can do at home  Or make an appt with me or her ob/Gyn for this  There weren't any abnormalities  But we routinely recommend breast exam

## 2022-06-01 DIAGNOSIS — G25.81 RESTLESS LEGS SYNDROME (RLS): ICD-10-CM

## 2022-06-01 RX ORDER — ROPINIROLE 0.5 MG/1
TABLET, FILM COATED ORAL
Qty: 90 TABLET | Refills: 3 | Status: SHIPPED | OUTPATIENT
Start: 2022-06-01

## 2022-06-06 RX ORDER — CYCLOBENZAPRINE HCL 10 MG
10 TABLET ORAL NIGHTLY PRN
Qty: 30 TABLET | Refills: 1 | Status: SHIPPED | OUTPATIENT
Start: 2022-06-06 | End: 2022-07-25 | Stop reason: SDUPTHER

## 2022-06-06 NOTE — TELEPHONE ENCOUNTER
Patient is requesting medication refill.  Please approve or deny this request.    Rx requested:  Requested Prescriptions     Pending Prescriptions Disp Refills    cyclobenzaprine (FLEXERIL) 10 mg tablet 30 tablet 1     Sig: Take 1 tablet by mouth nightly as needed for Muscle spasms         Last Office Visit:   1/28/2022      Next Visit Date:  Future Appointments   Date Time Provider Sangeeta Agarwal   7/25/2022  3:00 PM Jose Sousa MD [facility]   11/28/2022  2:00 PM Shravan Hannah MD [facility]   5/22/2023  2:00 PM Jake Lin MD [facility]

## 2022-07-17 DIAGNOSIS — K21.9 GASTROESOPHAGEAL REFLUX DISEASE WITHOUT ESOPHAGITIS: ICD-10-CM

## 2022-07-17 RX ORDER — PANTOPRAZOLE SODIUM 40 MG/1
40 TABLET, DELAYED RELEASE ORAL EVERY 12 HOURS PRN
Qty: 60 TABLET | Refills: 1 | Status: SHIPPED | OUTPATIENT
Start: 2022-07-17

## 2022-07-25 ENCOUNTER — OFFICE VISIT (OUTPATIENT)
Dept: NEUROLOGY | Age: 42
End: 2022-07-25
Payer: COMMERCIAL

## 2022-07-25 VITALS
HEART RATE: 92 BPM | SYSTOLIC BLOOD PRESSURE: 112 MMHG | WEIGHT: 246.6 LBS | DIASTOLIC BLOOD PRESSURE: 62 MMHG | BODY MASS INDEX: 42.33 KG/M2

## 2022-07-25 DIAGNOSIS — G44.209 TENSION HEADACHE: ICD-10-CM

## 2022-07-25 DIAGNOSIS — G24.9 DYSTONIA: Primary | ICD-10-CM

## 2022-07-25 DIAGNOSIS — M54.16 LUMBAR RADICULOPATHY: ICD-10-CM

## 2022-07-25 DIAGNOSIS — G25.81 RESTLESS LEG SYNDROME: ICD-10-CM

## 2022-07-25 PROCEDURE — 4004F PT TOBACCO SCREEN RCVD TLK: CPT | Performed by: PSYCHIATRY & NEUROLOGY

## 2022-07-25 PROCEDURE — G8417 CALC BMI ABV UP PARAM F/U: HCPCS | Performed by: PSYCHIATRY & NEUROLOGY

## 2022-07-25 PROCEDURE — 99214 OFFICE O/P EST MOD 30 MIN: CPT | Performed by: PSYCHIATRY & NEUROLOGY

## 2022-07-25 PROCEDURE — G8427 DOCREV CUR MEDS BY ELIG CLIN: HCPCS | Performed by: PSYCHIATRY & NEUROLOGY

## 2022-07-25 RX ORDER — CYCLOBENZAPRINE HCL 10 MG
10 TABLET ORAL NIGHTLY PRN
Qty: 90 TABLET | Refills: 1 | Status: SHIPPED | OUTPATIENT
Start: 2022-07-25

## 2022-07-25 ASSESSMENT — ENCOUNTER SYMPTOMS
TROUBLE SWALLOWING: 0
PHOTOPHOBIA: 0
BACK PAIN: 0
SHORTNESS OF BREATH: 0
COLOR CHANGE: 0
NAUSEA: 0
VOMITING: 0
CHOKING: 0

## 2022-07-25 NOTE — PROGRESS NOTES
Subjective:      Patient ID: Elva Vallejo is a 39 y.o. female who presents today for:  Chief Complaint   Patient presents with    6 Month Follow-Up     Pt states that she is getting more migraines frequently. She states that she is getting at least a few headaches a day. She states that it is weird pain that comes and goes. She states that she thinks its from stress right now. HPI 70-year-old right-handed female with a history of neck pain tension muscular headaches lumbar radiculopathy patient has temporal pain and we had given her Medrol pack and physical therapy and Flexeril with pain management. She also has used Voltaren gel. She has restless leg and no sleep apnea has been described. We will continue on the same combination of medications. Reports getting few headaches a day    Gain is all muscle headaches and she is very tender on the right. She may have underlying dystonia as she has restriction of movement and pain. These are not quite migrainous.   She is using the Voltaren gel much more often now  Past Medical History:   Diagnosis Date    Acid reflux     Anxiety     Breast cyst     Depression     Generalized anxiety disorder     H/O tubal ligation     Hiatal hernia 13    Hypothyroidism     Hypothyroidism     IBS (irritable bowel syndrome)     Obesity     Tx'd with Adipex     Past Surgical History:   Procedure Laterality Date    BARIATRIC SURGERY  2018    GASTRIC SLEEVE    BREAST BIOPSY Right 2017    abscess removed    BREAST REDUCTION SURGERY      BREAST SURGERY  2007    Reduction     SECTION  2004    CHOLECYSTECTOMY      COLONOSCOPY N/A 2020    COLONOSCOPY DIAGNOSTIC performed by Francine Narayanan MD at [facility] (CERVIX STATUS UNKNOWN)      HYSTERECTOMY, VAGINAL N/A 2020    TLH performed by Anthony Yang MD at [address] [place] [address] ENDOSCOPY  2013    Dr Hermila Caceres Social History     Socioeconomic History    Marital status: Single     Spouse name: Not on file    Number of children: Not on file    Years of education: Not on file    Highest education level: Not on file   Occupational History    Not on file   Tobacco Use    Smoking status: Former     Packs/day: 0.50     Years: 3.00     Pack years: 1.50     Types: Cigarettes     Quit date: 8/6/2018     Years since quitting: 3.9    Smokeless tobacco: Never   Vaping Use    Vaping Use: Never used   Substance and Sexual Activity    Alcohol use: Never     Comment: Rare use    Drug use: No    Sexual activity: Yes     Partners: Male   Other Topics Concern    Not on file   Social History Narrative    Not on file     Social Determinants of Health     Financial Resource Strain: Low Risk     Difficulty of Paying Living Expenses: Not hard at all   Food Insecurity: No Food Insecurity    Worried About Running Out of Food in the Last Year: Never true    Ran Out of Food in the Last Year: Never true   Transportation Needs: Not on file   Physical Activity: Not on file   Stress: Not on file   Social Connections: Not on file   Intimate Partner Violence: Not on file   Housing Stability: Not on file     Family History   Problem Relation Age of Onset    High Blood Pressure Mother         No FDR with colon cancer     Crohn's Disease Mother     Cancer Father         Lung cancer    Mental Illness Brother     Heart Disease Brother     Asthma Brother     Colon Cancer Neg Hx      Allergies   Allergen Reactions    Ibuprofen Other (See Comments)     Can't have with gastric sleeve    Morphine Nausea And Vomiting       Current Outpatient Medications   Medication Sig Dispense Refill    cyclobenzaprine (FLEXERIL) 10 MG tablet Take 1 tablet by mouth nightly as needed for Muscle spasms 90 tablet 1    diclofenac sodium (VOLTAREN) 1 % GEL Apply topically 2 times daily 100 g 3    pantoprazole (PROTONIX) 40 MG tablet Take 1 tablet by mouth every 12 hours as needed (acid reflux) 60 tablet 1    rOPINIRole (REQUIP) 0.5 MG tablet TAKE ONE TABLET BY MOUTH NIGHTLY AS NEEDED FOR RESTLESS LEGS 90 tablet 3    metoprolol succinate (TOPROL XL) 25 MG extended release tablet Take 1 tablet by mouth nightly 90 tablet 3    traZODone (DESYREL) 150 MG tablet Take 1 tablet by mouth nightly 90 tablet 1    busPIRone (BUSPAR) 10 MG tablet TAKE ONE TABLET BY MOUTH EVERY 8 HOURS AS NEEDED FOR ANXIETY. 270 tablet 0    levothyroxine (SYNTHROID) 150 MCG tablet TAKE ONE TABLET BY MOUTH EVERY DAY 90 tablet 1    albuterol sulfate HFA (VENTOLIN HFA) 108 (90 Base) MCG/ACT inhaler Inhale 1 puff into the lungs every 6 hours as needed for Wheezing or Shortness of Breath 18 g 0    acetaminophen (APAP EXTRA STRENGTH) 500 MG tablet Take 2 tablets by mouth every 6 hours as needed for Pain 60 tablet 1    Multiple Vitamin (MULTI VITAMIN DAILY PO) Take 1 tablet by mouth daily      Probiotic Product (PROBIOTIC DAILY PO) Take 1 tablet by mouth daily      fluticasone (FLONASE) 50 MCG/ACT nasal spray 1 spray by Nasal route daily 1 Bottle 0    Ascorbic Acid (VITAMIN C) 250 MG tablet Take 500 mg by mouth daily        No current facility-administered medications for this visit. Review of Systems   Constitutional:  Negative for fever. HENT:  Negative for ear pain, tinnitus and trouble swallowing. Eyes:  Negative for photophobia and visual disturbance. Respiratory:  Negative for choking and shortness of breath. Cardiovascular:  Negative for chest pain and palpitations. Gastrointestinal:  Negative for nausea and vomiting. Musculoskeletal:  Positive for myalgias and neck pain. Negative for back pain, gait problem, joint swelling and neck stiffness. Skin:  Negative for color change. Allergic/Immunologic: Negative for food allergies. Neurological:  Negative for dizziness, tremors, seizures, syncope, facial asymmetry, speech difficulty, weakness, light-headedness, numbness and headaches. Psychiatric/Behavioral:  Negative for behavioral problems, confusion, hallucinations and sleep disturbance. Objective:   /62 (Site: Left Upper Arm, Position: Sitting, Cuff Size: Medium Adult)   Pulse 92   Wt 246 lb 9.6 oz (111.9 kg)   LMP 10/18/2020   BMI 42.33 kg/m²     Physical Exam  Vitals reviewed. Eyes:      Pupils: Pupils are equal, round, and reactive to light. Cardiovascular:      Rate and Rhythm: Normal rate and regular rhythm. Heart sounds: No murmur heard. Pulmonary:      Effort: Pulmonary effort is normal.      Breath sounds: Normal breath sounds. Abdominal:      General: Bowel sounds are normal.   Musculoskeletal:         General: Normal range of motion. Cervical back: Normal range of motion. Skin:     General: Skin is warm. Neurological:      Mental Status: She is alert and oriented to person, place, and time. Cranial Nerves: No cranial nerve deficit. Sensory: No sensory deficit. Motor: No abnormal muscle tone. Coordination: Coordination normal.      Deep Tendon Reflexes: Reflexes are normal and symmetric. Babinski sign absent on the right side. Babinski sign absent on the left side. Comments: Tenderness notable at the base of the skull and trapezius muscle on the right. She is developing some degree of dystonia with restriction of neck movements. Psychiatric:         Mood and Affect: Mood normal.       [employer] DIGITAL SCREEN BILATERAL    Result Date: 5/27/2022  COMPARISON: May 1, 2017; January 31, 2020; May 12, 2021 HISTORY: Z12.31 Encounter for screening mammogram for malignant neoplasm of breast ICD10 COMMENTS: Screening mammogram TECHNIQUE: 2-D and 3-D Bilateral mammograms were obtained. FINDINGS: The breasts contain scattered fibroglandular tissue. No suspicious microcalcifications, neodensity or architectural distortion seen.      Recommend annual mammogram, routine physicals as recommended and any palpable abnormality be managed based on findings from clinical examination. BI-RADS 2: BENIGN FINDINGS. Board Certified Radiologists. Accredited by the ACR and FDA. MAMMOGRAPHY IS VERY IMPORTANT TO YOUR HEALTH. THE AMERICAN [employer] SOCIETY GUIDELINES RECOMMEND THAT WOMEN 36YEARS OF AGE AND OLDER SHOULD HAVE A MAMMOGRAM EVERY YEAR. A REMINDER LETTER WILL BE SENT AT THE APPROPRIATE TIME.        Lab Results   Component Value Date/Time    WBC 5.2 07/10/2021 09:13 AM    RBC 4.21 07/10/2021 09:13 AM    RBC 4.13 10/01/2018 11:38 PM    HGB 14.0 07/10/2021 09:13 AM    HCT 40.9 07/10/2021 09:13 AM    MCV 97.3 07/10/2021 09:13 AM    MCH 33.3 07/10/2021 09:13 AM    MCHC 34.3 07/10/2021 09:13 AM    RDW 12.3 07/10/2021 09:13 AM     07/10/2021 09:13 AM    MPV 10.0 10/01/2018 11:38 PM     Lab Results   Component Value Date/Time     07/10/2021 09:13 AM    K 3.9 07/10/2021 09:13 AM    K 4.5 12/04/2019 05:35 AM     07/10/2021 09:13 AM    CO2 27 07/10/2021 09:13 AM    BUN 14 07/10/2021 09:13 AM    CREATININE 0.71 07/10/2021 09:13 AM    GFRAA >60.0 07/10/2021 09:13 AM    LABGLOM >60.0 07/10/2021 09:13 AM    GLUCOSE 81 07/10/2021 09:13 AM    GLUCOSE 97 10/01/2018 11:38 PM    PROT 7.7 07/10/2021 09:13 AM    LABALBU 4.3 07/10/2021 09:13 AM    LABALBU 4.8 09/15/2011 09:49 AM    CALCIUM 9.7 07/10/2021 09:13 AM    BILITOT 0.5 07/10/2021 09:13 AM    ALKPHOS 76 07/10/2021 09:13 AM    AST 17 07/10/2021 09:13 AM    ALT 22 07/10/2021 09:13 AM     Lab Results   Component Value Date/Time    PROTIME 14.1 12/03/2019 01:38 AM    INR 1.0 12/03/2019 01:38 AM     Lab Results   Component Value Date/Time    TSH 0.144 06/09/2021 01:24 PM    CAPHXTHW67 386 12/03/2019 02:05 AM    FOLATE 11.1 12/03/2019 02:05 AM    FERRITIN 119.2 11/25/2020 09:46 AM    IRON 93 11/25/2020 09:46 AM    TIBC 269 11/25/2020 09:46 AM     Lab Results   Component Value Date/Time    TRIG 98 07/10/2021 09:13 AM    HDL 54 07/10/2021 09:13 AM    LDLCALC 129 07/10/2021 09:13 AM     No results found for: Milo Lennox, LABBENZ, CANNAB, COCAINESCRN, LABMETH, OPIATESCREENURINE, PHENCYCLIDINESCREENURINE, PPXUR, ETOH  No results found for: LITHIUM, DILFRTOT, VALPROATE    Assessment:       Diagnosis Orders   1. Dystonia        2. Tension headache        3. Lumbar radiculopathy        Right dystonia with muscle headache. She has few headaches a day. She is quite tender to touch in the trapezius muscle area and insertion. She is now developing some degree of dystonia and dystonic postures. Recommended about amatoxin 100 units under EMG guidance to see if this helps as medication unlikely to help. She is on topical Voltaren gel which she is using more often now. Also another polypharmacy and she uses Flexeril for neck pain. She truly has not developed any significant migraine headaches. She has lumbar pain which is not bothersome she is under pain management. She has restless leg syndrome and takes Requip. We will pre-CERT her for 253S about amatoxin and see if this works. Side effects reviewed discussed and usually minimal.      Plan:      No orders of the defined types were placed in this encounter. Orders Placed This Encounter   Medications    cyclobenzaprine (FLEXERIL) 10 MG tablet     Sig: Take 1 tablet by mouth nightly as needed for Muscle spasms     Dispense:  90 tablet     Refill:  1    diclofenac sodium (VOLTAREN) 1 % GEL     Sig: Apply topically 2 times daily     Dispense:  100 g     Refill:  3       No follow-ups on file.       Amy Villasenor MD

## 2022-07-27 ENCOUNTER — HOSPITAL ENCOUNTER (EMERGENCY)
Age: 42
Discharge: HOME | End: 2022-07-27
Attending: EMERGENCY MEDICINE
Payer: COMMERCIAL

## 2022-07-27 ENCOUNTER — APPOINTMENT (OUTPATIENT)
Dept: CT IMAGING | Age: 42
End: 2022-07-27
Payer: COMMERCIAL

## 2022-07-27 VITALS
TEMPERATURE: 97.1 F | DIASTOLIC BLOOD PRESSURE: 61 MMHG | WEIGHT: 246 LBS | BODY MASS INDEX: 42 KG/M2 | RESPIRATION RATE: 20 BRPM | HEART RATE: 67 BPM | SYSTOLIC BLOOD PRESSURE: 98 MMHG | OXYGEN SATURATION: 100 % | HEIGHT: 64 IN

## 2022-07-27 DIAGNOSIS — R10.30 LOWER ABDOMINAL PAIN: Primary | ICD-10-CM

## 2022-07-27 LAB
ALBUMIN SERPL-MCNC: 4.4 G/DL (ref 3.5–4.6)
ALP BLD-CCNC: 76 U/L (ref 40–130)
ALT SERPL-CCNC: 19 U/L (ref 0–33)
ANION GAP SERPL CALCULATED.3IONS-SCNC: 10 MEQ/L (ref 9–15)
AST SERPL-CCNC: 18 U/L (ref 0–35)
BASOPHILS ABSOLUTE: 0.1 K/UL (ref 0–0.2)
BASOPHILS RELATIVE PERCENT: 0.8 %
BILIRUB SERPL-MCNC: 0.3 MG/DL (ref 0.2–0.7)
BILIRUBIN URINE: NEGATIVE
BLOOD, URINE: NEGATIVE
BUN BLDV-MCNC: 11 MG/DL (ref 6–20)
CALCIUM SERPL-MCNC: 9.5 MG/DL (ref 8.5–9.9)
CHLORIDE BLD-SCNC: 104 MEQ/L (ref 95–107)
CLARITY: CLEAR
CO2: 27 MEQ/L (ref 20–31)
COLOR: YELLOW
CREAT SERPL-MCNC: 0.72 MG/DL (ref 0.5–0.9)
EOSINOPHILS ABSOLUTE: 0.1 K/UL (ref 0–0.7)
EOSINOPHILS RELATIVE PERCENT: 1.8 %
GFR AFRICAN AMERICAN: >60
GFR NON-AFRICAN AMERICAN: >60
GLOBULIN: 3 G/DL (ref 2.3–3.5)
GLUCOSE BLD-MCNC: 90 MG/DL (ref 70–99)
GLUCOSE URINE: NEGATIVE MG/DL
HCT VFR BLD CALC: 42.4 % (ref 37–47)
HEMOGLOBIN: 14.4 G/DL (ref 12–16)
KETONES, URINE: NEGATIVE MG/DL
LEUKOCYTE ESTERASE, URINE: NEGATIVE
LYMPHOCYTES ABSOLUTE: 2.4 K/UL (ref 1–4.8)
LYMPHOCYTES RELATIVE PERCENT: 35.3 %
MCH RBC QN AUTO: 32.9 PG (ref 27–31.3)
MCHC RBC AUTO-ENTMCNC: 34 % (ref 33–37)
MCV RBC AUTO: 96.7 FL (ref 82–100)
MONOCYTES ABSOLUTE: 0.5 K/UL (ref 0.2–0.8)
MONOCYTES RELATIVE PERCENT: 7 %
NEUTROPHILS ABSOLUTE: 3.8 K/UL (ref 1.4–6.5)
NEUTROPHILS RELATIVE PERCENT: 55.1 %
NITRITE, URINE: NEGATIVE
PDW BLD-RTO: 12.9 % (ref 11.5–14.5)
PH UA: 7 (ref 5–9)
PLATELET # BLD: 180 K/UL (ref 130–400)
POC CREATININE WHOLE BLOOD: 0.8
POTASSIUM SERPL-SCNC: 3.6 MEQ/L (ref 3.4–4.9)
PROTEIN UA: NEGATIVE MG/DL
RBC # BLD: 4.38 M/UL (ref 4.2–5.4)
SODIUM BLD-SCNC: 141 MEQ/L (ref 135–144)
SPECIFIC GRAVITY UA: 1.02 (ref 1–1.03)
TOTAL PROTEIN: 7.4 G/DL (ref 6.3–8)
URINE REFLEX TO CULTURE: NORMAL
UROBILINOGEN, URINE: 1 E.U./DL
WBC # BLD: 6.8 K/UL (ref 4.8–10.8)

## 2022-07-27 PROCEDURE — 96375 TX/PRO/DX INJ NEW DRUG ADDON: CPT

## 2022-07-27 PROCEDURE — 6360000004 HC RX CONTRAST MEDICATION: Performed by: EMERGENCY MEDICINE

## 2022-07-27 PROCEDURE — 99285 EMERGENCY DEPT VISIT HI MDM: CPT

## 2022-07-27 PROCEDURE — 81003 URINALYSIS AUTO W/O SCOPE: CPT

## 2022-07-27 PROCEDURE — 6360000002 HC RX W HCPCS: Performed by: EMERGENCY MEDICINE

## 2022-07-27 PROCEDURE — 80053 COMPREHEN METABOLIC PANEL: CPT

## 2022-07-27 PROCEDURE — 74177 CT ABD & PELVIS W/CONTRAST: CPT

## 2022-07-27 PROCEDURE — 36415 COLL VENOUS BLD VENIPUNCTURE: CPT

## 2022-07-27 PROCEDURE — 85025 COMPLETE CBC W/AUTO DIFF WBC: CPT

## 2022-07-27 PROCEDURE — 96374 THER/PROPH/DIAG INJ IV PUSH: CPT

## 2022-07-27 RX ORDER — KETOROLAC TROMETHAMINE 30 MG/ML
30 INJECTION, SOLUTION INTRAMUSCULAR; INTRAVENOUS ONCE
Status: COMPLETED | OUTPATIENT
Start: 2022-07-27 | End: 2022-07-27

## 2022-07-27 RX ORDER — FENTANYL CITRATE 50 UG/ML
50 INJECTION, SOLUTION INTRAMUSCULAR; INTRAVENOUS ONCE
Status: COMPLETED | OUTPATIENT
Start: 2022-07-27 | End: 2022-07-27

## 2022-07-27 RX ORDER — TRAMADOL HYDROCHLORIDE 50 MG/1
50 TABLET ORAL EVERY 6 HOURS PRN
Qty: 12 TABLET | Refills: 0 | Status: SHIPPED | OUTPATIENT
Start: 2022-07-27 | End: 2022-07-30

## 2022-07-27 RX ADMIN — FENTANYL CITRATE 50 MCG: 50 INJECTION, SOLUTION INTRAMUSCULAR; INTRAVENOUS at 14:59

## 2022-07-27 RX ADMIN — IOPAMIDOL 50 ML: 612 INJECTION, SOLUTION INTRAVENOUS at 15:52

## 2022-07-27 RX ADMIN — KETOROLAC TROMETHAMINE 30 MG: 30 INJECTION, SOLUTION INTRAMUSCULAR at 14:59

## 2022-07-27 ASSESSMENT — ENCOUNTER SYMPTOMS
COUGH: 0
CHEST TIGHTNESS: 0
ABDOMINAL PAIN: 1
WHEEZING: 0
SHORTNESS OF BREATH: 0
SORE THROAT: 0
NAUSEA: 0
DIARRHEA: 0
PHOTOPHOBIA: 0
ABDOMINAL DISTENTION: 0
VOMITING: 0
EYE DISCHARGE: 0

## 2022-07-27 ASSESSMENT — PAIN SCALES - GENERAL
PAINLEVEL_OUTOF10: 7
PAINLEVEL_OUTOF10: 4
PAINLEVEL_OUTOF10: 4
PAINLEVEL_OUTOF10: 7

## 2022-07-27 ASSESSMENT — PAIN DESCRIPTION - ORIENTATION
ORIENTATION: RIGHT;LOWER

## 2022-07-27 ASSESSMENT — PAIN - FUNCTIONAL ASSESSMENT
PAIN_FUNCTIONAL_ASSESSMENT: 0-10

## 2022-07-27 ASSESSMENT — PAIN DESCRIPTION - LOCATION
LOCATION: ABDOMEN

## 2022-07-27 ASSESSMENT — PAIN DESCRIPTION - PAIN TYPE: TYPE: ACUTE PAIN

## 2022-07-27 NOTE — ED NOTES
Returned. Made aware we are waiting on results. Call light within reach. Declines need of anything at this time except ice chips. Aware we are unable to give those until results are back of CT. Verbalized understanding.       Clive Daly RN  07/27/22 1600

## 2022-07-27 NOTE — ED TRIAGE NOTES
A & Ox4. Skin pink warm and dry. Pt points to RLQ abdomen for pain since 1030/1100 at work that is starting to radiate to center of lower abdomen. Denies N/V/D. States pain worse when walking.

## 2022-07-27 NOTE — ED PROVIDER NOTES
[facility] ED  eMERGENCY dEPARTMENT eNCOUnter      Pt Name: Nona Murray  MRN: [id number]  [place] 1980  Date of evaluation: 7/27/2022  Provider: Marion Lewis MD    CHIEF COMPLAINT       Chief Complaint   Patient presents with    Abdominal Pain     C/o right lower abdominal pain x 3 hours          HISTORY OF PRESENT ILLNESS   (Location/Symptom, Timing/Onset,Context/Setting, Quality, Duration, Modifying Factors, Severity)  Note limiting factors. Nona Murray is a 39 y.o. female who presents to the emergency department for evaluation of abdominal pain. Patient describes right lower side abdominal pain has been going on the past 3 hours. It started while at work sitting down. Feels similar to past episodes of \"ovarian cyst rupture\". She has had prior hysterectomy. No other abdominal surgeries. She has persistent moderate pain in the right lower side area. No related nausea or vomiting. No back pain. No dysuria. HPI    NursingNotes were reviewed. REVIEW OF SYSTEMS    (2-9 systems for level 4, 10 or more for level 5)     Review of Systems   Constitutional:  Negative for chills and diaphoresis. HENT:  Negative for congestion, ear pain, mouth sores and sore throat. Eyes:  Negative for photophobia and discharge. Respiratory:  Negative for cough, chest tightness, shortness of breath and wheezing. Cardiovascular:  Negative for chest pain and palpitations. Gastrointestinal:  Positive for abdominal pain. Negative for abdominal distention, diarrhea, nausea and vomiting. Endocrine: Negative for cold intolerance. Genitourinary:  Negative for difficulty urinating. Musculoskeletal:  Negative for arthralgias. Skin:  Negative for pallor and rash. Allergic/Immunologic: Negative for immunocompromised state. Neurological:  Negative for dizziness and syncope. Hematological:  Negative for adenopathy.    Psychiatric/Behavioral:  Negative for agitation and hallucinations. All other systems reviewed and are negative. Except as noted above the remainder of the review of systems was reviewed and negative. PAST MEDICAL HISTORY     Past Medical History:   Diagnosis Date    Acid reflux     Anxiety     Breast cyst     Depression     Generalized anxiety disorder     H/O tubal ligation     Hiatal hernia 2013    Hypothyroidism     Hypothyroidism     IBS (irritable bowel syndrome)     Obesity     Tx'd with Adipex    Ovarian cyst          SURGICALHISTORY       Past Surgical History:   Procedure Laterality Date    BARIATRIC SURGERY  2018    GASTRIC SLEEVE    BREAST BIOPSY Right 2017    abscess removed    BREAST REDUCTION SURGERY      BREAST SURGERY  2007    Reduction     SECTION  2004    CHOLECYSTECTOMY      COLONOSCOPY N/A 2020    COLONOSCOPY DIAGNOSTIC performed by Soto Rendon MD at [facility] (CERVIX STATUS UNKNOWN)      HYSTERECTOMY, VAGINAL N/A 2020    TLH performed by Yumiko Scruggs MD at [facility]      UPPER GASTROINTESTINAL ENDOSCOPY  2013    Dr Dunlap Lowers       Previous Medications    ACETAMINOPHEN (APAP EXTRA STRENGTH) 500 MG TABLET    Take 2 tablets by mouth every 6 hours as needed for Pain    ALBUTEROL SULFATE HFA (VENTOLIN HFA) 108 (90 BASE) MCG/ACT INHALER    Inhale 1 puff into the lungs every 6 hours as needed for Wheezing or Shortness of Breath    ASCORBIC ACID (VITAMIN C) 250 MG TABLET    Take 500 mg by mouth daily     BUSPIRONE (BUSPAR) 10 MG TABLET    TAKE ONE TABLET BY MOUTH EVERY 8 HOURS AS NEEDED FOR ANXIETY.     CYCLOBENZAPRINE (FLEXERIL) 10 MG TABLET    Take 1 tablet by mouth nightly as needed for Muscle spasms    DICLOFENAC SODIUM (VOLTAREN) 1 % GEL    Apply topically 2 times daily    FLUTICASONE (FLONASE) 50 MCG/ACT NASAL SPRAY    1 spray by Nasal route daily    LEVOTHYROXINE (SYNTHROID) 150 MCG TABLET    TAKE ONE TABLET BY MOUTH EVERY DAY    METOPROLOL SUCCINATE (TOPROL XL) 25 MG EXTENDED RELEASE TABLET    Take 1 tablet by mouth nightly    MULTIPLE VITAMIN (MULTI VITAMIN DAILY PO)    Take 1 tablet by mouth daily    PANTOPRAZOLE (PROTONIX) 40 MG TABLET    Take 1 tablet by mouth every 12 hours as needed (acid reflux)    PROBIOTIC PRODUCT (PROBIOTIC DAILY PO)    Take 1 tablet by mouth daily    ROPINIROLE (REQUIP) 0.5 MG TABLET    TAKE ONE TABLET BY MOUTH NIGHTLY AS NEEDED FOR RESTLESS LEGS    TRAZODONE (DESYREL) 150 MG TABLET    Take 1 tablet by mouth nightly       ALLERGIES     Ibuprofen and Morphine    FAMILY HISTORY       Family History   Problem Relation Age of Onset    High Blood Pressure Mother         No FDR with colon cancer     Crohn's Disease Mother     Cancer Father         Lung cancer    Mental Illness Brother     Heart Disease Brother     Asthma Brother     Colon Cancer Neg Hx           SOCIAL HISTORY       Social History     Socioeconomic History    Marital status: Single     Spouse name: None    Number of children: None    Years of education: None    Highest education level: None   Tobacco Use    Smoking status: Some Days     Packs/day: 0.50     Years: 3.00     Pack years: 1.50     Types: Cigarettes     Last attempt to quit: 8/6/2018     Years since quitting: 3.9    Smokeless tobacco: Never   Vaping Use    Vaping Use: Never used   Substance and Sexual Activity    Alcohol use: Never     Comment: Rare use    Drug use: No    Sexual activity: Yes     Partners: Male     Social Determinants of Health     Financial Resource Strain: Low Risk     Difficulty of Paying Living Expenses: Not hard at all   Food Insecurity: No Food Insecurity    Worried About Running Out of Food in the Last Year: Never true    Ran Out of Food in the Last Year: Never true       SCREENINGS    Duncan Coma Scale  Eye Opening: Spontaneous  Best Verbal Response: Oriented  Best Motor Response: Obeys commands  Duncan Coma Scale Score: 15 @FLOW([id number])@      PHYSICAL EXAM    (up to 7 for level 4, 8 or more for level 5)     ED Triage Vitals [07/27/22 1433]   BP Temp Temp src Heart Rate Resp SpO2 Height Weight   106/78 97.1 °F (36.2 °C) -- 94 16 98 % -- --       Physical Exam  Vitals and nursing note reviewed. Constitutional:       Appearance: She is well-developed. HENT:      Head: Normocephalic. Nose: Nose normal.   Eyes:      Conjunctiva/sclera: Conjunctivae normal.      Pupils: Pupils are equal, round, and reactive to light. Cardiovascular:      Rate and Rhythm: Normal rate and regular rhythm. Heart sounds: Normal heart sounds. Pulmonary:      Effort: Pulmonary effort is normal.      Breath sounds: Normal breath sounds. Abdominal:      General: Bowel sounds are normal.      Palpations: Abdomen is soft. Tenderness: There is abdominal tenderness in the right lower quadrant. There is no guarding. Hernia: No hernia is present. Musculoskeletal:         General: Normal range of motion. Cervical back: Normal range of motion and neck supple. Skin:     General: Skin is warm and dry. Capillary Refill: Capillary refill takes less than 2 seconds. Neurological:      Mental Status: She is alert and oriented to person, place, and time. Psychiatric:         Mood and Affect: Mood normal.       DIAGNOSTIC RESULTS     EKG: All EKG's are interpreted by the Emergency Department Physician who either signs or Co-signsthis chart in the absence of a cardiologist.      RADIOLOGY:   Annia Hammond such as CT, Ultrasound and MRI are read by the radiologist. Plain radiographic images are visualized and preliminarily interpreted by the emergency physician with the below findings:      Interpretation per the Radiologist below, if available at the time ofthis note:    CT ABDOMEN PELVIS W IV CONTRAST Additional Contrast? None   Final Result   NO ACUTE PATHOLOGY IN THE ABDOMEN OR PELVIS.                ED BEDSIDE ULTRASOUND: Performed by ED Physician - none    LABS:  Labs Reviewed   CBC WITH AUTO DIFFERENTIAL - Abnormal; Notable for the following components:       Result Value    MCH 32.9 (*)     All other components within normal limits   POCT CREATININE - URINE - Normal   COMPREHENSIVE METABOLIC PANEL   URINALYSIS WITH REFLEX TO CULTURE       All other labs were within normal range or not returned as of this dictation. EMERGENCY DEPARTMENT COURSE and DIFFERENTIAL DIAGNOSIS/MDM:   Vitals:    Vitals:    07/27/22 1530 07/27/22 1600 07/27/22 1630 07/27/22 1700   BP: (!) 96/50 98/62 (!) 90/48 88/72   Pulse: 83 68 64 63   Resp: 16 16 16 16   Temp:       SpO2: 97% 100% 100% 100%   Weight:       Height:              MDM    CT scan is unremarkable. Laboratory studies normal.  Pain improved on reevaluation. Patient discharged home. Return for increasing pain or fever. CONSULTS:  None    PROCEDURES:  Unless otherwise noted below, none     Procedures    FINAL IMPRESSION      1.  Lower abdominal pain          DISPOSITION/PLAN   DISPOSITION Decision To Discharge 07/27/2022 05:04:48 PM      PATIENT REFERRED TO:  Rachel Camarillo MD  [address] [facility] [address] [place] [address]  [phone]    In 2 days      DISCHARGE MEDICATIONS:  New Prescriptions    No medications on file          (Please note that portions of this note were completed with a voice recognition program.  Efforts were made to edit the dictations but occasionally words are mis-transcribed.)    Lisa Underwood MD (electronically signed)  Attending Emergency Physician         Lisa Underwood MD  07/27/22 6706

## 2022-07-28 ENCOUNTER — PATIENT MESSAGE (OUTPATIENT)
Dept: FAMILY MEDICINE CLINIC | Age: 42
End: 2022-07-28

## 2022-07-28 DIAGNOSIS — M54.16 LUMBAR RADICULOPATHY: Primary | ICD-10-CM

## 2022-07-28 LAB
GFR AFRICAN AMERICAN: >60
GFR NON-AFRICAN AMERICAN: >60
PERFORMED ON: NORMAL
POC CREATININE: 0.8 MG/DL (ref 0.6–1.2)
POC SAMPLE TYPE: NORMAL

## 2022-08-01 ENCOUNTER — TELEPHONE (OUTPATIENT)
Dept: NEUROLOGY | Age: 42
End: 2022-08-01

## 2022-08-02 ENCOUNTER — TELEPHONE (OUTPATIENT)
Dept: NEUROLOGY | Age: 42
End: 2022-08-02

## 2022-08-02 ENCOUNTER — OFFICE VISIT (OUTPATIENT)
Dept: FAMILY MEDICINE CLINIC | Age: 42
End: 2022-08-02
Payer: COMMERCIAL

## 2022-08-02 VITALS
HEIGHT: 64 IN | DIASTOLIC BLOOD PRESSURE: 70 MMHG | SYSTOLIC BLOOD PRESSURE: 112 MMHG | BODY MASS INDEX: 41.83 KG/M2 | HEART RATE: 82 BPM | TEMPERATURE: 98.2 F | OXYGEN SATURATION: 99 % | WEIGHT: 245 LBS

## 2022-08-02 DIAGNOSIS — G24.9 DYSTONIA: Primary | ICD-10-CM

## 2022-08-02 DIAGNOSIS — R10.30 LOWER ABDOMINAL PAIN: Primary | ICD-10-CM

## 2022-08-02 DIAGNOSIS — R10.2 PELVIC PAIN: ICD-10-CM

## 2022-08-02 PROCEDURE — 99214 OFFICE O/P EST MOD 30 MIN: CPT | Performed by: INTERNAL MEDICINE

## 2022-08-02 PROCEDURE — G8417 CALC BMI ABV UP PARAM F/U: HCPCS | Performed by: INTERNAL MEDICINE

## 2022-08-02 PROCEDURE — G8427 DOCREV CUR MEDS BY ELIG CLIN: HCPCS | Performed by: INTERNAL MEDICINE

## 2022-08-02 PROCEDURE — 4004F PT TOBACCO SCREEN RCVD TLK: CPT | Performed by: INTERNAL MEDICINE

## 2022-08-02 RX ORDER — DICYCLOMINE HYDROCHLORIDE 10 MG/1
CAPSULE ORAL
Qty: 360 CAPSULE | Refills: 1 | Status: SHIPPED | OUTPATIENT
Start: 2022-08-02

## 2022-08-02 ASSESSMENT — PATIENT HEALTH QUESTIONNAIRE - PHQ9
2. FEELING DOWN, DEPRESSED OR HOPELESS: 0
SUM OF ALL RESPONSES TO PHQ QUESTIONS 1-9: 0
1. LITTLE INTEREST OR PLEASURE IN DOING THINGS: 0
SUM OF ALL RESPONSES TO PHQ9 QUESTIONS 1 & 2: 0
SUM OF ALL RESPONSES TO PHQ QUESTIONS 1-9: 0

## 2022-08-02 ASSESSMENT — ENCOUNTER SYMPTOMS
BACK PAIN: 0
ABDOMINAL PAIN: 1
EYE PAIN: 0
SHORTNESS OF BREATH: 0

## 2022-08-02 NOTE — PROGRESS NOTES
Subjective:      Patient ID: [place] is a 43 y.o. female who presents today with:  Chief Complaint   Patient presents with    Follow-up     Patient presents today for ER f/u.         HPI    Here for follow up   6/10 lower abd pain  Pelvic pain  Constant  Has ovaries  Recent ct negative  No vaginal discharge  Or odor  Past Medical History:   Diagnosis Date    Acid reflux     Anxiety     Breast cyst     Depression     Generalized anxiety disorder     H/O tubal ligation     Hiatal hernia 2013    Hypothyroidism     Hypothyroidism     IBS (irritable bowel syndrome)     Obesity     Tx'd with Adipex    Ovarian cyst      Past Surgical History:   Procedure Laterality Date    BARIATRIC SURGERY  2018    GASTRIC SLEEVE    BREAST BIOPSY Right 2017    abscess removed    BREAST REDUCTION SURGERY      BREAST SURGERY  2007    Reduction     SECTION  2004    CHOLECYSTECTOMY  1996    COLONOSCOPY N/A 2020    COLONOSCOPY DIAGNOSTIC performed by Sari Vargas MD at [address] [place] [address] (CERVIX STATUS UNKNOWN)      HYSTERECTOMY, VAGINAL N/A 2020    TLH performed by Wilfredo Neri MD at [address] [place] [address]      UPPER GASTROINTESTINAL ENDOSCOPY  2013    Dr Gwendolyn Portillo     Social History     Socioeconomic History    Marital status: Single     Spouse name: Not on file    Number of children: Not on file    Years of education: Not on file    Highest education level: Not on file   Occupational History    Not on file   Tobacco Use    Smoking status: Some Days     Packs/day: 0.50     Years: 3.00     Pack years: 1.50     Types: Cigarettes     Last attempt to quit: 2018     Years since quittin.0    Smokeless tobacco: Never   Vaping Use    Vaping Use: Never used   Substance and Sexual Activity    Alcohol use: Never     Comment: Rare use    Drug use: No    Sexual activity: Yes     Partners: Male   Other Topics Concern    Not on file   Social History Narrative    Not on file     Social Determinants of Health     Financial Resource Strain: Low Risk     Difficulty of Paying Living Expenses: Not hard at all   Food Insecurity: No Food Insecurity    Worried About Running Out of Food in the Last Year: Never true    Ran Out of Food in the Last Year: Never true   Transportation Needs: Not on file   Physical Activity: Not on file   Stress: Not on file   Social Connections: Not on file   Intimate Partner Violence: Not on file   Housing Stability: Not on file     Allergies   Allergen Reactions    Ibuprofen Other (See Comments)     Can't have with gastric sleeve    Morphine Nausea And Vomiting     Current Outpatient Medications on File Prior to Visit   Medication Sig Dispense Refill    cyclobenzaprine (FLEXERIL) 10 MG tablet Take 1 tablet by mouth nightly as needed for Muscle spasms 90 tablet 1    diclofenac sodium (VOLTAREN) 1 % GEL Apply topically 2 times daily 100 g 3    pantoprazole (PROTONIX) 40 MG tablet Take 1 tablet by mouth every 12 hours as needed (acid reflux) 60 tablet 1    rOPINIRole (REQUIP) 0.5 MG tablet TAKE ONE TABLET BY MOUTH NIGHTLY AS NEEDED FOR RESTLESS LEGS 90 tablet 3    metoprolol succinate (TOPROL XL) 25 MG extended release tablet Take 1 tablet by mouth nightly 90 tablet 3    traZODone (DESYREL) 150 MG tablet Take 1 tablet by mouth nightly 90 tablet 1    busPIRone (BUSPAR) 10 MG tablet TAKE ONE TABLET BY MOUTH EVERY 8 HOURS AS NEEDED FOR ANXIETY.  270 tablet 0    levothyroxine (SYNTHROID) 150 MCG tablet TAKE ONE TABLET BY MOUTH EVERY DAY 90 tablet 1    albuterol sulfate HFA (VENTOLIN HFA) 108 (90 Base) MCG/ACT inhaler Inhale 1 puff into the lungs every 6 hours as needed for Wheezing or Shortness of Breath 18 g 0    acetaminophen (APAP EXTRA STRENGTH) 500 MG tablet Take 2 tablets by mouth every 6 hours as needed for Pain 60 tablet 1    Multiple Vitamin (MULTI VITAMIN DAILY PO) Take 1 tablet by mouth daily      Probiotic Product (PROBIOTIC DAILY PO) Take 1 tablet by mouth daily      fluticasone (FLONASE) 50 MCG/ACT nasal spray 1 spray by Nasal route daily 1 Bottle 0    Ascorbic Acid (VITAMIN C) 250 MG tablet Take 500 mg by mouth daily        No current facility-administered medications on file prior to visit. I have personally reviewed the ROS, PMH, PFH, and social history     Review of Systems   Constitutional:  Negative for chills and fever. HENT:  Negative for congestion. Eyes:  Negative for pain. Respiratory:  Negative for shortness of breath. Cardiovascular:  Negative for chest pain. Gastrointestinal:  Positive for abdominal pain. Genitourinary:  Negative for hematuria. Musculoskeletal:  Negative for back pain. Allergic/Immunologic: Negative for immunocompromised state. Neurological:  Negative for headaches. Psychiatric/Behavioral:  Negative for hallucinations. Objective:   /70 (Site: Left Upper Arm, Position: Sitting, Cuff Size: Medium Adult)   Pulse 82   Temp 98.2 °F (36.8 °C) (Temporal)   Ht 5' 4\" (1.626 m)   Wt 245 lb (111.1 kg)   LMP 10/18/2020   SpO2 99%   BMI 42.05 kg/m²     Physical Exam  Constitutional:       General: She is not in acute distress. Appearance: Normal appearance. She is not ill-appearing, toxic-appearing or diaphoretic. HENT:      Head: Normocephalic. Neck:      Vascular: No carotid bruit. Cardiovascular:      Rate and Rhythm: Normal rate and regular rhythm. Pulses: Normal pulses. Heart sounds: Normal heart sounds. No murmur heard. No friction rub. No gallop. Pulmonary:      Effort: Pulmonary effort is normal. No respiratory distress. Breath sounds: Normal breath sounds. No wheezing, rhonchi or rales. Abdominal:      General: Abdomen is flat. There is no distension. Palpations: Abdomen is soft. Tenderness: There is no abdominal tenderness. There is no right CVA tenderness, left CVA tenderness, guarding or rebound.    Musculoskeletal:      Cervical back: Neck supple. Right lower leg: No edema. Left lower leg: No edema. Skin:     General: Skin is warm. Findings: No erythema or rash. Neurological:      Mental Status: She is alert. Psychiatric:         Mood and Affect: Mood normal.         Assessment:       Diagnosis Orders   1. Lower abdominal pain  US PELVIS COMPLETE    dicyclomine (BENTYL) 10 MG capsule    Lipase    HCG Qualitative, Serum    US Soft Tissue Limited Area    Sedimentation Rate    C-Reactive Protein      2. Pelvic pain  US PELVIS COMPLETE    dicyclomine (BENTYL) 10 MG capsule    Lipase    HCG Qualitative, Serum    US Soft Tissue Limited Area    Sedimentation Rate    C-Reactive Protein            Plan:     vc.  Will probably schedule 2nd covid booster in fall   Had partial hysterectomy, get US, fu dr haydne  If no clear cause, will refer to gi, do trial of bentyl        Orders Placed This Encounter   Procedures    US PELVIS COMPLETE     This procedure can be scheduled via [facility]. Access your [facility] account by visiting [email or web address]. Standing Status:   Future     Number of Occurrences:   1     Standing Expiration Date:   8/2/2023     Order Specific Question:   Reason for exam:     Answer:   unexplained pelvic pain    US Soft Tissue Limited Area     This procedure can be scheduled via [facility]. Access your [facility] account by visiting [email or web address].      Standing Status:   Future     Number of Occurrences:   1     Standing Expiration Date:   8/2/2023     Order Specific Question:   Reason for exam:     Answer:   suspected scar tissue lower abdomen    Lipase     Standing Status:   Future     Number of Occurrences:   1     Standing Expiration Date:   8/2/2023    HCG Qualitative, Serum     Standing Status:   Future     Number of Occurrences:   1     Standing Expiration Date:   8/2/2023    Sedimentation Rate     Standing Status:   Future     Number of Occurrences:   1     Standing Expiration Date:   8/2/2023    C-Reactive Protein     Standing Status:   Future     Number of Occurrences:   1     Standing Expiration Date:   8/2/2023       Orders Placed This Encounter   Medications    dicyclomine (BENTYL) 10 MG capsule     Sig: Take 1 capsule po every 6 hours as needed for abdominal pain     Dispense:  360 capsule     Refill:  1     Go to ER if it worsens, returns, worsens. She looks non toxic  If anything should change or worsen call ASAP, don't wait for next scheduled appointment. Return in about 4 weeks (around 8/30/2022) for Chronic condition management/appointment, worsening symptoms, call ASAP for appointment.       Yon Nye MD

## 2022-08-02 NOTE — TELEPHONE ENCOUNTER
Botox scheduled for 08/15/22 at 845 am at [facility]. Patient aware of appt. Order pended. Please sign.

## 2022-08-04 ENCOUNTER — PATIENT MESSAGE (OUTPATIENT)
Dept: FAMILY MEDICINE CLINIC | Age: 42
End: 2022-08-04

## 2022-08-04 ENCOUNTER — HOSPITAL ENCOUNTER (OUTPATIENT)
Dept: ULTRASOUND IMAGING | Age: 42
Discharge: HOME | End: 2022-08-06
Payer: COMMERCIAL

## 2022-08-04 DIAGNOSIS — R10.2 PELVIC PAIN: ICD-10-CM

## 2022-08-04 DIAGNOSIS — R10.30 LOWER ABDOMINAL PAIN: ICD-10-CM

## 2022-08-04 DIAGNOSIS — R10.30 LOWER ABDOMINAL PAIN: Primary | ICD-10-CM

## 2022-08-04 LAB
C-REACTIVE PROTEIN: <3 MG/L (ref 0–5)
HCG QUALITATIVE: NEGATIVE
LIPASE: 35 U/L (ref 12–95)
SEDIMENTATION RATE, ERYTHROCYTE: 15 MM (ref 0–20)

## 2022-08-04 PROCEDURE — 76830 TRANSVAGINAL US NON-OB: CPT

## 2022-08-04 PROCEDURE — 76999 ECHO EXAMINATION PROCEDURE: CPT

## 2022-08-04 PROCEDURE — 76856 US EXAM PELVIC COMPLETE: CPT

## 2022-08-05 NOTE — TELEPHONE ENCOUNTER
Unsure if limited by body habitus or if was something other than her ovary causing her pain  Would recommend following up your obgyn  If she has new or worsening pain, or pain that doesn't go away go to the er

## 2022-08-15 ENCOUNTER — HOSPITAL ENCOUNTER (OUTPATIENT)
Dept: NEUROLOGY | Age: 42
Discharge: HOME | End: 2022-08-15
Payer: COMMERCIAL

## 2022-08-15 DIAGNOSIS — E03.9 HYPOTHYROIDISM, UNSPECIFIED TYPE: ICD-10-CM

## 2022-08-15 LAB — TSH REFLEX: 0.64 UIU/ML (ref 0.44–3.86)

## 2022-08-15 PROCEDURE — 95874 GUIDE NERV DESTR NEEDLE EMG: CPT

## 2022-08-15 PROCEDURE — 64616 CHEMODENERV MUSC NECK DYSTON: CPT | Performed by: PSYCHIATRY & NEUROLOGY

## 2022-08-15 PROCEDURE — 6360000002 HC RX W HCPCS

## 2022-08-15 PROCEDURE — 64616 CHEMODENERV MUSC NECK DYSTON: CPT

## 2022-08-15 PROCEDURE — 95874 GUIDE NERV DESTR NEEDLE EMG: CPT | Performed by: PSYCHIATRY & NEUROLOGY

## 2022-08-15 NOTE — PROCEDURES
[address]                      [address], [address] [facility]                                 PROCEDURE NOTE    PATIENT NAME: Arma Councilman                 :        1980  MED REC NO:   [id number]                            ROOM:  ACCOUNT NO:   [de-identified]                           ADMIT DATE: 08/15/2022  PROVIDER:     Twan Green MD    DATE OF PROCEDURE:  08/15/2022    This is a botulinum toxin injected for cervical dystonia on the right. 100 units of botulinum toxin are injected in multiple muscle sites with  EMG guidance. No complications are notable.         Nabila Howard MD    D: 08/15/2022 9:15:58       T: 08/15/2022 11:21:13     [email or web address]  Job#: [id number]     Doc#: [id number]    CC:

## 2022-08-16 LAB — T4 FREE: 1.34 NG/DL (ref 0.84–1.68)

## 2022-08-23 ENCOUNTER — TELEPHONE (OUTPATIENT)
Dept: NEUROLOGY | Age: 42
End: 2022-08-23

## 2022-08-25 ENCOUNTER — TELEPHONE (OUTPATIENT)
Dept: NEUROLOGY | Age: 42
End: 2022-08-25

## 2022-08-25 DIAGNOSIS — G24.3 CERVICAL DYSTONIA: Primary | ICD-10-CM

## 2022-08-25 NOTE — TELEPHONE ENCOUNTER
Next Botox scheduled for 11/15/22 at 845 am at [address]. Patient aware of appt    Order pended.   Please sign

## 2022-09-02 ENCOUNTER — OFFICE VISIT (OUTPATIENT)
Dept: ENDOCRINOLOGY | Age: 42
End: 2022-09-02
Payer: COMMERCIAL

## 2022-09-02 VITALS
SYSTOLIC BLOOD PRESSURE: 107 MMHG | OXYGEN SATURATION: 97 % | HEART RATE: 83 BPM | HEIGHT: 64 IN | WEIGHT: 248 LBS | BODY MASS INDEX: 42.34 KG/M2 | DIASTOLIC BLOOD PRESSURE: 74 MMHG

## 2022-09-02 DIAGNOSIS — E66.01 MORBID OBESITY (HCC): ICD-10-CM

## 2022-09-02 DIAGNOSIS — E03.9 HYPOTHYROIDISM, UNSPECIFIED TYPE: Primary | ICD-10-CM

## 2022-09-02 DIAGNOSIS — E03.9 HYPOTHYROIDISM, UNSPECIFIED TYPE: ICD-10-CM

## 2022-09-02 DIAGNOSIS — Z98.84 S/P GASTRIC BYPASS: ICD-10-CM

## 2022-09-02 LAB
ANION GAP SERPL CALCULATED.3IONS-SCNC: 10 MEQ/L (ref 9–15)
BUN BLDV-MCNC: 14 MG/DL (ref 6–20)
CALCIUM SERPL-MCNC: 8.8 MG/DL (ref 8.5–9.9)
CHLORIDE BLD-SCNC: 103 MEQ/L (ref 95–107)
CO2: 25 MEQ/L (ref 20–31)
CREAT SERPL-MCNC: 0.74 MG/DL (ref 0.5–0.9)
GFR AFRICAN AMERICAN: >60
GFR NON-AFRICAN AMERICAN: >60
GLUCOSE BLD-MCNC: 80 MG/DL (ref 70–99)
POTASSIUM SERPL-SCNC: 3.9 MEQ/L (ref 3.4–4.9)
SODIUM BLD-SCNC: 138 MEQ/L (ref 135–144)

## 2022-09-02 PROCEDURE — G8427 DOCREV CUR MEDS BY ELIG CLIN: HCPCS | Performed by: INTERNAL MEDICINE

## 2022-09-02 PROCEDURE — 4004F PT TOBACCO SCREEN RCVD TLK: CPT | Performed by: INTERNAL MEDICINE

## 2022-09-02 PROCEDURE — 99213 OFFICE O/P EST LOW 20 MIN: CPT | Performed by: INTERNAL MEDICINE

## 2022-09-02 PROCEDURE — G8417 CALC BMI ABV UP PARAM F/U: HCPCS | Performed by: INTERNAL MEDICINE

## 2022-09-02 RX ORDER — TIZANIDINE 4 MG/1
TABLET ORAL
COMMUNITY
Start: 2022-09-01

## 2022-09-02 NOTE — PROGRESS NOTES
9/2/2022    Assessment:       Diagnosis Orders   1. Hypothyroidism, unspecified type  Basic Metabolic Panel      2. Morbid obesity ([address]        3. S/P gastric bypass              PLAN:     Orders Placed This Encounter   Procedures    Basic Metabolic Panel     Standing Status:   Future     Number of Occurrences:   1     Standing Expiration Date:   9/2/2023     Discussed about revision of the gastric bypass  Continue current dose of levothyroxine  Follow-up in 4 weeks  [place] report reviewed  Subjective:     Chief Complaint   Patient presents with    Hypothyroidism     Vitals:    09/02/22 1325   BP: 107/74   Site: Left Upper Arm   Position: Sitting   Cuff Size: Large Adult   Pulse: 83   SpO2: 97%   Weight: 248 lb (112.5 kg)   Height: 5' 4\" (1.626 m)     Wt Readings from Last 3 Encounters:   09/02/22 248 lb (112.5 kg)   08/02/22 245 lb (111.1 kg)   07/27/22 246 lb (111.6 kg)     BP Readings from Last 3 Encounters:   09/02/22 107/74   08/02/22 112/70   07/27/22 98/61     Follow-up on obesity BMI 42 history of gastric sleeve done many years ago BMI 42 history of hypothyroidism on levothyroxine 150 mcg daily thyroid function test was stable  Wants to start phentermine can start from next month  [place] report reviewed    Other  This is a chronic (Hypothyroidism) problem. The current episode started more than 1 year ago. The problem has been unchanged. Associated symptoms include fatigue. Pertinent negatives include no neck pain. Treatments tried: Levothyroxine. The treatment provided moderate relief.       Latest Reference Range & Units 8/15/22 09:22   TSH 0.440 - 3.860 uIU/mL 0.644   T4 Free 0.84 - 1.68 ng/dL 1.34       Past Medical History:   Diagnosis Date    Acid reflux     Anxiety     Breast cyst     Depression     Generalized anxiety disorder     H/O tubal ligation     Hiatal hernia 11/18/2013    Hypothyroidism     Hypothyroidism     IBS (irritable bowel syndrome)     Obesity     Tx'd with Adipex    Ovarian cyst Past Surgical History:   Procedure Laterality Date    BARIATRIC SURGERY  2018    GASTRIC SLEEVE    BREAST BIOPSY Right 2017    abscess removed    BREAST REDUCTION SURGERY      BREAST SURGERY  2007    Reduction     SECTION  2004    CHOLECYSTECTOMY  1996    COLONOSCOPY N/A 2020    COLONOSCOPY DIAGNOSTIC performed by Carley Watkins MD at [address] [place] [address] (CERVIX STATUS UNKNOWN)      HYSTERECTOMY, VAGINAL N/A 2020    TLH performed by Baltazar Poole MD at [place] [address]    UPPER GASTROINTESTINAL ENDOSCOPY  2013    Dr Diego Gallego     Social History     Socioeconomic History    Marital status: Single     Spouse name: Not on file    Number of children: Not on file    Years of education: Not on file    Highest education level: Not on file   Occupational History    Not on file   Tobacco Use    Smoking status: Some Days     Packs/day: 0.50     Years: 3.00     Pack years: 1.50     Types: Cigarettes     Last attempt to quit: 2018     Years since quittin.0    Smokeless tobacco: Never   Vaping Use    Vaping Use: Never used   Substance and Sexual Activity    Alcohol use: Never     Comment: Rare use    Drug use: No    Sexual activity: Yes     Partners: Male   Other Topics Concern    Not on file   Social History Narrative    Not on file     Social Determinants of Health     Financial Resource Strain: Low Risk     Difficulty of Paying Living Expenses: Not hard at all   Food Insecurity: No Food Insecurity    Worried About Running Out of Food in the Last Year: Never true    Ran Out of Food in the Last Year: Never true   Transportation Needs: Not on file   Physical Activity: Not on file   Stress: Not on file   Social Connections: Not on file   Intimate Partner Violence: Not on file   Housing Stability: Not on file     Family History   Problem Relation Age of Onset    High Blood Pressure Mother         No FDR with colon cancer     Crohn's Disease Mother     Cancer Father         Lung cancer    Mental Illness Brother     Heart Disease Brother     Asthma Brother     Colon Cancer Neg Hx      Allergies   Allergen Reactions    Ibuprofen Other (See Comments)     Can't have with gastric sleeve    Morphine Nausea And Vomiting       Current Outpatient Medications:     tiZANidine (ZANAFLEX) 4 MG tablet, , Disp: , Rfl:     dicyclomine (BENTYL) 10 MG capsule, Take 1 capsule po every 6 hours as needed for abdominal pain, Disp: 360 capsule, Rfl: 1    cyclobenzaprine (FLEXERIL) 10 MG tablet, Take 1 tablet by mouth nightly as needed for Muscle spasms, Disp: 90 tablet, Rfl: 1    diclofenac sodium (VOLTAREN) 1 % GEL, Apply topically 2 times daily, Disp: 100 g, Rfl: 3    pantoprazole (PROTONIX) 40 MG tablet, Take 1 tablet by mouth every 12 hours as needed (acid reflux), Disp: 60 tablet, Rfl: 1    rOPINIRole (REQUIP) 0.5 MG tablet, TAKE ONE TABLET BY MOUTH NIGHTLY AS NEEDED FOR RESTLESS LEGS, Disp: 90 tablet, Rfl: 3    metoprolol succinate (TOPROL XL) 25 MG extended release tablet, Take 1 tablet by mouth nightly, Disp: 90 tablet, Rfl: 3    traZODone (DESYREL) 150 MG tablet, Take 1 tablet by mouth nightly, Disp: 90 tablet, Rfl: 1    busPIRone (BUSPAR) 10 MG tablet, TAKE ONE TABLET BY MOUTH EVERY 8 HOURS AS NEEDED FOR ANXIETY., Disp: 270 tablet, Rfl: 0    levothyroxine (SYNTHROID) 150 MCG tablet, TAKE ONE TABLET BY MOUTH EVERY DAY, Disp: 90 tablet, Rfl: 1    albuterol sulfate HFA (VENTOLIN HFA) 108 (90 Base) MCG/ACT inhaler, Inhale 1 puff into the lungs every 6 hours as needed for Wheezing or Shortness of Breath, Disp: 18 g, Rfl: 0    acetaminophen (APAP EXTRA STRENGTH) 500 MG tablet, Take 2 tablets by mouth every 6 hours as needed for Pain, Disp: 60 tablet, Rfl: 1    Multiple Vitamin (MULTI VITAMIN DAILY PO), Take 1 tablet by mouth daily, Disp: , Rfl:     Probiotic Product (PROBIOTIC DAILY PO), Take 1 tablet by mouth daily, Disp: , Rfl:     fluticasone (FLONASE) 50 MCG/ACT nasal spray, 1 spray by Nasal route daily, Disp: 1 Bottle, Rfl: 0    Ascorbic Acid (VITAMIN C) 250 MG tablet, Take 500 mg by mouth daily , Disp: , Rfl:   Lab Results   Component Value Date     07/27/2022    K 3.6 07/27/2022     07/27/2022    CO2 27 07/27/2022    BUN 11 07/27/2022    CREATININE 0.8 07/27/2022    GLUCOSE 90 07/27/2022    CALCIUM 9.5 07/27/2022    PROT 7.4 07/27/2022    LABALBU 4.4 07/27/2022    BILITOT 0.3 07/27/2022    ALKPHOS 76 07/27/2022    AST 18 07/27/2022    ALT 19 07/27/2022    LABGLOM >60 07/27/2022    GFRAA >60 07/27/2022    GLOB 3.0 07/27/2022     Lab Results   Component Value Date    WBC 6.8 07/27/2022    HGB 14.4 07/27/2022    HCT 42.4 07/27/2022    MCV 96.7 07/27/2022     07/27/2022     Lab Results   Component Value Date    LABA1C <4.2 (L) 07/10/2021    LABA1C 4.4 (L) 09/22/2020    LABA1C 4.3 (L) 01/03/2020     Lab Results   Component Value Date    HDL 54 07/10/2021    HDL 51 09/22/2020    HDL 50 01/03/2020    LDLCALC 129 07/10/2021    LDLCALC 86 09/22/2020    LDLCALC 95 01/03/2020    CHOL 203 (H) 07/10/2021    CHOL 158 09/22/2020    CHOL 176 01/03/2020    TRIG 98 07/10/2021    TRIG 107 09/22/2020    TRIG 153 (H) 01/03/2020     No results found for: TESTM  Lab Results   Component Value Date    TSH 0.144 (L) 06/09/2021    TSH 18.120 (H) 04/13/2021    TSH 0.318 (L) 11/25/2020    TSHREFLEX 0.644 08/15/2022    TSHREFLEX 0.640 02/21/2022    TSHREFLEX 1.450 08/24/2021    FT3 2.3 03/14/2019    FT3 2.3 06/28/2016    T4FREE 1.34 08/15/2022    T4FREE 1.16 02/21/2022    T4FREE 1.42 08/24/2021     Lab Results   Component Value Date    TPOABS <10.0 04/24/2020       Review of Systems   Constitutional:  Positive for fatigue. Endocrine: Negative. Musculoskeletal:  Negative for neck pain. Psychiatric/Behavioral:  Positive for dysphoric mood. All other systems reviewed and are negative. Objective:   Physical Exam  Vitals reviewed.    Constitutional:       General: She is not in acute distress. Appearance: Normal appearance. She is obese. HENT:      Head: Normocephalic and atraumatic. Right Ear: External ear normal.      Left Ear: External ear normal.      Nose: Nose normal.   Eyes:      General: No scleral icterus. Right eye: No discharge. Left eye: No discharge. Extraocular Movements: Extraocular movements intact. Conjunctiva/sclera: Conjunctivae normal.   Cardiovascular:      Rate and Rhythm: Normal rate. Pulmonary:      Effort: Pulmonary effort is normal.   Musculoskeletal:         General: Normal range of motion. Cervical back: Normal range of motion and neck supple. Neurological:      General: No focal deficit present. Mental Status: She is alert and oriented to person, place, and time.    Psychiatric:         Mood and Affect: Mood normal.         Behavior: Behavior normal.

## 2022-10-12 ENCOUNTER — OFFICE VISIT (OUTPATIENT)
Dept: FAMILY MEDICINE CLINIC | Age: 42
End: 2022-10-12
Payer: COMMERCIAL

## 2022-10-12 VITALS
HEIGHT: 64 IN | BODY MASS INDEX: 42.51 KG/M2 | SYSTOLIC BLOOD PRESSURE: 124 MMHG | OXYGEN SATURATION: 98 % | HEART RATE: 78 BPM | DIASTOLIC BLOOD PRESSURE: 62 MMHG | WEIGHT: 249 LBS | TEMPERATURE: 97.7 F

## 2022-10-12 DIAGNOSIS — J40 BRONCHITIS: Primary | ICD-10-CM

## 2022-10-12 DIAGNOSIS — R06.02 SOB (SHORTNESS OF BREATH) ON EXERTION: ICD-10-CM

## 2022-10-12 DIAGNOSIS — J06.9 UPPER RESPIRATORY INFECTION WITH COUGH AND CONGESTION: ICD-10-CM

## 2022-10-12 LAB
INFLUENZA A ANTIBODY: NORMAL
INFLUENZA B ANTIBODY: NORMAL
Lab: NORMAL
PERFORMING INSTRUMENT: NORMAL
QC PASS/FAIL: NORMAL
SARS-COV-2, POC: NORMAL

## 2022-10-12 PROCEDURE — 99213 OFFICE O/P EST LOW 20 MIN: CPT | Performed by: NURSE PRACTITIONER

## 2022-10-12 PROCEDURE — 4004F PT TOBACCO SCREEN RCVD TLK: CPT | Performed by: NURSE PRACTITIONER

## 2022-10-12 PROCEDURE — G8484 FLU IMMUNIZE NO ADMIN: HCPCS | Performed by: NURSE PRACTITIONER

## 2022-10-12 PROCEDURE — G8417 CALC BMI ABV UP PARAM F/U: HCPCS | Performed by: NURSE PRACTITIONER

## 2022-10-12 PROCEDURE — 87426 SARSCOV CORONAVIRUS AG IA: CPT | Performed by: NURSE PRACTITIONER

## 2022-10-12 PROCEDURE — 87804 INFLUENZA ASSAY W/OPTIC: CPT | Performed by: NURSE PRACTITIONER

## 2022-10-12 PROCEDURE — G8427 DOCREV CUR MEDS BY ELIG CLIN: HCPCS | Performed by: NURSE PRACTITIONER

## 2022-10-12 RX ORDER — GUAIFENESIN 600 MG/1
1200 TABLET, EXTENDED RELEASE ORAL 2 TIMES DAILY
Qty: 40 TABLET | Refills: 0 | Status: SHIPPED | OUTPATIENT
Start: 2022-10-12 | End: 2022-10-22

## 2022-10-12 RX ORDER — DOXYCYCLINE HYCLATE 100 MG
100 TABLET ORAL 2 TIMES DAILY
Qty: 20 TABLET | Refills: 0 | Status: SHIPPED | OUTPATIENT
Start: 2022-10-12 | End: 2022-10-22

## 2022-10-12 RX ORDER — PREDNISONE 50 MG/1
50 TABLET ORAL DAILY
Qty: 5 TABLET | Refills: 0 | Status: SHIPPED | OUTPATIENT
Start: 2022-10-12 | End: 2022-10-17

## 2022-10-12 RX ORDER — ALBUTEROL SULFATE 90 UG/1
1 AEROSOL, METERED RESPIRATORY (INHALATION) EVERY 6 HOURS PRN
Qty: 18 G | Refills: 0 | Status: SHIPPED | OUTPATIENT
Start: 2022-10-12

## 2022-10-12 ASSESSMENT — ENCOUNTER SYMPTOMS
DIARRHEA: 0
SHORTNESS OF BREATH: 1
RHINORRHEA: 1
NAUSEA: 0
SORE THROAT: 0
WHEEZING: 1
VOMITING: 0
COUGH: 1

## 2022-10-12 NOTE — PROGRESS NOTES
Subjective:      Patient ID: Thomasina Cranker is a 43 y.o. female who presents today for:  Chief Complaint   Patient presents with    Cough    Shortness of Breath       Cough  Associated symptoms include ear pain (rigth yesterday), rhinorrhea, shortness of breath and wheezing. Pertinent negatives include no chills, fever, headaches, myalgias, rash or sore throat. Shortness of Breath  Associated symptoms include ear pain (rigth yesterday), rhinorrhea and wheezing. Pertinent negatives include no fever, headaches, rash, sore throat or vomiting.      Feels like her bronchititis  Gets this every year   Started as a regular cold allergy sinuses   Sinus drainage for 1 week   Now a deep cough the last 3 days   She is feeling SOB   She is wheezing   She does not have asthma or copd   She has an older inhaler   She used her daughters breathing machine last nite   Cant taste anything-this is normal for her though when shes sick       Past Medical History:   Diagnosis Date    Acid reflux     Anxiety     Breast cyst     Depression     Generalized anxiety disorder     H/O tubal ligation     Hiatal hernia 2013    Hypothyroidism     Hypothyroidism     IBS (irritable bowel syndrome)     Obesity     Tx'd with Adipex    Ovarian cyst      Past Surgical History:   Procedure Laterality Date    BARIATRIC SURGERY  2018    GASTRIC SLEEVE    BREAST BIOPSY Right 2017    abscess removed    BREAST REDUCTION SURGERY      BREAST SURGERY  2007    Reduction     SECTION  2004    CHOLECYSTECTOMY      COLONOSCOPY N/A 2020    COLONOSCOPY DIAGNOSTIC performed by Bon Gibson MD at [facility] ([address] [place] [address])      HYSTERECTOMY, VAGINAL N/A 2020    TLH performed by Diane Turpin MD at [address] [place] [address]      UPPER GASTROINTESTINAL ENDOSCOPY  2013    Dr Kobi Samaniego     Social History     Socioeconomic History    Marital status: Single     Spouse name: Not on file    Number of children: Not on file    Years of education: Not on file    Highest education level: Not on file   Occupational History    Not on file   Tobacco Use    Smoking status: Some Days     Packs/day: 0.50     Years: 3.00     Pack years: 1.50     Types: Cigarettes     Last attempt to quit: 2018     Years since quittin.2    Smokeless tobacco: Never   Vaping Use    Vaping Use: Never used   Substance and Sexual Activity    Alcohol use: Never     Comment: Rare use    Drug use: No    Sexual activity: Yes     Partners: Male   Other Topics Concern    Not on file   Social History Narrative    Not on file     Social Determinants of Health     Financial Resource Strain: Low Risk     Difficulty of Paying Living Expenses: Not hard at all   Food Insecurity: No Food Insecurity    Worried About Running Out of Food in the Last Year: Never true    Ran Out of Food in the Last Year: Never true   Transportation Needs: Not on file   Physical Activity: Not on file   Stress: Not on file   Social Connections: Not on file   Intimate Partner Violence: Not on file   Housing Stability: Not on file     Family History   Problem Relation Age of Onset    High Blood Pressure Mother         No FDR with colon cancer     Crohn's Disease Mother     Cancer Father         Lung cancer    Mental Illness Brother     Heart Disease Brother     Asthma Brother     Colon Cancer Neg Hx      Allergies   Allergen Reactions    Ibuprofen Other (See Comments)     Can't have with gastric sleeve    Morphine Nausea And Vomiting     Current Outpatient Medications   Medication Sig Dispense Refill    doxycycline hyclate (VIBRA-TABS) 100 MG tablet Take 1 tablet by mouth 2 times daily for 10 days 20 tablet 0    predniSONE (DELTASONE) 50 MG tablet Take 1 tablet by mouth daily for 5 days 5 tablet 0    guaiFENesin (MUCINEX) 600 MG extended release tablet Take 2 tablets by mouth 2 times daily for 10 days 40 tablet 0    albuterol sulfate HFA (VENTOLIN HFA) 108 (90 Base) MCG/ACT inhaler Inhale 1 puff into the lungs every 6 hours as needed for Wheezing or Shortness of Breath 18 g 0    tiZANidine (ZANAFLEX) 4 MG tablet       dicyclomine (BENTYL) 10 MG capsule Take 1 capsule po every 6 hours as needed for abdominal pain 360 capsule 1    cyclobenzaprine (FLEXERIL) 10 MG tablet Take 1 tablet by mouth nightly as needed for Muscle spasms 90 tablet 1    diclofenac sodium (VOLTAREN) 1 % GEL Apply topically 2 times daily 100 g 3    pantoprazole (PROTONIX) 40 MG tablet Take 1 tablet by mouth every 12 hours as needed (acid reflux) 60 tablet 1    rOPINIRole (REQUIP) 0.5 MG tablet TAKE ONE TABLET BY MOUTH NIGHTLY AS NEEDED FOR RESTLESS LEGS 90 tablet 3    metoprolol succinate (TOPROL XL) 25 MG extended release tablet Take 1 tablet by mouth nightly 90 tablet 3    traZODone (DESYREL) 150 MG tablet Take 1 tablet by mouth nightly 90 tablet 1    busPIRone (BUSPAR) 10 MG tablet TAKE ONE TABLET BY MOUTH EVERY 8 HOURS AS NEEDED FOR ANXIETY. 270 tablet 0    levothyroxine (SYNTHROID) 150 MCG tablet TAKE ONE TABLET BY MOUTH EVERY DAY 90 tablet 1    acetaminophen (APAP EXTRA STRENGTH) 500 MG tablet Take 2 tablets by mouth every 6 hours as needed for Pain 60 tablet 1    Multiple Vitamin (MULTI VITAMIN DAILY PO) Take 1 tablet by mouth daily      Probiotic Product (PROBIOTIC DAILY PO) Take 1 tablet by mouth daily      fluticasone (FLONASE) 50 MCG/ACT nasal spray 1 spray by Nasal route daily 1 Bottle 0    Ascorbic Acid (VITAMIN C) 250 MG tablet Take 500 mg by mouth daily        No current facility-administered medications for this visit. Review of Systems   Constitutional:  Negative for appetite change, chills, fatigue and fever. HENT:  Positive for congestion, ear pain (rigth yesterday) and rhinorrhea. Negative for sore throat. Respiratory:  Positive for cough, shortness of breath and wheezing. Gastrointestinal:  Negative for diarrhea, nausea and vomiting. Musculoskeletal:  Negative for myalgias. Skin:  Negative for rash. Neurological:  Negative for headaches. Psychiatric/Behavioral:  Negative for agitation, confusion and hallucinations. Objective:   /62   Pulse 78   Temp 97.7 °F (36.5 °C) (Temporal)   Ht 5' 4\" (1.626 m)   Wt 249 lb (112.9 kg)   LMP 10/18/2020   SpO2 98%   BMI 42.74 kg/m²     Physical Exam  Vitals reviewed. Constitutional:       Appearance: Normal appearance. HENT:      Head: Normocephalic and atraumatic. Right Ear: Hearing, tympanic membrane, ear canal and external ear normal. No middle ear effusion. No foreign body. Tympanic membrane is not injected, erythematous or bulging. Left Ear: Hearing, tympanic membrane, ear canal and external ear normal.  No middle ear effusion. No foreign body. Tympanic membrane is not injected, erythematous or bulging. Nose: Nose normal. No congestion or rhinorrhea. Right Nostril: No foreign body. Left Nostril: No foreign body. Right Turbinates: Not enlarged. Left Turbinates: Not enlarged. Right Sinus: No maxillary sinus tenderness or frontal sinus tenderness. Left Sinus: No maxillary sinus tenderness or frontal sinus tenderness. Mouth/Throat:      Lips: Pink. Mouth: Mucous membranes are moist.      Pharynx: Oropharynx is clear. Uvula midline. No pharyngeal swelling, oropharyngeal exudate, posterior oropharyngeal erythema or uvula swelling. Tonsils: No tonsillar exudate or tonsillar abscesses. Eyes:      General: Lids are normal.         Right eye: No foreign body. Left eye: No foreign body. Extraocular Movements: Extraocular movements intact. Conjunctiva/sclera: Conjunctivae normal.   Cardiovascular:      Rate and Rhythm: Normal rate and regular rhythm. Heart sounds: Normal heart sounds. Pulmonary:      Effort: Pulmonary effort is normal. No tachypnea, accessory muscle usage or respiratory distress. Breath sounds: Examination of the right-upper field reveals rhonchi. Rhonchi present. No wheezing. Abdominal:      Tenderness: There is no abdominal tenderness. There is no guarding. Musculoskeletal:         General: Normal range of motion. Cervical back: Normal range of motion. Lymphadenopathy:      Cervical: No cervical adenopathy. Upper Body:      Right upper body: No supraclavicular adenopathy. Left upper body: No supraclavicular adenopathy. Skin:     General: Skin is warm and dry. Capillary Refill: Capillary refill takes less than 2 seconds. Neurological:      General: No focal deficit present. Mental Status: She is alert and oriented to person, place, and time. Gait: Gait is intact. Psychiatric:         Mood and Affect: Mood normal.         Speech: Speech normal.         Behavior: Behavior normal. Behavior is cooperative. Thought Content: Thought content normal.         Judgment: Judgment normal.       Assessment:       Diagnosis Orders   1. Bronchitis  doxycycline hyclate (VIBRA-TABS) 100 MG tablet    predniSONE (DELTASONE) 50 MG tablet    guaiFENesin (MUCINEX) 600 MG extended release tablet    albuterol sulfate HFA (VENTOLIN HFA) 108 (90 Base) MCG/ACT inhaler      2. Upper respiratory infection with cough and congestion  POCT Influenza A/B    POCT COVID-19, Antigen      3. SOB (shortness of breath) on exertion  predniSONE (DELTASONE) 50 MG tablet    albuterol sulfate HFA (VENTOLIN HFA) 108 (90 Base) MCG/ACT inhaler        Results for POC orders placed in visit on 10/12/22   POCT Influenza A/B   Result Value Ref Range    Influenza A Ab -     Influenza B Ab -       Plan:     Assessment & Plan   Hiro Walls was seen today for cough and shortness of breath. Diagnoses and all orders for this visit:    Bronchitis  -     doxycycline hyclate (VIBRA-TABS) 100 MG tablet; Take 1 tablet by mouth 2 times daily for 10 days  -     predniSONE (DELTASONE) 50 MG tablet;  Take 1 tablet by mouth daily for 5 days  -     guaiFENesin (MUCINEX) 600 MG extended release tablet; Take 2 tablets by mouth 2 times daily for 10 days  -     albuterol sulfate HFA (VENTOLIN HFA) 108 (90 Base) MCG/ACT inhaler; Inhale 1 puff into the lungs every 6 hours as needed for Wheezing or Shortness of Breath    Upper respiratory infection with cough and congestion  -     POCT Influenza A/B  -     POCT COVID-19, Antigen    SOB (shortness of breath) on exertion  -     predniSONE (DELTASONE) 50 MG tablet; Take 1 tablet by mouth daily for 5 days  -     albuterol sulfate HFA (VENTOLIN HFA) 108 (90 Base) MCG/ACT inhaler; Inhale 1 puff into the lungs every 6 hours as needed for Wheezing or Shortness of Breath    Orders Placed This Encounter   Procedures    POCT Influenza A/B    POCT COVID-19, Antigen     Order Specific Question:   Is this test for diagnosis or screening? Answer:   Screening     Order Specific Question:   Symptomatic for COVID-19 as defined by CDC? Answer:   No     Order Specific Question:   Date of Symptom Onset     Answer:   N/A     Order Specific Question:   Hospitalized for COVID-19? Answer:   No     Order Specific Question:   Admitted to ICU for COVID-19? Answer:   No     Order Specific Question:   Employed in healthcare setting? Answer:   Unknown     Order Specific Question:   Resident in a congregate (group) care setting? Answer:   Unknown     Order Specific Question:   Pregnant? Answer:   No     Order Specific Question:   Previously tested for COVID-19?      Answer:   Yes     Orders Placed This Encounter   Medications    doxycycline hyclate (VIBRA-TABS) 100 MG tablet     Sig: Take 1 tablet by mouth 2 times daily for 10 days     Dispense:  20 tablet     Refill:  0    predniSONE (DELTASONE) 50 MG tablet     Sig: Take 1 tablet by mouth daily for 5 days     Dispense:  5 tablet     Refill:  0    guaiFENesin (MUCINEX) 600 MG extended release tablet     Sig: Take 2 tablets by mouth 2 times daily for 10 days     Dispense:  40 tablet     Refill:  0    albuterol sulfate HFA (VENTOLIN HFA) 108 (90 Base) MCG/ACT inhaler     Sig: Inhale 1 puff into the lungs every 6 hours as needed for Wheezing or Shortness of Breath     Dispense:  18 g     Refill:  0       Medications Discontinued During This Encounter   Medication Reason    albuterol sulfate HFA (VENTOLIN HFA) 108 (90 Base) MCG/ACT inhaler REORDER     Return if symptoms worsen or fail to improve. Reviewed with the patient/family: current clinical status & medications. Side effects of the medication prescribed today, as well as treatment plan/rationale and result expectations have been discussed with the patient/family who expresses understanding. Patient will be discharged home in stable condition. Follow up with PCP to evaluate treatment results or return if symptoms worsen or fail to improve. Discussed signs and symptoms which require immediate follow-up in ED/call to 911. Understanding verbalized. I have reviewed the patient's medical history in detail and updated the computerized patient record.     Yamilet Dennis, APRN - CNP

## 2022-10-23 DIAGNOSIS — F41.1 GENERALIZED ANXIETY DISORDER: ICD-10-CM

## 2022-10-24 RX ORDER — BUSPIRONE HYDROCHLORIDE 10 MG/1
10 TABLET ORAL EVERY 8 HOURS PRN
Qty: 270 TABLET | Refills: 0 | Status: SHIPPED | OUTPATIENT
Start: 2022-10-24 | End: 2023-01-22

## 2022-11-15 ENCOUNTER — HOSPITAL ENCOUNTER (OUTPATIENT)
Dept: NEUROLOGY | Age: 42
Discharge: HOME | End: 2022-11-15
Payer: COMMERCIAL

## 2022-11-15 PROCEDURE — 6360000002 HC RX W HCPCS

## 2022-11-15 PROCEDURE — 95874 GUIDE NERV DESTR NEEDLE EMG: CPT

## 2022-11-15 PROCEDURE — 64616 CHEMODENERV MUSC NECK DYSTON: CPT | Performed by: PSYCHIATRY & NEUROLOGY

## 2022-11-15 PROCEDURE — 95874 GUIDE NERV DESTR NEEDLE EMG: CPT | Performed by: PSYCHIATRY & NEUROLOGY

## 2022-11-15 PROCEDURE — 64616 CHEMODENERV MUSC NECK DYSTON: CPT

## 2022-11-15 NOTE — PROCEDURES
[address]                      [address] [place], [address] [facility]                                 PROCEDURE NOTE    PATIENT NAME: Masoud Duke                 :        1980  MED REC NO:   [id number]                            ROOM:  ACCOUNT NO:   [de-identified]                           ADMIT DATE: 11/15/2022  PROVIDER:     Umm Rodriguez MD    DATE OF PROCEDURE:  11/15/2022    Neurophysiologic studies are requested for the patient's underlying  cervical dystonia. 100 units of botulinum toxin are injected with EMG  guidance for a right cervical dystonia with multiple muscle sites. No  complications are notable. This is done under emg guidence        Grazyna Merlos MD    D: 11/15/2022 9:03:41       T: 11/15/2022 10:14:15     [email or web address]  Job#: [id number]     Doc#: [id number]    CC:

## 2022-11-19 DIAGNOSIS — K21.9 GASTROESOPHAGEAL REFLUX DISEASE WITHOUT ESOPHAGITIS: ICD-10-CM

## 2022-11-22 RX ORDER — PANTOPRAZOLE SODIUM 40 MG/1
40 TABLET, DELAYED RELEASE ORAL EVERY 12 HOURS PRN
Qty: 60 TABLET | Refills: 1 | Status: SHIPPED | OUTPATIENT
Start: 2022-11-22

## 2022-11-23 ENCOUNTER — TELEPHONE (OUTPATIENT)
Dept: NEUROLOGY | Age: 42
End: 2022-11-23

## 2022-11-23 DIAGNOSIS — G24.3 CERVICAL DYSTONIA: Primary | ICD-10-CM

## 2022-11-25 PROBLEM — G24.3 CERVICAL DYSTONIA: Status: ACTIVE | Noted: 2022-11-25

## 2023-04-04 ENCOUNTER — HOSPITAL ENCOUNTER (OUTPATIENT)
Dept: NEUROLOGY | Age: 43
Discharge: HOME | End: 2023-04-04
Payer: COMMERCIAL

## 2023-04-04 PROCEDURE — 95874 GUIDE NERV DESTR NEEDLE EMG: CPT

## 2023-04-04 PROCEDURE — 64616 CHEMODENERV MUSC NECK DYSTON: CPT

## 2023-04-04 PROCEDURE — 6360000002 HC RX W HCPCS

## 2023-04-04 NOTE — PROCEDURES
[address]                      [address], [address] [facility]                                 PROCEDURE NOTE    PATIENT NAME: Abad Pace                 :        1980  MED REC NO:   [id number]                            ROOM:  ACCOUNT NO:   [de-identified]                           ADMIT DATE: 2023  PROVIDER:     Isaiah Alan MD    DATE OF PROCEDURE:  2023    EMG-guided botulinum toxin. 100 units of botulinum toxin are injected on the right neck muscle for  cervical dystonia under EMG guidance at multiple muscle. No  complications are notable.         Malick Colon MD    D: 2023 9:37:42       T: 2023 12:09:47     [email or web address]  Job#: [id number]     Doc#: [id number]    CC:

## 2023-05-22 ENCOUNTER — OFFICE VISIT (OUTPATIENT)
Dept: CARDIOLOGY CLINIC | Age: 43
End: 2023-05-22
Payer: COMMERCIAL

## 2023-05-22 VITALS
DIASTOLIC BLOOD PRESSURE: 66 MMHG | SYSTOLIC BLOOD PRESSURE: 122 MMHG | OXYGEN SATURATION: 96 % | BODY MASS INDEX: 42.12 KG/M2 | HEART RATE: 80 BPM | WEIGHT: 245.4 LBS

## 2023-05-22 DIAGNOSIS — I47.1 PSVT (PAROXYSMAL SUPRAVENTRICULAR TACHYCARDIA) (HCC): ICD-10-CM

## 2023-05-22 DIAGNOSIS — R00.2 PALPITATIONS: ICD-10-CM

## 2023-05-22 DIAGNOSIS — E03.9 HYPOTHYROIDISM, UNSPECIFIED TYPE: ICD-10-CM

## 2023-05-22 DIAGNOSIS — R07.9 CHEST PAIN, UNSPECIFIED TYPE: ICD-10-CM

## 2023-05-22 DIAGNOSIS — Z00.00 PE (PHYSICAL EXAM), ANNUAL: Primary | ICD-10-CM

## 2023-05-22 PROCEDURE — G8417 CALC BMI ABV UP PARAM F/U: HCPCS | Performed by: INTERNAL MEDICINE

## 2023-05-22 PROCEDURE — G8427 DOCREV CUR MEDS BY ELIG CLIN: HCPCS | Performed by: INTERNAL MEDICINE

## 2023-05-22 PROCEDURE — 4004F PT TOBACCO SCREEN RCVD TLK: CPT | Performed by: INTERNAL MEDICINE

## 2023-05-22 PROCEDURE — 99214 OFFICE O/P EST MOD 30 MIN: CPT | Performed by: INTERNAL MEDICINE

## 2023-05-22 PROCEDURE — 93000 ELECTROCARDIOGRAM COMPLETE: CPT | Performed by: INTERNAL MEDICINE

## 2023-05-22 RX ORDER — METFORMIN HYDROCHLORIDE 500 MG/1
TABLET, EXTENDED RELEASE ORAL
COMMUNITY
Start: 2023-05-12

## 2023-05-22 ASSESSMENT — ENCOUNTER SYMPTOMS
EYES NEGATIVE: 1
CHEST TIGHTNESS: 0
WHEEZING: 0
GASTROINTESTINAL NEGATIVE: 1
BLOOD IN STOOL: 0
RESPIRATORY NEGATIVE: 1
STRIDOR: 0
COUGH: 0
SHORTNESS OF BREATH: 0
NAUSEA: 0

## 2023-05-22 NOTE — PROGRESS NOTES
Family History   Problem Relation Age of Onset    High Blood Pressure Mother         No FDR with colon cancer     Crohn's Disease Mother     Cancer Father         Lung cancer    Mental Illness Brother     Heart Disease Brother     Asthma Brother     Colon Cancer Neg Hx        Social History     Socioeconomic History    Marital status: Single     Spouse name: None    Number of children: None    Years of education: None    Highest education level: None   Tobacco Use    Smoking status: Some Days     Packs/day: 0.50     Years: 3.00     Pack years: 1.50     Types: Cigarettes     Last attempt to quit: 2018     Years since quittin.7    Smokeless tobacco: Never   Vaping Use    Vaping Use: Never used   Substance and Sexual Activity    Alcohol use: Never     Comment: Rare use    Drug use: No    Sexual activity: Yes     Partners: Male     Social Determinants of Health     Financial Resource Strain: Low Risk     Difficulty of Paying Living Expenses: Not hard at all   Food Insecurity: No Food Insecurity    Worried About Running Out of Food in the Last Year: Never true    Ran Out of Food in the Last Year: Never true       Allergies   Allergen Reactions    Ibuprofen Other (See Comments)     Can't have with gastric sleeve    Morphine Nausea And Vomiting       Current Outpatient Medications   Medication Sig Dispense Refill    metFORMIN (GLUCOPHAGE-XR) 500 MG extended release tablet First week take one tablet with breakfast daily; second week: take one tablet with breakfast and one tablet with dinner; third week: take two pills with breakfast and one pill with dinner; fourth week and onwards: take two pills with breakfast and two pills with dinner.       pantoprazole (PROTONIX) 40 MG tablet Take 1 tablet by mouth every 12 hours as needed (acid reflux) 60 tablet 1    albuterol sulfate HFA (VENTOLIN HFA) 108 (90 Base) MCG/ACT inhaler Inhale 1 puff into the lungs every 6 hours as needed for Wheezing or Shortness of Breath

## 2023-07-05 ENCOUNTER — HOSPITAL ENCOUNTER (OUTPATIENT)
Dept: NEUROLOGY | Age: 43
Discharge: HOME | End: 2023-07-05
Payer: COMMERCIAL

## 2023-07-05 PROCEDURE — 6360000002 HC RX W HCPCS

## 2023-07-05 PROCEDURE — 64616 CHEMODENERV MUSC NECK DYSTON: CPT

## 2023-07-05 PROCEDURE — 95874 GUIDE NERV DESTR NEEDLE EMG: CPT

## 2023-07-05 NOTE — PROCEDURES
[facility] [place], [address] [facility]                                 PROCEDURE NOTE    PATIENT NAME: Raudel Garcia                 :        1980  MED REC NO:   [id number]                            ROOM:  ACCOUNT NO:   [de-identified]                           ADMIT DATE: 2023  PROVIDER:     Sarah Keane MD    DATE OF PROCEDURE:  2023    This is a botulinum toxin injection for cervical dystonia on the right. 100 units of botulinum toxin are injected under EMG guidance in multiple  muscle sites. No complications are notable.         Sarah Keane MD    D: 2023 04:18:87       T: 2023 11:13:36     [email or web address]  Job#: [id number]     Doc#: [id number]    CC:

## 2023-07-13 ENCOUNTER — TELEPHONE (OUTPATIENT)
Dept: NEUROLOGY | Age: 43
End: 2023-07-13

## 2023-07-13 DIAGNOSIS — G24.3 CERVICAL DYSTONIA: Primary | ICD-10-CM

## 2023-07-13 NOTE — TELEPHONE ENCOUNTER
Next Botox scheduled at [facility] 10/09/23 845 am.  Patient aware of appt

## 2023-08-11 ENCOUNTER — OFFICE VISIT (OUTPATIENT)
Dept: ENDOCRINOLOGY | Age: 43
End: 2023-08-11
Payer: COMMERCIAL

## 2023-08-11 VITALS
HEIGHT: 64 IN | OXYGEN SATURATION: 96 % | SYSTOLIC BLOOD PRESSURE: 96 MMHG | WEIGHT: 244 LBS | BODY MASS INDEX: 41.66 KG/M2 | DIASTOLIC BLOOD PRESSURE: 64 MMHG | HEART RATE: 71 BPM

## 2023-08-11 DIAGNOSIS — E03.9 HYPOTHYROIDISM, UNSPECIFIED TYPE: Primary | ICD-10-CM

## 2023-08-11 DIAGNOSIS — E66.01 MORBID OBESITY (HCC): ICD-10-CM

## 2023-08-11 DIAGNOSIS — Z98.84 S/P GASTRIC BYPASS: ICD-10-CM

## 2023-08-11 PROCEDURE — 99213 OFFICE O/P EST LOW 20 MIN: CPT | Performed by: INTERNAL MEDICINE

## 2023-08-11 PROCEDURE — G8417 CALC BMI ABV UP PARAM F/U: HCPCS | Performed by: INTERNAL MEDICINE

## 2023-08-11 PROCEDURE — 4004F PT TOBACCO SCREEN RCVD TLK: CPT | Performed by: INTERNAL MEDICINE

## 2023-08-11 PROCEDURE — G8427 DOCREV CUR MEDS BY ELIG CLIN: HCPCS | Performed by: INTERNAL MEDICINE

## 2023-08-11 RX ORDER — LEVOTHYROXINE SODIUM 0.15 MG/1
TABLET ORAL
Qty: 90 TABLET | Refills: 1 | Status: SHIPPED | OUTPATIENT
Start: 2023-08-11

## 2023-08-11 RX ORDER — PREGABALIN 75 MG/1
CAPSULE ORAL
COMMUNITY
Start: 2023-07-10

## 2023-08-11 RX ORDER — DULOXETIN HYDROCHLORIDE 60 MG/1
60 CAPSULE, DELAYED RELEASE ORAL DAILY
COMMUNITY
Start: 2023-06-08

## 2023-08-11 RX ORDER — PHENTERMINE HYDROCHLORIDE 37.5 MG/1
37.5 TABLET ORAL
Qty: 30 TABLET | Refills: 2 | Status: SHIPPED | OUTPATIENT
Start: 2023-08-11 | End: 2023-09-10

## 2023-08-11 NOTE — PROGRESS NOTES
been stable. Other  This is a recurrent (Obesity weight gain) problem. Associated symptoms include fatigue. The symptoms are aggravated by eating.    Past Medical History:   Diagnosis Date    Acid reflux     Anxiety     Breast cyst     Depression     Generalized anxiety disorder     H/O tubal ligation     Hiatal hernia 2013    Hypothyroidism     Hypothyroidism     IBS (irritable bowel syndrome)     Obesity     Tx'd with Adipex    Ovarian cyst      Past Surgical History:   Procedure Laterality Date    BARIATRIC SURGERY  2018    GASTRIC SLEEVE    BREAST BIOPSY Right 2017    abscess removed    BREAST REDUCTION SURGERY      BREAST SURGERY  2007    Reduction     SECTION  2004    CHOLECYSTECTOMY      COLONOSCOPY N/A 2020    COLONOSCOPY DIAGNOSTIC performed by Vearl Bloch, MD at [address] (CERVIX STATUS UNKNOWN)      HYSTERECTOMY, VAGINAL N/A 2020    TLH performed by Dania Pickard MD at [address]      UPPER GASTROINTESTINAL ENDOSCOPY  2013    Dr Romayne Killian     Social History     Socioeconomic History    Marital status: Single     Spouse name: Not on file    Number of children: Not on file    Years of education: Not on file    Highest education level: Not on file   Occupational History    Not on file   Tobacco Use    Smoking status: Some Days     Packs/day: 0.50     Years: 3.00     Pack years: 1.50     Types: Cigarettes     Last attempt to quit: 2018     Years since quittin.0    Smokeless tobacco: Never   Vaping Use    Vaping Use: Never used   Substance and Sexual Activity    Alcohol use: Never     Comment: Rare use    Drug use: No    Sexual activity: Yes     Partners: Male   Other Topics Concern    Not on file   Social History Narrative    Not on file     Social Determinants of Health     Financial Resource Strain: Not on file   Food Insecurity: Not on file   Transportation Needs: Not on file   Physical No

## 2023-08-14 DIAGNOSIS — R00.2 PALPITATIONS: ICD-10-CM

## 2023-08-14 RX ORDER — METOPROLOL SUCCINATE 25 MG/1
25 TABLET, EXTENDED RELEASE ORAL NIGHTLY
Qty: 90 TABLET | Refills: 3 | Status: SHIPPED | OUTPATIENT
Start: 2023-08-14

## 2023-08-14 NOTE — TELEPHONE ENCOUNTER
Requesting medication refill. Please approve or deny this request.    Rx requested:  Requested Prescriptions     Pending Prescriptions Disp Refills    metoprolol succinate (TOPROL XL) 25 MG extended release tablet 90 tablet 3     Sig: Take 1 tablet by mouth nightly         Last Office Visit:   5/22/2023      Next Visit Date:  Future Appointments   Date Time Provider [address] [place] [address]   10/9/2023  8:45 AM Francia Yarbrough  [place]  [facility] [employer]   11/10/2023  1:30 PM [address] Matt Ji MD [address] [place] [address]   5/28/2024  2:00 PM Arley Green MD [facility]             Please approve or deny.

## 2023-08-15 RX ORDER — METOPROLOL SUCCINATE 25 MG/1
TABLET, EXTENDED RELEASE ORAL
Qty: 90 TABLET | Refills: 0 | OUTPATIENT
Start: 2023-08-15

## 2023-10-17 ENCOUNTER — HOSPITAL ENCOUNTER (OUTPATIENT)
Dept: NEUROLOGY | Age: 43
Discharge: HOME | End: 2023-10-17
Attending: PSYCHIATRY & NEUROLOGY
Payer: COMMERCIAL

## 2023-10-17 PROCEDURE — 6360000002 HC RX W HCPCS

## 2023-10-17 PROCEDURE — 64616 CHEMODENERV MUSC NECK DYSTON: CPT

## 2023-10-17 PROCEDURE — 95874 GUIDE NERV DESTR NEEDLE EMG: CPT

## 2023-10-17 NOTE — PROCEDURES
[facility] [place], [address] [facility]                                 PROCEDURE NOTE    PATIENT NAME: Terrie Conn                 :        1980  MED REC NO:   [id number]                            ROOM:  ACCOUNT NO:   [de-identified]                           ADMIT DATE: 10/17/2023  PROVIDER:     Trish Pritchard MD    DATE OF PROCEDURE:  10/17/2023    Botulinum toxin injection for cervical dystonia on the right. 100 units  of botulinum toxin are injected at multiple muscle sites under EMG  guidance without any complications.         Trish Pritchard MD    D: 10/17/2023 9:19:53       T: 10/17/2023 9:22:11     [email or web address]  Job#: [id number]     Doc#: [id number]    CC:

## 2023-10-23 ENCOUNTER — TELEPHONE (OUTPATIENT)
Dept: NEUROLOGY | Age: 43
End: 2023-10-23

## 2023-10-23 DIAGNOSIS — G24.3 CERVICAL DYSTONIA: Primary | ICD-10-CM

## 2023-11-10 ENCOUNTER — OFFICE VISIT (OUTPATIENT)
Dept: ENDOCRINOLOGY | Age: 43
End: 2023-11-10
Payer: COMMERCIAL

## 2023-11-10 VITALS
HEART RATE: 70 BPM | WEIGHT: 254 LBS | BODY MASS INDEX: 43.36 KG/M2 | SYSTOLIC BLOOD PRESSURE: 99 MMHG | DIASTOLIC BLOOD PRESSURE: 66 MMHG | OXYGEN SATURATION: 97 % | HEIGHT: 64 IN

## 2023-11-10 DIAGNOSIS — E03.9 HYPOTHYROIDISM, UNSPECIFIED TYPE: ICD-10-CM

## 2023-11-10 DIAGNOSIS — E03.9 HYPOTHYROIDISM, UNSPECIFIED TYPE: Primary | ICD-10-CM

## 2023-11-10 DIAGNOSIS — E66.01 MORBID OBESITY (HCC): ICD-10-CM

## 2023-11-10 LAB
T4 FREE SERPL-MCNC: 1.36 NG/DL (ref 0.84–1.68)
TSH REFLEX: 0.64 UIU/ML (ref 0.44–3.86)

## 2023-11-10 PROCEDURE — G8484 FLU IMMUNIZE NO ADMIN: HCPCS | Performed by: INTERNAL MEDICINE

## 2023-11-10 PROCEDURE — 4004F PT TOBACCO SCREEN RCVD TLK: CPT | Performed by: INTERNAL MEDICINE

## 2023-11-10 PROCEDURE — G8427 DOCREV CUR MEDS BY ELIG CLIN: HCPCS | Performed by: INTERNAL MEDICINE

## 2023-11-10 PROCEDURE — G8417 CALC BMI ABV UP PARAM F/U: HCPCS | Performed by: INTERNAL MEDICINE

## 2023-11-10 PROCEDURE — 99213 OFFICE O/P EST LOW 20 MIN: CPT | Performed by: INTERNAL MEDICINE

## 2023-11-10 RX ORDER — LEVOTHYROXINE SODIUM 0.15 MG/1
TABLET ORAL
Qty: 90 TABLET | Refills: 3 | Status: SHIPPED | OUTPATIENT
Start: 2023-11-10

## 2023-11-10 RX ORDER — PREGABALIN 150 MG/1
150 CAPSULE ORAL 2 TIMES DAILY
COMMUNITY
Start: 2023-10-16

## 2023-11-10 RX ORDER — PHENTERMINE HYDROCHLORIDE 37.5 MG/1
37.5 TABLET ORAL DAILY
Qty: 30 TABLET | Refills: 5 | COMMUNITY
Start: 2023-10-05 | End: 2024-04-02

## 2023-11-10 NOTE — PROGRESS NOTES
11/10/2023    Assessment:       Diagnosis Orders   1. Hypothyroidism, unspecified type        2. Morbid obesity ([address])              PLAN:     Orders Placed This Encounter   Procedures    T4, Free     Standing Status:   Future     Standing Expiration Date:   11/10/2024    TSH with Reflex     Standing Status:   Future     Standing Expiration Date:   11/10/2024     Orders Placed This Encounter   Medications    levothyroxine (SYNTHROID) 150 MCG tablet     Sig: TAKE ONE TABLET BY MOUTH EVERY DAY     Dispense:  90 tablet     Refill:  3       Subjective:     Chief Complaint   Patient presents with    Hypothyroidism    Obesity     Vitals:    11/10/23 1329   BP: 99/66   Pulse: 70   SpO2: 97%   Weight: 115.2 kg (254 lb)   Height: 1.626 m (5' 4\")     Wt Readings from Last 3 Encounters:   11/10/23 115.2 kg (254 lb)   08/11/23 110.7 kg (244 lb)   05/22/23 111.3 kg (245 lb 6.4 oz)     BP Readings from Last 3 Encounters:   11/10/23 99/66   08/11/23 96/64   05/22/23 122/66     Follow-up on hypothyroidism obesity was referred to bariatric surgery BMI is 43 did not lose any weight requesting refills of levothyroxine  Labs were done today which was reviewed    Thyroid Problem  Presents for follow-up visit. Symptoms include weight gain. Patient reports no cold intolerance, heat intolerance, palpitations or tremors. The symptoms have been stable.         Latest Reference Range & Units 08/15/22 09:22 11/10/23 07:08   TSH 0.440 - 3.860 uIU/mL 0.644 0.640   T4 Free 0.84 - 1.68 ng/dL 1.34 1.36     Past Medical History:   Diagnosis Date    Acid reflux     Anxiety     Breast cyst     Depression     Generalized anxiety disorder     H/O tubal ligation     Hiatal hernia 11/18/2013    Hypothyroidism     Hypothyroidism     IBS (irritable bowel syndrome)     Obesity     Tx'd with Adipex    Ovarian cyst      Past Surgical History:   Procedure Laterality Date    BARIATRIC SURGERY  11/2018    GASTRIC SLEEVE    BREAST BIOPSY Right 2017    abscess removed

## 2024-01-07 DIAGNOSIS — E03.9 HYPOTHYROIDISM, UNSPECIFIED TYPE: ICD-10-CM

## 2024-01-08 RX ORDER — LEVOTHYROXINE SODIUM 0.15 MG/1
TABLET ORAL
Qty: 90 TABLET | Refills: 3 | Status: SHIPPED | OUTPATIENT
Start: 2024-01-08

## 2024-01-16 ENCOUNTER — PATIENT MESSAGE (OUTPATIENT)
Dept: NEUROLOGY | Age: 44
End: 2024-01-16

## 2024-02-20 ENCOUNTER — TELEPHONE (OUTPATIENT)
Dept: CARDIOLOGY CLINIC | Age: 44
End: 2024-02-20

## 2024-02-20 NOTE — TELEPHONE ENCOUNTER
Appointment canceled for Iris Vernon ([id number])   Visit Type: OFFICE VISIT   Date        Time      Length    Provider                  Department   5/28/2024    2:00 PM  15 mins.  Dr. Chi Santacruz MD        [facility] CARDIOLOGY      Reason for Cancellation: Changed Provider      Patient Comments: My new insurance does not have Dr. Santacruz in the network.     Appointment Scheduled  (Newest Message First)  View All Conversations on this Encounter  Iris Vernon  [facility] Front Desk2 hours ago (7:10 AM)       Appointment canceled for Iris Vernon ([id number])  Visit Type: OFFICE VISIT  Date        Time      Length    Provider                  Department  5/28/2024    2:00 PM  15 mins.  Dr. Chi Santacruz MD        [facility] CARDIOLOGY     Reason for Cancellation: Changed Provider     Patient Comments: My new insurance does not have Dr. Santacruz in the network.        Jojo, Batch Job User  WorkIris beck9 months ago     Mychart, Processor  Iris Vernon9 months ago     PM  Appointment Information:      Visit Type: Office Visit          Date: 5/28/2024                  Dept: [facility] Cardiology                  Provider: CHI SANTACRUZ                  Time: 2:00 PM                  Length: 15 min     Appt Status: Scheduled          This MyChart message has not been read.

## 2024-02-20 NOTE — TELEPHONE ENCOUNTER
Reason for Cancellation: Changed Provider     Patient Comments: My new insurance does not have Dr. Santacruz in the network.

## 2025-05-16 ENCOUNTER — OFFICE VISIT (OUTPATIENT)
Age: 45
End: 2025-05-16

## 2025-05-16 VITALS
WEIGHT: 249.8 LBS | SYSTOLIC BLOOD PRESSURE: 106 MMHG | TEMPERATURE: 97.1 F | RESPIRATION RATE: 20 BRPM | DIASTOLIC BLOOD PRESSURE: 60 MMHG | HEART RATE: 75 BPM | HEIGHT: 64 IN | OXYGEN SATURATION: 99 % | BODY MASS INDEX: 42.65 KG/M2

## 2025-05-16 DIAGNOSIS — F32.A ANXIETY AND DEPRESSION: ICD-10-CM

## 2025-05-16 DIAGNOSIS — M54.9 CHRONIC BILATERAL BACK PAIN, UNSPECIFIED BACK LOCATION: ICD-10-CM

## 2025-05-16 DIAGNOSIS — F41.9 ANXIETY AND DEPRESSION: ICD-10-CM

## 2025-05-16 DIAGNOSIS — G47.33 OSA (OBSTRUCTIVE SLEEP APNEA): ICD-10-CM

## 2025-05-16 DIAGNOSIS — Z76.89 ENCOUNTER TO ESTABLISH CARE WITH NEW DOCTOR: Primary | ICD-10-CM

## 2025-05-16 DIAGNOSIS — I47.10 PSVT (PAROXYSMAL SUPRAVENTRICULAR TACHYCARDIA): ICD-10-CM

## 2025-05-16 DIAGNOSIS — Z13.220 SCREENING FOR LIPID DISORDERS: ICD-10-CM

## 2025-05-16 DIAGNOSIS — E03.9 HYPOTHYROIDISM, UNSPECIFIED TYPE: ICD-10-CM

## 2025-05-16 DIAGNOSIS — Z13.1 SCREENING FOR DIABETES MELLITUS: ICD-10-CM

## 2025-05-16 DIAGNOSIS — G89.29 CHRONIC BILATERAL BACK PAIN, UNSPECIFIED BACK LOCATION: ICD-10-CM

## 2025-05-16 DIAGNOSIS — R53.83 OTHER FATIGUE: ICD-10-CM

## 2025-05-16 RX ORDER — METHOCARBAMOL 500 MG/1
500 TABLET, FILM COATED ORAL 4 TIMES DAILY
Qty: 40 TABLET | Refills: 0 | Status: SHIPPED | OUTPATIENT
Start: 2025-05-16 | End: 2025-05-26

## 2025-05-16 RX ORDER — CELECOXIB 100 MG/1
100 CAPSULE ORAL 2 TIMES DAILY
Qty: 180 CAPSULE | Refills: 1 | Status: SHIPPED | OUTPATIENT
Start: 2025-05-16

## 2025-05-16 RX ORDER — BUPROPION HYDROCHLORIDE 300 MG/1
300 TABLET ORAL DAILY
COMMUNITY
Start: 2025-04-02

## 2025-05-16 RX ORDER — DULOXETIN HYDROCHLORIDE 30 MG/1
CAPSULE, DELAYED RELEASE ORAL
Qty: 12 CAPSULE | Refills: 0 | Status: SHIPPED | OUTPATIENT
Start: 2025-05-16

## 2025-05-16 SDOH — ECONOMIC STABILITY: FOOD INSECURITY: WITHIN THE PAST 12 MONTHS, THE FOOD YOU BOUGHT JUST DIDN'T LAST AND YOU DIDN'T HAVE MONEY TO GET MORE.: NEVER TRUE

## 2025-05-16 SDOH — ECONOMIC STABILITY: FOOD INSECURITY: WITHIN THE PAST 12 MONTHS, YOU WORRIED THAT YOUR FOOD WOULD RUN OUT BEFORE YOU GOT MONEY TO BUY MORE.: NEVER TRUE

## 2025-05-16 ASSESSMENT — PATIENT HEALTH QUESTIONNAIRE - PHQ9
SUM OF ALL RESPONSES TO PHQ QUESTIONS 1-9: 0
2. FEELING DOWN, DEPRESSED OR HOPELESS: NOT AT ALL
1. LITTLE INTEREST OR PLEASURE IN DOING THINGS: NOT AT ALL
SUM OF ALL RESPONSES TO PHQ QUESTIONS 1-9: 0

## 2025-05-16 ASSESSMENT — ENCOUNTER SYMPTOMS: BACK PAIN: 1

## 2025-05-16 NOTE — PROGRESS NOTES
CREATININE 0.74 09/02/2022    GLUCOSE 80 09/02/2022    CALCIUM 8.8 09/02/2022    BILITOT 0.3 07/27/2022    ALKPHOS 76 07/27/2022    AST 18 07/27/2022    ALT 19 07/27/2022    LABGLOM >60.0 09/02/2022    GFRAA >60.0 09/02/2022    GLOB 3.0 07/27/2022       TSH BLD  Order: [id number]  Component  Ref Range & Units 3/27/24 0809   TSH  0.270 - 4.200 mIU/L 0.523     Hemoglobin A1C   Date Value Ref Range Status   07/10/2021 <4.2 (L) 4.8 - 5.9 % Final      Lab Results   Component Value Date    CHOL 203 (H) 07/10/2021    TRIG 98 07/10/2021    HDL 54 07/10/2021     07/10/2021    CHOLHDLRATIO 5.6 09/15/2011      MRI LUMBAR SPINE WO CONTRAST  11/22  Impression    IMPRESSION:    Canal and foramina are patent.  No protrusion or extrusion.    Marked hypertrophic changes in the right L5-S1 facet joint.      Assessment & Plan   1. Encounter to establish care with new doctor    2. Chronic bilateral back pain, unspecified back location  - tried  Flexeril, Zanaflex, and Lyrica. Was supposed to get ketamine infusion  -     CBC with Auto Differential; Future  -     celecoxib (CELEBREX) 100 MG capsule; Take 1 capsule by mouth 2 times daily, Disp-180 capsule, R-1Normal  -     methocarbamol (ROBAXIN) 500 MG tablet; Take 1 tablet by mouth 4 times daily for 10 days, Disp-40 tablet, R-0Normal  -     Ambulatory referral to Pain Medicine    3. EDEL (obstructive sleep apnea)  - advised dental device  - cannot tolerate CPAP  - not surgical candidate for inspire    4. Screening for lipid disorders  -     Lipid Panel; Future    5. Screening for diabetes mellitus  -     Comprehensive Metabolic Panel; Future  -     Hemoglobin A1C; Future    6. Other fatigue  - hypothyroidism vs untreated EDEL vs mood    7. Hypothyroidism, unspecified type  -     TSH reflex to FT4; Future  - synthroid    8. Anxiety and depression  - wellbutrin 300 mg daily    9. PSVT (paroxysmal supraventricular tachycardia)  -     Chi Sol MD, Cardiology, [place]  -

## 2025-05-27 ENCOUNTER — INITIAL CONSULT (OUTPATIENT)
Age: 45
End: 2025-05-27

## 2025-05-27 VITALS
BODY MASS INDEX: 42.51 KG/M2 | DIASTOLIC BLOOD PRESSURE: 80 MMHG | WEIGHT: 249 LBS | SYSTOLIC BLOOD PRESSURE: 120 MMHG | OXYGEN SATURATION: 98 % | HEIGHT: 64 IN | HEART RATE: 76 BPM | TEMPERATURE: 97.5 F

## 2025-05-27 DIAGNOSIS — G47.33 OSA (OBSTRUCTIVE SLEEP APNEA): Primary | ICD-10-CM

## 2025-05-27 DIAGNOSIS — M47.817 LUMBOSACRAL SPONDYLOSIS WITHOUT MYELOPATHY: ICD-10-CM

## 2025-05-27 DIAGNOSIS — E03.9 HYPOTHYROIDISM, UNSPECIFIED TYPE: ICD-10-CM

## 2025-05-27 DIAGNOSIS — M70.62 GREATER TROCHANTERIC BURSITIS OF BOTH HIPS: ICD-10-CM

## 2025-05-27 DIAGNOSIS — M70.61 GREATER TROCHANTERIC BURSITIS OF BOTH HIPS: ICD-10-CM

## 2025-05-27 RX ORDER — LEVOTHYROXINE SODIUM 150 UG/1
TABLET ORAL
Qty: 90 TABLET | Refills: 3 | OUTPATIENT
Start: 2025-05-27

## 2025-05-27 RX ORDER — TOPIRAMATE 100 MG/1
100 TABLET, FILM COATED ORAL 2 TIMES DAILY
COMMUNITY
Start: 2024-10-29

## 2025-05-27 NOTE — PROGRESS NOTES
injections, Because of her weight I was not able to do in the Office will schedule to do this under [facility].

## 2025-05-31 DIAGNOSIS — Z13.1 SCREENING FOR DIABETES MELLITUS: ICD-10-CM

## 2025-05-31 DIAGNOSIS — G89.29 CHRONIC BILATERAL BACK PAIN, UNSPECIFIED BACK LOCATION: ICD-10-CM

## 2025-05-31 DIAGNOSIS — E03.9 HYPOTHYROIDISM, UNSPECIFIED TYPE: ICD-10-CM

## 2025-05-31 DIAGNOSIS — Z13.220 SCREENING FOR LIPID DISORDERS: ICD-10-CM

## 2025-05-31 DIAGNOSIS — M54.9 CHRONIC BILATERAL BACK PAIN, UNSPECIFIED BACK LOCATION: ICD-10-CM

## 2025-05-31 LAB
ALBUMIN SERPL-MCNC: 4.1 G/DL (ref 3.5–4.6)
ALP SERPL-CCNC: 95 U/L (ref 40–130)
ALT SERPL-CCNC: 15 U/L (ref 0–33)
ANION GAP SERPL CALCULATED.3IONS-SCNC: 10 MEQ/L (ref 9–15)
AST SERPL-CCNC: 14 U/L (ref 0–35)
BASOPHILS # BLD: 0 K/UL (ref 0–0.2)
BASOPHILS NFR BLD: 0.6 %
BILIRUB SERPL-MCNC: 0.5 MG/DL (ref 0.2–0.7)
BUN SERPL-MCNC: 9 MG/DL (ref 6–20)
CALCIUM SERPL-MCNC: 9.1 MG/DL (ref 8.5–9.9)
CHLORIDE SERPL-SCNC: 107 MEQ/L (ref 95–107)
CHOLEST SERPL-MCNC: 170 MG/DL (ref 0–199)
CO2 SERPL-SCNC: 22 MEQ/L (ref 20–31)
CREAT SERPL-MCNC: 0.79 MG/DL (ref 0.5–0.9)
EOSINOPHIL # BLD: 0 K/UL (ref 0–0.7)
EOSINOPHIL NFR BLD: 0.8 %
ERYTHROCYTE [DISTWIDTH] IN BLOOD BY AUTOMATED COUNT: 12.3 % (ref 11.5–14.5)
ESTIMATED AVERAGE GLUCOSE: 74 MG/DL
GLOBULIN SER CALC-MCNC: 2.9 G/DL (ref 2.3–3.5)
GLUCOSE SERPL-MCNC: 88 MG/DL (ref 70–99)
HBA1C MFR BLD: 4.2 % (ref 4–6)
HCT VFR BLD AUTO: 39.9 % (ref 37–47)
HDLC SERPL-MCNC: 50 MG/DL (ref 40–59)
HGB BLD-MCNC: 13.5 G/DL (ref 12–16)
LDLC SERPL CALC-MCNC: 102 MG/DL (ref 0–129)
LYMPHOCYTES # BLD: 1.9 K/UL (ref 1–4.8)
LYMPHOCYTES NFR BLD: 36.3 %
MCH RBC QN AUTO: 32.7 PG (ref 27–31.3)
MCHC RBC AUTO-ENTMCNC: 33.8 % (ref 33–37)
MCV RBC AUTO: 96.6 FL (ref 79.4–94.8)
MONOCYTES # BLD: 0.4 K/UL (ref 0.2–0.8)
MONOCYTES NFR BLD: 7.6 %
NEUTROPHILS # BLD: 2.9 K/UL (ref 1.4–6.5)
NEUTS SEG NFR BLD: 54.5 %
PLATELET # BLD AUTO: 207 K/UL (ref 130–400)
POTASSIUM SERPL-SCNC: 3.7 MEQ/L (ref 3.4–4.9)
PROT SERPL-MCNC: 7 G/DL (ref 6.3–8)
RBC # BLD AUTO: 4.13 M/UL (ref 4.2–5.4)
SODIUM SERPL-SCNC: 139 MEQ/L (ref 135–144)
T4 FREE SERPL-MCNC: 0.91 NG/DL (ref 0.84–1.68)
TRIGL SERPL-MCNC: 88 MG/DL (ref 0–150)
TSH REFLEX: 0.22 UIU/ML (ref 0.44–3.86)
WBC # BLD AUTO: 5.3 K/UL (ref 4.8–10.8)

## 2025-06-02 ENCOUNTER — RESULTS FOLLOW-UP (OUTPATIENT)
Age: 45
End: 2025-06-02

## 2025-06-02 DIAGNOSIS — E03.9 HYPOTHYROIDISM, UNSPECIFIED TYPE: ICD-10-CM

## 2025-06-02 RX ORDER — LEVOTHYROXINE SODIUM 125 UG/1
TABLET ORAL
Qty: 60 TABLET | Refills: 0 | Status: SHIPPED | OUTPATIENT
Start: 2025-06-02 | End: 2025-07-29

## 2025-06-06 ENCOUNTER — TELEPHONE (OUTPATIENT)
Age: 45
End: 2025-06-06

## 2025-06-06 DIAGNOSIS — M70.62 GREATER TROCHANTERIC BURSITIS OF BOTH HIPS: Primary | ICD-10-CM

## 2025-06-06 DIAGNOSIS — M47.817 LUMBOSACRAL SPONDYLOSIS WITHOUT MYELOPATHY: ICD-10-CM

## 2025-06-06 DIAGNOSIS — M70.61 GREATER TROCHANTERIC BURSITIS OF BOTH HIPS: Primary | ICD-10-CM

## 2025-06-06 NOTE — TELEPHONE ENCOUNTER
Appt made for    6/19/2025 3:00 PM     & Pt stated that she has a damaris anxiety about the Injection & would like to know if there is anything the Provider can give her for the Day of the injection.

## 2025-06-06 NOTE — TELEPHONE ENCOUNTER
B/L TROC [facility] INJ   NO AUTH REQUIRED   REFERRAL# [id number]    OK to schedule procedure approved as above.   Please note sides/levels approved and date range.   (If applicable, sides/levels approved may differ from those ordered)    TO BE SCHEDULED WITH DR CALL

## 2025-06-13 ENCOUNTER — OFFICE VISIT (OUTPATIENT)
Age: 45
End: 2025-06-13

## 2025-06-13 ENCOUNTER — PATIENT MESSAGE (OUTPATIENT)
Age: 45
End: 2025-06-13

## 2025-06-13 VITALS
WEIGHT: 246 LBS | DIASTOLIC BLOOD PRESSURE: 80 MMHG | TEMPERATURE: 97.8 F | RESPIRATION RATE: 20 BRPM | OXYGEN SATURATION: 100 % | SYSTOLIC BLOOD PRESSURE: 118 MMHG | HEART RATE: 60 BPM | HEIGHT: 64 IN | BODY MASS INDEX: 42 KG/M2

## 2025-06-13 DIAGNOSIS — I47.10 PSVT (PAROXYSMAL SUPRAVENTRICULAR TACHYCARDIA): ICD-10-CM

## 2025-06-13 DIAGNOSIS — E03.9 HYPOTHYROIDISM, UNSPECIFIED TYPE: ICD-10-CM

## 2025-06-13 DIAGNOSIS — Z12.31 SCREENING MAMMOGRAM FOR BREAST CANCER: ICD-10-CM

## 2025-06-13 DIAGNOSIS — F41.9 ANXIETY AND DEPRESSION: ICD-10-CM

## 2025-06-13 DIAGNOSIS — G47.33 OSA (OBSTRUCTIVE SLEEP APNEA): ICD-10-CM

## 2025-06-13 DIAGNOSIS — M54.9 CHRONIC BILATERAL BACK PAIN, UNSPECIFIED BACK LOCATION: ICD-10-CM

## 2025-06-13 DIAGNOSIS — K44.9 HIATAL HERNIA: ICD-10-CM

## 2025-06-13 DIAGNOSIS — F32.A ANXIETY AND DEPRESSION: ICD-10-CM

## 2025-06-13 DIAGNOSIS — K21.9 GASTROESOPHAGEAL REFLUX DISEASE WITHOUT ESOPHAGITIS: ICD-10-CM

## 2025-06-13 DIAGNOSIS — G89.29 CHRONIC BILATERAL BACK PAIN, UNSPECIFIED BACK LOCATION: ICD-10-CM

## 2025-06-13 DIAGNOSIS — Z00.00 VISIT FOR ANNUAL HEALTH EXAMINATION: Primary | ICD-10-CM

## 2025-06-13 RX ORDER — VENLAFAXINE HYDROCHLORIDE 37.5 MG/1
CAPSULE, EXTENDED RELEASE ORAL
Qty: 60 CAPSULE | Refills: 0 | Status: SHIPPED | OUTPATIENT
Start: 2025-06-13

## 2025-06-13 RX ORDER — PANTOPRAZOLE SODIUM 40 MG/1
40 TABLET, DELAYED RELEASE ORAL DAILY PRN
Qty: 90 TABLET | Refills: 0 | Status: SHIPPED | OUTPATIENT
Start: 2025-06-13

## 2025-06-13 RX ORDER — MELOXICAM 15 MG/1
15 TABLET ORAL DAILY PRN
Qty: 30 TABLET | Refills: 0 | Status: SHIPPED | OUTPATIENT
Start: 2025-06-13

## 2025-06-13 RX ORDER — BUPROPION HYDROCHLORIDE 150 MG/1
150 TABLET ORAL DAILY
Qty: 14 TABLET | Refills: 0 | Status: SHIPPED | OUTPATIENT
Start: 2025-06-13 | End: 2025-06-27

## 2025-06-13 ASSESSMENT — ENCOUNTER SYMPTOMS: BACK PAIN: 1

## 2025-06-13 NOTE — PROGRESS NOTES
Subjective  Iris Vernon, 44 y.o. female presents today with:  Chief Complaint   Patient presents with    1 Month Follow-Up     Patient presents today for a 1 month follow up for back pain on 5.16.25. she is still in pain and acid reflux is really bad for the past few weeks. She tried Tums with no relief.       Has EDEL and is compliant with CPAP.  Follows with pulmonology.     Follows with cardiology due to PSVT.  Is on metoprolol.    Reports uncontrolled GERD.  Used to be on Protonix, needs a refill on this.     Anxiety/depression  Patient is on wellbutrin 300 mg daily and trazodone 150 mg nightly. States she feels fatigued and down.     Hypothyroidism  Pt is on synthroid 125 mcg daily.  TSH was abnormal and she was decreased from 150 mcg dosage to 125 mcg dosage last week.  Denies any temperature intolerances or changes in energy. Doing well with medication.    Is following up with pain management for trochanteric bursitis of both hips and lumbar spondylosis.  Plans to have hip injections done under fluoroscopy.  Reports still having back pain.  Cymbalta and Celebrex not helping.  Also tried Robaxin but it was not helpful.  History of Present Illness  The patient presents for evaluation of hip pain, acid reflux, fatigue, and depression.    She has been experiencing severe acid reflux for the past 2 weeks, characterized by regurgitation of acid and a persistent burning sensation in her esophagus. She describes the sensation as if her esophagus is on fire, which intensifies when she swallows. She recalls an incident where she vomited only acid after attending a cookout, despite having eaten half an hour prior. She has been taking Protonix for several years and notes that missing a dose results in a flare-up of her symptoms. She has been off Protonix for the past 2 weeks, during which her symptoms have worsened. She is considering another endoscopy to investigate a hernia located at the bottom of her stomach,

## 2025-06-16 DIAGNOSIS — G25.81 RESTLESS LEGS SYNDROME (RLS): ICD-10-CM

## 2025-06-16 RX ORDER — TOPIRAMATE 100 MG/1
100 TABLET, FILM COATED ORAL 2 TIMES DAILY
Qty: 60 TABLET | Refills: 0 | Status: SHIPPED | OUTPATIENT
Start: 2025-06-16

## 2025-06-16 RX ORDER — ROPINIROLE 0.5 MG/1
0.5 TABLET, FILM COATED ORAL NIGHTLY
Qty: 90 TABLET | Refills: 0 | Status: SHIPPED | OUTPATIENT
Start: 2025-06-16

## 2025-06-18 ENCOUNTER — PREP FOR PROCEDURE (OUTPATIENT)
Dept: GASTROENTEROLOGY | Age: 45
End: 2025-06-18

## 2025-06-18 ENCOUNTER — OFFICE VISIT (OUTPATIENT)
Dept: GASTROENTEROLOGY | Age: 45
End: 2025-06-18
Payer: COMMERCIAL

## 2025-06-18 VITALS
OXYGEN SATURATION: 99 % | HEART RATE: 84 BPM | WEIGHT: 246 LBS | DIASTOLIC BLOOD PRESSURE: 64 MMHG | SYSTOLIC BLOOD PRESSURE: 104 MMHG | BODY MASS INDEX: 42.2 KG/M2

## 2025-06-18 DIAGNOSIS — R13.19 ESOPHAGEAL DYSPHAGIA: ICD-10-CM

## 2025-06-18 DIAGNOSIS — K21.9 GASTROESOPHAGEAL REFLUX DISEASE, UNSPECIFIED WHETHER ESOPHAGITIS PRESENT: Primary | ICD-10-CM

## 2025-06-18 PROCEDURE — 99203 OFFICE O/P NEW LOW 30 MIN: CPT | Performed by: SPECIALIST

## 2025-06-18 PROCEDURE — G8417 CALC BMI ABV UP PARAM F/U: HCPCS | Performed by: SPECIALIST

## 2025-06-18 PROCEDURE — G8427 DOCREV CUR MEDS BY ELIG CLIN: HCPCS | Performed by: SPECIALIST

## 2025-06-18 PROCEDURE — 4004F PT TOBACCO SCREEN RCVD TLK: CPT | Performed by: SPECIALIST

## 2025-06-18 RX ORDER — SODIUM CHLORIDE 0.9 % (FLUSH) 0.9 %
5-40 SYRINGE (ML) INJECTION EVERY 12 HOURS SCHEDULED
Status: CANCELLED | OUTPATIENT
Start: 2025-06-18

## 2025-06-18 RX ORDER — SODIUM CHLORIDE 9 MG/ML
INJECTION, SOLUTION INTRAVENOUS CONTINUOUS
Status: CANCELLED | OUTPATIENT
Start: 2025-06-18

## 2025-06-18 RX ORDER — SODIUM CHLORIDE 0.9 % (FLUSH) 0.9 %
5-40 SYRINGE (ML) INJECTION PRN
Status: CANCELLED | OUTPATIENT
Start: 2025-06-18

## 2025-06-18 RX ORDER — SODIUM CHLORIDE 9 MG/ML
INJECTION, SOLUTION INTRAVENOUS PRN
Status: CANCELLED | OUTPATIENT
Start: 2025-06-18

## 2025-06-18 ASSESSMENT — ENCOUNTER SYMPTOMS
DIARRHEA: 0
RESPIRATORY NEGATIVE: 1
NAUSEA: 0
VOMITING: 0
CONSTIPATION: 0
ABDOMINAL DISTENTION: 0
EYES NEGATIVE: 1
BLOOD IN STOOL: 0
RECTAL PAIN: 0
GASTROINTESTINAL NEGATIVE: 1
ANAL BLEEDING: 0
ABDOMINAL PAIN: 0

## 2025-06-18 NOTE — PROGRESS NOTES
Gastroenterology Clinic Visit    Iris Vernon  [id number]  Chief Complaint   Patient presents with    New Patient     NP GERD       HPI: 44 y.o. female presents to the clinic with history of GERD for many years and has been on pantoprazole.  Patient had recurrence of symptoms when patient was not able to take medication for few days and was having nausea and vomit.  No history of hematemesis or melena.  Occasional difficulty in swallowing certain solid food.  Also has occasional nocturnal symptoms with emesis.  No history of any significant weight loss, patient was recently started on Mobic, patient had gastric sleeve surgery in 2019.  GERD history uses e-cigarettes does not drink alcohol, no family history of Howe's esophagus or esophageal cancer    Review of Systems   Constitutional: Negative.    HENT: Negative.     Eyes: Negative.    Respiratory: Negative.     Cardiovascular: Negative.    Gastrointestinal: Negative.  Negative for abdominal distention, abdominal pain, anal bleeding, blood in stool, constipation, diarrhea, nausea, rectal pain and vomiting.        History of GERD   Endocrine: Negative.         Hypothyroidism   Genitourinary: Negative.    Musculoskeletal: Negative.    Skin: Negative.    Neurological: Negative.    Hematological: Negative.    Psychiatric/Behavioral: Negative.          Past Medical History:   Diagnosis Date    Acid reflux     Anxiety     Breast cyst     Depression     Generalized anxiety disorder     H/O tubal ligation     Hiatal hernia 2013    Hypothyroidism     Hypothyroidism     IBS (irritable bowel syndrome)     Obesity     Tx'd with Adipex    Ovarian cyst       Past Surgical History:   Procedure Laterality Date    BARIATRIC SURGERY  2018    GASTRIC SLEEVE    BREAST BIOPSY Right 2017    abscess removed    BREAST REDUCTION SURGERY      BREAST SURGERY  2007    Reduction     SECTION  2004    CHOLECYSTECTOMY      COLONOSCOPY N/A 2020    COLONOSCOPY

## 2025-06-19 ENCOUNTER — HOSPITAL ENCOUNTER (OUTPATIENT)
Age: 45
Discharge: HOME | End: 2025-06-19
Payer: COMMERCIAL

## 2025-06-19 VITALS
WEIGHT: 246 LBS | HEART RATE: 74 BPM | TEMPERATURE: 97.2 F | BODY MASS INDEX: 42 KG/M2 | SYSTOLIC BLOOD PRESSURE: 124 MMHG | DIASTOLIC BLOOD PRESSURE: 82 MMHG | HEIGHT: 64 IN

## 2025-06-19 DIAGNOSIS — G89.4 CHRONIC PAIN SYNDROME: ICD-10-CM

## 2025-06-19 PROCEDURE — 20610 DRAIN/INJ JOINT/BURSA W/O US: CPT | Performed by: PAIN MEDICINE

## 2025-06-19 PROCEDURE — 6360000002 HC RX W HCPCS: Performed by: PAIN MEDICINE

## 2025-06-19 RX ORDER — TRIAMCINOLONE ACETONIDE 40 MG/ML
40 INJECTION, SUSPENSION INTRA-ARTICULAR; INTRAMUSCULAR ONCE
Status: COMPLETED | OUTPATIENT
Start: 2025-06-19 | End: 2025-06-19

## 2025-06-19 RX ORDER — BUPIVACAINE HYDROCHLORIDE 5 MG/ML
8 INJECTION, SOLUTION EPIDURAL; INTRACAUDAL; PERINEURAL ONCE
Status: COMPLETED | OUTPATIENT
Start: 2025-06-19 | End: 2025-06-19

## 2025-06-19 RX ADMIN — BUPIVACAINE HYDROCHLORIDE 40 MG: 5 INJECTION, SOLUTION EPIDURAL; INTRACAUDAL; PERINEURAL at 09:29

## 2025-06-19 RX ADMIN — TRIAMCINOLONE ACETONIDE 40 MG: 40 INJECTION, SUSPENSION INTRA-ARTICULAR; INTRAMUSCULAR at 09:28

## 2025-06-19 ASSESSMENT — PAIN SCALES - GENERAL
PAINLEVEL_OUTOF10: 8
PAINLEVEL_OUTOF10: 6

## 2025-06-19 NOTE — PROCEDURES
PROCEDURE NOTE  Date: 6/19/2025   Name: Iris Vernon  YOB: 1980    Procedures:   bilaterally Trochanteric Bursa injections:.  Discussed procedure, Risk and complications with patient and addressed  Concerns.  Informed Consent Obtained. Supine on OR Table, skin site for initial needle placement marked using tenderness on palpation, Anesthetised with 2% lidocaine 2cc. 23g needle advanced towards  the Femur on Both sides till bone encountered,   Injected 4cc 0.5% Bupivacaine and 20mg Kenalog each side  with 100% good relief, Skin washed with wet Towel and Pt discharged in stable condition.

## 2025-06-23 ENCOUNTER — OFFICE VISIT (OUTPATIENT)
Age: 45
End: 2025-06-23
Payer: COMMERCIAL

## 2025-06-23 VITALS
OXYGEN SATURATION: 98 % | BODY MASS INDEX: 41.71 KG/M2 | DIASTOLIC BLOOD PRESSURE: 70 MMHG | WEIGHT: 243 LBS | SYSTOLIC BLOOD PRESSURE: 112 MMHG | HEART RATE: 74 BPM

## 2025-06-23 DIAGNOSIS — G47.33 OSA (OBSTRUCTIVE SLEEP APNEA): Primary | ICD-10-CM

## 2025-06-23 DIAGNOSIS — G25.81 RESTLESS LEG SYNDROME: ICD-10-CM

## 2025-06-23 DIAGNOSIS — E66.01 MORBID OBESITY (HCC): ICD-10-CM

## 2025-06-23 PROCEDURE — 4004F PT TOBACCO SCREEN RCVD TLK: CPT | Performed by: INTERNAL MEDICINE

## 2025-06-23 PROCEDURE — G8427 DOCREV CUR MEDS BY ELIG CLIN: HCPCS | Performed by: INTERNAL MEDICINE

## 2025-06-23 PROCEDURE — G8417 CALC BMI ABV UP PARAM F/U: HCPCS | Performed by: INTERNAL MEDICINE

## 2025-06-23 PROCEDURE — 99204 OFFICE O/P NEW MOD 45 MIN: CPT | Performed by: INTERNAL MEDICINE

## 2025-06-23 ASSESSMENT — ENCOUNTER SYMPTOMS
NAUSEA: 0
VOICE CHANGE: 0
COUGH: 0
TROUBLE SWALLOWING: 0
SHORTNESS OF BREATH: 1
SINUS PRESSURE: 0
RHINORRHEA: 0
ABDOMINAL PAIN: 0
SORE THROAT: 0
CHEST TIGHTNESS: 0
EYE DISCHARGE: 0
EYE ITCHING: 0
DIARRHEA: 0
VOMITING: 0
WHEEZING: 0

## 2025-06-23 NOTE — PROGRESS NOTES
There are no plain film signs of  pleural effusion or pneumothorax.  Visualized bones are intact.    IMPRESSION NO RADIOGRAPHIC FINDINGS OF ACUTE CARDIOPULMONARY DISEASE.    -  [facility]  Read By- PEGGY RAMOS M.D.  Released By- PEGGY RAMOS M.D.  Released Date Time- 10/27/11 1414  This document has been electronically signed.  ------------------------------------------------------------------------------  ]  Results for orders placed during the hospital encounter of 03/13/19    XR CHEST PORTABLE    Narrative  EXAMINATION: XR CHEST PORTABLE    CLINICAL HISTORY: PALPITATIONS    COMPARISONS: OCTOBER 24, 2011.    FINDINGS: Osseous structures are intact. Cardiopericardial silhouette normal. Pulmonary vasculature normal. Lungs are clear.    Impression  NO ACUTE CARDIOPULMONARY DISEASE.  ]  No results found for this or any previous visit.  ]  No results found for this or any previous visit.  ]  No results found for this or any previous visit.  ]    Assessment/Plan:     1. EDEL (obstructive sleep apnea)  She is complaining of snoring and daytime sleepiness and tiredness.No C/o witness apnea. She does not wakes up with gasping for air. No C/o wakes up frequently during sleep . No complaint of morning headache.She does not have restful sleep half of the time .She does not take daily naps.She does not fall asleep while watching TV.She does not have a complaint of sleepiness while driving.    - Home Sleep Study; Future    2. Morbid obesity (HCC)  She is advised try to lose weight. obesity related risk explained to the patient ,  Current weight:  110.2 kg (243 lb) Lbs. BMI:  Body mass index is 41.71 kg/m².  Suggested weight control approaches, including dietary changes , exercise, behavioral modification.    3. Restless leg syndrome  On Requip      Return in about 4 weeks (around 7/21/2025) for edel, sleep study.      Graeme Worthington MD

## 2025-07-10 ENCOUNTER — ANESTHESIA EVENT (OUTPATIENT)
Dept: ENDOSCOPY | Age: 45
End: 2025-07-10
Payer: COMMERCIAL

## 2025-07-11 ENCOUNTER — ANESTHESIA (OUTPATIENT)
Dept: ENDOSCOPY | Age: 45
End: 2025-07-11
Payer: COMMERCIAL

## 2025-07-11 ENCOUNTER — HOSPITAL ENCOUNTER (OUTPATIENT)
Age: 45
Setting detail: OUTPATIENT SURGERY
Discharge: HOME | End: 2025-07-11
Attending: SPECIALIST | Admitting: SPECIALIST
Payer: COMMERCIAL

## 2025-07-11 VITALS
BODY MASS INDEX: 43.59 KG/M2 | HEART RATE: 67 BPM | WEIGHT: 246 LBS | TEMPERATURE: 97.6 F | DIASTOLIC BLOOD PRESSURE: 64 MMHG | RESPIRATION RATE: 18 BRPM | SYSTOLIC BLOOD PRESSURE: 100 MMHG | OXYGEN SATURATION: 99 % | HEIGHT: 63 IN

## 2025-07-11 DIAGNOSIS — R00.2 PALPITATIONS: ICD-10-CM

## 2025-07-11 PROCEDURE — 3700000000 HC ANESTHESIA ATTENDED CARE: Performed by: SPECIALIST

## 2025-07-11 PROCEDURE — 6360000002 HC RX W HCPCS: Performed by: NURSE ANESTHETIST, CERTIFIED REGISTERED

## 2025-07-11 PROCEDURE — 2500000003 HC RX 250 WO HCPCS: Performed by: SPECIALIST

## 2025-07-11 PROCEDURE — 7100000010 HC PHASE II RECOVERY - FIRST 15 MIN: Performed by: SPECIALIST

## 2025-07-11 PROCEDURE — 2709999900 HC NON-CHARGEABLE SUPPLY: Performed by: SPECIALIST

## 2025-07-11 PROCEDURE — 3609017100 HC EGD: Performed by: SPECIALIST

## 2025-07-11 PROCEDURE — 7100000011 HC PHASE II RECOVERY - ADDTL 15 MIN: Performed by: SPECIALIST

## 2025-07-11 PROCEDURE — 2580000003 HC RX 258

## 2025-07-11 PROCEDURE — 43235 EGD DIAGNOSTIC BRUSH WASH: CPT | Performed by: SPECIALIST

## 2025-07-11 RX ORDER — SODIUM CHLORIDE 9 MG/ML
INJECTION, SOLUTION INTRAVENOUS CONTINUOUS
Status: DISCONTINUED | OUTPATIENT
Start: 2025-07-11 | End: 2025-07-11 | Stop reason: HOSPADM

## 2025-07-11 RX ORDER — PROPOFOL 10 MG/ML
INJECTION, EMULSION INTRAVENOUS
Status: DISCONTINUED | OUTPATIENT
Start: 2025-07-11 | End: 2025-07-11 | Stop reason: SDUPTHER

## 2025-07-11 RX ORDER — SODIUM CHLORIDE 0.9 % (FLUSH) 0.9 %
5-40 SYRINGE (ML) INJECTION EVERY 12 HOURS SCHEDULED
Status: DISCONTINUED | OUTPATIENT
Start: 2025-07-11 | End: 2025-07-11 | Stop reason: HOSPADM

## 2025-07-11 RX ORDER — METOPROLOL SUCCINATE 25 MG/1
25 TABLET, EXTENDED RELEASE ORAL NIGHTLY
Qty: 30 TABLET | Refills: 0 | Status: SHIPPED | OUTPATIENT
Start: 2025-07-11

## 2025-07-11 RX ORDER — SODIUM CHLORIDE 9 MG/ML
INJECTION, SOLUTION INTRAVENOUS
Status: COMPLETED
Start: 2025-07-11 | End: 2025-07-11

## 2025-07-11 RX ORDER — SODIUM CHLORIDE 0.9 % (FLUSH) 0.9 %
5-40 SYRINGE (ML) INJECTION PRN
Status: DISCONTINUED | OUTPATIENT
Start: 2025-07-11 | End: 2025-07-11 | Stop reason: HOSPADM

## 2025-07-11 RX ORDER — SODIUM CHLORIDE 9 MG/ML
INJECTION, SOLUTION INTRAVENOUS PRN
Status: DISCONTINUED | OUTPATIENT
Start: 2025-07-11 | End: 2025-07-11 | Stop reason: HOSPADM

## 2025-07-11 RX ORDER — LIDOCAINE HYDROCHLORIDE 20 MG/ML
INJECTION, SOLUTION EPIDURAL; INFILTRATION; INTRACAUDAL; PERINEURAL
Status: DISCONTINUED | OUTPATIENT
Start: 2025-07-11 | End: 2025-07-11 | Stop reason: SDUPTHER

## 2025-07-11 RX ADMIN — PROPOFOL 10 MG: 10 INJECTION, EMULSION INTRAVENOUS at 10:08

## 2025-07-11 RX ADMIN — PROPOFOL 100 MG: 10 INJECTION, EMULSION INTRAVENOUS at 10:03

## 2025-07-11 RX ADMIN — SODIUM CHLORIDE: 0.9 INJECTION, SOLUTION INTRAVENOUS at 09:29

## 2025-07-11 RX ADMIN — SODIUM CHLORIDE: 9 INJECTION, SOLUTION INTRAVENOUS at 09:29

## 2025-07-11 RX ADMIN — LIDOCAINE HYDROCHLORIDE 2.5 ML: 20 INJECTION, SOLUTION EPIDURAL; INFILTRATION; INTRACAUDAL; PERINEURAL at 10:03

## 2025-07-11 RX ADMIN — PROPOFOL 50 MG: 10 INJECTION, EMULSION INTRAVENOUS at 10:06

## 2025-07-11 ASSESSMENT — PAIN - FUNCTIONAL ASSESSMENT
PAIN_FUNCTIONAL_ASSESSMENT: 0-10
PAIN_FUNCTIONAL_ASSESSMENT: 0-10

## 2025-07-11 ASSESSMENT — LIFESTYLE VARIABLES: SMOKING_STATUS: 1

## 2025-07-11 NOTE — ANESTHESIA POSTPROCEDURE EVALUATION
rDepartment of Anesthesiology  Postprocedure Note    Patient: Iris Vernon  MRN: [id number]  YOB: 1980  Date of evaluation: 7/11/2025    Procedure Summary       Date: 07/11/25 Room / Location: [facility] OR 01 / [facility]    Anesthesia Start: 1000 Anesthesia Stop: 1012    Procedure: ESOPHAGOGASTRODUODENOSCOPY Diagnosis:       Gastroesophageal reflux disease      Esophageal dysphagia      (Gastroesophageal reflux disease [K21.9])      (Esophageal dysphagia [R13.19])    Surgeons: Aracelis Ruiz MD Responsible Provider: Viktor England APRN - CRNA    Anesthesia Type: MAC ASA Status: 3            Anesthesia Type: MAC    Marquita Phase I: Marquita Score: 10    Marquita Phase II:      Anesthesia Post Evaluation    Patient location during evaluation: bedside  Patient participation: complete - patient participated  Level of consciousness: awake and awake and alert  Pain score: 0  Airway patency: patent  Nausea & Vomiting: no nausea and no vomiting  Cardiovascular status: blood pressure returned to baseline and hemodynamically stable  Respiratory status: acceptable  Hydration status: euvolemic  Pain management: adequate    No notable events documented.  
verbal cues/nonverbal cues (demo/gestures)/1 person assist

## 2025-07-11 NOTE — TELEPHONE ENCOUNTER
Requesting medication refill. Please approve or deny this request.    Rx requested:  Requested Prescriptions     Pending Prescriptions Disp Refills    metoprolol succinate (TOPROL XL) 25 MG extended release tablet 90 tablet 3     Sig: Take 1 tablet by mouth nightly         Last Office Visit:   5/22/2023      Next Visit Date:  Future Appointments   Date Time Provider Department Center   7/14/2025 10:45 AM José Miguel Crowley MD [id number]   7/18/2025  2:15 PM Chi Santacruz MD [facility]   7/21/2025  2:00 PM Graeme Worthington MD [facility]   8/1/2025  1:30 PM Kemi Davenport MD [facility]

## 2025-07-11 NOTE — ANESTHESIA PRE PROCEDURE
depression F41.9, F32.A    Gastroesophageal reflux disease K21.9    Esophageal dysphagia R13.19       Past Medical History:        Diagnosis Date    Acid reflux     Anxiety     Breast cyst     Depression     Generalized anxiety disorder     H/O tubal ligation     Hiatal hernia 2013    Hypothyroidism     Hypothyroidism     IBS (irritable bowel syndrome)     Obesity     Tx'd with Adipex    Ovarian cyst        Past Surgical History:        Procedure Laterality Date    BARIATRIC SURGERY  2018    GASTRIC SLEEVE    BREAST BIOPSY Right 2017    abscess removed    BREAST REDUCTION SURGERY      BREAST SURGERY  2007    Reduction     SECTION  2004    CHOLECYSTECTOMY      COLONOSCOPY N/A 2020    COLONOSCOPY DIAGNOSTIC performed by Glenn Mason MD at [facility]    ENDOMETRIAL ABLATION      HYSTERECTOMY (CERVIX STATUS UNKNOWN)      HYSTERECTOMY, VAGINAL N/A 2020    [place] [facility] [place]    TUBAL LIGATION      UPPER GASTROINTESTINAL ENDOSCOPY  2013    Dr Pelletier       Social History:    Social History     Tobacco Use    Smoking status: Some Days     Current packs/day: 0.00     Average packs/day: 0.5 packs/day for 3.0 years (1.5 ttl pk-yrs)     Types: Cigarettes     Start date: 2015     Last attempt to quit: 2018     Years since quittin.9    Smokeless tobacco: Never   Substance Use Topics    Alcohol use: Never     Comment: Rare use                                Ready to quit: Not Answered  Counseling given: Not Answered      Vital Signs (Current): There were no vitals filed for this visit.                                           BP Readings from Last 3 Encounters:   25 112/70   25 124/82   25 104/64       NPO Status:                                                                                 BMI:   Wt Readings from Last 3 Encounters:   25 110.2 kg (243 lb)   25 111.6 kg (246 lb)   25 111.6 kg (246 lb)

## 2025-07-11 NOTE — H&P
Patient Name: Iris Vernon  : 1980  MRN: [id number]  DATE: 25      ENDOSCOPY  History and Physical    Procedure:    [] Diagnostic Colonoscopy       [] Screening Colonoscopy  [x] EGD      [] ERCP      [] EUS       [] Other    [x] Previous office notes/History and Physical reviewed from the patients chart. Please see EMR for further details of HPI. I have examined the patient's status immediately prior to the procedure and:      Indications/HPI:    []Abdominal Pain  []Cancer- GI/Lung  []Fhx of colon CA/polyps  []History of Polyps  []Howe’s   []Melena  []Abnormal Imaging  []Dysphagia    []Persistent Pneumonia  []Anemia  []Food Impaction  []History of Polyps  []GI Bleed  []Pulmonary nodule/Mass  []Change in bowel habits []Heartburn/Reflux  []Rectal Bleed (BRBPR)  []Chest Pain - Non Cardiac []Heme (+) Stoo  l[]Ulcers  []Constipation  []Hemoptysis   []Varices  []Diarrhea  []Hypoxemia  []Nausea/Vomiting  []Screening   []Crohns/Colitis  []Other: History of GERD and occasional dysphagia    Anesthesia:   [x] MAC [] Moderate Sedation   [] General   [] None     ROS: 12 pt Review of Symptoms was negative unless mentioned above    Medications:   Prior to Admission medications    Medication Sig Start Date End Date Taking? Authorizing Provider   venlafaxine (EFFEXOR XR) 75 MG extended release capsule Take 1 capsule by mouth daily Take 1 tablet daily for 6 days, then take 2 tablets daily 25  Yes Kemi Davenport MD   rOPINIRole (REQUIP) 0.5 MG tablet Take 1 tablet by mouth nightly 25  Yes Kemi Davenport MD   topiramate (TOPAMAX) 100 MG tablet Take 1 tablet by mouth 2 times daily 25  Yes Kemi Davenport MD   pantoprazole (PROTONIX) 40 MG tablet Take 1 tablet by mouth daily as needed (acid reflux) 25  Yes Kemi Davenport MD   meloxicam (MOBIC) 15 MG tablet Take 1 tablet by mouth daily as needed for Pain 25  Yes Kemi Davenport MD   levothyroxine (SYNTHROID) 125 MCG tablet TAKE ONE TABLET BY  MOUTH EVERY DAY 6/2/25  Yes Kemi Davenport MD   albuterol sulfate HFA (VENTOLIN HFA) 108 (90 Base) MCG/ACT inhaler Inhale 1 puff into the lungs every 6 hours as needed for Wheezing or Shortness of Breath 10/12/22  Yes Lisa Sutton, APRN - CNP   dicyclomine (BENTYL) 10 MG capsule Take 1 capsule po every 6 hours as needed for abdominal pain 8/2/22  Yes Santino Meyer MD   traZODone (DESYREL) 150 MG tablet Take 1 tablet by mouth nightly 5/24/22  Yes Santino Meyer MD   acetaminophen (APAP EXTRA STRENGTH) 500 MG tablet Take 2 tablets by mouth every 6 hours as needed for Pain 11/19/20  Yes Manuel Cook MD   Multiple Vitamin (MULTI VITAMIN DAILY PO) Take 1 tablet by mouth daily   Yes Milla Meyer MD   Probiotic Product (PROBIOTIC DAILY PO) Take 1 tablet by mouth daily   Yes Milla Meyer MD   buPROPion (WELLBUTRIN XL) 150 MG extended release tablet Take 1 tablet by mouth daily for 14 days 6/13/25 6/27/25  Kemi Davenport MD   metoprolol succinate (TOPROL XL) 25 MG extended release tablet Take 1 tablet by mouth nightly 8/14/23   Chi Santacruz MD   Ascorbic Acid (VITAMIN C) 250 MG tablet Take 2 tablets by mouth daily  Patient not taking: Reported on 7/11/2025    Milla Meyer MD       Allergies:   Allergies   Allergen Reactions    Ibuprofen Other (See Comments)     Can't have with gastric sleeve    Metformin And Related Diarrhea    Morphine Nausea And Vomiting        History of allergic reaction to anesthesia:  No    Past Medical History:  Past Medical History:   Diagnosis Date    Acid reflux     Anxiety     Breast cyst     Depression     Generalized anxiety disorder     H/O tubal ligation     Hiatal hernia 11/18/2013    Hypothyroidism     Hypothyroidism     IBS (irritable bowel syndrome)     Obesity     Tx'd with Adipex    Ovarian cyst        Past Surgical History:  Past Surgical History:   Procedure Laterality Date    BARIATRIC SURGERY  11/2018    GASTRIC SLEEVE    BREAST BIOPSY

## 2025-07-14 ENCOUNTER — PATIENT MESSAGE (OUTPATIENT)
Age: 45
End: 2025-07-14

## 2025-07-14 ENCOUNTER — OFFICE VISIT (OUTPATIENT)
Age: 45
End: 2025-07-14
Payer: COMMERCIAL

## 2025-07-14 VITALS
WEIGHT: 246 LBS | TEMPERATURE: 97.1 F | HEIGHT: 64 IN | HEART RATE: 63 BPM | BODY MASS INDEX: 42 KG/M2 | SYSTOLIC BLOOD PRESSURE: 90 MMHG | DIASTOLIC BLOOD PRESSURE: 70 MMHG | OXYGEN SATURATION: 97 %

## 2025-07-14 DIAGNOSIS — M70.61 GREATER TROCHANTERIC BURSITIS OF BOTH HIPS: Primary | ICD-10-CM

## 2025-07-14 DIAGNOSIS — M70.62 GREATER TROCHANTERIC BURSITIS OF BOTH HIPS: Primary | ICD-10-CM

## 2025-07-14 PROCEDURE — 99213 OFFICE O/P EST LOW 20 MIN: CPT | Performed by: PAIN MEDICINE

## 2025-07-14 PROCEDURE — G8417 CALC BMI ABV UP PARAM F/U: HCPCS | Performed by: PAIN MEDICINE

## 2025-07-14 PROCEDURE — G8427 DOCREV CUR MEDS BY ELIG CLIN: HCPCS | Performed by: PAIN MEDICINE

## 2025-07-14 PROCEDURE — 1036F TOBACCO NON-USER: CPT | Performed by: PAIN MEDICINE

## 2025-07-14 ASSESSMENT — ENCOUNTER SYMPTOMS
GASTROINTESTINAL NEGATIVE: 1
ALLERGIC/IMMUNOLOGIC NEGATIVE: 1
EYES NEGATIVE: 1
RESPIRATORY NEGATIVE: 1

## 2025-07-14 NOTE — PROGRESS NOTES
History of Present Illness     Patient Identification  Iris Vernon is a 44 y.o. female.    Patient information was obtained from patient.      Chief Complaint   Chief Complaint   Patient presents with    Follow-up     follow up after injections       Patient presents with Iris Vernon is a 43 year old female, PMHx of :s/p gastric sleeve surgery 2019. hypertension and depression; Had bilateral trochanteric bursa injections. Feels 50% relief.  complaint of  pain every where worst low back, bilateralto posterior thighs posterior Knees. This is a result of no known injury. Onset of pain was several years ago and has been gradually worsening since.   described as squeezing and rated as moderate and severe, 7-8/10 without radiation. Symptoms include  Rt leg giving out occationally  past 1 year. The patient also Denied complains of fever, dysuria, weight loss, history of cancer, history of osteoporosis, history of steroid use. The patient denies weakness, numbness, incontinence, dysuria, hematuria. The patient denies other injuries. Care prior to arrival consisted of Facet blocks 2/10/23 with no relief. Lyrica, Cymbalta with no relief  has positive sleep study no treatement yet.     Past Medical History:   Diagnosis Date    Acid reflux     Anxiety     Breast cyst     Depression     Generalized anxiety disorder     H/O tubal ligation     Hiatal hernia 11/18/2013    Hypothyroidism     Hypothyroidism     IBS (irritable bowel syndrome)     Obesity     Tx'd with Adipex    Ovarian cyst      Family History   Problem Relation Age of Onset    High Blood Pressure Mother         No FDR with colon cancer     Crohn's Disease Mother     Cancer Father         Lung cancer    Mental Illness Brother     Heart Disease Brother     Asthma Brother     Colon Cancer Neg Hx      Current Outpatient Medications   Medication Sig Dispense Refill    metoprolol succinate (TOPROL XL) 25 MG extended release tablet Take 1 tablet by mouth

## 2025-07-22 ENCOUNTER — HOSPITAL ENCOUNTER (OUTPATIENT)
Dept: SLEEP CENTER | Age: 45
Discharge: HOME | End: 2025-07-24
Payer: COMMERCIAL

## 2025-07-22 DIAGNOSIS — G47.33 OSA (OBSTRUCTIVE SLEEP APNEA): ICD-10-CM

## 2025-07-22 PROCEDURE — 95806 SLEEP STUDY UNATT&RESP EFFT: CPT

## 2025-07-23 RX ORDER — TOPIRAMATE 100 MG/1
100 TABLET, FILM COATED ORAL 2 TIMES DAILY
Qty: 60 TABLET | Refills: 0 | Status: SHIPPED | OUTPATIENT
Start: 2025-07-23

## 2025-07-29 ENCOUNTER — PATIENT MESSAGE (OUTPATIENT)
Age: 45
End: 2025-07-29

## 2025-07-29 DIAGNOSIS — E03.9 HYPOTHYROIDISM, UNSPECIFIED TYPE: ICD-10-CM

## 2025-07-29 DIAGNOSIS — E03.9 HYPOTHYROIDISM, UNSPECIFIED TYPE: Primary | ICD-10-CM

## 2025-07-29 LAB
T4 FREE SERPL-MCNC: 1.17 NG/DL (ref 0.84–1.68)
TSH REFLEX: 0.12 UIU/ML (ref 0.44–3.86)

## 2025-07-29 RX ORDER — LEVOTHYROXINE SODIUM 112 UG/1
112 TABLET ORAL DAILY
Qty: 60 TABLET | Refills: 0 | Status: SHIPPED | OUTPATIENT
Start: 2025-07-29

## 2025-08-01 ENCOUNTER — OFFICE VISIT (OUTPATIENT)
Age: 45
End: 2025-08-01
Payer: COMMERCIAL

## 2025-08-01 VITALS
RESPIRATION RATE: 18 BRPM | DIASTOLIC BLOOD PRESSURE: 60 MMHG | SYSTOLIC BLOOD PRESSURE: 122 MMHG | BODY MASS INDEX: 41.96 KG/M2 | TEMPERATURE: 98 F | WEIGHT: 245.8 LBS | HEIGHT: 64 IN | OXYGEN SATURATION: 97 % | HEART RATE: 70 BPM

## 2025-08-01 DIAGNOSIS — E03.9 HYPOTHYROIDISM, UNSPECIFIED TYPE: ICD-10-CM

## 2025-08-01 DIAGNOSIS — G43.809 OTHER MIGRAINE WITHOUT STATUS MIGRAINOSUS, NOT INTRACTABLE: ICD-10-CM

## 2025-08-01 DIAGNOSIS — R09.81 CHRONIC NASAL CONGESTION: ICD-10-CM

## 2025-08-01 DIAGNOSIS — F32.A ANXIETY AND DEPRESSION: Primary | ICD-10-CM

## 2025-08-01 DIAGNOSIS — F41.9 ANXIETY AND DEPRESSION: Primary | ICD-10-CM

## 2025-08-01 DIAGNOSIS — R53.83 OTHER FATIGUE: ICD-10-CM

## 2025-08-01 DIAGNOSIS — G47.33 OSA (OBSTRUCTIVE SLEEP APNEA): ICD-10-CM

## 2025-08-01 PROBLEM — J31.0 CHRONIC RHINITIS: Status: RESOLVED | Noted: 2021-07-27 | Resolved: 2025-08-01

## 2025-08-01 PROBLEM — M54.2 NECK PAIN: Status: RESOLVED | Noted: 2021-04-30 | Resolved: 2025-08-01

## 2025-08-01 PROBLEM — R10.9 FLANK PAIN: Status: RESOLVED | Noted: 2021-07-27 | Resolved: 2025-08-01

## 2025-08-01 PROCEDURE — G8417 CALC BMI ABV UP PARAM F/U: HCPCS | Performed by: STUDENT IN AN ORGANIZED HEALTH CARE EDUCATION/TRAINING PROGRAM

## 2025-08-01 PROCEDURE — 99214 OFFICE O/P EST MOD 30 MIN: CPT | Performed by: STUDENT IN AN ORGANIZED HEALTH CARE EDUCATION/TRAINING PROGRAM

## 2025-08-01 PROCEDURE — G8427 DOCREV CUR MEDS BY ELIG CLIN: HCPCS | Performed by: STUDENT IN AN ORGANIZED HEALTH CARE EDUCATION/TRAINING PROGRAM

## 2025-08-01 PROCEDURE — 1036F TOBACCO NON-USER: CPT | Performed by: STUDENT IN AN ORGANIZED HEALTH CARE EDUCATION/TRAINING PROGRAM

## 2025-08-01 RX ORDER — HYDROXYZINE HYDROCHLORIDE 25 MG/1
25 TABLET, FILM COATED ORAL EVERY 8 HOURS PRN
Qty: 30 TABLET | Refills: 0 | Status: SHIPPED | OUTPATIENT
Start: 2025-08-01 | End: 2025-08-31

## 2025-08-01 RX ORDER — FLUTICASONE PROPIONATE 50 MCG
1 SPRAY, SUSPENSION (ML) NASAL DAILY
Qty: 16 G | Refills: 0 | Status: SHIPPED | OUTPATIENT
Start: 2025-08-01

## 2025-08-01 RX ORDER — LORATADINE 10 MG/1
10 TABLET ORAL DAILY
Qty: 90 TABLET | Refills: 0 | Status: SHIPPED | OUTPATIENT
Start: 2025-08-01

## 2025-08-01 NOTE — PROGRESS NOTES
[facility]STRO [facility]ENTER     Current Outpatient Medications   Medication Sig Dispense Refill    hydrOXYzine HCl (ATARAX) 25 MG tablet Take 1 tablet by mouth every 8 hours as needed for Anxiety 30 tablet 0    loratadine (CLARITIN) 10 MG tablet Take 1 tablet by mouth daily 90 tablet 0    fluticasone (FLONASE) 50 MCG/ACT nasal spray 1 spray by Each Nostril route daily 16 g 0    levothyroxine (SYNTHROID) 112 MCG tablet Take 1 tablet by mouth daily 60 tablet 0    topiramate (TOPAMAX) 100 MG tablet Take 1 tablet by mouth 2 times daily 60 tablet 0    metoprolol succinate (TOPROL XL) 25 MG extended release tablet Take 1 tablet by mouth nightly 30 tablet 0    rOPINIRole (REQUIP) 0.5 MG tablet Take 1 tablet by mouth nightly 90 tablet 0    pantoprazole (PROTONIX) 40 MG tablet Take 1 tablet by mouth daily as needed (acid reflux) 90 tablet 0    meloxicam (MOBIC) 15 MG tablet Take 1 tablet by mouth daily as needed for Pain 30 tablet 0    albuterol sulfate HFA (VENTOLIN HFA) 108 (90 Base) MCG/ACT inhaler Inhale 1 puff into the lungs every 6 hours as needed for Wheezing or Shortness of Breath 18 g 0    dicyclomine (BENTYL) 10 MG capsule Take 1 capsule po every 6 hours as needed for abdominal pain 360 capsule 1    traZODone (DESYREL) 150 MG tablet Take 1 tablet by mouth nightly 90 tablet 1    acetaminophen (APAP EXTRA STRENGTH) 500 MG tablet Take 2 tablets by mouth every 6 hours as needed for Pain 60 tablet 1    Multiple Vitamin (MULTI VITAMIN DAILY PO) Take 1 tablet by mouth daily      Probiotic Product (PROBIOTIC DAILY PO) Take 1 tablet by mouth daily      Ascorbic Acid (VITAMIN C) 250 MG tablet Take 2 tablets by mouth daily      buPROPion (WELLBUTRIN XL) 150 MG extended release tablet Take 1 tablet by mouth daily for 14 days 14 tablet 0     No current facility-administered medications for this visit.     Allergies, PMH, Surgical Hx, Family Hx, and Social Hx reviewed and updated.    Objective    Vitals:    08/01/25 1332   BP: 122/60   BP

## 2025-08-05 ENCOUNTER — OFFICE VISIT (OUTPATIENT)
Age: 45
End: 2025-08-05
Payer: COMMERCIAL

## 2025-08-05 VITALS
WEIGHT: 245.8 LBS | OXYGEN SATURATION: 97 % | DIASTOLIC BLOOD PRESSURE: 62 MMHG | SYSTOLIC BLOOD PRESSURE: 120 MMHG | BODY MASS INDEX: 41.96 KG/M2 | HEART RATE: 67 BPM | TEMPERATURE: 97.9 F | HEIGHT: 64 IN

## 2025-08-05 DIAGNOSIS — L25.5 RHUS DERMATITIS: Primary | ICD-10-CM

## 2025-08-05 PROCEDURE — 99213 OFFICE O/P EST LOW 20 MIN: CPT | Performed by: NURSE PRACTITIONER

## 2025-08-05 PROCEDURE — 1036F TOBACCO NON-USER: CPT | Performed by: NURSE PRACTITIONER

## 2025-08-05 PROCEDURE — G8417 CALC BMI ABV UP PARAM F/U: HCPCS | Performed by: NURSE PRACTITIONER

## 2025-08-05 PROCEDURE — G8427 DOCREV CUR MEDS BY ELIG CLIN: HCPCS | Performed by: NURSE PRACTITIONER

## 2025-08-05 RX ORDER — PREDNISONE 10 MG/1
TABLET ORAL
Qty: 39 TABLET | Refills: 0 | Status: SHIPPED | OUTPATIENT
Start: 2025-08-05

## 2025-08-05 RX ORDER — CETIRIZINE HYDROCHLORIDE 10 MG/1
10 TABLET ORAL DAILY
Qty: 7 TABLET | Refills: 0 | Status: SHIPPED | OUTPATIENT
Start: 2025-08-05 | End: 2025-08-12

## 2025-08-11 ENCOUNTER — OFFICE VISIT (OUTPATIENT)
Age: 45
End: 2025-08-11
Payer: COMMERCIAL

## 2025-08-11 VITALS
HEART RATE: 67 BPM | SYSTOLIC BLOOD PRESSURE: 120 MMHG | WEIGHT: 245 LBS | OXYGEN SATURATION: 99 % | TEMPERATURE: 97.4 F | HEIGHT: 64 IN | DIASTOLIC BLOOD PRESSURE: 80 MMHG | BODY MASS INDEX: 41.83 KG/M2

## 2025-08-11 DIAGNOSIS — M47.817 LUMBOSACRAL SPONDYLOSIS WITHOUT MYELOPATHY: Primary | ICD-10-CM

## 2025-08-11 PROCEDURE — 1036F TOBACCO NON-USER: CPT | Performed by: PAIN MEDICINE

## 2025-08-11 PROCEDURE — G8417 CALC BMI ABV UP PARAM F/U: HCPCS | Performed by: PAIN MEDICINE

## 2025-08-11 PROCEDURE — 99213 OFFICE O/P EST LOW 20 MIN: CPT | Performed by: PAIN MEDICINE

## 2025-08-11 PROCEDURE — G8427 DOCREV CUR MEDS BY ELIG CLIN: HCPCS | Performed by: PAIN MEDICINE

## 2025-08-11 ASSESSMENT — ENCOUNTER SYMPTOMS
EYES NEGATIVE: 1
ALLERGIC/IMMUNOLOGIC NEGATIVE: 1
RESPIRATORY NEGATIVE: 1
GASTROINTESTINAL NEGATIVE: 1

## 2025-08-12 ENCOUNTER — OFFICE VISIT (OUTPATIENT)
Age: 45
End: 2025-08-12
Payer: COMMERCIAL

## 2025-08-12 VITALS
DIASTOLIC BLOOD PRESSURE: 70 MMHG | WEIGHT: 245 LBS | RESPIRATION RATE: 16 BRPM | BODY MASS INDEX: 42.05 KG/M2 | OXYGEN SATURATION: 97 % | SYSTOLIC BLOOD PRESSURE: 100 MMHG | HEART RATE: 67 BPM

## 2025-08-12 DIAGNOSIS — I47.10 PSVT (PAROXYSMAL SUPRAVENTRICULAR TACHYCARDIA): Primary | ICD-10-CM

## 2025-08-12 DIAGNOSIS — R00.2 PALPITATIONS: ICD-10-CM

## 2025-08-12 PROCEDURE — G8417 CALC BMI ABV UP PARAM F/U: HCPCS | Performed by: INTERNAL MEDICINE

## 2025-08-12 PROCEDURE — 99214 OFFICE O/P EST MOD 30 MIN: CPT | Performed by: INTERNAL MEDICINE

## 2025-08-12 PROCEDURE — 93000 ELECTROCARDIOGRAM COMPLETE: CPT | Performed by: INTERNAL MEDICINE

## 2025-08-12 PROCEDURE — G8427 DOCREV CUR MEDS BY ELIG CLIN: HCPCS | Performed by: INTERNAL MEDICINE

## 2025-08-12 PROCEDURE — 1036F TOBACCO NON-USER: CPT | Performed by: INTERNAL MEDICINE

## 2025-08-12 RX ORDER — METOPROLOL SUCCINATE 25 MG/1
25 TABLET, EXTENDED RELEASE ORAL NIGHTLY
Qty: 90 TABLET | Refills: 3 | Status: SHIPPED | OUTPATIENT
Start: 2025-08-12

## 2025-08-12 ASSESSMENT — ENCOUNTER SYMPTOMS
NAUSEA: 0
SHORTNESS OF BREATH: 0
COUGH: 0
BLOOD IN STOOL: 0
GASTROINTESTINAL NEGATIVE: 1
STRIDOR: 0
RESPIRATORY NEGATIVE: 1
EYES NEGATIVE: 1
CHEST TIGHTNESS: 0
WHEEZING: 0

## 2025-08-20 ENCOUNTER — OFFICE VISIT (OUTPATIENT)
Age: 45
End: 2025-08-20
Payer: COMMERCIAL

## 2025-08-20 VITALS
BODY MASS INDEX: 43.08 KG/M2 | WEIGHT: 251 LBS | TEMPERATURE: 96.9 F | DIASTOLIC BLOOD PRESSURE: 72 MMHG | SYSTOLIC BLOOD PRESSURE: 112 MMHG | OXYGEN SATURATION: 97 % | HEART RATE: 68 BPM

## 2025-08-20 DIAGNOSIS — R06.83 LOUD SNORING: Primary | ICD-10-CM

## 2025-08-20 DIAGNOSIS — G25.81 RESTLESS LEG SYNDROME: ICD-10-CM

## 2025-08-20 DIAGNOSIS — G47.00 INSOMNIA, UNSPECIFIED TYPE: ICD-10-CM

## 2025-08-20 DIAGNOSIS — E66.01 MORBID OBESITY (HCC): ICD-10-CM

## 2025-08-20 PROCEDURE — 99214 OFFICE O/P EST MOD 30 MIN: CPT | Performed by: INTERNAL MEDICINE

## 2025-08-20 PROCEDURE — 1036F TOBACCO NON-USER: CPT | Performed by: INTERNAL MEDICINE

## 2025-08-20 PROCEDURE — G8427 DOCREV CUR MEDS BY ELIG CLIN: HCPCS | Performed by: INTERNAL MEDICINE

## 2025-08-20 PROCEDURE — G8417 CALC BMI ABV UP PARAM F/U: HCPCS | Performed by: INTERNAL MEDICINE

## 2025-08-22 ENCOUNTER — HOSPITAL ENCOUNTER (OUTPATIENT)
Dept: PHYSICAL THERAPY | Age: 45
Setting detail: THERAPIES SERIES
End: 2025-08-22
Attending: PAIN MEDICINE
Payer: COMMERCIAL

## 2025-08-25 DIAGNOSIS — E03.9 HYPOTHYROIDISM, UNSPECIFIED TYPE: ICD-10-CM

## 2025-08-25 LAB — TSH REFLEX: 3.46 UIU/ML (ref 0.44–3.86)

## 2025-08-27 ENCOUNTER — OFFICE VISIT (OUTPATIENT)
Dept: GASTROENTEROLOGY | Age: 45
End: 2025-08-27
Payer: COMMERCIAL

## 2025-08-27 VITALS — HEIGHT: 64 IN | HEART RATE: 65 BPM | OXYGEN SATURATION: 98 % | WEIGHT: 254 LBS | BODY MASS INDEX: 43.36 KG/M2

## 2025-08-27 DIAGNOSIS — K21.9 GASTROESOPHAGEAL REFLUX DISEASE, UNSPECIFIED WHETHER ESOPHAGITIS PRESENT: Primary | ICD-10-CM

## 2025-08-27 PROCEDURE — 1036F TOBACCO NON-USER: CPT | Performed by: NURSE PRACTITIONER

## 2025-08-27 PROCEDURE — 99213 OFFICE O/P EST LOW 20 MIN: CPT | Performed by: NURSE PRACTITIONER

## 2025-08-27 PROCEDURE — G8427 DOCREV CUR MEDS BY ELIG CLIN: HCPCS | Performed by: NURSE PRACTITIONER

## 2025-08-27 PROCEDURE — G8417 CALC BMI ABV UP PARAM F/U: HCPCS | Performed by: NURSE PRACTITIONER

## 2025-08-27 RX ORDER — AMOXICILLIN 500 MG/1
CAPSULE ORAL
COMMUNITY
Start: 2025-08-24

## 2025-08-27 RX ORDER — FAMOTIDINE 20 MG/1
20 TABLET, FILM COATED ORAL 2 TIMES DAILY PRN
Qty: 60 TABLET | Refills: 3 | Status: SHIPPED | OUTPATIENT
Start: 2025-08-27

## 2025-08-27 ASSESSMENT — ENCOUNTER SYMPTOMS
DIARRHEA: 0
CONSTIPATION: 0
RECTAL PAIN: 0
ANAL BLEEDING: 0
ABDOMINAL PAIN: 0
NAUSEA: 0
BLOOD IN STOOL: 0
TROUBLE SWALLOWING: 0
VOMITING: 0
ABDOMINAL DISTENTION: 0
GASTROINTESTINAL NEGATIVE: 1

## 2025-08-29 ENCOUNTER — OFFICE VISIT (OUTPATIENT)
Age: 45
End: 2025-08-29
Payer: COMMERCIAL

## 2025-08-29 ENCOUNTER — HOSPITAL ENCOUNTER (OUTPATIENT)
Dept: PHYSICAL THERAPY | Age: 45
Setting detail: THERAPIES SERIES
Discharge: HOME | End: 2025-08-29
Attending: PAIN MEDICINE
Payer: COMMERCIAL

## 2025-08-29 VITALS
HEART RATE: 69 BPM | BODY MASS INDEX: 43.36 KG/M2 | WEIGHT: 254 LBS | SYSTOLIC BLOOD PRESSURE: 118 MMHG | HEIGHT: 64 IN | OXYGEN SATURATION: 98 % | DIASTOLIC BLOOD PRESSURE: 82 MMHG | TEMPERATURE: 97.3 F

## 2025-08-29 DIAGNOSIS — R53.83 OTHER FATIGUE: ICD-10-CM

## 2025-08-29 DIAGNOSIS — E03.9 HYPOTHYROIDISM, UNSPECIFIED TYPE: ICD-10-CM

## 2025-08-29 DIAGNOSIS — M47.817 SPONDYLOSIS OF LUMBOSACRAL REGION, UNSPECIFIED SPINAL OSTEOARTHRITIS COMPLICATION STATUS: ICD-10-CM

## 2025-08-29 DIAGNOSIS — F32.A ANXIETY AND DEPRESSION: Primary | ICD-10-CM

## 2025-08-29 DIAGNOSIS — F41.9 ANXIETY AND DEPRESSION: Primary | ICD-10-CM

## 2025-08-29 PROCEDURE — G8417 CALC BMI ABV UP PARAM F/U: HCPCS | Performed by: STUDENT IN AN ORGANIZED HEALTH CARE EDUCATION/TRAINING PROGRAM

## 2025-08-29 PROCEDURE — 97163 PT EVAL HIGH COMPLEX 45 MIN: CPT

## 2025-08-29 PROCEDURE — 97110 THERAPEUTIC EXERCISES: CPT

## 2025-08-29 PROCEDURE — G8427 DOCREV CUR MEDS BY ELIG CLIN: HCPCS | Performed by: STUDENT IN AN ORGANIZED HEALTH CARE EDUCATION/TRAINING PROGRAM

## 2025-08-29 PROCEDURE — 99214 OFFICE O/P EST MOD 30 MIN: CPT | Performed by: STUDENT IN AN ORGANIZED HEALTH CARE EDUCATION/TRAINING PROGRAM

## 2025-08-29 PROCEDURE — 1036F TOBACCO NON-USER: CPT | Performed by: STUDENT IN AN ORGANIZED HEALTH CARE EDUCATION/TRAINING PROGRAM

## 2025-08-29 RX ORDER — LEVOTHYROXINE SODIUM 112 UG/1
112 TABLET ORAL DAILY
Qty: 90 TABLET | Refills: 1 | Status: SHIPPED | OUTPATIENT
Start: 2025-08-29

## 2025-08-29 RX ORDER — GABAPENTIN 100 MG/1
100 CAPSULE ORAL 2 TIMES DAILY PRN
Qty: 60 CAPSULE | Refills: 0 | Status: SHIPPED | OUTPATIENT
Start: 2025-08-29 | End: 2025-09-28

## 2025-08-31 ENCOUNTER — PATIENT MESSAGE (OUTPATIENT)
Age: 45
End: 2025-08-31

## 2025-08-31 DIAGNOSIS — R10.30 LOWER ABDOMINAL PAIN: ICD-10-CM

## 2025-08-31 DIAGNOSIS — R10.2 PELVIC PAIN: ICD-10-CM

## 2025-08-31 DIAGNOSIS — R09.81 CHRONIC NASAL CONGESTION: ICD-10-CM

## 2025-08-31 DIAGNOSIS — K44.9 HIATAL HERNIA: ICD-10-CM

## 2025-08-31 DIAGNOSIS — K21.9 GASTROESOPHAGEAL REFLUX DISEASE WITHOUT ESOPHAGITIS: ICD-10-CM

## 2025-09-02 RX ORDER — PANTOPRAZOLE SODIUM 40 MG/1
40 TABLET, DELAYED RELEASE ORAL DAILY PRN
Qty: 90 TABLET | Refills: 0 | Status: SHIPPED | OUTPATIENT
Start: 2025-09-02

## 2025-09-02 RX ORDER — LORATADINE 10 MG/1
10 TABLET ORAL DAILY
Qty: 90 TABLET | Refills: 0 | Status: SHIPPED | OUTPATIENT
Start: 2025-09-02

## 2025-09-03 RX ORDER — DICYCLOMINE HYDROCHLORIDE 10 MG/1
CAPSULE ORAL
Qty: 90 CAPSULE | Refills: 0 | Status: SHIPPED | OUTPATIENT
Start: 2025-09-03

## 2025-09-05 ENCOUNTER — HOSPITAL ENCOUNTER (OUTPATIENT)
Dept: PHYSICAL THERAPY | Age: 45
Setting detail: THERAPIES SERIES
Discharge: HOME | End: 2025-09-05
Attending: PAIN MEDICINE
Payer: COMMERCIAL

## 2025-09-05 PROCEDURE — 97140 MANUAL THERAPY 1/> REGIONS: CPT

## 2025-09-05 PROCEDURE — 97110 THERAPEUTIC EXERCISES: CPT

## 2025-09-05 ASSESSMENT — PAIN DESCRIPTION - LOCATION: LOCATION: BACK

## 2025-09-05 ASSESSMENT — PAIN SCALES - GENERAL: PAINLEVEL_OUTOF10: 7

## 2025-09-05 ASSESSMENT — PAIN DESCRIPTION - ORIENTATION: ORIENTATION: RIGHT;LEFT;LOWER

## 2025-09-05 ASSESSMENT — PAIN DESCRIPTION - DESCRIPTORS: DESCRIPTORS: SQUEEZING

## (undated) DEVICE — TRAY PREP DRY W/ PREM GLV 2 APPL 6 SPNG 2 UNDPD 1 OVERWRAP

## (undated) DEVICE — GAUZE,SPONGE,4"X4",16PLY,XRAY,STRL,LF: Brand: MEDLINE

## (undated) DEVICE — SINGLE PORT MANIFOLD: Brand: NEPTUNE 2

## (undated) DEVICE — NEEDLE INSUF L120MM ULT VERES ENDOPATH

## (undated) DEVICE — MATTRESS OVERLAY EGGCRATE 72X33IN FOAM PAD

## (undated) DEVICE — Z DUPLICATE USE 2431315 SET INSUF TBNG HI FLO W/ SMK EVAC FOR PNEUMOCLEAR

## (undated) DEVICE — TUBING, SUCTION, 1/4" X 10', STRAIGHT: Brand: MEDLINE

## (undated) DEVICE — CONMED SCOPE SAVER BITE BLOCK, 20X27 MM: Brand: SCOPE SAVER

## (undated) DEVICE — GOWN,AURORA,NONREINFORCED,LARGE: Brand: MEDLINE

## (undated) DEVICE — COUNTER NDL 40 COUNT HLD 70 FOAM BLK ADH W/ MAG

## (undated) DEVICE — TUBING IRRIGATION 140/160/180/190 SER GI ENDOSCP SMARTCAP

## (undated) DEVICE — POUCH, INSTRUMENT, 3POCKET, INVISISHIELD: Brand: MEDLINE

## (undated) DEVICE — BRUSH ENDO CLN L90.5IN SHTH DIA1.7MM BRIST DIA5-7MM 2-6MM

## (undated) DEVICE — LABEL MED MINI W/ MARKER

## (undated) DEVICE — TUBE SET 96 MM 64 MM H2O PERISTALTIC STD AUX CHANNEL

## (undated) DEVICE — 4-PORT MANIFOLD: Brand: NEPTUNE 2

## (undated) DEVICE — SPONGE,LAP,18"X18",DLX,XR,ST,5/PK,40/PK: Brand: MEDLINE

## (undated) DEVICE — DRAPE, LAVH, STERILE: Brand: MEDLINE

## (undated) DEVICE — KIT,ANTI FOG,W/SPONGE & FLUID,SOFT PACK: Brand: MEDLINE

## (undated) DEVICE — COVER LT HNDL BLU PLAS

## (undated) DEVICE — APPLICATOR MEDICATED 26 CC SOLUTION HI LT ORNG CHLORAPREP

## (undated) DEVICE — ELECTRODE PT RET AD L9FT HI MOIST COND ADH HYDRGEL CORDED

## (undated) DEVICE — ADAPTER FLSH PMP FLD MGMT GI IRRIG OFP 2 DISPOSABLE

## (undated) DEVICE — TROCAR: Brand: KII FIOS FIRST ENTRY

## (undated) DEVICE — COVER,TABLE,44X90,STERILE: Brand: MEDLINE

## (undated) DEVICE — PACK,SET UP,DRAPE: Brand: MEDLINE

## (undated) DEVICE — Device

## (undated) DEVICE — LINER PAD CONTOUR SUPER PEACH 7X14IN

## (undated) DEVICE — GOWN,SIRUS,POLYRNF,BRTHSLV,XLN/XL,20/CS: Brand: MEDLINE

## (undated) DEVICE — TROCAR: Brand: KII® SLEEVE

## (undated) DEVICE — SYRINGE MED 10ML TRNSLUC BRL PLUNG BLK MRK POLYPR CTRL

## (undated) DEVICE — ENDO CARRY-ON PROCEDURE KIT: Brand: ENDO CARRY-ON PROCEDURE KIT

## (undated) DEVICE — SHEARS ENDOSCP L36CM DIA5MM ULTRASONIC CRV TIP HARM

## (undated) DEVICE — VCARE MEDIUM, UTERINE MANIPULATOR, VAGINAL-CERVICAL-AHLUWALIA'S-RETRACTOR-ELEVATOR: Brand: VCARE

## (undated) DEVICE — SUTURE MCRYL SZ 4-0 L27IN ABSRB UD L19MM PS-2 1/2 CIR PRIM Y426H

## (undated) DEVICE — Device: Brand: ENDO SMARTCAP

## (undated) DEVICE — DEVICE TRCR 12X9X3IN WHT CLSR DISP OMNICLOSE

## (undated) DEVICE — SUTURE VCRL SZ 0 L36IN ABSRB UD L36MM CT-1 1/2 CIR J946H

## (undated) DEVICE — GLOVE SURG SZ 9 THK91MIL LTX FREE SYN POLYISOPRENE ANTI

## (undated) DEVICE — TOTAL TRAY, DB, 100% SILI FOLEY, 16FR 10: Brand: MEDLINE

## (undated) DEVICE — WARMER SCP 2 ANTIFOG LAP DISP

## (undated) DEVICE — TOWEL,OR,DSP,ST,BLUE,STD,4/PK,20PK/CS: Brand: MEDLINE

## (undated) DEVICE — GLOVE SURG SZ 85 L12IN FNGR THK94MIL TRNSLUC YEL LTX

## (undated) DEVICE — INTENDED FOR TISSUE SEPARATION, AND OTHER PROCEDURES THAT REQUIRE A SHARP SURGICAL BLADE TO PUNCTURE OR CUT.: Brand: BARD-PARKER ® CARBON RIB-BACK BLADES